# Patient Record
Sex: MALE | Race: WHITE | Employment: OTHER | URBAN - METROPOLITAN AREA
[De-identification: names, ages, dates, MRNs, and addresses within clinical notes are randomized per-mention and may not be internally consistent; named-entity substitution may affect disease eponyms.]

---

## 2017-01-05 ENCOUNTER — MEDICAL CORRESPONDENCE (OUTPATIENT)
Dept: HEALTH INFORMATION MANAGEMENT | Facility: CLINIC | Age: 82
End: 2017-01-05

## 2017-01-12 ENCOUNTER — OFFICE VISIT (OUTPATIENT)
Dept: INTERNAL MEDICINE | Facility: CLINIC | Age: 82
End: 2017-01-12

## 2017-01-12 VITALS
WEIGHT: 193 LBS | HEART RATE: 57 BPM | BODY MASS INDEX: 26.93 KG/M2 | SYSTOLIC BLOOD PRESSURE: 151 MMHG | DIASTOLIC BLOOD PRESSURE: 67 MMHG | RESPIRATION RATE: 16 BRPM

## 2017-01-12 DIAGNOSIS — F43.22 ADJUSTMENT DISORDER WITH ANXIOUS MOOD: Primary | ICD-10-CM

## 2017-01-12 DIAGNOSIS — I10 ESSENTIAL HYPERTENSION: ICD-10-CM

## 2017-01-12 ASSESSMENT — PAIN SCALES - GENERAL: PAINLEVEL: NO PAIN (1)

## 2017-01-12 NOTE — NURSING NOTE
Chief Complaint   Patient presents with     Recheck Medication     Patient is here to recheck medication     Ammy Araujo CMA 10:01 AM on 1/12/2017.

## 2017-01-12 NOTE — PATIENT INSTRUCTIONS
Primary Care Center Medication Refill Request Information:  * Please contact your pharmacy regarding ANY request for medication refills.  ** [facility] Prescription Fax = [phone]  * Please allow 3 business days for routine medication refills.  * Please allow 5 business days for controlled substance medication refills.     Primary Care Center Test Result notification information:  *You will be notified with in 7-10 days of your appointment day regarding the results of your test.  If you are on MyChart you will be notified as soon as the provider has reviewed the results and signed off on them.

## 2017-01-12 NOTE — PROGRESS NOTES
HPI  Daniel Mcnair is an 87M with PMH of anxiety, anal stenosis, CAD, and HTN who is presenting for routine health maintenance examination.      He was started on Zoloft at his last apppointment on 12/20, but is so far uncertain of whether it has been beneficial due to recent events.  He's experienced no side effects. 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  He feels he continues to have anxiety, and would like to continue the medication, but is not interested in seeing a therapist at this time.    Otherwise, he would like to clarify his medication list.  He continues to manage his ongoing medical conditions, most notably his anal stenosis, but had no further concerns for this visit.        ROS  No chest pain  No abdominal pain  No constipation or diarrhea    PMH  Past Medical History   Diagnosis Date     Anal stricture 7/29/2011     Hypertrophy of prostate without urinary obstruction and other lower urinary tract symptoms (LUTS) 7/21/2011     Idiopathic gynecomastia 7/29/2011     Unspecified essential hypertension 7/21/2011     Other and unspecified hyperlipidemia 7/21/2011     Spinal stenosis, lumbar region, without neurogenic claudication 7/21/2011     Impotence of organic origin 7/21/2011     Lymphoma (H) 1/25/2012     Coronary artery disease 9/5/2012     Esophageal reflux 3/20/2013     Baker's cyst      Chronic pain      left hip     Stented coronary artery 2003     Hearing loss      Snoring      Basal cell carcinoma      Macular degeneration, dry          PSH  Past Surgical History   Procedure Laterality Date     Arthroplasty hip  10/2008     left     Arthroplasty hip  1999     right     Hernia repair       Extracapsular cataract extration with intraocular lens implant  2005     Extracapsular cataract extration with intraocular lens implant  2010     Mastoid surgery  1939     Cardiac surgery  2001     stent x 5      Cardiac  surgery  2002     Back surgery  2006     Optical tracking system fusion spine posterior lumbar two levels  7/11/2013     Procedure: OPTICAL TRACKING SYSTEM FUSION SPINE POSTERIOR LUMBAR TWO LEVELS;  Stealth Assisted Lumbar 3-4 Transforaminal Lumbar Interbody Fusion, Lumbar 2-3 Lumbar 3-4 Laminectomy Decompression;  Surgeon: Edgar Lew MD;  Location: [facility]     Cystoscopy, transurethral resection (tur) prostate, combined  11/5/2013     Procedure: COMBINED CYSTOSCOPY, TRANSURETHRAL RESECTION (TUR) PROSTATE;  Transurethral Resection Of Prostate, Bipolar;  Surgeon: Lai Street MD;  Location: [facility]     Arthroplasty revision hip  1/21/2014     Procedure: ARTHROPLASTY REVISION HIP;  Revision Left Total Hip;  Surgeon: Edgar Grewal MD;  Location: [facility]     Esophagoscopy, gastroscopy, duodenoscopy (egd), combined  2/3/2014     Procedure: COMBINED ESOPHAGOSCOPY, GASTROSCOPY, DUODENOSCOPY (EGD);;  Surgeon: Ezra Fiore MD;  Location: [facility]     Esophagoscopy, gastroscopy, duodenoscopy (egd), combined  4/15/2014     Procedure: COMBINED ESOPHAGOSCOPY, GASTROSCOPY, DUODENOSCOPY (EGD), BIOPSY SINGLE OR MULTIPLE;  Surgeon: Ezra Fiore MD;  Location: [facility]     Cataract iol, rt/lt Bilateral      Mohs micrographic procedure           MEDICATIONS    Current outpatient prescriptions:      gentamicin (GARAMYCIN) 0.1 % ointment, , Disp: , Rfl: 3     atorvastatin (LIPITOR) 20 MG tablet, Take 1 tablet (20 mg) by mouth daily, Disp: 90 tablet, Rfl: 2     amLODIPine (NORVASC) 10 MG tablet, Take 1 tablet (10 mg) by mouth daily, Disp: 90 tablet, Rfl: 1     acetaminophen-codeine (TYLENOL W/CODEINE NO. 3) 300-30 MG per tablet, Take 1-2 tablets by mouth every 4 hours as needed for mild pain, Disp: 30 tablet, Rfl: 1     benazepril (LOTENSIN) 40 MG tablet, Take 1 tablet (40 mg) by mouth daily, Disp: 90 tablet, Rfl: 3     tamsulosin (FLOMAX) 0.4 MG 24 hr capsule, Take 1 capsule (0.4 mg) by mouth daily, Disp:  90 capsule, Rfl: 3     triamcinolone (KENALOG) 0.1 % ointment, Apply topically 2 times daily To itchy rashes, Disp: 80 g, Rfl: 3     metoprolol (TOPROL-XL) 25 MG 24 hr tablet, Take 0.5 tablets (12.5 mg) by mouth daily, Disp: 45 tablet, Rfl: 3     tolterodine (DETROL) 2 MG tablet, Take 1 tablet (2 mg )twice a day, Disp: 180 tablet, Rfl: 3     ranitidine (ZANTAC) 150 MG tablet, Take 1 tablet (150 mg) by mouth 2 times daily, Disp: 180 tablet, Rfl: 3     desonide (DESOWEN) 0.05 % ointment, Apply topically 2 times daily To affected areas of scale and redness on the face and ears as needed until clear., Disp: 60 g, Rfl: 11     sildenafil (VIAGRA) 100 MG tablet, Take 1 tablet (100 mg) by mouth daily as needed for erectile dysfunction, Disp: 10 tablet, Rfl: 2     Multiple Vitamins-Minerals (VITEYES AREDS FORMULA/LUTEIN) CAPS, Take by mouth daily, Disp: , Rfl:      psyllium 0.52 G capsule, Take by mouth daily, Disp: , Rfl:      nitroglycerin (NITROSTAT) 0.4 MG SL tablet, Place 1 tablet (0.4 mg) under the tongue every 5 minutes as needed, Disp: 30 tablet, Rfl: 2     triamcinolone (KENALOG) 0.025 % cream, Apply topically 2 times daily, Disp: 45 g, Rfl: 0     Cyanocobalamin (VITAMIN B 12 PO), Take by mouth daily, Disp: , Rfl:      aspirin 81 MG EC tablet, Take 1 tablet (81 mg) by mouth daily, Disp: 90 tablet, Rfl: 3     acetaminophen (TYLENOL) 325 MG tablet, Take 2 tablets (650 mg) by mouth every 6 hours as needed for pain, Disp: 100 tablet, Rfl: 0     Magnesium Hydroxide (MILK OF MAGNESIA PO), Take by mouth At Bedtime, Disp: , Rfl:      docusate sodium 100 MG tablet, Take 100 mg by mouth 2 times daily, Disp: 60 tablet, Rfl: 1     bisacodyl (DULCOLAX) 10 MG suppository, Place 1 suppository rectally daily as needed., Disp: 3 suppository, Rfl: 0     melatonin 3 MG tablet, Take 5 mg by mouth At Bedtime , Disp: , Rfl:      Guar Gum (BENEFIBER) packet, Take 1 packet by mouth every morning , Disp: , Rfl:         PHYSICAL  EXAMINATION  /67 mmHg  Pulse 57  Resp 16  Wt 87.544 kg (193 lb)  Constitutional: anxious, alert  HEENT:  No masses or lesions.  No LAD.  Cardiovascular: RRR, no murmurs   Respiratory: CTAB, no increased work of breathing  Gastrointestinal: Abdomen soft, non-tender.   Musculoskeletal: extremities normal- no LE edema  Skin: no suspicious lesions or rashes  Neurologic: alert and oriented x3  Psychiatric: affect normal/bright      ASSESSMENT/PLAN    1. Adjustment disorder with anxious mood.  Started on zoloft 25mg on 12/20/16.  Currently not interested in therapy.  Will increase zoloft to 50mg and re-evaluate in 1 month.      2. Benign Essential HTN.  Currently taking amlodipine 10mg, metoprolol 25mg, benazepril 40mg.  Given anxiety and recent stressors, no indication for addition of medication to current regimen.  Will reconsider at next visit.    3. Follow up in 2 months      This note was scribed by SUJATA Pena MS4 for Dr. Albertina Gross--Internal Medicine.    The medical student acted as scribe and the encounter documented above was completely performed by myself.  Supervising Doctor Albertina Gross

## 2017-01-12 NOTE — MR AVS SNAPSHOT
After Visit Summary   1/12/2017    Daniel Mcnair    MRN: [id number]           Patient Information     Date Of Birth          10/17/1929        Visit Information        Provider Department      1/12/2017 9:55 AM Albertina Gross MD [facility]        Today's Diagnoses     Adjustment disorder with anxious mood    -  1       Care Instructions    Primary Care Center Medication Refill Request Information:  * Please contact your pharmacy regarding ANY request for medication refills.  ** [facility] Prescription Fax = [phone]  * Please allow 3 business days for routine medication refills.  * Please allow 5 business days for controlled substance medication refills.     Primary Care Center Test Result notification information:  *You will be notified with in 7-10 days of your appointment day regarding the results of your test.  If you are on MyChart you will be notified as soon as the provider has reviewed the results and signed off on them.          Follow-ups after your visit        Follow-up notes from your care team     Return in about 2 months (around 3/12/2017).      Your next 10 appointments already scheduled     Feb 01, 2017 10:30 AM   (Arrive by 10:15 AM)   Return Visit with Gabo Valdez MD   [facility] Physical Medicine and Rehabilitation ([facility] [place])    [address]  3rd Floor  [facility] [id number] [phone]            Mar 15, 2017  9:25 AM   (Arrive by 9:10 AM)   Return Visit with Albertina Gross MD   [facility] ([facility] [place])    [address]  4th Floor  [facility] [id number] [phone]            May 18, 2017  9:00 AM   (Arrive by 8:45 AM)   CT CHEST/ABDOMEN/PELVIS W CONTRAST with UCCT2   [facility] Imaging Center CT ([facility] [place])    [address]  1st [facility] [id number] [phone]           Please bring any scans or  X-rays taken at other hospitals, if similar tests were done. Also bring a list of your medicines, including vitamins, minerals and over-the-counter drugs. It is safest to leave personal items at home.  Be sure to tell your doctor:   If you have any allergies.   If there s any chance you are pregnant.   If you are breastfeeding.   If you have any special needs.  You may have contrast for this exam. To prepare:   Do not eat or drink for 2 hours before your exam. If you need to take medicine, you may take it with small sips of water. (We may ask you to take liquid medicine as well.)   The day before your exam, drink extra fluids at least six 8-ounce glasses (unless your doctor tells you to restrict your fluids).  Patients over 70 or patients with diabetes or kidney problems:   If you haven t had a blood test (creatinine test) within the last 30 days, go to your clinic or Diagnostic Imaging Department for this test.  If you have diabetes:   If your kidney function is normal, continue taking your metformin (Avandamet, Glucophage, Glucovance, Metaglip) on the day of your exam.   If your kidney function is abnormal, wait 48 hours before restarting this medicine.  You will have oral contrast for this exam:   You will drink the contrast at home. Get this from your clinic or Diagnostic Imaging Department. Please follow the directions given.  Please wear loose clothing, such as a sweat suit or jogging clothes. Avoid snaps, zippers and other metal. We may ask you to undress and put on a hospital gown.  If you have any questions, please call the Imaging Department where you will have your exam.            May 18, 2017 10:00 AM   Live Lab Draw with  LIVE LAB DRAW   [facility] Live Lab Draw ([facility] [place])    [address] [address] [phone]            May 18, 2017 12:00 PM   (Arrive by 11:45 AM)   Return Visit with MD SOLANGE [facility] Live  Cancer Clinic ([facility] [place])    [address]  2nd Floor  [facility] [address] [phone]            Jun 05, 2017  8:45 AM   RETURN RETINA with Luna Parker MD   Eye Clinic ([facility])    [address]  [address]  9th [address]  [facility] [address] [phone]              Who to contact     Please call your clinic at [phone] to:    Ask questions about your health    Make or cancel appointments    Discuss your medicines    Learn about your test results    Speak to your doctor   If you have compliments or concerns about an experience at your clinic, or if you wish to file a complaint, please contact [facility] Patient Relations at [phone] or email us at [email or web address]         Additional Information About Your Visit        Loandesk[employer] Dragon Information     [employer]okt gives you secure access to your electronic health record. If you see a primary care provider, you can also send messages to your care team and make appointments. If you have questions, please call your primary care clinic.  If you do not have a primary care provider, please call [phone] and they will assist you.      Embrace is an electronic gateway that provides easy, online access to your medical records. With Embrace, you can request a clinic appointment, read your test results, renew a prescription or communicate with your care team.     To access your existing account, please contact your [facility] Clinic or call [phone] for assistance.        Care EveryWhere ID     This is your Care EveryWhere ID. This could be used by other organizations to access your [place] medical records  [id number]        Your Vitals Were     Pulse Respirations                57 16           Blood Pressure from Last 3 Encounters:   01/12/17 151/67   12/14/16 135/73   11/30/16 140/61    Weight from Last 3  Encounters:   01/12/17 87.544 kg (193 lb)   12/14/16 88.633 kg (195 lb 6.4 oz)   11/23/16 89.495 kg (197 lb 4.8 oz)              Today, you had the following     No orders found for display         Today's Medication Changes          These changes are accurate as of: 1/12/17 11:02 AM.  If you have any questions, ask your nurse or doctor.               Stop taking these medicines if you haven't already. Please contact your care team if you have questions.     loratadine 10 MG tablet   Commonly known as:  CLARITIN           sertraline 25 MG tablet   Commonly known as:  ZOLOFT                    Primary Care Provider Office Phone # Fax #    Albertina Gross [phone]        [facility] [address] [place] [address] [address]  [facility] [address]        Thank you!     Thank you for choosing [facility]  for your care. Our goal is always to provide you with excellent care. Hearing back from our patients is one way we can continue to improve our services. Please take a few minutes to complete the written survey that you may receive in the mail after your visit with us. Thank you!             Your Updated Medication List - Protect others around you: Learn how to safely use, store and throw away your medicines at www.disposemymeds.org.          This list is accurate as of: 1/12/17 11:02 AM.  Always use your most recent med list.                   Brand Name Dispense Instructions for use    acetaminophen 325 MG tablet    TYLENOL    100 tablet    Take 2 tablets (650 mg) by mouth every 6 hours as needed for pain       acetaminophen-codeine 300-30 MG per tablet    TYLENOL w/CODEINE No. 3    30 tablet    Take 1-2 tablets by mouth every 4 hours as needed for mild pain       amLODIPine 10 MG tablet    NORVASC    90 tablet    Take 1 tablet (10 mg) by mouth daily       aspirin 81 MG EC tablet     90 tablet    Take 1 tablet (81 mg) by mouth daily       atorvastatin 20 MG tablet    LIPITOR    90 tablet    Take  1 tablet (20 mg) by mouth daily       benazepril 40 MG tablet    LOTENSIN    90 tablet    Take 1 tablet (40 mg) by mouth daily       bisacodyl 10 MG Suppository    DULCOLAX    3 suppository    Place 1 suppository rectally daily as needed.       desonide 0.05 % ointment    DESOWEN    60 g    Apply topically 2 times daily To affected areas of scale and redness on the face and ears as needed until clear.       docusate sodium 100 MG tablet    COLACE    60 tablet    Take 100 mg by mouth 2 times daily       gentamicin 0.1 % ointment    GARAMYCIN         melatonin 3 MG tablet      Take 5 mg by mouth At Bedtime       metoprolol 25 MG 24 hr tablet    TOPROL-XL    45 tablet    Take 0.5 tablets (12.5 mg) by mouth daily       MILK OF MAGNESIA PO      Take by mouth At Bedtime       nitroglycerin 0.4 MG sublingual tablet    NITROSTAT    30 tablet    Place 1 tablet (0.4 mg) under the tongue every 5 minutes as needed       NutriSource Fiber packet      Take 1 packet by mouth every morning       psyllium 0.52 G capsule      Take by mouth daily       ranitidine 150 MG tablet    ZANTAC    180 tablet    Take 1 tablet (150 mg) by mouth 2 times daily       sildenafil 100 MG cap/tab    REVATIO/VIAGRA    10 tablet    Take 1 tablet (100 mg) by mouth daily as needed for erectile dysfunction       tamsulosin 0.4 MG capsule    FLOMAX    90 capsule    Take 1 capsule (0.4 mg) by mouth daily       tolterodine 2 MG tablet    DETROL    180 tablet    Take 1 tablet (2 mg )twice a day       * triamcinolone 0.025 % cream    KENALOG    45 g    Apply topically 2 times daily       * triamcinolone 0.1 % ointment    KENALOG    80 g    Apply topically 2 times daily To itchy rashes       VITAMIN B 12 PO      Take by mouth daily       VITEYES AREDS FORMULA/LUTEIN Caps      Take by mouth daily       * Notice:  This list has 2 medication(s) that are the same as other medications prescribed for you. Read the directions carefully, and ask your doctor or other  care provider to review them with you.

## 2017-02-01 ENCOUNTER — OFFICE VISIT (OUTPATIENT)
Dept: PHYSICAL MEDICINE AND REHAB | Facility: CLINIC | Age: 82
End: 2017-02-01

## 2017-02-01 VITALS
SYSTOLIC BLOOD PRESSURE: 141 MMHG | HEIGHT: 70 IN | BODY MASS INDEX: 27.66 KG/M2 | HEART RATE: 54 BPM | WEIGHT: 193.2 LBS | DIASTOLIC BLOOD PRESSURE: 56 MMHG | OXYGEN SATURATION: 97 % | RESPIRATION RATE: 20 BRPM

## 2017-02-01 DIAGNOSIS — M54.16 LUMBAR RADICULOPATHY: Primary | ICD-10-CM

## 2017-02-01 DIAGNOSIS — M70.62 GREATER TROCHANTERIC BURSITIS OF LEFT HIP: ICD-10-CM

## 2017-02-01 ASSESSMENT — PAIN SCALES - GENERAL: PAINLEVEL: MILD PAIN (2)

## 2017-02-01 NOTE — Clinical Note
2/1/2017       RE: Daniel Mcnair  [address] [id number]  [place] [phone]     Dear Colleague,    Thank you for referring your patient, Daniel Mcnair, to the [facility] PHYSICAL MEDICINE AND REHABILITATION at [facility]. Please see a copy of my visit note below.    PM&R Clinic F/u for Left Greater Trochanteric Steroid Injection    This is a 88 yo M with chronic low back pain, L > R hip pain likely from greater trochanteric pain syndrome, in the setting of chronic R L5 radiculopathy with myofascial pain.   He was last seen on 11/30/16 and here for the stated procedure.     Procedure: Left Greater Trochanteric Steroid Injection    Diagnosis: Greater Trochanteric pain syndrome     After explaining the procedure (risks: bleeding, infection, post-injection flare, infection, uncontrolled blood glucose levels, subcutaneous fat atrophy, skin depigmentation and benefits: symptom improvement) he gave informed consent.      The patient was placed in right lateral decubitus position and the anatomical area to be infiltrated was exposed.  The area was cleaned with chloro prep.    Using a 25 G 2 in needle, 4 mL of 0.25 % Bupivacaine (NDC 55150-167-10, Exp 03/2018)  40 mg per 1 mL of Kenalog / Triamcinolone (NDC 9436-2408-42, Exp May 2018) were infiltrated to Left Greater Trochanteric Area using an in-plane sono-guided technique with adequate needle visualization.    The area was cleaned and a band-aid was placed.  No post-procedure complication was observed and the patient tolerated the procedure appropriately.    Instructions:  1. Watch for medication side effects (bleeding, infection, post-injection flare, infection, uncontrolled blood glucose levels, subcutaneous fat atrophy, skin depigmentation)  2. Apply ice to area three times a day for 20-30 min during the first 48h.  3. Resume HEP in a few days.        4. Referral to [facility] for repeat L5-S1  Right transforaminal steroid injection.     F/u in 8 weeks           Again, thank you for allowing me to participate in the care of your patient.      Sincerely,    Gabo Valdez MD

## 2017-02-01 NOTE — PROGRESS NOTES
PM&R Clinic F/u for Left Greater Trochanteric Steroid Injection    This is a 88 yo M with chronic low back pain, L > R hip pain likely from greater trochanteric pain syndrome, in the setting of chronic R L5 radiculopathy with myofascial pain.   He was last seen on 11/30/16 and here for the stated procedure.     Procedure: Left Greater Trochanteric Steroid Injection    Diagnosis: Greater Trochanteric pain syndrome     After explaining the procedure (risks: bleeding, infection, post-injection flare, infection, uncontrolled blood glucose levels, subcutaneous fat atrophy, skin depigmentation and benefits: symptom improvement) he gave informed consent.      The patient was placed in right lateral decubitus position and the anatomical area to be infiltrated was exposed.  The area was cleaned with chloro prep.    Using a 25 G 2 in needle, 4 mL of 0.25 % Bupivacaine (NDC 55150-167-10, Exp 03/2018)  40 mg per 1 mL of Kenalog / Triamcinolone (NDC 5355-0082-56, Exp May 2018) were infiltrated to Left Greater Trochanteric Area using an in-plane sono-guided technique with adequate needle visualization.    The area was cleaned and a band-aid was placed.  No post-procedure complication was observed and the patient tolerated the procedure appropriately.    Instructions:  1. Watch for medication side effects (bleeding, infection, post-injection flare, infection, uncontrolled blood glucose levels, subcutaneous fat atrophy, skin depigmentation)  2. Apply ice to area three times a day for 20-30 min during the first 48h.  3. Resume HEP in a few days.        4. Referral to [facility] [place] for repeat L5-S1 Right transforaminal steroid injection.     F/u in 8 weeks

## 2017-02-02 ENCOUNTER — CARE COORDINATION (OUTPATIENT)
Dept: PHYSICAL MEDICINE AND REHAB | Facility: CLINIC | Age: 82
End: 2017-02-02

## 2017-02-02 NOTE — PROGRESS NOTES
Order for fluoroscopic guided lumbar (right L5-S1) transforaminal injection placed with [facility] via their website, requesting [place] location.

## 2017-02-15 ENCOUNTER — TRANSFERRED RECORDS (OUTPATIENT)
Dept: HEALTH INFORMATION MANAGEMENT | Facility: CLINIC | Age: 82
End: 2017-02-15

## 2017-03-11 DIAGNOSIS — I10 BENIGN ESSENTIAL HYPERTENSION: ICD-10-CM

## 2017-03-13 RX ORDER — AMLODIPINE BESYLATE 10 MG/1
10 TABLET ORAL DAILY
Qty: 90 TABLET | Refills: 0 | Status: SHIPPED | OUTPATIENT
Start: 2017-03-13 | End: 2017-06-21

## 2017-03-13 NOTE — TELEPHONE ENCOUNTER
amLODIPine (NORVASC) 10 MG  Last Written Prescription Date:  10/14/16  Last Fill Quantity: 90,   # refills: 1  Last Office Visit : 1/12/17  Future Office visit:  3/15/17  BP Readings from Last 3 Encounters:   02/01/17 141/56   01/12/17 151/67   12/14/16 135/73

## 2017-03-15 ENCOUNTER — OFFICE VISIT (OUTPATIENT)
Dept: INTERNAL MEDICINE | Facility: CLINIC | Age: 82
End: 2017-03-15

## 2017-03-15 VITALS
SYSTOLIC BLOOD PRESSURE: 126 MMHG | RESPIRATION RATE: 16 BRPM | DIASTOLIC BLOOD PRESSURE: 69 MMHG | BODY MASS INDEX: 26.72 KG/M2 | WEIGHT: 186.2 LBS | HEART RATE: 54 BPM

## 2017-03-15 DIAGNOSIS — I10 ESSENTIAL HYPERTENSION, BENIGN: Primary | ICD-10-CM

## 2017-03-15 DIAGNOSIS — K21.9 GASTROESOPHAGEAL REFLUX DISEASE WITHOUT ESOPHAGITIS: ICD-10-CM

## 2017-03-15 DIAGNOSIS — F43.22 ADJUSTMENT DISORDER WITH ANXIOUS MOOD: ICD-10-CM

## 2017-03-15 RX ORDER — METOPROLOL SUCCINATE 25 MG/1
12.5 TABLET, EXTENDED RELEASE ORAL DAILY
Qty: 45 TABLET | Refills: 3 | Status: SHIPPED | OUTPATIENT
Start: 2017-03-15 | End: 2018-03-08

## 2017-03-15 ASSESSMENT — PAIN SCALES - GENERAL: PAINLEVEL: NO PAIN (0)

## 2017-03-15 NOTE — PATIENT INSTRUCTIONS
Primary Care Center Medication Refill Request Information:  * Please contact your pharmacy regarding ANY request for medication refills.  ** [facility] Prescription Fax = [phone]  * Please allow 3 business days for routine medication refills.  * Please allow 5 business days for controlled substance medication refills.     Primary Care Center Test Result notification information:  *You will be notified with in 7-10 days of your appointment day regarding the results of your test.  If you are on MyChart you will be notified as soon as the provider has reviewed the results and signed off on them.

## 2017-03-15 NOTE — MR AVS SNAPSHOT
After Visit Summary   3/15/2017    Daniel Mcnair    MRN: [id number]           Patient Information     Date Of Birth          10/17/1929        Visit Information        Provider Department      3/15/2017 9:25 AM Albertina Gross MD [facility]        Today's Diagnoses     Essential hypertension, benign    -  1    Gastroesophageal reflux disease without esophagitis        Adjustment disorder with anxious mood          Care Instructions    Primary Care Center Medication Refill Request Information:  * Please contact your pharmacy regarding ANY request for medication refills.  ** [facility] Prescription Fax = [phone]  * Please allow 3 business days for routine medication refills.  * Please allow 5 business days for controlled substance medication refills.     Primary Care Center Test Result notification information:  *You will be notified with in 7-10 days of your appointment day regarding the results of your test.  If you are on MyChart you will be notified as soon as the provider has reviewed the results and signed off on them.          Follow-ups after your visit        Your next 10 appointments already scheduled     Apr 25, 2017  2:30 PM CDT   (Arrive by 2:15 PM)   Return Visit with Gabo Valdez MD   [facility] ([facility])    [address] [place] [address] [id number] [phone]            May 18, 2017  9:00 AM CDT   (Arrive by 8:45 AM)   CT CHEST/ABDOMEN/PELVIS W CONTRAST with UCCT2   [facility] [place] [facility] ([facility])    [address] [place] [address] [id number] [phone]           Please bring any scans or X-rays taken at other hospitals, if similar tests were done. Also bring a list of your medicines, including vitamins, minerals and over-the-counter drugs. It is safest to leave personal items at home.  Be sure to tell your doctor:    If you have any allergies.   If there s any chance you are pregnant.   If you are breastfeeding.   If you have any special needs.  You may have contrast for this exam. To prepare:   Do not eat or drink for 2 hours before your exam. If you need to take medicine, you may take it with small sips of water. (We may ask you to take liquid medicine as well.)   The day before your exam, drink extra fluids at least six 8-ounce glasses (unless your doctor tells you to restrict your fluids).  Patients over 70 or patients with diabetes or kidney problems:   If you haven t had a blood test (creatinine test) within the last 30 days, go to your clinic or Diagnostic Imaging Department for this test.  If you have diabetes:   If your kidney function is normal, continue taking your metformin (Avandamet, Glucophage, Glucovance, Metaglip) on the day of your exam.   If your kidney function is abnormal, wait 48 hours before restarting this medicine.  You will have oral contrast for this exam:   You will drink the contrast at home. Get this from your clinic or Diagnostic Imaging Department. Please follow the directions given.  Please wear loose clothing, such as a sweat suit or jogging clothes. Avoid snaps, zippers and other metal. We may ask you to undress and put on a hospital gown.  If you have any questions, please call the Imaging Department where you will have your exam.            May 18, 2017 10:00 AM CDT   [employer] [facility] [employer] Lab Draw with  [facility] LAB DRAW   [facility] Lab Draw ([facility])    [address] [place] [id number]   [phone]            May 18, 2017 12:00 PM CDT   (Arrive by 11:45 AM)   Return Visit with Tanner Rojas MD   [facility] ([facility])    [address] [place] [id number]   [phone]            Jun 05, 2017  8:45 AM CDT   RETURN RETINA with Luna Parker MD   [facility]  ([facility])    [address]  [address]  [address]  [facility] [address]   [phone]            Jun 21, 2017  9:25 AM CDT   (Arrive by 9:10 AM)   Return Visit with Albertina Gross MD   [facility] ([facility])    [address]  4th Floor  [facility] [id number] [phone]              Who to contact     Please call your clinic at [phone] to:    Ask questions about your health    Make or cancel appointments    Discuss your medicines    Learn about your test results    Speak to your doctor   If you have compliments or concerns about an experience at your clinic, or if you wish to file a complaint, please contact [facility] Patient Relations at [phone] or email us at [email or web address]         Additional Information About Your Visit        FUELUPharContact At Once! Information     [employer] gives you secure access to your electronic health record. If you see a primary care provider, you can also send messages to your care team and make appointments. If you have questions, please call your primary care clinic.  If you do not have a primary care provider, please call [phone] and they will assist you.      [employer] is an electronic gateway that provides easy, online access to your medical records. With [employer], you can request a clinic appointment, read your test results, renew a prescription or communicate with your care team.     To access your existing account, please contact your [facility] or call [phone] for assistance.        Care EveryWhere ID     This is your Care EveryWhere ID. This could be used by other organizations to access your [employer] medical records  [id number]        Your Vitals Were     Pulse Respirations BMI (Body Mass Index)             54 16 26.72 kg/m2          Blood Pressure from Last 3 Encounters:   03/15/17 126/69   02/01/17  141/56   01/12/17 151/67    Weight from Last 3 Encounters:   03/15/17 84.5 kg (186 lb 3.2 oz)   02/01/17 87.6 kg (193 lb 3.2 oz)   01/12/17 87.5 kg (193 lb)              Today, you had the following     No orders found for display         Where to get your medicines      These medications were sent to [facility] - [facility] [address]  [address] [address]     Phone:  [phone]     metoprolol 25 MG 24 hr tablet    ranitidine 150 MG tablet          Primary Care Provider Office Phone # Fax #    Albertina Gross [phone]        [facility] [address] [place] [address] [address] [place] [phone]        Thank you!     Thank you for choosing [facility]  for your care. Our goal is always to provide you with excellent care. Hearing back from our patients is one way we can continue to improve our services. Please take a few minutes to complete the written survey that you may receive in the mail after your visit with us. Thank you!             Your Updated Medication List - Protect others around you: Learn how to safely use, store and throw away your medicines at www.disposemymeds.org.          This list is accurate as of: 3/15/17 10:34 AM.  Always use your most recent med list.                   Brand Name Dispense Instructions for use    acetaminophen 325 MG tablet    TYLENOL    100 tablet    Take 2 tablets (650 mg) by mouth every 6 hours as needed for pain       acetaminophen-codeine 300-30 MG per tablet    TYLENOL w/CODEINE No. 3    30 tablet    Take 1-2 tablets by mouth every 4 hours as needed for mild pain       amLODIPine 10 MG tablet    NORVASC    90 tablet    Take 1 tablet (10 mg) by mouth daily       aspirin 81 MG EC tablet     90 tablet    Take 1 tablet (81 mg) by mouth daily       atorvastatin 20 MG tablet    LIPITOR    90 tablet    Take 1 tablet (20 mg) by mouth daily       benazepril 40 MG tablet    LOTENSIN    90 tablet     Take 1 tablet (40 mg) by mouth daily       bisacodyl 10 MG Suppository    DULCOLAX    3 suppository    Place 1 suppository rectally daily as needed.       desonide 0.05 % ointment    DESOWEN    60 g    Apply topically 2 times daily To affected areas of scale and redness on the face and ears as needed until clear.       docusate sodium 100 MG tablet    COLACE    60 tablet    Take 100 mg by mouth 2 times daily       gentamicin 0.1 % ointment    GARAMYCIN         melatonin 3 MG tablet      Take 5 mg by mouth At Bedtime       metoprolol 25 MG 24 hr tablet    TOPROL-XL    45 tablet    Take 0.5 tablets (12.5 mg) by mouth daily       MILK OF MAGNESIA PO      Take by mouth At Bedtime       nitroglycerin 0.4 MG sublingual tablet    NITROSTAT    30 tablet    Place 1 tablet (0.4 mg) under the tongue every 5 minutes as needed       NutriSource Fiber packet      Take 1 packet by mouth every morning       psyllium 0.52 G capsule      Take by mouth daily       ranitidine 150 MG tablet    ZANTAC    180 tablet    Take 1 tablet (150 mg) by mouth 2 times daily       sertraline 50 MG tablet    ZOLOFT    90 tablet    Take 1 tablet (50 mg) by mouth daily       sildenafil 100 MG cap/tab    REVATIO/VIAGRA    10 tablet    Take 1 tablet (100 mg) by mouth daily as needed for erectile dysfunction       tamsulosin 0.4 MG capsule    FLOMAX    90 capsule    Take 1 capsule (0.4 mg) by mouth daily       tolterodine 2 MG tablet    DETROL    180 tablet    Take 1 tablet (2 mg )twice a day       * triamcinolone 0.025 % cream    KENALOG    45 g    Apply topically 2 times daily       * triamcinolone 0.1 % ointment    KENALOG    80 g    Apply topically 2 times daily To itchy rashes       VITAMIN B 12 PO      Take by mouth daily       VITEYES AREDS FORMULA/LUTEIN Caps      Take by mouth daily       * Notice:  This list has 2 medication(s) that are the same as other medications prescribed for you. Read the directions carefully, and ask your doctor or  other care provider to review them with you.

## 2017-03-15 NOTE — PROGRESS NOTES
HPI:   Daniel Mcnair is a 87 year old  male presents today for followup. SUBJECTIVE:  He is here to follow up regarding his hypertension as well as his mood.  At our last visit, I increased his sertraline to 50 mg a day due to continued anxiousness and persistent worrying.  He reports he is overall doing better.  He says his anxiety is better, and his wife confirms this.  He says he is spending less time worrying and just feels more calm.  However, he has noticed difficulty having erections and maintaining erections on the higher dose.  He says that his sexual function was good prior to the sertraline dose, but now it has been more of a problem.  His PHQ-9 score today was 2.  He notes that he still has trouble with needing to pay attention to a bowel regimen in order to have regular bowel movements.  He is known to have anal stenosis and uses a dilator as well as good bowel regimen.  Two-three weeks ago he did have to use a suppository followed by an enema in order to have a bowel movement, but most of the time his regimen is working well.  He has no other complaints today and denies any chest pain or shortness of breath.           ASSESSMENT/PLAN: ASSESSMENT AND PLAN:   1.  Adjustment disorder with anxiety, improved on the sertraline.  He is willing to continue on this for now and will tolerate the sexual side effects for now.  He is hopeful that maybe after 6 months or so we will be able to try to taper him off the sertraline.  If we were not able to get him off of it, we would consider using another SSRI, but at this time, he would like not to make any changes in his medications.   2.  Benign essential hypertension.  Based on reading today, this is well controlled.   3.  Gastroesophageal reflux disease, well controlled on his ranitidine which I have refilled today.   4.  Other medical problems are stable.   5.  Anal fissure.  Constipation is a problem.  Continue bowel regimen and he needs to dilate on a  regular basis.           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 3) 300-30 MG per tablet Take 1-2 tablets by mouth every 4 hours as needed for mild pain (Patient not taking: Reported on 3/15/2017) 30 tablet 1     benazepril (LOTENSIN) 40 MG tablet Take 1 tablet (40 mg) by mouth daily 90 tablet 3     tamsulosin (FLOMAX) 0.4 MG 24 hr capsule Take 1 capsule (0.4 mg) by mouth daily 90 capsule 3     triamcinolone (KENALOG) 0.1 % ointment Apply topically 2 times daily To itchy rashes 80 g 3     metoprolol (TOPROL-XL) 25 MG 24 hr tablet Take 0.5 tablets (12.5 mg) by mouth daily 45 tablet 3     tolterodine (DETROL) 2 MG tablet Take 1 tablet (2 mg )twice a day 180 tablet 3     ranitidine (ZANTAC) 150 MG tablet Take 1 tablet (150 mg) by mouth 2 times daily 180 tablet 3     desonide (DESOWEN) 0.05 % ointment Apply topically 2 times daily To affected areas of scale and redness on the face and ears as needed until clear. 60 g 11     sildenafil (VIAGRA) 100 MG tablet Take 1 tablet (100 mg) by mouth daily as needed for erectile dysfunction 10 tablet 2     Multiple Vitamins-Minerals (VITEYES AREDS FORMULA/LUTEIN) CAPS Take by mouth daily       psyllium 0.52 G capsule Take by mouth daily       nitroglycerin (NITROSTAT) 0.4 MG SL tablet Place 1 tablet (0.4 mg) under the tongue every 5 minutes as needed 30 tablet 2     triamcinolone (KENALOG) 0.025 % cream Apply topically 2 times daily 45 g 0     Cyanocobalamin (VITAMIN B 12 PO) Take by mouth daily       aspirin 81 MG EC tablet Take 1 tablet (81 mg) by mouth daily 90 tablet 3     acetaminophen (TYLENOL) 325 MG tablet Take 2 tablets (650 mg) by mouth every 6 hours as needed for pain 100 tablet 0     Magnesium Hydroxide (MILK OF MAGNESIA PO) Take by mouth At Bedtime       docusate sodium 100 MG tablet Take 100 mg by mouth 2 times daily 60 tablet 1     bisacodyl (DULCOLAX) 10 MG suppository Place 1 suppository rectally daily as needed. 3 suppository 0     melatonin 3 MG tablet Take 5 mg by  mouth At Bedtime        Guar Gum (BENEFIBER) packet Take 1 packet by mouth every morning            ROS:    Constitutional: no fevers, chill   Cardiovascular: no chest pain, palpitations  Respiratory: no dyspnea, cough, shortness of breath or wheezing   GI: no nausea, vomiting, diarrhea, no abdominal pain   Musculoskeletal: no edema       PHYSICAL EXAM:   /69  Pulse 54  Resp 16  Wt 84.5 kg (186 lb 3.2 oz)  BMI 26.72 kg/m2   Wt Readings from Last 1 Encounters:   03/15/17 84.5 kg (186 lb 3.2 oz)       Constitutional: no distress, comfortable, pleasant    Cardiovascular: regular rate and rhythm, normal S1 and S2, no murmurs, rubs or gallops  Respiratory: clear to auscultation, no wheezes or crackles, normal breath sounds   Musculoskeletal:  no edema       Albertina Gross MD

## 2017-03-15 NOTE — NURSING NOTE
Chief Complaint   Patient presents with     Depression     patient states he is here for a check on jacquesoft     SPIKE [place] at 9:36 AM on 3/15/2017.

## 2017-03-28 ENCOUNTER — DOCUMENTATION ONLY (OUTPATIENT)
Dept: OTHER | Facility: CLINIC | Age: 82
End: 2017-03-28

## 2017-03-28 DIAGNOSIS — Z71.89 ADVANCED DIRECTIVES, COUNSELING/DISCUSSION: Chronic | ICD-10-CM

## 2017-04-05 ASSESSMENT — PATIENT HEALTH QUESTIONNAIRE - PHQ9: SUM OF ALL RESPONSES TO PHQ QUESTIONS 1-9: 2

## 2017-04-25 ENCOUNTER — OFFICE VISIT (OUTPATIENT)
Dept: PHYSICAL MEDICINE AND REHAB | Facility: CLINIC | Age: 82
End: 2017-04-25

## 2017-04-25 VITALS
SYSTOLIC BLOOD PRESSURE: 126 MMHG | BODY MASS INDEX: 27.41 KG/M2 | HEIGHT: 70 IN | WEIGHT: 191.5 LBS | HEART RATE: 54 BPM | DIASTOLIC BLOOD PRESSURE: 47 MMHG

## 2017-04-25 DIAGNOSIS — M25.552 HIP PAIN, BILATERAL: Primary | ICD-10-CM

## 2017-04-25 DIAGNOSIS — M54.50 CHRONIC BILATERAL LOW BACK PAIN WITHOUT SCIATICA: ICD-10-CM

## 2017-04-25 DIAGNOSIS — G89.29 CHRONIC BILATERAL LOW BACK PAIN WITHOUT SCIATICA: ICD-10-CM

## 2017-04-25 DIAGNOSIS — M25.551 HIP PAIN, BILATERAL: Primary | ICD-10-CM

## 2017-04-25 ASSESSMENT — PAIN SCALES - GENERAL: PAINLEVEL: NO PAIN (0)

## 2017-04-25 NOTE — LETTER
"4/25/2017     RE: Daniel Mcnair  [address] [id number]     Dear Colleague,    Thank you for referring your patient, Daniel Mcnair, to the [facility] REHAB[facility]ATION at [facility]. Please see a copy of my visit note below.    PHYSICAL MEDICINE AND REHABILITATION CLINIC:  Followup with me today after last seen on 02/01/2017 for back pain and hip pain.     Interval History:   Mr. Mcnair continues to have left sided \"hip\" pain and this unfortunately did not respond to the left greater trochanteric bursa injection that he received at this last visit. He did undergo a right L5 epidural for his chronic right sided radiculopathy and states that that issue is stable. He has done physical therapy for other issues before but nothing targeting this left sided pain. He states that this pain started over a year ago and his last artificial hip revision on that left side was about 2 years ago.    He is also concerned today about stiffness in the bilateral anterior and posterior thighs. He feels that this in general is exacerbated by activity. He reports limited walking tolerance and having to take breaks in order to \"recharge\". He denies pain associated with this. He also admits to generalized fatigue for the past couple years and states that he definitely sleeps longer at night than usual and takes a 2 hour nap during the day which is his best sleep. He feels this may be related to waking up several times at night to urinate but is unsure. The stiffness in his bilateral thighs has started to significantly impact his activities.       Original HPI:  This is an 87-year-old gentleman with acute and chronic low back pain and hip pain in the setting of chronic right-sided radiculopathy.  He had a history of right total hip arthroplasty in 1998, left total hip arthroplasty in 2006 and 2 lumbar spine surgeries in the 2000s.     "   PHYSICAL EXAMINATION:  Blood pressure 140/61, pulse 63 per minute.      Gen: Alert, pleasant, conversant individual, not in distress, cooperative.  Uses a cane for ambulation.    Ext: AROM intact throughout the upper and lower extremities. Tenderness to palpation just inferior to the left greater trochanter.  Spine: Tender to palpation at the right paraspinal and left PSIS, pain did not radiate.  Neuro: Speech is fluent and intelligible. Strength is 5/5 and symmetric. Sensation to light touch appears to be diminished in a stocking distribution. No tone detected on exam. No clonus at the ankles. SLR negative bilaterally. Pain with resisted left hip abduction.    ASSESSMENT AND PLAN:  This is an 87-year-old male with acute on chronic low back pain and hip pain in the setting of chronic right-sided radiculopathy with prior bilateral total hip arthroplasty in the past and 2 lumbar spine surgeries in the past with low back pain and hip pain.  His low back and right leg pain are likely due to a chronic L5 radiculopathy and the TFESI injections continue to provided some benefit.   He continues to have left>right lateral thigh pain that did not respond to greater trochanteric bursa injection. Tender to palpation today in a 1inch diamter area just inferior to the greater trochanter. Differential inculdes IT band syndrome as well as post surgical pain. Ordered physical therapy to develop a HEP for his bilateral hip tightness/pain.  Discussed stretching and heating regimen for his bilateral thigh stiffness.  Return to clinic in 3-4 months or PRN.    Rudi Pace,   PGY-4 PM&R Resident    The patient was seen and staffed with Dr. Valdez    Patient seen and examined with Dr Reynold Pace PM&R Resident, plan discussed. Agree with the findings and the plan of care as documented in this note. More than 15 minutes spent for this clinic visit.     Again, thank you for allowing me to participate in the care of your patient.       Sincerely,    Gabo Valdez MD

## 2017-04-25 NOTE — MR AVS SNAPSHOT
After Visit Summary   4/25/2017    Daniel Mcnair    MRN: [id number]           Patient Information     Date Of Birth          10/17/1929        Visit Information        Provider Department      4/25/2017 2:30 PM Gabo Valdez MD [facility]        Today's Diagnoses     Hip pain, bilateral    -  1    Chronic bilateral low back pain without sciatica           Follow-ups after your visit        Additional Services     PHYSICAL THERAPY REFERRAL       [facility] in [place]       Treatment: Evaluation & Treatment  Special Instructions/Modalities: modalities especially heat, stretching, core and pelvic stabilization, conditioning, HEP   Special Programs: none    Please be aware that coverage of these services is subject to the terms and limitations of your health insurance plan.  Call member services at your health plan with any benefit or coverage questions.      **Note to Provider:  If you are referring outside of [facility] for the therapy appointment, please list the name of the location in the  special instructions  above, print the referral and give to the patient to schedule the appointment.                  Your next 10 appointments already scheduled     May 18, 2017  9:00 AM CDT   (Arrive by 8:45 AM)   CT CHEST/ABDOMEN/PELVIS W CONTRAST with UCCT2   [facility] ([facility])    [address] [address] [phone]           Please bring any scans or X-rays taken at other hospitals, if similar tests were done. Also bring a list of your medicines, including vitamins, minerals and over-the-counter drugs. It is safest to leave personal items at home.  Be sure to tell your doctor:   If you have any allergies.   If there s any chance you are pregnant.   If you are breastfeeding.   If you have any special needs.  You may have contrast for this exam. To prepare:   Do not eat or drink for 2  hours before your exam. If you need to take medicine, you may take it with small sips of water. (We may ask you to take liquid medicine as well.)   The day before your exam, drink extra fluids at least six 8-ounce glasses (unless your doctor tells you to restrict your fluids).  Patients over 70 or patients with diabetes or kidney problems:   If you haven t had a blood test (creatinine test) within the last 30 days, go to your clinic or Diagnostic Imaging Department for this test.  If you have diabetes:   If your kidney function is normal, continue taking your metformin (Avandamet, Glucophage, Glucovance, Metaglip) on the day of your exam.   If your kidney function is abnormal, wait 48 hours before restarting this medicine.  You will have oral contrast for this exam:   You will drink the contrast at home. Get this from your clinic or Diagnostic Imaging Department. Please follow the directions given.  Please wear loose clothing, such as a sweat suit or jogging clothes. Avoid snaps, zippers and other metal. We may ask you to undress and put on a hospital gown.  If you have any questions, please call the Imaging Department where you will have your exam.            May 18, 2017 10:00 AM CDT   [facility] Draw with  [facility] LAB DRAW   [facility] Lab Draw ([facility])    [address] [place] [address]   [phone]            May 18, 2017 12:00 PM CDT   (Arrive by 11:45 AM)   Return Visit with Tanner Rojas MD   [facility] ([facility])    [address] [place] [address]   [phone]            Jun 21, 2017  9:25 AM CDT   (Arrive by 9:10 AM)   Return Visit with Albetrina Gross MD   [facility] ([facility])    [address] [place] [address]   [phone]            Jul 11, 2017  8:15 AM CDT   RETURN RETINA with  "Luna Parker MD   [facility] Clinic ([facility])    [address]  [address] [address]  9th [address]  [facility] [address]   [phone]            Jul 27, 2017 10:30 AM CDT   (Arrive by 10:15 AM)   Return Visit with Mary Sanon MD   [facility] ([facility])    [address]  3rd Floor  [facility] [id number] [phone]              Who to contact     Please call your clinic at [phone] to:    Ask questions about your health    Make or cancel appointments    Discuss your medicines    Learn about your test results    Speak to your doctor   If you have compliments or concerns about an experience at your clinic, or if you wish to file a complaint, please contact [facility] Patient Relations at [phone] or email us at [email or web address]         Additional Information About Your Visit        IdeaxisharHitchedPic Information     Invenra gives you secure access to your electronic health record. If you see a primary care provider, you can also send messages to your care team and make appointments. If you have questions, please call your primary care clinic.  If you do not have a primary care provider, please call [phone] and they will assist you.      Invenra is an electronic gateway that provides easy, online access to your medical records. With Invenra, you can request a clinic appointment, read your test results, renew a prescription or communicate with your care team.     To access your existing account, please contact your [facility] or call [phone] for assistance.        Care EveryWhere ID     This is your Care EveryWhere ID. This could be used by other organizations to access your [place] medical records  [id number]        Your Vitals Were     Pulse Height BMI (Body Mass Index)             54 1.778 m (5' 10\") 27.48 kg/m2          Blood " Pressure from Last 3 Encounters:   04/25/17 126/47   03/15/17 126/69   02/01/17 141/56    Weight from Last 3 Encounters:   04/25/17 86.9 kg (191 lb 8 oz)   03/15/17 84.5 kg (186 lb 3.2 oz)   02/01/17 87.6 kg (193 lb 3.2 oz)              We Performed the Following     PHYSICAL THERAPY REFERRAL        Primary Care Provider Office Phone # Fax #    Albertina Gross [phone]        PH[facility]IC[facility] [address] [facility] [address]  [facility] [address]        Thank you!     Thank you for choosing [facility]  for your care. Our goal is always to provide you with excellent care. Hearing back from our patients is one way we can continue to improve our services. Please take a few minutes to complete the written survey that you may receive in the mail after your visit with us. Thank you!             Your Updated Medication List - Protect others around you: Learn how to safely use, store and throw away your medicines at www.disposemymeds.org.          This list is accurate as of: 4/25/17  4:23 PM.  Always use your most recent med list.                   Brand Name Dispense Instructions for use    acetaminophen 325 MG tablet    TYLENOL    100 tablet    Take 2 tablets (650 mg) by mouth every 6 hours as needed for pain       acetaminophen-codeine 300-30 MG per tablet    TYLENOL w/CODEINE No. 3    30 tablet    Take 1-2 tablets by mouth every 4 hours as needed for mild pain       amLODIPine 10 MG tablet    NORVASC    90 tablet    Take 1 tablet (10 mg) by mouth daily       aspirin 81 MG EC tablet     90 tablet    Take 1 tablet (81 mg) by mouth daily       atorvastatin 20 MG tablet    LIPITOR    90 tablet    Take 1 tablet (20 mg) by mouth daily       benazepril 40 MG tablet    LOTENSIN    90 tablet    Take 1 tablet (40 mg) by mouth daily       bisacodyl 10 MG Suppository    DULCOLAX    3 suppository    Place 1 suppository rectally daily as needed.       desonide 0.05 % ointment    DESOWEN     60 g    Apply topically 2 times daily To affected areas of scale and redness on the face and ears as needed until clear.       docusate sodium 100 MG tablet    COLACE    60 tablet    Take 100 mg by mouth 2 times daily       gentamicin 0.1 % ointment    GARAMYCIN         melatonin 3 MG tablet      Take 5 mg by mouth At Bedtime       metoprolol 25 MG 24 hr tablet    TOPROL-XL    45 tablet    Take 0.5 tablets (12.5 mg) by mouth daily       MILK OF MAGNESIA PO      Take by mouth At Bedtime       nitroglycerin 0.4 MG sublingual tablet    NITROSTAT    30 tablet    Place 1 tablet (0.4 mg) under the tongue every 5 minutes as needed       NutriSource Fiber packet      Take 1 packet by mouth every morning       psyllium 0.52 G capsule      Take by mouth daily       ranitidine 150 MG tablet    ZANTAC    180 tablet    Take 1 tablet (150 mg) by mouth 2 times daily       sertraline 50 MG tablet    ZOLOFT    90 tablet    Take 1 tablet (50 mg) by mouth daily       sildenafil 100 MG cap/tab    REVATIO/VIAGRA    10 tablet    Take 1 tablet (100 mg) by mouth daily as needed for erectile dysfunction       tamsulosin 0.4 MG capsule    FLOMAX    90 capsule    Take 1 capsule (0.4 mg) by mouth daily       tolterodine 2 MG tablet    DETROL    180 tablet    Take 1 tablet (2 mg )twice a day       * triamcinolone 0.025 % cream    KENALOG    45 g    Apply topically 2 times daily       * triamcinolone 0.1 % ointment    KENALOG    80 g    Apply topically 2 times daily To itchy rashes       VITAMIN B 12 PO      Take by mouth daily       VITEYES AREDS FORMULA/LUTEIN Caps      Take by mouth daily       * Notice:  This list has 2 medication(s) that are the same as other medications prescribed for you. Read the directions carefully, and ask your doctor or other care provider to review them with you.

## 2017-04-25 NOTE — PROGRESS NOTES
"PHYSICAL MEDICINE AND REHABILITATION CLINIC:  Followup with me today after last seen on 02/01/2017 for back pain and hip pain.     Interval History:   Mr. Mcnair continues to have left sided \"hip\" pain and this unfortunately did not respond to the left greater trochanteric bursa injection that he received at this last visit. He did undergo a right L5 epidural for his chronic right sided radiculopathy and states that that issue is stable. He has done physical therapy for other issues before but nothing targeting this left sided pain. He states that this pain started over a year ago and his last artificial hip revision on that left side was about 2 years ago.    He is also concerned today about stiffness in the bilateral anterior and posterior thighs. He feels that this in general is exacerbated by activity. He reports limited walking tolerance and having to take breaks in order to \"recharge\". He denies pain associated with this. He also admits to generalized fatigue for the past couple years and states that he definitely sleeps longer at night than usual and takes a 2 hour nap during the day which is his best sleep. He feels this may be related to waking up several times at night to urinate but is unsure. The stiffness in his bilateral thighs has started to significantly impact his activities.       Original HPI:  This is an 87-year-old gentleman with acute and chronic low back pain and hip pain in the setting of chronic right-sided radiculopathy.  He had a history of right total hip arthroplasty in 1998, left total hip arthroplasty in 2006 and 2 lumbar spine surgeries in the 2000s.       PHYSICAL EXAMINATION:  Blood pressure 140/61, pulse 63 per minute.      Gen: Alert, pleasant, conversant individual, not in distress, cooperative.  Uses a cane for ambulation.    Ext: AROM intact throughout the upper and lower extremities. Tenderness to palpation just inferior to the left greater trochanter.  Spine: Tender to " palpation at the right paraspinal and left PSIS, pain did not radiate.  Neuro: Speech is fluent and intelligible. Strength is 5/5 and symmetric. Sensation to light touch appears to be diminished in a stocking distribution. No tone detected on exam. No clonus at the ankles. SLR negative bilaterally. Pain with resisted left hip abduction.    ASSESSMENT AND PLAN:  This is an 87-year-old male with acute on chronic low back pain and hip pain in the setting of chronic right-sided radiculopathy with prior bilateral total hip arthroplasty in the past and 2 lumbar spine surgeries in the past with low back pain and hip pain.  His low back and right leg pain are likely due to a chronic L5 radiculopathy and the TFESI injections continue to provided some benefit.   He continues to have left>right lateral thigh pain that did not respond to greater trochanteric bursa injection. Tender to palpation today in a 1inch diamter area just inferior to the greater trochanter. Differential inculdes IT band syndrome as well as post surgical pain. Ordered physical therapy to develop a HEP for his bilateral hip tightness/pain.  Discussed stretching and heating regimen for his bilateral thigh stiffness.  Return to clinic in 3-4 months or PRN.    Rudi Pace,   PGY-4 PM&R Resident    The patient was seen and staffed with Dr. Valdez    Patient seen and examined with Dr Reynold Pace PM&R Resident, plan discussed. Agree with the findings and the plan of care as documented in this note. More than 15 minutes spent for this clinic visit.

## 2017-04-26 ENCOUNTER — CARE COORDINATION (OUTPATIENT)
Dept: PHYSICAL MEDICINE AND REHAB | Facility: CLINIC | Age: 82
End: 2017-04-26

## 2017-04-26 NOTE — PROGRESS NOTES
Physical therapy orders signed by Dr. Valdez, and faxed to Clay Christine, requesting [facility], [phone].

## 2017-05-05 ENCOUNTER — TRANSFERRED RECORDS (OUTPATIENT)
Dept: HEALTH INFORMATION MANAGEMENT | Facility: CLINIC | Age: 82
End: 2017-05-05

## 2017-05-18 ENCOUNTER — ONCOLOGY VISIT (OUTPATIENT)
Dept: ONCOLOGY | Facility: CLINIC | Age: 82
End: 2017-05-18
Attending: INTERNAL MEDICINE
Payer: MEDICARE

## 2017-05-18 ENCOUNTER — APPOINTMENT (OUTPATIENT)
Dept: LAB | Facility: CLINIC | Age: 82
End: 2017-05-18
Attending: INTERNAL MEDICINE
Payer: MEDICARE

## 2017-05-18 VITALS
HEART RATE: 50 BPM | HEIGHT: 70 IN | TEMPERATURE: 98.1 F | SYSTOLIC BLOOD PRESSURE: 139 MMHG | OXYGEN SATURATION: 97 % | WEIGHT: 190.9 LBS | BODY MASS INDEX: 27.33 KG/M2 | DIASTOLIC BLOOD PRESSURE: 61 MMHG

## 2017-05-18 DIAGNOSIS — C85.13 B-CELL LYMPHOMA OF INTRA-ABDOMINAL LYMPH NODES, UNSPECIFIED B-CELL LYMPHOMA TYPE (H): ICD-10-CM

## 2017-05-18 LAB
ALBUMIN SERPL-MCNC: 3.6 G/DL (ref 3.4–5)
ALP SERPL-CCNC: 63 U/L (ref 40–150)
ALT SERPL W P-5'-P-CCNC: 24 U/L (ref 0–70)
ANION GAP SERPL CALCULATED.3IONS-SCNC: 10 MMOL/L (ref 3–14)
AST SERPL W P-5'-P-CCNC: 20 U/L (ref 0–45)
BASOPHILS # BLD AUTO: 0 10E9/L (ref 0–0.2)
BASOPHILS NFR BLD AUTO: 0.5 %
BILIRUB SERPL-MCNC: 0.9 MG/DL (ref 0.2–1.3)
BUN SERPL-MCNC: 12 MG/DL (ref 7–30)
CALCIUM SERPL-MCNC: 8.7 MG/DL (ref 8.5–10.1)
CHLORIDE SERPL-SCNC: 106 MMOL/L (ref 94–109)
CO2 SERPL-SCNC: 23 MMOL/L (ref 20–32)
CREAT SERPL-MCNC: 0.78 MG/DL (ref 0.66–1.25)
DIFFERENTIAL METHOD BLD: ABNORMAL
EOSINOPHIL # BLD AUTO: 0.2 10E9/L (ref 0–0.7)
EOSINOPHIL NFR BLD AUTO: 2.7 %
ERYTHROCYTE [DISTWIDTH] IN BLOOD BY AUTOMATED COUNT: 13.3 % (ref 10–15)
GFR SERPL CREATININE-BSD FRML MDRD: ABNORMAL ML/MIN/1.7M2
GLUCOSE SERPL-MCNC: 94 MG/DL (ref 70–99)
HCT VFR BLD AUTO: 37.6 % (ref 40–53)
HGB BLD-MCNC: 13.4 G/DL (ref 13.3–17.7)
IMM GRANULOCYTES # BLD: 0 10E9/L (ref 0–0.4)
IMM GRANULOCYTES NFR BLD: 0.5 %
LDH SERPL L TO P-CCNC: 160 U/L (ref 85–227)
LYMPHOCYTES # BLD AUTO: 1.6 10E9/L (ref 0.8–5.3)
LYMPHOCYTES NFR BLD AUTO: 21 %
MCH RBC QN AUTO: 34.1 PG (ref 26.5–33)
MCHC RBC AUTO-ENTMCNC: 35.6 G/DL (ref 31.5–36.5)
MCV RBC AUTO: 96 FL (ref 78–100)
MONOCYTES # BLD AUTO: 0.7 10E9/L (ref 0–1.3)
MONOCYTES NFR BLD AUTO: 9.8 %
NEUTROPHILS # BLD AUTO: 4.9 10E9/L (ref 1.6–8.3)
NEUTROPHILS NFR BLD AUTO: 65.5 %
NRBC # BLD AUTO: 0 10*3/UL
NRBC BLD AUTO-RTO: 0 /100
PLATELET # BLD AUTO: 145 10E9/L (ref 150–450)
POTASSIUM SERPL-SCNC: 4.4 MMOL/L (ref 3.4–5.3)
PROT SERPL-MCNC: 6.3 G/DL (ref 6.8–8.8)
RBC # BLD AUTO: 3.93 10E12/L (ref 4.4–5.9)
SODIUM SERPL-SCNC: 139 MMOL/L (ref 133–144)
URATE SERPL-MCNC: 4.3 MG/DL (ref 3.5–7.2)
WBC # BLD AUTO: 7.5 10E9/L (ref 4–11)

## 2017-05-18 PROCEDURE — 84550 ASSAY OF BLOOD/URIC ACID: CPT | Performed by: INTERNAL MEDICINE

## 2017-05-18 PROCEDURE — 84165 PROTEIN E-PHORESIS SERUM: CPT | Performed by: INTERNAL MEDICINE

## 2017-05-18 PROCEDURE — 83615 LACTATE (LD) (LDH) ENZYME: CPT | Performed by: INTERNAL MEDICINE

## 2017-05-18 PROCEDURE — 99212 OFFICE O/P EST SF 10 MIN: CPT | Mod: ZF

## 2017-05-18 PROCEDURE — 80053 COMPREHEN METABOLIC PANEL: CPT | Performed by: INTERNAL MEDICINE

## 2017-05-18 PROCEDURE — 36592 COLLECT BLOOD FROM PICC: CPT

## 2017-05-18 PROCEDURE — 00000402 ZZHCL STATISTIC TOTAL PROTEIN: Performed by: INTERNAL MEDICINE

## 2017-05-18 PROCEDURE — 85025 COMPLETE CBC W/AUTO DIFF WBC: CPT | Performed by: INTERNAL MEDICINE

## 2017-05-18 PROCEDURE — 99214 OFFICE O/P EST MOD 30 MIN: CPT | Mod: ZP | Performed by: INTERNAL MEDICINE

## 2017-05-18 ASSESSMENT — PAIN SCALES - GENERAL: PAINLEVEL: NO PAIN (0)

## 2017-05-18 NOTE — NURSING NOTE
Chief Complaint   Patient presents with     Blood Draw     vs/labs drawn off PIV   SEe flowsheets. PIV d/c.  Andreea Wick, [facility]

## 2017-05-18 NOTE — LETTER
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  The lymphoma was diagnosed when retroperitoneal lymphadenopathy was incidentally identified on a CT scan obtained for other purposes.  Diagnosis was established by needle biopsy.  The specimen was too small for histologic subtyping, but in view of the histology and modest adenopathy, it was felt that this was likely a low-grade lymphoma.  Reverend Mcnair has now been monitored without therapeutic intervention for approximately 5-1/2 years.  He was last in clinic on 11/16/2016.       INTERIM HISTORY: Reverend Mcnair's problems continue to be primarily those related to orthopedic and degenerative spinal issues.  The most recent steroid injection did not help very long.  He continues to have considerable fatigue and stress, but also maintains a quite busy schedule.        He has had no interval fevers, night sweats or weight loss.  The most significant pain that he has is in the right sacroiliac region.  His wife mentions that he only takes acetaminophen for this pain.  He is also undergoing physical therapy.       He has had no interval infections.  A 10-point review of systems is otherwise negative.  He has no upper abdominal or back pain attributable to pancreatic lesions.       MEDICATIONS:   1.  Metoprolol.   2.  Ranitidine.   3.  Amlodipine.   4.  Sertraline   5.  Atorvastatin.   6.  Acetaminophen/codeine.   7.  Lotensin.   8.  Tamsulosin.   9.  Tolterodine.    10.  Sildenafil.     11.  Multivitamins.    12.  Psyllium.     13.  Nitroglycerin.  The patient has not used this since approximately 2002.  He had a prescription, but it is an old prescription and has been misplaced.  Discussed reauthorizing via primary provider.    14.  Topical agents.       ALLERGIES:  None reported.       PHYSICAL EXAMINATION:   VITAL SIGNS:  Weight 86.6 kg (relatively stable), blood pressure 139/61, pulse 50 and regular, respirations 16-18, temperature 98.1, O2 saturation 97% on room air.    HEENT:  Pupils equal and reactive.  EOM - intact.  Anicteric.  Oropharynx - no visible masses.     Mucosa - moist.  No lesions.   NECK:  No cervical or supraclavicular adenopathy.   CHEST:  Clear to auscultation.   AXILLAE:  Negative.   HEART:  Regular rhythm.  No murmur. No gallop.   ABDOMEN:  Soft and nontender.  The liver is at the right costal margin and spans approximately    10-11 cm.  The spleen is not palpable or percussible.  No masses.  Bowel sounds present.  No inguinal/femoral nodes.    EXTREMITIES:  He has 1-2+ lower extremity edema.   SKIN:  No rashes.       LABORATORY:     Hemoglobin 13.4 grams percent with 7,500 WBCs (66% neutrophils, 21% lymphocytes) and 145,000 platelets.     Electrolytes, calcium, creatinine (0.78) - normal.    Liver enzymes, including serum LDH - normal.    Uric acid 4.3.   Glucose 94.      IMAGING:  Chest and abdominal/pelvic CT scan -     1. In this patient with a history of low-grade B-cell lymphoma, stable disease. Mildly enlarged retroperitoneal lymph nodes and prominent mediastinal lymph nodes are not significantly changed since 12/2014. No new or enlarging adenopathy in the chest, abdomen or pelvis.  2. Numerous bilateral solid pulmonary nodules are largely unchanged since 2014, with the exception of a new 3 mm nodule in the right lower lobe. Recommend attention on follow-up.  3. 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 Consider follow-up in 6 months to 1 year.     ASSESSMENT AND PLAN:  Low-grade B-cell lymphoma, clinical stage IIA, with intra-abdominal lymph nodes, but incompletely staged.  The patient initially had some regression and then subsequently had stable disease on serial CT scans.  His most recent CT scan  prior to today was obtained in 12/2014 and showed a spontaneous overall decrease in the size of nodes with no new findings.  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       Relative to the lymphoma, we will continue followup as we have been.  Although we were considering extending the interval to a year because of the stability of disease, the new findings on CT scan merit followup per patient's request.       CT findings regarding the pancreas - I had an extended discussion with Reverend Mcnair and his wife regarding these findings, which are apparently new since 2014 and may represent IPMNs.  If documented, it is unlikely that they would be actionable.  However, in our discussion he indicated that it would be reassuring to have the additional followup to know if these are, in fact, changing.  Thus, we will have him return in approximately 6 months with an abdominal CT scan to reassess.        The patient is out of nitroglycerin, but has not used this for many years.  He will be seeing his primary physician, Dr. Gross, in the next month or so.  The patient was comfortable waiting to see her prior to determining whether this prescription should be renewed.      Tanner Rojas MD  Professor of Medicine  Oncology  [facility]  Office: [phone]  Clinic Fax: [phone]        cc:      MD Abel Barcenas MD Bruce A. Peterson, MD

## 2017-05-18 NOTE — MR AVS SNAPSHOT
After Visit Summary   5/18/2017    Daniel Mcnair    MRN: [id number]           Patient Information     Date Of Birth          10/17/1929        Visit Information        Provider Department      5/18/2017 12:00 PM Tanner Rojas MD [facility]        Today's Diagnoses     B-cell lymphoma of intra-abdominal lymph nodes, unspecified B-cell lymphoma type (H)          Care Instructions    Return in about 6 months with labs and CT.         Follow-ups after your visit        Follow-up notes from your care team     Return in about 6 months (around 11/18/2017) for Physical Exam, Lab Work, CT.      Your next 10 appointments already scheduled     Jun 21, 2017  9:25 AM CDT   (Arrive by 9:10 AM)   Return Visit with Albertina Gross MD   [facility] ([facility] [place])    [address] [place] [address]  4th [facility] [address]   [phone]            Jul 11, 2017  8:15 AM CDT   RETURN RETINA with Luna Parker MD   [facility] Clinic ([facility])    [address]  [address] [place] [address]  [facility]  [facility] [address]   [phone]            Jul 27, 2017 10:30 AM CDT   (Arrive by 10:15 AM)   Return Visit with Mary Sanon MD   [facility] ([facility])    [address] [place] [address]  3rd [facility] [address] [phone]            Aug 11, 2017 10:00 AM CDT   (Arrive by 9:45 AM)   Return Visit with Esau Cotton MD   [facility] ([facility])    [address] [place] [address]  3rd [facility] [id number] [phone]            Nov 16, 2017  9:00 AM CST   (Arrive by 8:45 AM)   CT ABDOMEN W CONTRAST with UCCT1   [facility] [place] [facility] ([facility])    [address] [place] [id number] [phone]           Please bring any scans or X-rays taken at other hospitals,  if similar tests were done. Also bring a list of your medicines, including vitamins, minerals and over-the-counter drugs. It is safest to leave personal items at home.  Be sure to tell your doctor:   If you have any allergies.   If there s any chance you are pregnant.   If you are breastfeeding.   If you have any special needs.  You may have contrast for this exam. To prepare:   Do not eat or drink for 2 hours before your exam. If you need to take medicine, you may take it with small sips of water. (We may ask you to take liquid medicine as well.)   The day before your exam, drink extra fluids at least six 8-ounce glasses (unless your doctor tells you to restrict your fluids).  Patients over 70 or patients with diabetes or kidney problems:   If you haven t had a blood test (creatinine test) within the last 30 days, go to your clinic or Diagnostic Imaging Department for this test.  If you have diabetes:   If your kidney function is normal, continue taking your metformin (Avandamet, Glucophage, Glucovance, Metaglip) on the day of your exam.   If your kidney function is abnormal, wait 48 hours before restarting this medicine.  You will have oral contrast for this exam:   You will drink the contrast at home. Get this from your clinic or Diagnostic Imaging Department. Please follow the directions given.  Please wear loose clothing, such as a sweat suit or jogging clothes. Avoid snaps, zippers and other metal. We may ask you to undress and put on a hospital gown.  If you have any questions, please call the Imaging Department where you will have your exam.            Nov 16, 2017  9:45 AM [facility]   [facility] Lab Draw with  LIVE LAB DRAW   [facility] Lab Draw ([facility])    [address] [address] [phone]            Nov 16, 2017 10:30 AM CST   (Arrive by 10:15 AM)   Return Visit with Tanner Rojas MD   [facility] ([facility]  "Clinics and Surgery Center)    [address] [facility]  2nd Floor  [facility] [id number] [phone]              Future tests that were ordered for you today     Open Future Orders        Priority Expected Expires Ordered    Basic metabolic panel Routine 11/18/2017 5/18/2018 5/18/2017    *CBC with platelets differential Routine 11/18/2017 5/18/2018 5/18/2017    Comprehensive metabolic panel Routine 11/18/2017 5/18/2018 5/18/2017    CT Abdomen w contrast* Routine 11/18/2017 5/18/2018 5/18/2017            Who to contact     If you have questions or need follow up information about today's clinic visit or your schedule please contact [facility] directly at [phone].  Normal or non-critical lab and imaging results will be communicated to you by MyChart, letter or phone within 4 business days after the clinic has received the results. If you do not hear from us within 7 days, please contact the clinic through [employer] or phone. If you have a critical or abnormal lab result, we will notify you by phone as soon as possible.  Submit refill requests through [employer] or call your pharmacy and they will forward the refill request to us. Please allow 3 business days for your refill to be completed.          Additional Information About Your Visit        [employer] Information     [employer] gives you secure access to your electronic health record. If you see a primary care provider, you can also send messages to your care team and make appointments. If you have questions, please call your primary care clinic.  If you do not have a primary care provider, please call [phone] and they will assist you.        Care EveryWhere ID     This is your Care EveryWhere ID. This could be used by other organizations to access your [facility] medical records  [id number]        Your Vitals Were     Pulse Temperature Height Pulse Oximetry BMI (Body Mass Index)       50 98.1  F (36.7  C) (Oral) 1.778 m (5' 10\") 97% 27.39 " kg/m2        Blood Pressure from Last 3 Encounters:   05/18/17 139/61   04/25/17 126/47   03/15/17 126/69    Weight from Last 3 Encounters:   05/18/17 86.6 kg (190 lb 14.4 oz)   04/25/17 86.9 kg (191 lb 8 oz)   03/15/17 84.5 kg (186 lb 3.2 oz)              We Performed the Following     *CBC with platelets differential     Comprehensive metabolic panel     Lactate Dehydrogenase     Protein electrophoresis     Uric acid        Primary Care Provider Office Phone # Fax #    Albertina Gross [phone]        [facility] [address] [facility] [address]  [facility] [address]        Thank you!     Thank you for choosing [facility]  for your care. Our goal is always to provide you with excellent care. Hearing back from our patients is one way we can continue to improve our services. Please take a few minutes to complete the written survey that you may receive in the mail after your visit with us. Thank you!             Your Updated Medication List - Protect others around you: Learn how to safely use, store and throw away your medicines at www.disposemymeds.org.          This list is accurate as of: 5/18/17 12:24 PM.  Always use your most recent med list.                   Brand Name Dispense Instructions for use    acetaminophen 325 MG tablet    TYLENOL    100 tablet    Take 2 tablets (650 mg) by mouth every 6 hours as needed for pain       acetaminophen-codeine 300-30 MG per tablet    TYLENOL w/CODEINE No. 3    30 tablet    Take 1-2 tablets by mouth every 4 hours as needed for mild pain       amLODIPine 10 MG tablet    NORVASC    90 tablet    Take 1 tablet (10 mg) by mouth daily       aspirin 81 MG EC tablet     90 tablet    Take 1 tablet (81 mg) by mouth daily       atorvastatin 20 MG tablet    LIPITOR    90 tablet    Take 1 tablet (20 mg) by mouth daily       benazepril 40 MG tablet    LOTENSIN    90 tablet    Take 1 tablet (40 mg) by mouth daily       bisacodyl 10 MG Suppository     DULCOLAX    3 suppository    Place 1 suppository rectally daily as needed.       desonide 0.05 % ointment    DESOWEN    60 g    Apply topically 2 times daily To affected areas of scale and redness on the face and ears as needed until clear.       docusate sodium 100 MG tablet    COLACE    60 tablet    Take 100 mg by mouth 2 times daily       gentamicin 0.1 % ointment    GARAMYCIN         melatonin 3 MG tablet      Take 5 mg by mouth At Bedtime       metoprolol 25 MG 24 hr tablet    TOPROL-XL    45 tablet    Take 0.5 tablets (12.5 mg) by mouth daily       MILK OF MAGNESIA PO      Take by mouth At Bedtime       nitroglycerin 0.4 MG sublingual tablet    NITROSTAT    30 tablet    Place 1 tablet (0.4 mg) under the tongue every 5 minutes as needed       [employer] Fiber packet      Take 1 packet by mouth every morning       psyllium 0.52 G capsule      Take by mouth daily       ranitidine 150 MG tablet    ZANTAC    180 tablet    Take 1 tablet (150 mg) by mouth 2 times daily       sertraline 50 MG tablet    ZOLOFT    90 tablet    Take 1 tablet (50 mg) by mouth daily       sildenafil 100 MG cap/tab    REVATIO/VIAGRA    10 tablet    Take 1 tablet (100 mg) by mouth daily as needed for erectile dysfunction       tamsulosin 0.4 MG capsule    FLOMAX    90 capsule    Take 1 capsule (0.4 mg) by mouth daily       tolterodine 2 MG tablet    DETROL    180 tablet    Take 1 tablet (2 mg )twice a day       * triamcinolone 0.025 % cream    KENALOG    45 g    Apply topically 2 times daily       * triamcinolone 0.1 % ointment    KENALOG    80 g    Apply topically 2 times daily To itchy rashes       VITAMIN B 12 PO      Take by mouth daily       VITEYES AREDS FORMULA/LUTEIN Caps      Take by mouth daily       * Notice:  This list has 2 medication(s) that are the same as other medications prescribed for you. Read the directions carefully, and ask your doctor or other care provider to review them with you.

## 2017-05-18 NOTE — PROGRESS NOTES
HISTORY OF PRESENT ILLNESS:  Reverend Daniel Mcnair is an 87-year-old man with a diagnosis of low-grade B-cell lymphoma.  The lymphoma was diagnosed when retroperitoneal lymphadenopathy was incidentally identified on a CT scan obtained for other purposes.  Diagnosis was established by needle biopsy.  The specimen was too small for histologic subtyping, but in view of the histology and modest adenopathy, it was felt that this was likely a low-grade lymphoma.  Reverend Mcnair has now been monitored without therapeutic intervention for approximately 5-1/2 years.  He was last in clinic on 11/16/2016.       INTERIM HISTORY: Reverend Mcnair's problems continue to be primarily those related to orthopedic and degenerative spinal issues.  The most recent steroid injection did not help very long.  He continues to have considerable fatigue and stress, but also maintains a quite busy schedule.        He has had no interval fevers, night sweats or weight loss.  The most significant pain that he has is in the right sacroiliac region.  His wife mentions that he only takes acetaminophen for this pain.  He is also undergoing physical therapy.       He has had no interval infections.  A 10-point review of systems is otherwise negative.  He has no upper abdominal or back pain attributable to pancreatic lesions.       MEDICATIONS:   1.  Metoprolol.   2.  Ranitidine.   3.  Amlodipine.   4.  Sertraline   5.  Atorvastatin.   6.  Acetaminophen/codeine.   7.  Lotensin.   8.  Tamsulosin.   9.  Tolterodine.    10.  Sildenafil.     11.  Multivitamins.    12.  Psyllium.     13.  Nitroglycerin.  The patient has not used this since approximately 2002.  He had a prescription, but it is an old prescription and has been misplaced.  Discussed reauthorizing via primary provider.    14.  Topical agents.       ALLERGIES:  None reported.      PHYSICAL EXAMINATION:   VITAL SIGNS:  Weight 86.6 kg (relatively stable), blood pressure 139/61, pulse 50 and regular,  respirations 16-18, temperature 98.1, O2 saturation 97% on room air.    HEENT:  Pupils equal and reactive.  EOM - intact.  Anicteric.  Oropharynx - no visible masses.     Mucosa - moist.  No lesions.   NECK:  No cervical or supraclavicular adenopathy.   CHEST:  Clear to auscultation.   AXILLAE:  Negative.   HEART:  Regular rhythm.  No murmur. No gallop.   ABDOMEN:  Soft and nontender.  The liver is at the right costal margin and spans approximately    10-11 cm.  The spleen is not palpable or percussible.  No masses.  Bowel sounds present.  No inguinal/femoral nodes.    EXTREMITIES:  He has 1-2+ lower extremity edema.   SKIN:  No rashes.       LABORATORY:     Hemoglobin 13.4 grams percent with 7,500 WBCs (66% neutrophils, 21% lymphocytes) and 145,000 platelets.     Electrolytes, calcium, creatinine (0.78) - normal.    Liver enzymes, including serum LDH - normal.    Uric acid 4.3.   Glucose 94.      IMAGING:  Chest and abdominal/pelvic CT scan -     1. In this patient with a history of low-grade B-cell lymphoma, stable disease. Mildly enlarged retroperitoneal lymph nodes and prominent mediastinal lymph nodes are not significantly changed since 12/2014. No new or enlarging adenopathy in the chest, abdomen or pelvis.  2. Numerous bilateral solid pulmonary nodules are largely unchanged since 2014, with the exception of a new 3 mm nodule in the right lower lobe. Recommend attention on follow-up.  3. 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 Consider follow-up in 6 months to 1 year.     ASSESSMENT AND PLAN:  Low-grade B-cell lymphoma, clinical stage IIA, with intra-abdominal lymph nodes, but incompletely staged.  The patient initially had some regression and then subsequently had stable disease on serial CT scans.  His most recent CT scan prior to today was obtained in 12/2014 and showed a spontaneous overall decrease in the size of nodes with no new findings.   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       Relative to the lymphoma, we will continue followup as we have been.  Although we were considering extending the interval to a year because of the stability of disease, the new findings on CT scan merit followup per patient's request.       CT findings regarding the pancreas - I had an extended discussion with Reverend Mcnair and his wife regarding these findings, which are apparently new since 2014 and may represent IPMNs.  If documented, it is unlikely that they would be actionable.  However, in our discussion he indicated that it would be reassuring to have the additional followup to know if these are, in fact, changing.  Thus, we will have him return in approximately 6 months with an abdominal CT scan to reassess.        The patient is out of nitroglycerin, but has not used this for many years.  He will be seeing his primary physician, Dr. Gross, in the next month or so.  The patient was comfortable waiting to see her prior to determining whether this prescription should be renewed.      Tanner Rojas MD  Professor of Medicine  Oncology  [facility]  Office: [phone]  Clinic Fax: [phone]        cc:      MD Abel Barcenas MD

## 2017-05-18 NOTE — NURSING NOTE
"Oncology Rooming Note    May 18, 2017 11:39 AM   Daniel Mcnair is a 87 year old male who presents for:    Chief Complaint   Patient presents with     Blood Draw     vs/labs drawn off PIV     Oncology Clinic Visit     Lymphoma of lymph nodes in abdomen      Initial Vitals: /61  Pulse 50  Temp 98.1  F (36.7  C) (Oral)  Ht 1.778 m (5' 10\")  Wt 86.6 kg (190 lb 14.4 oz)  SpO2 97%  BMI 27.39 kg/m2 Estimated body mass index is 27.39 kg/(m^2) as calculated from the following:    Height as of this encounter: 1.778 m (5' 10\").    Weight as of this encounter: 86.6 kg (190 lb 14.4 oz). Body surface area is 2.07 meters squared.  No Pain (0) Comment: Data Unavailable   No LMP for male patient.  Allergies reviewed: Yes  Medications reviewed: Yes    Medications: Medication refills not needed today.  Pharmacy name entered into EPIC:    [facility] - NICOLE GOYAL  [facility] - [address] PHARMACY [address] [place] [address] [id number]  [facility] [address] [place] [address] - [id number]    Clinical concerns: Pt would like to talk to Dr. Rojas about getting a new bottle of Nitroglycerin.  Provider was notified.    7 minutes for nursing intake (face to face time)     Mally Foote MA              "

## 2017-05-19 LAB
ALBUMIN SERPL ELPH-MCNC: 3.6 G/DL (ref 3.7–5.1)
ALPHA1 GLOB SERPL ELPH-MCNC: 0.2 G/DL (ref 0.2–0.4)
ALPHA2 GLOB SERPL ELPH-MCNC: 0.9 G/DL (ref 0.5–0.9)
B-GLOBULIN SERPL ELPH-MCNC: 0.6 G/DL (ref 0.6–1)
GAMMA GLOB SERPL ELPH-MCNC: 0.8 G/DL (ref 0.7–1.6)
M PROTEIN SERPL ELPH-MCNC: 0 G/DL
PROT PATTERN SERPL ELPH-IMP: ABNORMAL

## 2017-06-16 ENCOUNTER — TRANSFERRED RECORDS (OUTPATIENT)
Dept: HEALTH INFORMATION MANAGEMENT | Facility: CLINIC | Age: 82
End: 2017-06-16

## 2017-06-21 ENCOUNTER — OFFICE VISIT (OUTPATIENT)
Dept: INTERNAL MEDICINE | Facility: CLINIC | Age: 82
End: 2017-06-21

## 2017-06-21 VITALS
SYSTOLIC BLOOD PRESSURE: 151 MMHG | WEIGHT: 188 LBS | BODY MASS INDEX: 26.98 KG/M2 | HEART RATE: 73 BPM | RESPIRATION RATE: 16 BRPM | DIASTOLIC BLOOD PRESSURE: 65 MMHG

## 2017-06-21 DIAGNOSIS — I25.10 CORONARY ARTERY DISEASE INVOLVING NATIVE HEART WITHOUT ANGINA PECTORIS, UNSPECIFIED VESSEL OR LESION TYPE: ICD-10-CM

## 2017-06-21 DIAGNOSIS — I10 BENIGN ESSENTIAL HYPERTENSION: Primary | ICD-10-CM

## 2017-06-21 DIAGNOSIS — E78.5 HYPERLIPIDEMIA LDL GOAL <70: ICD-10-CM

## 2017-06-21 DIAGNOSIS — N40.0 HYPERTROPHY OF PROSTATE WITHOUT URINARY OBSTRUCTION: ICD-10-CM

## 2017-06-21 DIAGNOSIS — N52.2 DRUG-INDUCED ERECTILE DYSFUNCTION: ICD-10-CM

## 2017-06-21 DIAGNOSIS — I49.9 IRREGULAR HEART RATE: ICD-10-CM

## 2017-06-21 DIAGNOSIS — N31.8 HYPERTONICITY OF BLADDER: ICD-10-CM

## 2017-06-21 LAB — INTERPRETATION ECG - MUSE: NORMAL

## 2017-06-21 RX ORDER — ATORVASTATIN CALCIUM 20 MG/1
20 TABLET, FILM COATED ORAL DAILY
Qty: 90 TABLET | Refills: 2 | Status: SHIPPED | OUTPATIENT
Start: 2017-06-21 | End: 2018-03-08

## 2017-06-21 RX ORDER — NITROGLYCERIN 0.4 MG/1
0.4 TABLET SUBLINGUAL EVERY 5 MIN PRN
Qty: 30 TABLET | Refills: 2 | Status: SHIPPED | OUTPATIENT
Start: 2017-06-21

## 2017-06-21 RX ORDER — AMLODIPINE BESYLATE 10 MG/1
10 TABLET ORAL DAILY
Qty: 90 TABLET | Refills: 0 | Status: SHIPPED | OUTPATIENT
Start: 2017-06-21 | End: 2017-08-19

## 2017-06-21 RX ORDER — TOLTERODINE TARTRATE 2 MG/1
TABLET, EXTENDED RELEASE ORAL
Qty: 180 TABLET | Refills: 3 | Status: SHIPPED | OUTPATIENT
Start: 2017-06-21 | End: 2018-07-09

## 2017-06-21 RX ORDER — TAMSULOSIN HYDROCHLORIDE 0.4 MG/1
0.4 CAPSULE ORAL DAILY
Qty: 90 CAPSULE | Refills: 3 | Status: SHIPPED | OUTPATIENT
Start: 2017-06-21 | End: 2018-07-20

## 2017-06-21 NOTE — PATIENT INSTRUCTIONS
[facility] Medication Refill Request Information:  * Please contact your pharmacy regarding ANY request for medication refills.  ** [facility] Prescription Fax = [phone]  * Please allow 3 business days for routine medication refills.  * Please allow 5 business days for controlled substance medication refills.     [facility] Test Result notification information:  *You will be notified with in 7-10 days of your appointment day regarding the results of your test.  If you are on MyChart you will be notified as soon as the provider has reviewed the results and signed off on them.    [facility] [phone]

## 2017-06-21 NOTE — MR AVS SNAPSHOT
After Visit Summary   6/21/2017    Daniel cMnair    MRN: [id number]           Patient Information     Date Of Birth          10/17/1929        Visit Information        Provider Department      6/21/2017 9:25 AM Albertina Gross MD [facility]        Today's Diagnoses     Benign essential hypertension    -  1    Hyperlipidemia LDL goal <70        Hypertonicity of bladder        Hypertrophy of prostate without urinary obstruction        Coronary artery disease involving native heart without angina pectoris, unspecified vessel or lesion type        Drug-induced erectile dysfunction        Irregular heart rate          Care Instructions    Primary Care Center Medication Refill Request Information:  * Please contact your pharmacy regarding ANY request for medication refills.  ** [facility] Prescription Fax = [phone]  * Please allow 3 business days for routine medication refills.  * Please allow 5 business days for controlled substance medication refills.     Primary Care Center Test Result notification information:  *You will be notified with in 7-10 days of your appointment day regarding the results of your test.  If you are on MyChart you will be notified as soon as the provider has reviewed the results and signed off on them.    Primary Care Center [phone]             Follow-ups after your visit        Follow-up notes from your care team     Return in about 4 weeks (around 7/19/2017) for BP Recheck.      Your next 10 appointments already scheduled     Jul 11, 2017  8:15 AM CDT   RETURN RETINA with Luna Parker MD   Eye Clinic ([facility])    [address]  [address]  [address] [address]  [facility] [address] [phone]            Jul 21, 2017 10:30 AM CDT   Nurse Visit with  P[facility] Nurse   [facility] ([facility])    [address]  [address] [facility] [id number] [phone]             Jul 27, 2017 10:30 AM CDT   (Arrive by 10:15 AM)   Return Visit with Mary Sanon MD   [facility] Physical Medicine and Rehabilitation ([facility])    [address]  [address] [facility] [address]   [phone]            Aug 11, 2017 10:00 AM CDT   (Arrive by 9:45 AM)   Return Visit with Esau Cotton MD   [facility] Dermatology ([facility])    [address]  [address] [facility] [address] [phone]            Nov 16, 2017  9:00 AM CST   (Arrive by 8:45 AM)   CT ABDOMEN W CONTRAST with UCCT1   [facility] ([facility])    [address] [address] [phone]           Please bring any scans or X-rays taken at other hospitals, if similar tests were done. Also bring a list of your medicines, including vitamins, minerals and over-the-counter drugs. It is safest to leave personal items at home.  Be sure to tell your doctor:   If you have any allergies.   If there s any chance you are pregnant.   If you are breastfeeding.   If you have any special needs.  You may have contrast for this exam. To prepare:   Do not eat or drink for 2 hours before your exam. If you need to take medicine, you may take it with small sips of water. (We may ask you to take liquid medicine as well.)   The day before your exam, drink extra fluids at least six 8-ounce glasses (unless your doctor tells you to restrict your fluids).  Patients over 70 or patients with diabetes or kidney problems:   If you haven t had a blood test (creatinine test) within the last 30 days, go to your clinic or Diagnostic Imaging Department for this test.  If you have diabetes:   If your kidney function is normal, continue taking your metformin (Avandamet, Glucophage, Glucovance, Metaglip) on the day of your exam.   If your kidney function is abnormal, wait 48 hours before restarting this medicine.  You will  have oral contrast for this exam:   You will drink the contrast at home. Get this from your clinic or Diagnostic Imaging Department. Please follow the directions given.  Please wear loose clothing, such as a sweat suit or jogging clothes. Avoid snaps, zippers and other metal. We may ask you to undress and put on a hospital gown.  If you have any questions, please call the Imaging Department where you will have your exam.            Nov 16, 2017  9:45 AM CST   [facility] Draw with  [facility] LAB DRAW   [facility] Lab Draw ([facility])    [address] [place] [address] [phone]            Nov 16, 2017 10:30 AM CST   (Arrive by 10:15 AM)   Return Visit with Tanner Rojas MD   [facility] ([facility])    [address] [place] [address] [phone]              Who to contact     Please call your clinic at [phone] to:    Ask questions about your health    Make or cancel appointments    Discuss your medicines    Learn about your test results    Speak to your doctor   If you have compliments or concerns about an experience at your clinic, or if you wish to file a complaint, please contact [facility] Patient Relations at [phone] or email us at [email or web address]         Additional Information About Your Visit        MyChart Information     [employer] gives you secure access to your electronic health record. If you see a primary care provider, you can also send messages to your care team and make appointments. If you have questions, please call your primary care clinic.  If you do not have a primary care provider, please call [phone] and they will assist you.      [employer] is an electronic gateway that provides easy, online access to your medical records. With [employer], you can request a clinic appointment, read your test results,  renew a prescription or communicate with your care team.     To access your existing account, please contact your [facility] or call [phone] for assistance.        Care EveryWhere ID     This is your Care EveryWhere ID. This could be used by other organizations to access your [facility] medical records  [id number]        Your Vitals Were     BMI (Body Mass Index)                   26.98 kg/m2            Blood Pressure from Last 3 Encounters:   05/18/17 139/61   04/25/17 126/47   03/15/17 126/69    Weight from Last 3 Encounters:   06/21/17 85.3 kg (188 lb)   05/18/17 86.6 kg (190 lb 14.4 oz)   04/25/17 86.9 kg (191 lb 8 oz)              We Performed the Following     EKG Performed in Clinic w/ Provider Reading Fee          Where to get your medicines      These medications were sent to [facility] - [address] [address]     Phone:  [phone]     amLODIPine 10 MG tablet    atorvastatin 20 MG tablet    nitroglycerin 0.4 MG sublingual tablet    tamsulosin 0.4 MG capsule    tolterodine 2 MG tablet          Primary Care Provider Office Phone # Fax #    Albertina Gross [phone]        [facility] [address] [place] [address] [address] [place] [address]        Equal Access to Services     PARRISH LOPEZ : Hadii aad ku hadasho Soomaali, waaxda luqadaha, qaybta kaalmada adeegyada, jose miguel lopez. So [facility] [phone].    ATENCIÓN: Si habla español, tiene a vaughan disposición servicios gratuitos de asistencia lingüística. Glenna al [phone].    We comply with applicable federal civil rights laws and [place] laws. We do not discriminate on the basis of race, color, national origin, age, disability sex, sexual orientation or gender identity.            Thank you!     Thank you for choosing [facility]  for your care. Our goal is always to provide you with  excellent care. Hearing back from our patients is one way we can continue to improve our services. Please take a few minutes to complete the written survey that you may receive in the mail after your visit with us. Thank you!             Your Updated Medication List - Protect others around you: Learn how to safely use, store and throw away your medicines at www.disposemymeds.org.          This list is accurate as of: 6/21/17 10:36 AM.  Always use your most recent med list.                   Brand Name Dispense Instructions for use Diagnosis    acetaminophen 325 MG tablet    TYLENOL    100 tablet    Take 2 tablets (650 mg) by mouth every 6 hours as needed for pain    S/P revision of total hip       acetaminophen-codeine 300-30 MG per tablet    TYLENOL w/CODEINE No. 3    30 tablet    Take 1-2 tablets by mouth every 4 hours as needed for mild pain    Pain in joint, pelvic region and thigh, unspecified laterality       amLODIPine 10 MG tablet    NORVASC    90 tablet    Take 1 tablet (10 mg) by mouth daily    Benign essential hypertension       aspirin 81 MG EC tablet     90 tablet    Take 1 tablet (81 mg) by mouth daily    Hyperlipidemia LDL goal <70       atorvastatin 20 MG tablet    LIPITOR    90 tablet    Take 1 tablet (20 mg) by mouth daily    Hyperlipidemia LDL goal <70       benazepril 40 MG tablet    LOTENSIN    90 tablet    Take 1 tablet (40 mg) by mouth daily    Essential hypertension, benign       bisacodyl 10 MG Suppository    DULCOLAX    3 suppository    Place 1 suppository rectally daily as needed.    Chronic constipation       desonide 0.05 % ointment    DESOWEN    60 g    Apply topically 2 times daily To affected areas of scale and redness on the face and ears as needed until clear.    Dermatitis, seborrheic       docusate sodium 100 MG tablet    COLACE    60 tablet    Take 100 mg by mouth 2 times daily    BPH (benign prostatic hyperplasia)       gentamicin 0.1 % ointment    GARAMYCIN      B-cell lymphoma  of intra-abdominal lymph nodes, unspecified B-cell lymphoma type (H)       melatonin 3 MG tablet      Take 5 mg by mouth At Bedtime        metoprolol 25 MG 24 hr tablet    TOPROL-XL    45 tablet    Take 0.5 tablets (12.5 mg) by mouth daily    Essential hypertension, benign       MILK OF MAGNESIA PO      Take by mouth At Bedtime        nitroglycerin 0.4 MG sublingual tablet    NITROSTAT    30 tablet    Place 1 tablet (0.4 mg) under the tongue every 5 minutes as needed    Coronary artery disease involving native heart without angina pectoris, unspecified vessel or lesion type       [employer] Fiber packet      Take 1 packet by mouth every morning        psyllium 0.52 G capsule      Take by mouth daily        ranitidine 150 MG tablet    ZANTAC    180 tablet    Take 1 tablet (150 mg) by mouth 2 times daily    Gastroesophageal reflux disease without esophagitis       sertraline 50 MG tablet    ZOLOFT    90 tablet    Take 1 tablet (50 mg) by mouth daily    Adjustment disorder with anxious mood       sildenafil 100 MG cap/tab    REVATIO/VIAGRA    10 tablet    Take 1 tablet (100 mg) by mouth daily as needed for erectile dysfunction    ED (erectile dysfunction)       tamsulosin 0.4 MG capsule    FLOMAX    90 capsule    Take 1 capsule (0.4 mg) by mouth daily    Hypertrophy of prostate without urinary obstruction       tolterodine 2 MG tablet    DETROL    180 tablet    Take 1 tablet (2 mg )twice a day    Hypertonicity of bladder       * triamcinolone 0.025 % cream    KENALOG    45 g    Apply topically 2 times daily    Dermatitis       * triamcinolone 0.1 % ointment    KENALOG    80 g    Apply topically 2 times daily To itchy rashes    Intrinsic atopic dermatitis       VITAMIN B 12 PO      Take by mouth daily        VITEYES AREDS FORMULA/LUTEIN Caps      Take by mouth daily        * Notice:  This list has 2 medication(s) that are the same as other medications prescribed for you. Read the directions carefully, and ask your  doctor or other care provider to review them with you.

## 2017-06-21 NOTE — NURSING NOTE
Chief Complaint   Patient presents with     Health Maintenance     Here for follow up; recheck     Wilfrido Oakley CMA at 9:39 AM on 6/21/2017

## 2017-06-21 NOTE — PROGRESS NOTES
HPI:   Daniel Mcnair is a 87 year old year old male presents today for followup.  He is generally doing well.  He is bothered by erectile dysfunction that is worsened on sertraline and he would like to wean off of that.  He felt the sertraline did help with some of the anxiety but feels that that is better overall and is very much bothered by the erectile dysfunction.  He tells me he is using melatonin at night to help with sleep and that is helpful.  He continues to get up several times a night to go to the bathroom due to BPH but is able to fall back asleep quickly.  He has apparently been taking vitamin B12 for a long time but for uncertain reasons and wonders if he can discontinue that.  He has no angina, no chest pain or shortness of breath with activity.  Energy level is good.  He has chronic low back pain that is stable and adequately managed at this point in terms of pain control.         ASSESSMENT/PLAN:     ASSESSMENT AND PLAN:   1. Coronary artery disease, status post recent angioplasty and stent placement.  He is doing well with no symptoms.  Continue aggressive management of risk factors including his hyperlipidemia.  Blood pressure today was above target at 151 systolic.  Generally it is good at home.  I am going to have him come back to see a nurse in about 3-4 weeks for repeat blood pressure check prior to making any changes in medications.  I note that his pulse rate today was about 50.  I obtained EKG and the ventricular rate was reported as 50 with sinus bradycardia with first-degree block.  He did have premature atrial complexes.  This is essentially unchanged from previous EKGs although the first degree AV block is more pronounced.  He is asymptomatic from the bradycardia and so I am not going to make any changes in those medicines.  We are maximum dose of his ACE inhibitor and calcium channel blocker.  We would like to avoid a diuretic because of his urinary symptoms, so I have limited  options in terms of blood pressure control.     2. Benign prostatic hypertrophy with lower urinary tract symptoms and hypertonicity of bladder.  Refilled Detrol and tamsulosin.     3. Erectile dysfunction.  He uses Viagra as needed.  He knows not to use it in conjunction with using any nitroglycerin.  He does feel as if this has worsened on sertraline and so we are going to taper him off and see how he does.     4. Pulse felt somewhat irregular on exam but EKG reveals this is due to PACs.     5. Follow up with nurse in 3-4 weeks and me in 3 months or sooner as needed.         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)     Benign essential hypertension     Coronary artery disease involving native coronary artery of native heart without angina pectoris     Advance Care Planning       Current Outpatient Prescriptions   Medication Sig Dispense Refill     metoprolol (TOPROL-XL) 25 MG 24 hr tablet Take 0.5 tablets (12.5 mg) by mouth daily 45 tablet 3     ranitidine (ZANTAC) 150 MG tablet Take 1 tablet (150 mg) by mouth 2 times daily 180 tablet 3     amLODIPine (NORVASC) 10 MG tablet Take 1 tablet (10 mg) by mouth daily 90 tablet 0     sertraline (ZOLOFT) 50 MG tablet Take 1 tablet (50 mg) by mouth daily 90 tablet 1     gentamicin (GARAMYCIN) 0.1 % ointment   3     atorvastatin (LIPITOR) 20 MG tablet Take 1 tablet (20 mg) by mouth daily 90 tablet 2     acetaminophen-codeine (TYLENOL W/CODEINE NO. 3) 300-30 MG per tablet Take 1-2 tablets by mouth every 4 hours as needed for mild pain 30 tablet 1     benazepril (LOTENSIN) 40 MG tablet Take 1 tablet (40 mg) by mouth daily 90 tablet 3     tamsulosin (FLOMAX) 0.4 MG 24 hr capsule Take 1 capsule (0.4 mg) by mouth daily 90 capsule 3     triamcinolone (KENALOG) 0.1 % ointment Apply topically 2 times daily To itchy rashes 80 g 3     tolterodine (DETROL) 2 MG tablet Take 1 tablet (2 mg )twice a day 180 tablet 3     desonide (DESOWEN) 0.05 % ointment Apply topically 2 times daily To affected areas of scale and redness on the face and ears as needed until clear. 60 g 11     sildenafil (VIAGRA) 100 MG tablet Take 1 tablet (100 mg) by mouth daily as needed for erectile dysfunction 10 tablet 2     Multiple Vitamins-Minerals (VITEYES AREDS FORMULA/LUTEIN) CAPS Take by mouth daily       psyllium 0.52 G capsule Take by mouth daily       nitroglycerin (NITROSTAT) 0.4 MG SL tablet Place 1 tablet (0.4 mg) under the tongue every 5 minutes as needed 30 tablet 2     triamcinolone (KENALOG) 0.025 % cream Apply topically 2 times daily 45 g 0      Cyanocobalamin (VITAMIN B 12 PO) Take by mouth daily       aspirin 81 MG EC tablet Take 1 tablet (81 mg) by mouth daily 90 tablet 3     acetaminophen (TYLENOL) 325 MG tablet Take 2 tablets (650 mg) by mouth every 6 hours as needed for pain 100 tablet 0     Magnesium Hydroxide (MILK OF MAGNESIA PO) Take by mouth At Bedtime       docusate sodium 100 MG tablet Take 100 mg by mouth 2 times daily 60 tablet 1     bisacodyl (DULCOLAX) 10 MG suppository Place 1 suppository rectally daily as needed. 3 suppository 0     melatonin 3 MG tablet Take 5 mg by mouth At Bedtime        Guar Gum (BENEFIBER) packet Take 1 packet by mouth every morning            ROS:    Constitutional: no fevers, chill   Cardiovascular: no chest pain, palpitations  Respiratory: no dyspnea, cough, shortness of breath or wheezing   GI: no nausea, vomiting, diarrhea, no abdominal pain   Musculoskeletal: no edema    PHYSICAL EXAM:   Wt 85.3 kg (188 lb)  BMI 26.98 kg/m2   Wt Readings from Last 1 Encounters:   06/21/17 85.3 kg (188 lb)       Constitutional: no distress, comfortable, pleasant    Cardiovascular: regular rate and rhythm, normal S1 and S2, no murmurs, rubs or gallops  Respiratory: clear to auscultation, no wheezes or crackles, normal breath sounds   Musculoskeletal:  no edema       Albertina Gross MD

## 2017-07-11 ENCOUNTER — OFFICE VISIT (OUTPATIENT)
Dept: OPHTHALMOLOGY | Facility: CLINIC | Age: 82
End: 2017-07-11
Attending: OPHTHALMOLOGY
Payer: MEDICARE

## 2017-07-11 DIAGNOSIS — H35.3112 NONEXUDATIVE AGE-RELATED MACULAR DEGENERATION, RIGHT EYE, INTERMEDIATE DRY STAGE: ICD-10-CM

## 2017-07-11 DIAGNOSIS — H53.2 DIPLOPIA: ICD-10-CM

## 2017-07-11 DIAGNOSIS — H43.813 POSTERIOR VITREOUS DETACHMENT, BILATERAL: ICD-10-CM

## 2017-07-11 DIAGNOSIS — H35.3122 NONEXUDATIVE AGE-RELATED MACULAR DEGENERATION, LEFT EYE, INTERMEDIATE DRY STAGE: ICD-10-CM

## 2017-07-11 DIAGNOSIS — H35.3190 NONEXUDATIVE SENILE MACULAR DEGENERATION OF RETINA: ICD-10-CM

## 2017-07-11 DIAGNOSIS — H53.2 MONOCULAR DIPLOPIA, RIGHT EYE: Primary | ICD-10-CM

## 2017-07-11 PROCEDURE — 99213 OFFICE O/P EST LOW 20 MIN: CPT | Mod: ZF

## 2017-07-11 PROCEDURE — 92060 SENSORIMOTOR EXAMINATION: CPT | Mod: ZF | Performed by: OPHTHALMOLOGY

## 2017-07-11 PROCEDURE — 92134 CPTRZ OPH DX IMG PST SGM RTA: CPT | Mod: ZF | Performed by: OPHTHALMOLOGY

## 2017-07-11 ASSESSMENT — VISUAL ACUITY
OD_CC: 20/30
METHOD: SNELLEN - LINEAR
OD_CC+: -2
OS_CC: 20/20

## 2017-07-11 ASSESSMENT — REFRACTION_MANIFEST
OS_AXIS: 165
OD_CYLINDER: SPHERE
OD_SPHERE: -1.75
OS_CYLINDER: +1.50
OS_SPHERE: +1.50

## 2017-07-11 ASSESSMENT — REFRACTION_WEARINGRX
OS_SPHERE: -1.50
OS_ADD: +2.75
OS_AXIS: 165
OD_CYLINDER: SPHERE
OD_ADD: +2.75
OD_SPHERE: -1.25
OS_CYLINDER: +1.50

## 2017-07-11 ASSESSMENT — CONF VISUAL FIELD
OD_NORMAL: 1
METHOD: COUNTING FINGERS
OS_NORMAL: 1

## 2017-07-11 ASSESSMENT — TONOMETRY
OS_IOP_MMHG: 13
OD_IOP_MMHG: 11
IOP_METHOD: TONOPEN

## 2017-07-11 ASSESSMENT — SLIT LAMP EXAM - LIDS
COMMENTS: NORMAL
COMMENTS: NORMAL

## 2017-07-11 ASSESSMENT — CUP TO DISC RATIO
OD_RATIO: 0.3
OS_RATIO: 0.3

## 2017-07-11 ASSESSMENT — EXTERNAL EXAM - RIGHT EYE: OD_EXAM: NORMAL

## 2017-07-11 ASSESSMENT — EXTERNAL EXAM - LEFT EYE: OS_EXAM: NORMAL

## 2017-07-11 NOTE — PROGRESS NOTES
CC -   Dry AMD    INTERVAL HISTORY -    continues to have monocular diplopia, mostly while reading the paper in the morning. He removes his glasses while reading because of neck strain while reading. Feels vision has been stable.    HPI - 88 yo male with hx of dry ARMD, dbh right eye noted in 3/2015, CE/IOL OU.   Now taking AREDS II, never smoked in the past and using Amsler not noticing diplopia      PAST OCULAR SURGERY  CE/IOL both eyes ~ 2005    RETINAL IMAGING  OCT  7-11-17  right eye- 2+ drusen , drusenoid pigment epithelial detachments, no fluid  left eye - 2+ drusen, drusenoid pigment epithelial detachments, no fluid      ASSESSMENT & PLAN  1.  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  Diplopia   - monocular on testing in past, although reports binocular symptoms since visit 1 year ago   - ortho at near and distance on strabismus exam   - will prescribe reading only glasses     3. Posterior vitreous detachment (PVD) both eyes    - advised S/Sx RD    4.  H/O dot blot hemorrhage right eye   - seen previously 3/2015 visit, resolved today, no new heme    - ?etiolgy   - h/o HTN, doubt Choroidal neovascular membrane, but in far temporal macula   - continue observation    5.  Pseudophakia both eyes   - Observe   - doing well with current Rx, reading only Rx dispensed today    6. Dry Eyes, bilateral   -continue AT QID   -start warm compress      return to clinic: 6 months, OCT both eyes       Kael Candelaria MD  PGY3, Dept of Ophthalmology  Pager [phone]      ATTESTATION     Attending Physician Attestation:      Complete documentation of historical and exam elements from today's encounter can be found in the full encounter summary report (not reduplicated in this progress note).  I personally obtained the chief complaint(s) and history of present illness.  I  confirmed and edited as necessary the review of systems, past medical/surgical history, family history, social history, and examination findings as documented by others; and I examined the patient myself.  I personally reviewed the relevant tests, images, and reports as documented above.  I personally reviewed the ophthalmic test(s) associated with this encounter, agree with the interpretation(s) as documented by the resident/fellow, and have edited the corresponding report(s) as necessary.   I formulated and edited as necessary the assessment and plan and discussed the findings and management plan with the patient and family        Luna Parker MD, PhD  , Vitreoretinal Surgery  Department of Ophthalmology  [facility]

## 2017-07-11 NOTE — MR AVS SNAPSHOT
After Visit Summary   7/11/2017    Daniel Mcnair    MRN: [id number]           Patient Information     Date Of Birth          10/17/1929        Visit Information        Provider Department      7/11/2017 8:15 AM Luna Parker MD [facility]        Today's Diagnoses     Monocular diplopia, right eye    -  1    Nonexudative senile macular degeneration of retina        Posterior vitreous detachment, bilateral        Diplopia        Nonexudative age-related macular degeneration, left eye, intermediate dry stage        Nonexudative age-related macular degeneration, right eye, intermediate dry stage           Follow-ups after your visit        Follow-up notes from your care team     Return in about 6 months (around 1/11/2018) for Retina Clinic, Follow Up, OCT OU, AMD.      Your next 10 appointments already scheduled     Jul 27, 2017 10:30 AM CDT   (Arrive by 10:15 AM)   Return Visit with Mary Sanon MD   [facility] Physical Medicine and Rehabilitation ([facility])    [address] [place] [address]   [phone]            Jul 28, 2017 10:30 AM CDT   Nurse Visit with  Pcc Nurse   [facility] Primary Care Clinic ([facility])    [address] [place] [address]  4th [facility] [address]   [phone]            Aug 11, 2017 10:00 AM CDT   (Arrive by 9:45 AM)   Return Visit with Esau Cotton MD   [facility] Dermatology ([facility])    [address] [place] [address]   [phone]            Nov 16, 2017  9:00 AM CST   (Arrive by 8:45 AM)   CT ABDOMEN W CONTRAST with UCCT1   [facility] ([facility])    [address] [place] [id number] [phone]           Please bring any scans or X-rays taken at other hospitals, if similar tests were done. Also bring a list of your medicines, including vitamins,  minerals and over-the-counter drugs. It is safest to leave personal items at home.  Be sure to tell your doctor:   If you have any allergies.   If there s any chance you are pregnant.   If you are breastfeeding.   If you have any special needs.  You may have contrast for this exam. To prepare:   Do not eat or drink for 2 hours before your exam. If you need to take medicine, you may take it with small sips of water. (We may ask you to take liquid medicine as well.)   The day before your exam, drink extra fluids at least six 8-ounce glasses (unless your doctor tells you to restrict your fluids).  Patients over 70 or patients with diabetes or kidney problems:   If you haven t had a blood test (creatinine test) within the last 30 days, go to your clinic or Diagnostic Imaging Department for this test.  If you have diabetes:   If your kidney function is normal, continue taking your metformin (Avandamet, Glucophage, Glucovance, Metaglip) on the day of your exam.   If your kidney function is abnormal, wait 48 hours before restarting this medicine.  You will have oral contrast for this exam:   You will drink the contrast at home. Get this from your clinic or Diagnostic Imaging Department. Please follow the directions given.  Please wear loose clothing, such as a sweat suit or jogging clothes. Avoid snaps, zippers and other metal. We may ask you to undress and put on a hospital gown.  If you have any questions, please call the Imaging Department where you will have your exam.            Nov 16, 2017  9:45 AM CST   [facility] Lab Draw with  [facility] LAB DRAW   [facility] Lab Draw ([facility])    [address] [place] [address] [phone]            Nov 16, 2017 10:30 AM CST   (Arrive by 10:15 AM)   Return Visit with Tanner Rojas MD   [facility] Cancer Clinic ([facility])    [address] [place] [address]    [phone]            Basil 15, 2018  9:45 AM CST   RETURN RETINA with Luna Parker MD   Eye Clinic ([facility])    [address]  9th [address] Clin 9a  [facility] [address]   [phone]              Future tests that were ordered for you today     Open Future Orders        Priority Expected Expires Ordered    OCT Retina Spectralis OU (both eyes) Routine  1/12/2019 7/11/2017            Who to contact     Please call your clinic at [phone] to:    Ask questions about your health    Make or cancel appointments    Discuss your medicines    Learn about your test results    Speak to your doctor   If you have compliments or concerns about an experience at your clinic, or if you wish to file a complaint, please contact [facility] Patient Relations at [phone] or email us at [email or web address]         Additional Information About Your Visit        Mobile Authentication[employer]Hop Information     [employer] gives you secure access to your electronic health record. If you see a primary care provider, you can also send messages to your care team and make appointments. If you have questions, please call your primary care clinic.  If you do not have a primary care provider, please call [phone] and they will assist you.      [employer] is an electronic gateway that provides easy, online access to your medical records. With [employer], you can request a clinic appointment, read your test results, renew a prescription or communicate with your care team.     To access your existing account, please contact your [facility] or call [phone] for assistance.        Care EveryWhere ID     This is your Care EveryWhere ID. This could be used by other organizations to access your [place] medical records  [id number]         Blood Pressure from Last 3 Encounters:   06/21/17 151/65   05/18/17 139/61   04/25/17 126/47    Weight from Last 3  Encounters:   06/21/17 85.3 kg (188 lb)   05/18/17 86.6 kg (190 lb 14.4 oz)   04/25/17 86.9 kg (191 lb 8 oz)              We Performed the Following     OCT Retina Spectralis OU (both eyes)     Sensorimotor        Primary Care Provider Office Phone # Fax #    Albertina Gross [phone]        PHYSICIANS [address] [place] [address] [address]  [facility] [address]        Equal Access to Services     PARRISH LOPEZ : Hadii aad ku hadasho Soomaali, waaxda luqadaha, qaybta kaalmada adeegyada, waxay idiin hayaan adeeg kharash la'sandhya lopez. So [facility] [phone].    ATENCIÓN: Si habla español, tiene a vaughan disposición servicios gratuitos de asistencia lingüística. [facility] [phone].    We comply with applicable federal civil rights laws and [place] laws. We do not discriminate on the basis of race, color, national origin, age, disability sex, sexual orientation or gender identity.            Thank you!     Thank you for choosing EYE CLINIC  for your care. Our goal is always to provide you with excellent care. Hearing back from our patients is one way we can continue to improve our services. Please take a few minutes to complete the written survey that you may receive in the mail after your visit with us. Thank you!             Your Updated Medication List - Protect others around you: Learn how to safely use, store and throw away your medicines at www.disposemymeds.org.          This list is accurate as of: 7/11/17 10:29 AM.  Always use your most recent med list.                   Brand Name Dispense Instructions for use Diagnosis    acetaminophen 325 MG tablet    TYLENOL    100 tablet    Take 2 tablets (650 mg) by mouth every 6 hours as needed for pain    S/P revision of total hip       acetaminophen-codeine 300-30 MG per tablet    TYLENOL w/CODEINE No. 3    30 tablet    Take 1-2 tablets by mouth every 4 hours as needed for mild pain    Pain in joint, pelvic region and thigh, unspecified laterality       amLODIPine  10 MG tablet    NORVASC    90 tablet    Take 1 tablet (10 mg) by mouth daily    Benign essential hypertension       aspirin 81 MG EC tablet     90 tablet    Take 1 tablet (81 mg) by mouth daily    Hyperlipidemia LDL goal <70       atorvastatin 20 MG tablet    LIPITOR    90 tablet    Take 1 tablet (20 mg) by mouth daily    Hyperlipidemia LDL goal <70       benazepril 40 MG tablet    LOTENSIN    90 tablet    Take 1 tablet (40 mg) by mouth daily    Essential hypertension, benign       bisacodyl 10 MG Suppository    DULCOLAX    3 suppository    Place 1 suppository rectally daily as needed.    Chronic constipation       desonide 0.05 % ointment    DESOWEN    60 g    Apply topically 2 times daily To affected areas of scale and redness on the face and ears as needed until clear.    Dermatitis, seborrheic       docusate sodium 100 MG tablet    COLACE    60 tablet    Take 100 mg by mouth 2 times daily    BPH (benign prostatic hyperplasia)       gentamicin 0.1 % ointment    GARAMYCIN      B-cell lymphoma of intra-abdominal lymph nodes, unspecified B-cell lymphoma type (H)       melatonin 3 MG tablet      Take 5 mg by mouth At Bedtime        metoprolol 25 MG 24 hr tablet    TOPROL-XL    45 tablet    Take 0.5 tablets (12.5 mg) by mouth daily    Essential hypertension, benign       MILK OF MAGNESIA PO      Take by mouth At Bedtime        nitroGLYcerin 0.4 MG sublingual tablet    NITROSTAT    30 tablet    Place 1 tablet (0.4 mg) under the tongue every 5 minutes as needed    Coronary artery disease involving native heart without angina pectoris, unspecified vessel or lesion type       N[employer]riSource Fiber packet      Take 1 packet by mouth every morning        psyllium 0.52 G capsule      Take by mouth daily        ranitidine 150 MG tablet    ZANTAC    180 tablet    Take 1 tablet (150 mg) by mouth 2 times daily    Gastroesophageal reflux disease without esophagitis       sertraline 50 MG tablet    ZOLOFT    90 tablet    Take 1 tablet  (50 mg) by mouth daily    Adjustment disorder with anxious mood       sildenafil 100 MG cap/tab    REVATIO/VIAGRA    10 tablet    Take 1 tablet (100 mg) by mouth daily as needed for erectile dysfunction    ED (erectile dysfunction)       tamsulosin 0.4 MG capsule    FLOMAX    90 capsule    Take 1 capsule (0.4 mg) by mouth daily    Hypertrophy of prostate without urinary obstruction       tolterodine 2 MG tablet    DETROL    180 tablet    Take 1 tablet (2 mg )twice a day    Hypertonicity of bladder       * triamcinolone 0.025 % cream    KENALOG    45 g    Apply topically 2 times daily    Dermatitis       * triamcinolone 0.1 % ointment    KENALOG    80 g    Apply topically 2 times daily To itchy rashes    Intrinsic atopic dermatitis       VITAMIN B 12 PO      Take by mouth daily        VITEYES AREDS FORMULA/LUTEIN Caps      Take by mouth daily        * Notice:  This list has 2 medication(s) that are the same as other medications prescribed for you. Read the directions carefully, and ask your doctor or other care provider to review them with you.

## 2017-07-27 ENCOUNTER — OFFICE VISIT (OUTPATIENT)
Dept: PHYSICAL MEDICINE AND REHAB | Facility: CLINIC | Age: 82
End: 2017-07-27

## 2017-07-27 VITALS — HEIGHT: 70 IN | DIASTOLIC BLOOD PRESSURE: 72 MMHG | HEART RATE: 52 BPM | SYSTOLIC BLOOD PRESSURE: 152 MMHG

## 2017-07-27 DIAGNOSIS — M25.551 HIP PAIN, BILATERAL: Primary | ICD-10-CM

## 2017-07-27 DIAGNOSIS — M25.552 HIP PAIN, BILATERAL: Primary | ICD-10-CM

## 2017-07-27 DIAGNOSIS — G89.29 CHRONIC BILATERAL LOW BACK PAIN WITHOUT SCIATICA: ICD-10-CM

## 2017-07-27 DIAGNOSIS — M54.50 CHRONIC BILATERAL LOW BACK PAIN WITHOUT SCIATICA: ICD-10-CM

## 2017-07-27 DIAGNOSIS — M70.61 GREATER TROCHANTERIC BURSITIS OF BOTH HIPS: ICD-10-CM

## 2017-07-27 DIAGNOSIS — M70.62 GREATER TROCHANTERIC BURSITIS OF BOTH HIPS: ICD-10-CM

## 2017-07-27 ASSESSMENT — PAIN SCALES - GENERAL: PAINLEVEL: NO PAIN (0)

## 2017-07-27 NOTE — MR AVS SNAPSHOT
After Visit Summary   7/27/2017    Daniel Mcnair    MRN: [id number]           Patient Information     Date Of Birth          10/17/1929        Visit Information        Provider Department      7/27/2017 10:30 AM Mary Sanon MD [facility] Physical Medicine and Rehabilitation        Today's Diagnoses     Hip pain, bilateral    -  1    Chronic bilateral low back pain without sciatica        Greater trochanteric bursitis of both hips           Follow-ups after your visit        Follow-up notes from your care team     Return if symptoms worsen or fail to improve.      Your next 10 appointments already scheduled     Aug 11, 2017 10:00 AM CDT   (Arrive by 9:45 AM)   Return Visit with Esau Cotton MD   [facility] Dermatology ([facility])    [address]  3rd Floor  [facility] [address] [phone]            Nov 16, 2017  9:00 AM CST   (Arrive by 8:45 AM)   CT ABDOMEN W CONTRAST with UCCT1   [facility] Imaging [place] [address] ([facility])    [address]  1st Floor  [facility] [address] [phone]           Please bring any scans or X-rays taken at other hospitals, if similar tests were done. Also bring a list of your medicines, including vitamins, minerals and over-the-counter drugs. It is safest to leave personal items at home.  Be sure to tell your doctor:   If you have any allergies.   If there s any chance you are pregnant.   If you are breastfeeding.   If you have any special needs.  You may have contrast for this exam. To prepare:   Do not eat or drink for 2 hours before your exam. If you need to take medicine, you may take it with small sips of water. (We may ask you to take liquid medicine as well.)   The day before your exam, drink extra fluids at least six 8-ounce glasses (unless your doctor tells you to restrict your fluids).  Patients over 70 or patients with diabetes or kidney problems:   If you haven t had a  blood test (creatinine test) within the last 30 days, go to your clinic or Diagnostic Imaging Department for this test.  If you have diabetes:   If your kidney function is normal, continue taking your metformin (Avandamet, Glucophage, Glucovance, Metaglip) on the day of your exam.   If your kidney function is abnormal, wait 48 hours before restarting this medicine.  You will have oral contrast for this exam:   You will drink the contrast at home. Get this from your clinic or Diagnostic Imaging Department. Please follow the directions given.  Please wear loose clothing, such as a sweat suit or jogging clothes. Avoid snaps, zippers and other metal. We may ask you to undress and put on a hospital gown.  If you have any questions, please call the Imaging Department where you will have your exam.            Nov 16, 2017  9:45 AM CST   [facility] Lab Draw with  [facility] LAB DRAW   [facility] Lab Draw ([facility])    [address] [place] [id number] [phone]            Nov 16, 2017 10:30 AM CST   (Arrive by 10:15 AM)   Return Visit with Tanner Rojas MD   [facility] Cancer Clinic ([facility])    [address] [address] [phone]            Basil 15, 2018  9:45 AM CST   RETURN RETINA with Luna Parker MD   [facility] ([facility]    [address] [facility]  [address]  [address] Clin 9a  [facility] [address] [phone]              Who to contact     Please call your clinic at [phone] to:    Ask questions about your health    Make or cancel appointments    Discuss your medicines    Learn about your test results    Speak to your doctor   If you have compliments or concerns about an experience at your clinic, or if you wish to file a complaint, please contact [facility] Patient Relations at [phone] or email us at  [email or web address]         Additional Information About Your Visit        MyChart Information     [email or web address] gives you secure access to your electronic health record. If you see a primary care provider, you can also send messages to your care team and make appointments. If you have questions, please call your primary care clinic.  If you do not have a primary care provider, please call [phone] and they will assist you.      [employer] is an electronic gateway that provides easy, online access to your medical records. With [employer], you can request a clinic appointment, read your test results, renew a prescription or communicate with your care team.     To access your existing account, please contact your [facility] or call [phone] for assistance.        Care EveryWhere ID     This is your Care EveryWhere ID. This could be used by other organizations to access your [place] medical records  [id number]        Your Vitals Were     Pulse Height                52 1.778 m (5' 10\")           Blood Pressure from Last 3 Encounters:   07/28/17 105/63   07/27/17 152/72   06/21/17 151/65    Weight from Last 3 Encounters:   06/21/17 85.3 kg (188 lb)   05/18/17 86.6 kg (190 lb 14.4 oz)   04/25/17 86.9 kg (191 lb 8 oz)              Today, you had the following     No orders found for display       Primary Care Provider Office Phone # Fax #    Albertina Gross [phone]        PHYSICIANS [address] [facility] [address]  [facility] [address]        Equal Access to Services     PARRISH LOPEZ : Hadii aad ku hadasho Soomaali, waaxda luqadaha, qaybta kaalmada adeegyada, jose miguel lopez. So [facility] [phone].    ATENCIÓN: Si habla español, tiene a vaughan disposición servicios gratuitos de asistencia lingüística. Llame al [phone].    We comply with applicable federal civil rights laws and [place] laws. We do not discriminate on the basis of " race, color, national origin, age, disability sex, sexual orientation or gender identity.            Thank you!     Thank you for choosing [facility] PHYSICAL MEDICINE AND REHABILITATION  for your care. Our goal is always to provide you with excellent care. Hearing back from our patients is one way we can continue to improve our services. Please take a few minutes to complete the written survey that you may receive in the mail after your visit with us. Thank you!             Your Updated Medication List - Protect others around you: Learn how to safely use, store and throw away your medicines at www.disposemymeds.org.          This list is accurate as of: 7/27/17 11:59 PM.  Always use your most recent med list.                   Brand Name Dispense Instructions for use Diagnosis    acetaminophen 325 MG tablet    TYLENOL    100 tablet    Take 2 tablets (650 mg) by mouth every 6 hours as needed for pain    S/P revision of total hip       acetaminophen-codeine 300-30 MG per tablet    TYLENOL w/CODEINE No. 3    30 tablet    Take 1-2 tablets by mouth every 4 hours as needed for mild pain    Pain in joint, pelvic region and thigh, unspecified laterality       amLODIPine 10 MG tablet    NORVASC    90 tablet    Take 1 tablet (10 mg) by mouth daily    Benign essential hypertension       aspirin 81 MG EC tablet     90 tablet    Take 1 tablet (81 mg) by mouth daily    Hyperlipidemia LDL goal <70       atorvastatin 20 MG tablet    LIPITOR    90 tablet    Take 1 tablet (20 mg) by mouth daily    Hyperlipidemia LDL goal <70       benazepril 40 MG tablet    LOTENSIN    90 tablet    Take 1 tablet (40 mg) by mouth daily    Essential hypertension, benign       bisacodyl 10 MG Suppository    DULCOLAX    3 suppository    Place 1 suppository rectally daily as needed.    Chronic constipation       desonide 0.05 % ointment    DESOWEN    60 g    Apply topically 2 times daily To affected areas of scale and redness on the face and ears as needed  until clear.    Dermatitis, seborrheic       docusate sodium 100 MG tablet    COLACE    60 tablet    Take 100 mg by mouth 2 times daily    BPH (benign prostatic hyperplasia)       gentamicin 0.1 % ointment    GARAMYCIN      B-cell lymphoma of intra-abdominal lymph nodes, unspecified B-cell lymphoma type (H)       melatonin 3 MG tablet      Take 5 mg by mouth At Bedtime        metoprolol 25 MG 24 hr tablet    TOPROL-XL    45 tablet    Take 0.5 tablets (12.5 mg) by mouth daily    Essential hypertension, benign       MILK OF MAGNESIA PO      Take by mouth At Bedtime        nitroGLYcerin 0.4 MG sublingual tablet    NITROSTAT    30 tablet    Place 1 tablet (0.4 mg) under the tongue every 5 minutes as needed    Coronary artery disease involving native heart without angina pectoris, unspecified vessel or lesion type       [employer] Fiber packet      Take 1 packet by mouth every morning        psyllium 0.52 G capsule      Take by mouth daily        ranitidine 150 MG tablet    ZANTAC    180 tablet    Take 1 tablet (150 mg) by mouth 2 times daily    Gastroesophageal reflux disease without esophagitis       sertraline 50 MG tablet    ZOLOFT    90 tablet    Take 1 tablet (50 mg) by mouth daily    Adjustment disorder with anxious mood       sildenafil 100 MG cap/tab    REVATIO/VIAGRA    10 tablet    Take 1 tablet (100 mg) by mouth daily as needed for erectile dysfunction    ED (erectile dysfunction)       tamsulosin 0.4 MG capsule    FLOMAX    90 capsule    Take 1 capsule (0.4 mg) by mouth daily    Hypertrophy of prostate without urinary obstruction       tolterodine 2 MG tablet    DETROL    180 tablet    Take 1 tablet (2 mg )twice a day    Hypertonicity of bladder       * triamcinolone 0.025 % cream    KENALOG    45 g    Apply topically 2 times daily    Dermatitis       * triamcinolone 0.1 % ointment    KENALOG    80 g    Apply topically 2 times daily To itchy rashes    Intrinsic atopic dermatitis       VITAMIN B 12 PO       Take by mouth daily        VITEYES AREDS FORMULA/LUTEIN Caps      Take by mouth daily        * Notice:  This list has 2 medication(s) that are the same as other medications prescribed for you. Read the directions carefully, and ask your doctor or other care provider to review them with you.

## 2017-07-27 NOTE — LETTER
"7/27/2017       RE: Daniel Mcnair  [id number] [id number]     Dear Colleague,    Thank you for referring your patient, Daniel Mcnair, to the [facility] PHYSICAL MEDICINE AND REHABILITATION at [facility]. Please see a copy of my visit note below.         Physical Medicine & Rehabilitation Clinic Note     Patient Name: Daniel Mcnair     YOB: 1929  Age / Sex: 87 year old male  MRN: [id number]              Background:      Daniel Mcnair is an 87 year old male last seen in PM&R Clinic by Dr. Valdez on April 25, 2017 with acute and chronic low back pain and hip pain in the setting of chronic right-sided radiculopathy. He has a complicated past surgical history as listed below.   At his last PM&R appointment, his low back and right leg pain were attributed to chronic L5 radiculopathy and his left>right lateral thigh pain was attributed to IT band syndrome vs post-surgical pain.  The plan was to refer for PT to develop a home exercise program for bilateral hip tightness/pain.     1. 07/2013 L3-L4 TLIF with L2-L3 and L3-L4 laminectomy and removal of hardware L4-L5 with Dr. Lew.   2. 02/2006 L4-L5 decompression and posterior instrumented spinal fusion.     3. 1999 right total hip arthroplasty by Dr. Cam, [facility].   4. 10/2008 left total hip arthroplasty by Dr. Grewal.   5. Revision left total hip arthroplasty 01/2014 for aseptic loosening of femoral stem by Dr. Grewal.        Subjective:      Since the last appointment, he has felt improved overall.  He has had two sessions of physical therapy total and he does his home exercise program daily.  He celebrated his 65th wedding anniversary yesterday with his family and he reports that he \"was getting exercise in another way\" but otherwise he has been doing his exercises faithfully daily.  He feels the PT has been very beneficial. He is now able to tashia his " "socks without a sock aid and he feels stronger after the exercises, though he remains tired at the end of the day.  He has also had some PT work on his neck with some massage which has been helpful.     He reports that he still \"can't stand for very long\"  and he notices that standing for conversations can be taxing, same with walking to the elevator at his senior living residence; he feels this is most related to thigh weakness. He takes a rollator with him to that he can sit when he needs to.  He estimates that he can walk a quarter of a mile before needing a break.  Otherwise, he will use a single end point cane when ambulating, though he often goes without the cane if he misplaces it.     His pain has also greatly improved.  He has had some pain-free mornings, and he describes his pain as 1-2/10 at worst.  This has improved since last visit.  Pain varies from low back to either hip in location.  His pain will at times radiate down his anterior thigh but not beyond the knee.     Overall, he feels that tiredness is the most limiting to him, although it has improved since starting PT. He doesn't have any other specific concerns at this time and is happy with the plan to continue home exercises.  He reports that the PT told him some of his issues may not improve, and he is understanding of that overall.          Objective:      /72  Pulse 52  Ht 1.778 m (5' 10\")    GENERAL: sitting in clinic room, no acute distress, conversational and pleasant     CARDIOPULMONARY:  Breathing comfortably on room air, speaking in full sentences, no cyanosis, extremities warm and well perfused    NEUROLOGICAL/MUSCULOSKELATAL:   General:  alert and oriented, moves all four extremities independently, extremities without gross deformities or edema.  Strength: 4+/5 strength bilaterally in tested flexion/extension of knee/hip and in plantar/dorsiflexion;   Gait: independent, moderate pace, reciprocal pattern, relatively narrow base " of support, overall well balanced with slightly stooped forward posture.   Other: hip scour positive for groin pain on the right, and positive for lateral hip pain on the left; straight leg raise demonstrates left worse than right hamstring tightness but is otherwise negative.          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 07/2013 L3-L4 TLIF with L2-L3 and L3-L4 laminectomy and removal of hardware L4-L5 with Dr. Lew.   2. 02/2006 L4-L5 decompression and posterior instrumented spinal fusion.     3. 1999 right total hip arthroplasty by Dr. Cam, [facility].   4. 10/2008 left total hip arthroplasty by Dr. Grewal.   5. Revision left total hip arthroplasty 01/2014 for aseptic loosening of femoral stem by Dr. Grewal.      Since his last visit, his low back and bilateral hip pain have improved after physical therapy and HEP. At this time, his most limiting factor is fatigue/thigh weakness which is likely due to deconditioning s/p multiple surgeries and possible residual spinal stenosis.     1. Work-up: nothing further indicated at this time.  2. Therapy: asked him to schedule a follow-up PT visit in order to assess his technique and potentially add exercises given his improvement; in the interim, he will continue with his current home exercise program  3. Activity: encouraged him to be as active as tolerated and to focus his energies on his most important activities;  4. Medications: no changes  5. Interventions: none indicated at this time.  He has previously had TFESI for chronic L5 radiculopathy at [facility] in [place] (last injection a few months ago per patient), no objections from PM&R standpoint for further injections as needed.  6. Follow-up in PM&R clinic PRN for persisting/progressing pain or decline in  function.     CC:  -  Primary Care Provider: Albertina Gross    --  Patient interviewed and discussed with attending Dr. Fab Dobbins-Sadia Weiner MD  PM&R Resident, PGY-4    Physician Attestation   I, Mary Sanon, saw this patient with the resident and agree with the resident s findings and plan of care as documented in the resident s note.      I personally reviewed vital signs, medications and imaging.    Fisher findings: Mr. Mcnair has a complicated past surgical history. His symptoms have overall improve with PT and regular HEP. Pain is most likely multifactorial as listed above. No clinical s/s of lumbar radiculopathy or myelopathy at this point. Will continue conservative management; discussed the importance of regular exercises to maintain his current function, ROM and strength. Can f/u with ortho team and PM&R as needed in the future.   I spent a total of 30 minutes face-to-face with Daniel Mcnair during today's office visit. Over 50% of this time was spent counseling the patient and/or coordinating care. See note for details.    Again, thank you for allowing me to participate in the care of your patient.      Sincerely,    Mary Sanon MD

## 2017-07-27 NOTE — PROGRESS NOTES
"     Physical Medicine & Rehabilitation Clinic Note     Patient Name: Daniel Mcnair     YOB: 1929  Age / Sex: 87 year old male  MRN: [id number]              Background:      Daniel Mcnair is an 87 year old male last seen in PM&R Clinic by Dr. Valdez on April 25, 2017 with acute and chronic low back pain and hip pain in the setting of chronic right-sided radiculopathy. He has a complicated past surgical history as listed below.  At his last PM&R appointment, his low back and right leg pain were attributed to chronic L5 radiculopathy and his left>right lateral thigh pain was attributed to IT band syndrome vs post-surgical pain.  The plan was to refer for PT to develop a home exercise program for bilateral hip tightness/pain.     1. 07/2013 L3-L4 TLIF with L2-L3 and L3-L4 laminectomy and removal of hardware L4-L5 with Dr. Lew.   2. 02/2006 L4-L5 decompression and posterior instrumented spinal fusion.     3. 1999 right total hip arthroplasty by Dr. Cam, [facility].   4. 10/2008 left total hip arthroplasty by Dr. Grewal.   5. Revision left total hip arthroplasty 01/2014 for aseptic loosening of femoral stem by Dr. Grewal.          Subjective:        Since the last appointment, he has felt improved overall.  He has had two sessions of physical therapy total and he does his home exercise program daily.  He celebrated his 65th wedding anniversary yesterday with his family and he reports that he \"was getting exercise in another way\" but otherwise he has been doing his exercises faithfully daily.  He feels the PT has been very beneficial. He is now able to tashia his socks without a sock aid and he feels stronger after the exercises, though he remains tired at the end of the day.  He has also had some PT work on his neck with some massage which has been helpful.     He reports that he still \"can't stand for very long\"  and he notices that standing for conversations can be taxing, same with walking to " "the elevator at his senior living residence; he feels this is most related to thigh weakness. He takes a rollator with him to that he can sit when he needs to.  He estimates that he can walk a quarter of a mile before needing a break.  Otherwise, he will use a single end point cane when ambulating, though he often goes without the cane if he misplaces it.     His pain has also greatly improved.  He has had some pain-free mornings, and he describes his pain as 1-2/10 at worst.  This has improved since last visit.  Pain varies from low back to either hip in location.  His pain will at times radiate down his anterior thigh but not beyond the knee.     Overall, he feels that tiredness is the most limiting to him, although it has improved since starting PT. He doesn't have any other specific concerns at this time and is happy with the plan to continue home exercises.  He reports that the PT told him some of his issues may not improve, and he is understanding of that overall.              Objective:      /72  Pulse 52  Ht 1.778 m (5' 10\")    GENERAL: sitting in clinic room, no acute distress, conversational and pleasant     CARDIOPULMONARY:  Breathing comfortably on room air, speaking in full sentences, no cyanosis, extremities warm and well perfused    NEUROLOGICAL/MUSCULOSKELATAL:   General:  alert and oriented, moves all four extremities independently, extremities without gross deformities or edema.  Strength: 4+/5 strength bilaterally in tested flexion/extension of knee/hip and in plantar/dorsiflexion;   Gait: independent, moderate pace, reciprocal pattern, relatively narrow base of support, overall well balanced with slightly stooped forward posture.   Other: hip scour positive for groin pain on the right, and positive for lateral hip pain on the left; straight leg raise demonstrates left worse than right hamstring tightness but is otherwise negative.              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 07/2013 L3-L4 TLIF with L2-L3 and L3-L4 laminectomy and removal of hardware L4-L5 with Dr. Lew.   2. 02/2006 L4-L5 decompression and posterior instrumented spinal fusion.     3. 1999 right total hip arthroplasty by Dr. Cam, [facility].   4. 10/2008 left total hip arthroplasty by Dr. Grewal.   5. Revision left total hip arthroplasty 01/2014 for aseptic loosening of femoral stem by Dr. Grewal.      Since his last visit, his low back and bilateral hip pain have improved after physical therapy and HEP. At this time, his most limiting factor is fatigue/thigh weakness which is likely due to deconditioning s/p multiple surgeries and possible residual spinal stenosis.     1. Work-up: nothing further indicated at this time.  2. Therapy: asked him to schedule a follow-up PT visit in order to assess his technique and potentially add exercises given his improvement; in the interim, he will continue with his current home exercise program  3. Activity: encouraged him to be as active as tolerated and to focus his energies on his most important activities;  4. Medications: no changes  5. Interventions: none indicated at this time.  He has previously had TFESI for chronic L5 radiculopathy at [facility] in [place] (last injection a few months ago per patient), no objections from PM&R standpoint for further injections as needed.  6. Follow-up in PM&R clinic PRN for persisting/progressing pain or decline in function.       CC:  -  Primary Care Provider: Albertina Gross      --  Patient interviewed and discussed with attending Dr. Fab Dobbins-Sadia Weiner MD  PM&R Resident, PGY-4    Physician Attestation   I, Mary Sanon, saw this patient with the resident and agree with the resident s findings and plan of care as  documented in the resident s note.      I personally reviewed vital signs, medications and imaging.    Fisher findings: Mr. Mcnair has a complicated past surgical history. His symptoms have overall improve with PT and regular HEP. Pain is most likely multifactorial as listed above. No clinical s/s of lumbar radiculopathy or myelopathy at this point. Will continue conservative management; discussed the importance of regular exercises to maintain his current function, ROM and strength. Can f/u with ortho team and PM&R as needed in the future.       Mary Sanon  Date of Service (when I saw the patient): Jul 27, 2017    I spent a total of 30 minutes face-to-face with Daniel Gamino Tabby during today's office visit. Over 50% of this time was spent counseling the patient and/or coordinating care. See note for details.

## 2017-07-28 ENCOUNTER — ALLIED HEALTH/NURSE VISIT (OUTPATIENT)
Dept: INTERNAL MEDICINE | Facility: CLINIC | Age: 82
End: 2017-07-28

## 2017-07-28 VITALS — HEART RATE: 59 BPM | SYSTOLIC BLOOD PRESSURE: 105 MMHG | DIASTOLIC BLOOD PRESSURE: 63 MMHG

## 2017-07-28 DIAGNOSIS — I10 BENIGN ESSENTIAL HYPERTENSION: Primary | ICD-10-CM

## 2017-07-28 NOTE — NURSING NOTE
Daniel Stevenson Mcnair comes into clinic today at the request of Dr. Gross for a BP check.    Patient' AHA average BP readin/63   Pulse: 59    This service provided today was under the supervising provider of the day Dr. Hernández, who was available if needed.    Reason for visit: BP Check: BP Protocol used: AHA    Sabrina Lopez LPN 2017 10:51 AM

## 2017-07-28 NOTE — MR AVS SNAPSHOT
After Visit Summary   7/28/2017    Daniel Mcnair    MRN: [id number]           Patient Information     Date Of Birth          10/17/1929        Visit Information        Provider Department      7/28/2017 10:30 AM Nurse, [facility]        Today's Diagnoses     Benign essential hypertension    -  1       Follow-ups after your visit        Your next 10 appointments already scheduled     Aug 11, 2017 10:00 AM CDT   (Arrive by 9:45 AM)   Return Visit with Esau Cotton MD   [facility] ([facility] [place])    [address] [place] [address]  3rd Floor  [facility] [address] [phone]            Nov 16, 2017  9:00 AM CST   (Arrive by 8:45 AM)   CT ABDOMEN W CONTRAST with UCCT1   [facility] ([facility])    [address] [place] [id number] [phone]           Please bring any scans or X-rays taken at other hospitals, if similar tests were done. Also bring a list of your medicines, including vitamins, minerals and over-the-counter drugs. It is safest to leave personal items at home.  Be sure to tell your doctor:   If you have any allergies.   If there s any chance you are pregnant.   If you are breastfeeding.   If you have any special needs.  You may have contrast for this exam. To prepare:   Do not eat or drink for 2 hours before your exam. If you need to take medicine, you may take it with small sips of water. (We may ask you to take liquid medicine as well.)   The day before your exam, drink extra fluids at least six 8-ounce glasses (unless your doctor tells you to restrict your fluids).  Patients over 70 or patients with diabetes or kidney problems:   If you haven t had a blood test (creatinine test) within the last 30 days, go to your clinic or Diagnostic Imaging Department for this test.  If you have diabetes:   If your kidney function is normal, continue taking your metformin  (Avandamet, Glucophage, Glucovance, Metaglip) on the day of your exam.   If your kidney function is abnormal, wait 48 hours before restarting this medicine.  You will have oral contrast for this exam:   You will drink the contrast at home. Get this from your clinic or Diagnostic Imaging Department. Please follow the directions given.  Please wear loose clothing, such as a sweat suit or jogging clothes. Avoid snaps, zippers and other metal. We may ask you to undress and put on a hospital gown.  If you have any questions, please call the Imaging Department where you will have your exam.            Nov 16, 2017  9:45 AM CST   [facility] Draw with  [email or web address] LAB DRAW   [facility] ([facility])    [address] [place] [id number] [phone]            Nov 16, 2017 10:30 AM CST   (Arrive by 10:15 AM)   Return Visit with Tanner Rojas MD   [facility] ([facility])    [address] [place] [id number] [phone]            Basil 15, 2018  9:45 AM CST   RETURN RETINA with Luna Parker MD   [facility] ([facility])    Jamey Marks [address]  [facility] [address] [phone]              Who to contact     Please call your clinic at [phone] to:    Ask questions about your health    Make or cancel appointments    Discuss your medicines    Learn about your test results    Speak to your doctor   If you have compliments or concerns about an experience at your clinic, or if you wish to file a complaint, please contact [facility] Patient Relations at [phone] or email us at [email or web address]         Additional Information About Your Visit        MyChart Information     [facility] gives you secure access to your electronic health record. If you see a primary care provider, you can also  send messages to your care team and make appointments. If you have questions, please call your primary care clinic.  If you do not have a primary care provider, please call [phone] and they will assist you.      [employer] is an electronic gateway that provides easy, online access to your medical records. With [employer], you can request a clinic appointment, read your test results, renew a prescription or communicate with your care team.     To access your existing account, please contact your [facility] or call [phone] for assistance.        Care EveryWhere ID     This is your Care EveryWhere ID. This could be used by other organizations to access your [facility] medical records  [id number]        Your Vitals Were     Pulse                   59            Blood Pressure from Last 3 Encounters:   07/28/17 105/63   07/27/17 152/72   06/21/17 151/65    Weight from Last 3 Encounters:   06/21/17 85.3 kg (188 lb)   05/18/17 86.6 kg (190 lb 14.4 oz)   04/25/17 86.9 kg (191 lb 8 oz)              Today, you had the following     No orders found for display       Primary Care Provider Office Phone # Fax #    Albertina Gross [phone]        [facility] [address] [place] [address] [phone]  [facility] [address]        Equal Access to Services     PARRISH LOPEZ AH: Hadii aad ku hadasho Soomaali, waaxda luqadaha, qaybta kaalmada adeegyada, jose miguel lucasin haylioneln jacinta lopez. So [facility] [phone].    ATENCIÓN: Si habla español, tiene a vaughan disposición servicios gratuitos de asistencia lingüística. Llame al [phone].    We comply with applicable federal civil rights laws and [place] laws. We do not discriminate on the basis of race, color, national origin, age, disability sex, sexual orientation or gender identity.            Thank you!     Thank you for choosing [facility]  for your care. Our goal is always to provide you with excellent care. Hearing back  from our patients is one way we can continue to improve our services. Please take a few minutes to complete the written survey that you may receive in the mail after your visit with us. Thank you!             Your Updated Medication List - Protect others around you: Learn how to safely use, store and throw away your medicines at www.disposemymeds.org.          This list is accurate as of: 7/28/17 10:53 AM.  Always use your most recent med list.                   Brand Name Dispense Instructions for use Diagnosis    acetaminophen 325 MG tablet    TYLENOL    100 tablet    Take 2 tablets (650 mg) by mouth every 6 hours as needed for pain    S/P revision of total hip       acetaminophen-codeine 300-30 MG per tablet    TYLENOL w/CODEINE No. 3    30 tablet    Take 1-2 tablets by mouth every 4 hours as needed for mild pain    Pain in joint, pelvic region and thigh, unspecified laterality       amLODIPine 10 MG tablet    NORVASC    90 tablet    Take 1 tablet (10 mg) by mouth daily    Benign essential hypertension       aspirin 81 MG EC tablet     90 tablet    Take 1 tablet (81 mg) by mouth daily    Hyperlipidemia LDL goal <70       atorvastatin 20 MG tablet    LIPITOR    90 tablet    Take 1 tablet (20 mg) by mouth daily    Hyperlipidemia LDL goal <70       benazepril 40 MG tablet    LOTENSIN    90 tablet    Take 1 tablet (40 mg) by mouth daily    Essential hypertension, benign       bisacodyl 10 MG Suppository    DULCOLAX    3 suppository    Place 1 suppository rectally daily as needed.    Chronic constipation       desonide 0.05 % ointment    DESOWEN    60 g    Apply topically 2 times daily To affected areas of scale and redness on the face and ears as needed until clear.    Dermatitis, seborrheic       docusate sodium 100 MG tablet    COLACE    60 tablet    Take 100 mg by mouth 2 times daily    BPH (benign prostatic hyperplasia)       gentamicin 0.1 % ointment    GARAMYCIN      B-cell lymphoma of intra-abdominal lymph  nodes, unspecified B-cell lymphoma type (H)       melatonin 3 MG tablet      Take 5 mg by mouth At Bedtime        metoprolol 25 MG 24 hr tablet    TOPROL-XL    45 tablet    Take 0.5 tablets (12.5 mg) by mouth daily    Essential hypertension, benign       MILK OF MAGNESIA PO      Take by mouth At Bedtime        nitroGLYcerin 0.4 MG sublingual tablet    NITROSTAT    30 tablet    Place 1 tablet (0.4 mg) under the tongue every 5 minutes as needed    Coronary artery disease involving native heart without angina pectoris, unspecified vessel or lesion type       N[employer]ri[employer] Fiber packet      Take 1 packet by mouth every morning        psyllium 0.52 G capsule      Take by mouth daily        ranitidine 150 MG tablet    ZANTAC    180 tablet    Take 1 tablet (150 mg) by mouth 2 times daily    Gastroesophageal reflux disease without esophagitis       sertraline 50 MG tablet    ZOLOFT    90 tablet    Take 1 tablet (50 mg) by mouth daily    Adjustment disorder with anxious mood       sildenafil 100 MG cap/tab    REVATIO/VIAGRA    10 tablet    Take 1 tablet (100 mg) by mouth daily as needed for erectile dysfunction    ED (erectile dysfunction)       tamsulosin 0.4 MG capsule    FLOMAX    90 capsule    Take 1 capsule (0.4 mg) by mouth daily    Hypertrophy of prostate without urinary obstruction       tolterodine 2 MG tablet    DETROL    180 tablet    Take 1 tablet (2 mg )twice a day    Hypertonicity of bladder       * triamcinolone 0.025 % cream    KENALOG    45 g    Apply topically 2 times daily    Dermatitis       * triamcinolone 0.1 % ointment    KENALOG    80 g    Apply topically 2 times daily To itchy rashes    Intrinsic atopic dermatitis       VITAMIN B 12 PO      Take by mouth daily        VITEYES AREDS FORMULA/LUTEIN Caps      Take by mouth daily        * Notice:  This list has 2 medication(s) that are the same as other medications prescribed for you. Read the directions carefully, and ask your doctor or other care provider  to review them with you.

## 2017-08-11 ENCOUNTER — OFFICE VISIT (OUTPATIENT)
Dept: DERMATOLOGY | Facility: CLINIC | Age: 82
End: 2017-08-11

## 2017-08-11 DIAGNOSIS — L72.11 PILAR CYST: Primary | ICD-10-CM

## 2017-08-11 DIAGNOSIS — L82.1 SEBORRHEIC KERATOSIS: ICD-10-CM

## 2017-08-11 DIAGNOSIS — L81.4 SOLAR LENTIGO: ICD-10-CM

## 2017-08-11 DIAGNOSIS — L85.8 RETENTION HYPERKERATOSIS: ICD-10-CM

## 2017-08-11 DIAGNOSIS — D18.01 CHERRY ANGIOMA: ICD-10-CM

## 2017-08-11 ASSESSMENT — PAIN SCALES - GENERAL: PAINLEVEL: NO PAIN (0)

## 2017-08-11 NOTE — PROGRESS NOTES
"[facility] Dermatology Note      Dermatology Problem List:  1.  NMSC  -BCC, right lateral neck, s/p Mohs 03/11/2016  -BCC, left antihelix, s/p Mohs 11/13/2015  2. Hx of AKs  3. Non-Hodgkin lymphoma, 2012, followed by oncology  4. ISK biopsied from left upper back 10/19/2015  5. Pilar vs epidermal inclusion cyst    Encounter Date: Aug 11, 2017    CC:   Chief Complaint   Patient presents with     Skin Check     \"I have itchy spots.\" Personal hx of BCC.         History of Present Illness:  Mr. Daniel Mcnair is a 87 year old male who presents as a follow-up for his annual skin check. The patient was last seen 8/5/2016 when a shave biopsy was taken from his right paraspinal base of neck that showed lichen planus-like keratosis. Patient reports that he has a cyst on his left occipital hair line that he had previously excised, but has since returned. He also is concerned with an itch spot over his right medial scapula. Otherwise he denies new or changing moles, itchy/irritated/bleeding/non-healing spots.    On ROS he reports back pain and fatigue, both of which are chronic for him. He denies fever, chills, unexplained weight loss or tender/swollen lymph nodes.     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)     Benign essential hypertension     Coronary artery disease involving native coronary artery of native heart without angina pectoris     Advance Care Planning     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     Macular degeneration, dry      Other and unspecified hyperlipidemia 7/21/2011     Snoring      Spinal stenosis, lumbar region, without neurogenic claudication 7/21/2011     Stented coronary artery 2003     Unspecified essential hypertension 7/21/2011     Past Surgical History:   Procedure Laterality Date     ARTHROPLASTY HIP  10/2008    left     ARTHROPLASTY HIP  1999    right     ARTHROPLASTY REVISION HIP  1/21/2014    Procedure: ARTHROPLASTY REVISION HIP;  Revision Left Total Hip;  Surgeon: Edgar Grewal MD;  Location: [facility] OR     BACK SURGERY  2006     CARDIAC SURGERY  2001    stent x 5      CARDIAC SURGERY  2002     CATARACT IOL,  RT/LT Bilateral      CYSTOSCOPY, TRANSURETHRAL RESECTION (TUR) PROSTATE, COMBINED  11/5/2013    Procedure: COMBINED CYSTOSCOPY, TRANSURETHRAL RESECTION (TUR) PROSTATE;  Transurethral Resection Of Prostate, Bipolar;  Surgeon: Lai Street MD;  Location: [facility] OR     ESOPHAGOSCOPY, GASTROSCOPY, DUODENOSCOPY (EGD), COMBINED  2/3/2014    Procedure: COMBINED ESOPHAGOSCOPY, GASTROSCOPY, DUODENOSCOPY (EGD);;  Surgeon: Ezra Fiore MD;  Location: [facility] GI     ESOPHAGOSCOPY, GASTROSCOPY, DUODENOSCOPY (EGD), COMBINED  4/15/2014    Procedure: COMBINED ESOPHAGOSCOPY, GASTROSCOPY, DUODENOSCOPY (EGD), BIOPSY SINGLE OR MULTIPLE;  Surgeon: Ezra Fiore MD;  Location: [facility] GI     EXTRACAPSULAR CATARACT EXTRATION WITH INTRAOCULAR LENS IMPLANT  2005     EXTRACAPSULAR CATARACT EXTRATION WITH INTRAOCULAR LENS IMPLANT  2010     HERNIA REPAIR       MASTOID SURGERY  1939     MOHS MICROGRAPHIC PROCEDURE       OPTICAL TRACKING SYSTEM FUSION SPINE POSTERIOR LUMBAR TWO LEVELS  7/11/2013    Procedure: OPTICAL TRACKING SYSTEM FUSION SPINE POSTERIOR LUMBAR TWO LEVELS;  Stealth Assisted Lumbar 3-4 Transforaminal Lumbar Interbody Fusion, Lumbar 2-3 Lumbar 3-4 Laminectomy Decompression;  Surgeon: Edgar Lew MD;  Location:  OR       Social History:  The patient is retired from full time work, but continues to work on and off as a [employer] .    Family History:  Family History   Problem Relation Age of Onset     DIABETES Maternal Grandfather      Alcohol/Drug Maternal Grandfather      Arthritis Maternal Grandfather      Obesity Maternal Grandfather      CEREBROVASCULAR DISEASE Maternal Grandmother      Hypertension Mother      Glaucoma No family hx of      Macular Degeneration No family hx of      CANCER No family hx of      No known family hx of skin cancer     Skin Cancer No family hx of          Medications:  Current Outpatient Prescriptions   Medication Sig Dispense Refill     atorvastatin (LIPITOR) 20 MG  tablet Take 1 tablet (20 mg) by mouth daily 90 tablet 2     tolterodine (DETROL) 2 MG tablet Take 1 tablet (2 mg )twice a day 180 tablet 3     amLODIPine (NORVASC) 10 MG tablet Take 1 tablet (10 mg) by mouth daily 90 tablet 0     tamsulosin (FLOMAX) 0.4 MG capsule Take 1 capsule (0.4 mg) by mouth daily 90 capsule 3     nitroglycerin (NITROSTAT) 0.4 MG sublingual tablet Place 1 tablet (0.4 mg) under the tongue every 5 minutes as needed 30 tablet 2     metoprolol (TOPROL-XL) 25 MG 24 hr tablet Take 0.5 tablets (12.5 mg) by mouth daily 45 tablet 3     ranitidine (ZANTAC) 150 MG tablet Take 1 tablet (150 mg) by mouth 2 times daily 180 tablet 3     sertraline (ZOLOFT) 50 MG tablet Take 1 tablet (50 mg) by mouth daily 90 tablet 1     gentamicin (GARAMYCIN) 0.1 % ointment   3     acetaminophen-codeine (TYLENOL W/CODEINE NO. 3) 300-30 MG per tablet Take 1-2 tablets by mouth every 4 hours as needed for mild pain 30 tablet 1     benazepril (LOTENSIN) 40 MG tablet Take 1 tablet (40 mg) by mouth daily 90 tablet 3     triamcinolone (KENALOG) 0.1 % ointment Apply topically 2 times daily To itchy rashes 80 g 3     desonide (DESOWEN) 0.05 % ointment Apply topically 2 times daily To affected areas of scale and redness on the face and ears as needed until clear. 60 g 11     sildenafil (VIAGRA) 100 MG tablet Take 1 tablet (100 mg) by mouth daily as needed for erectile dysfunction 10 tablet 2     Multiple Vitamins-Minerals (VITEYES AREDS FORMULA/LUTEIN) CAPS Take by mouth daily       psyllium 0.52 G capsule Take by mouth daily       triamcinolone (KENALOG) 0.025 % cream Apply topically 2 times daily 45 g 0     Cyanocobalamin (VITAMIN B 12 PO) Take by mouth daily       aspirin 81 MG EC tablet Take 1 tablet (81 mg) by mouth daily 90 tablet 3     acetaminophen (TYLENOL) 325 MG tablet Take 2 tablets (650 mg) by mouth every 6 hours as needed for pain 100 tablet 0     Magnesium Hydroxide (MILK OF MAGNESIA PO) Take by mouth At Bedtime        docusate sodium 100 MG tablet Take 100 mg by mouth 2 times daily 60 tablet 1     bisacodyl (DULCOLAX) 10 MG suppository Place 1 suppository rectally daily as needed. 3 suppository 0     melatonin 3 MG tablet Take 5 mg by mouth At Bedtime        Guar Gum (BENEFIBER) packet Take 1 packet by mouth every morning           Allergies   Allergen Reactions     Nka [No Known Allergies]          Review of Systems:  -As per HPI  -Constitutional: The patient denies fatigue, fevers, chills, unintended weight loss, and night sweats.  -HEENT: Patient denies nonhealing oral sores.  -Skin: As above in HPI. No additional skin concerns.    Physical exam:  Vitals: There were no vitals taken for this visit.  GEN: This is a well developed, well-nourished male in no acute distress, in a pleasant mood.    SKIN: Total skin excluding the undergarment areas was performed. The exam included the head/face, neck, both arms, chest, back, abdomen, both legs, digits and/or nails.   -There are bright red some shaped papules scattered on the trunk, upper and lower extremities.   -Multiple regular brown pigmented macules and papules are identified on the trunk, upper and lower extremities.   -There are waxy stuck on tan to brown papules on the face, neck, trunk, upper and lower extremities.  -There are purple to dark red patches on bilateral arms.  -No other lesions of concern on areas examined.     Impression/Plan:  1. Cherry angioma(s)    Benign, patient reassured. No treatment needed.    2. Seborrheic keratosis, non irritated    Benign, patient reassured. No treatment needed.    3. Multiple clinically benign nevi on the upper and lower extremities and trunk.    Benign, patient reassured. No treatment needed.    4. Senile plaques on bilateral upper extremities    Benign, patient reassured. No treatment needed.    5. History of NMSCs and AKs: no concerning lesions on today's exam.    CC Dr. Gross on close of this encounter.         Dr. Esau Cotton  staffed the patient.    Staff Involved:  Resident(Rick Blackmon)/Staff(as above)    Rick Blackmon MD, PhD  Medicine-Dermatology PGY-2    I agree with the PFSH and ROS as completed by the medical resident. The remainder of the encounter was performed by me and scribed by the medical resident. The scribed note accurately reflects my personal services and the decisions made by me.  Esau Cotton MD, MS, FAAD

## 2017-08-11 NOTE — NURSING NOTE
"Dermatology Rooming Note    Daniel Mcnair's goals for this visit include:   Chief Complaint   Patient presents with     Skin Check     \"I have itchy spots.\" Personal hx of BCC.     Hannah Goldsmith, [facility]  "

## 2017-08-11 NOTE — MR AVS SNAPSHOT
After Visit Summary   8/11/2017    Daniel Mcnair    MRN: [id number]           Patient Information     Date Of Birth          10/17/1929        Visit Information        Provider Department      8/11/2017 10:00 AM Esau Cotton MD [facility] Dermatology        Today's Diagnoses     Pilar cyst    -  1    Seborrheic keratosis        Cherry angioma        Solar lentigo        Retention hyperkeratosis          Care Instructions    - For your chest with the flaking skin, you can use Cerave-SA or Amlactin lotion.          Follow-ups after your visit        Follow-up notes from your care team     Return in about 1 year (around 8/11/2018).      Your next 10 appointments already scheduled     Nov 16, 2017  9:00 AM CST   (Arrive by 8:45 AM)   CT ABDOMEN W CONTRAST with UCCT1   [facility] ([facility])    [address] [address] [phone]           Please bring any scans or X-rays taken at other hospitals, if similar tests were done. Also bring a list of your medicines, including vitamins, minerals and over-the-counter drugs. It is safest to leave personal items at home.  Be sure to tell your doctor:   If you have any allergies.   If there s any chance you are pregnant.   If you are breastfeeding.   If you have any special needs.  You may have contrast for this exam. To prepare:   Do not eat or drink for 2 hours before your exam. If you need to take medicine, you may take it with small sips of water. (We may ask you to take liquid medicine as well.)   The day before your exam, drink extra fluids at least six 8-ounce glasses (unless your doctor tells you to restrict your fluids).  Patients over 70 or patients with diabetes or kidney problems:   If you haven t had a blood test (creatinine test) within the last 30 days, go to your clinic or Diagnostic Imaging Department for this test.  If you have diabetes:   If your kidney  function is normal, continue taking your metformin (Avandamet, Glucophage, Glucovance, Metaglip) on the day of your exam.   If your kidney function is abnormal, wait 48 hours before restarting this medicine.  You will have oral contrast for this exam:   You will drink the contrast at home. Get this from your clinic or Diagnostic Imaging Department. Please follow the directions given.  Please wear loose clothing, such as a sweat suit or jogging clothes. Avoid snaps, zippers and other metal. We may ask you to undress and put on a hospital gown.  If you have any questions, please call the Imaging Department where you will have your exam.            Nov 16, 2017  9:45 AM CST   [facility] Draw with  [email or web address] LAB DRAW   [facility] Draw ([facility])    [address] [place] [id number] [phone]            Nov 16, 2017 10:30 AM CST   (Arrive by 10:15 AM)   Return Visit with Tanner Rojas MD   [facility] ([facility])    [address] [place] [id number] [phone]            Basil 15, 2018  9:45 AM CST   RETURN RETINA with Luna Parker MD   [facility] ([facility])    Jamey Marks [address]  [address] [address]  [facility] [address] [phone]              Who to contact     Please call your clinic at [phone] to:    Ask questions about your health    Make or cancel appointments    Discuss your medicines    Learn about your test results    Speak to your doctor   If you have compliments or concerns about an experience at your clinic, or if you wish to file a complaint, please contact [facility] Patient Relations at [phone] or email us at [email or web address]         Additional Information About Your Visit        MyChart Information     [employer] gives you secure access to your electronic health  record. If you see a primary care provider, you can also send messages to your care team and make appointments. If you have questions, please call your primary care clinic.  If you do not have a primary care provider, please call [phone] and they will assist you.      [facility] is an electronic gateway that provides easy, online access to your medical records. With [facility], you can request a clinic appointment, read your test results, renew a prescription or communicate with your care team.     To access your existing account, please contact your [facility] or call [phone] for assistance.        Care EveryWhere ID     This is your Care EveryWhere ID. This could be used by other organizations to access your [facility] medical records  [id number]         Blood Pressure from Last 3 Encounters:   07/28/17 105/63   07/27/17 152/72   06/21/17 151/65    Weight from Last 3 Encounters:   06/21/17 85.3 kg (188 lb)   05/18/17 86.6 kg (190 lb 14.4 oz)   04/25/17 86.9 kg (191 lb 8 oz)              Today, you had the following     No orders found for display       Primary Care Provider Office Phone # Fax #    Albertina Gross [phone]       [address] [place] [address] [address] [place] [id number]        Equal Access to Services     PARRISH LOPEZ : Hadii brandy ku hadasho Soomaali, waaxda luqadaha, qaybta kaalmada adeegyada, jose miguel cummins haysandhya lawrence . So [facility] [phone].    ATENCIÓN: Si habla español, tiene a vaughan disposición servicios gratuitos de asistencia lingüística. Llame al [phone].    We comply with applicable federal civil rights laws and [place] laws. We do not discriminate on the basis of race, color, national origin, age, disability sex, sexual orientation or gender identity.            Thank you!     Thank you for choosing [facility]  for your care. Our goal is always to provide you with excellent care. Hearing back from our patients is one  way we can continue to improve our services. Please take a few minutes to complete the written survey that you may receive in the mail after your visit with us. Thank you!             Your Updated Medication List - Protect others around you: Learn how to safely use, store and throw away your medicines at www.disposemymeds.org.          This list is accurate as of: 8/11/17 10:48 AM.  Always use your most recent med list.                   Brand Name Dispense Instructions for use Diagnosis    acetaminophen 325 MG tablet    TYLENOL    100 tablet    Take 2 tablets (650 mg) by mouth every 6 hours as needed for pain    S/P revision of total hip       acetaminophen-codeine 300-30 MG per tablet    TYLENOL w/CODEINE No. 3    30 tablet    Take 1-2 tablets by mouth every 4 hours as needed for mild pain    Pain in joint, pelvic region and thigh, unspecified laterality       amLODIPine 10 MG tablet    NORVASC    90 tablet    Take 1 tablet (10 mg) by mouth daily    Benign essential hypertension       aspirin 81 MG EC tablet     90 tablet    Take 1 tablet (81 mg) by mouth daily    Hyperlipidemia LDL goal <70       atorvastatin 20 MG tablet    LIPITOR    90 tablet    Take 1 tablet (20 mg) by mouth daily    Hyperlipidemia LDL goal <70       benazepril 40 MG tablet    LOTENSIN    90 tablet    Take 1 tablet (40 mg) by mouth daily    Essential hypertension, benign       bisacodyl 10 MG Suppository    DULCOLAX    3 suppository    Place 1 suppository rectally daily as needed.    Chronic constipation       desonide 0.05 % ointment    DESOWEN    60 g    Apply topically 2 times daily To affected areas of scale and redness on the face and ears as needed until clear.    Dermatitis, seborrheic       docusate sodium 100 MG tablet    COLACE    60 tablet    Take 100 mg by mouth 2 times daily    BPH (benign prostatic hyperplasia)       gentamicin 0.1 % ointment    GARAMYCIN      B-cell lymphoma of intra-abdominal lymph nodes, unspecified B-cell  lymphoma type (H)       melatonin 3 MG tablet      Take 5 mg by mouth At Bedtime        metoprolol 25 MG 24 hr tablet    TOPROL-XL    45 tablet    Take 0.5 tablets (12.5 mg) by mouth daily    Essential hypertension, benign       MILK OF MAGNESIA PO      Take by mouth At Bedtime        nitroGLYcerin 0.4 MG sublingual tablet    NITROSTAT    30 tablet    Place 1 tablet (0.4 mg) under the tongue every 5 minutes as needed    Coronary artery disease involving native heart without angina pectoris, unspecified vessel or lesion type       N[employer]riSource Fiber packet      Take 1 packet by mouth every morning        psyllium 0.52 G capsule      Take by mouth daily        ranitidine 150 MG tablet    ZANTAC    180 tablet    Take 1 tablet (150 mg) by mouth 2 times daily    Gastroesophageal reflux disease without esophagitis       sertraline 50 MG tablet    ZOLOFT    90 tablet    Take 1 tablet (50 mg) by mouth daily    Adjustment disorder with anxious mood       sildenafil 100 MG cap/tab    REVATIO/VIAGRA    10 tablet    Take 1 tablet (100 mg) by mouth daily as needed for erectile dysfunction    ED (erectile dysfunction)       tamsulosin 0.4 MG capsule    FLOMAX    90 capsule    Take 1 capsule (0.4 mg) by mouth daily    Hypertrophy of prostate without urinary obstruction       tolterodine 2 MG tablet    DETROL    180 tablet    Take 1 tablet (2 mg )twice a day    Hypertonicity of bladder       * triamcinolone 0.025 % cream    KENALOG    45 g    Apply topically 2 times daily    Dermatitis       * triamcinolone 0.1 % ointment    KENALOG    80 g    Apply topically 2 times daily To itchy rashes    Intrinsic atopic dermatitis       VITAMIN B 12 PO      Take by mouth daily        VITEYES AREDS FORMULA/LUTEIN Caps      Take by mouth daily        * Notice:  This list has 2 medication(s) that are the same as other medications prescribed for you. Read the directions carefully, and ask your doctor or other care provider to review them with you.

## 2017-08-11 NOTE — LETTER
"8/11/2017       RE: Daniel Mcnair  [address] [facility] [address]  [place] [address]     Dear Colleague,    Thank you for referring your patient, Daniel Mcnair, to the [facility] DERMATOLOGY at [facility]. Please see a copy of my visit note below.    [facility] Dermatology Note      Dermatology Problem List:  1.  NMSC  -BCC, right lateral neck, s/p Mohs 03/11/2016  -BCC, left antihelix, s/p Mohs 11/13/2015  2. Hx of AKs  3. Non-Hodgkin lymphoma, 2012, followed by oncology  4. ISK biopsied from left upper back 10/19/2015  5. Pilar vs epidermal inclusion cyst    Encounter Date: Aug 11, 2017    CC:   Chief Complaint   Patient presents with     Skin Check     \"I have itchy spots.\" Personal hx of BCC.         History of Present Illness:  Mr. Daniel Mcnair is a 87 year old male who presents as a follow-up for his annual skin check. The patient was last seen 8/5/2016 when a shave biopsy was taken from his right paraspinal base of neck that showed lichen planus-like keratosis. Patient reports that he has a cyst on his left occipital hair line that he had previously excised, but has since returned. He also is concerned with an itch spot over his right medial scapula. Otherwise he denies new or changing moles, itchy/irritated/bleeding/non-healing spots.    On ROS he reports back pain and fatigue, both of which are chronic for him. He denies fever, chills, unexplained weight loss or tender/swollen lymph nodes.     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)     Benign essential hypertension     Coronary artery disease involving native coronary artery of native heart without angina pectoris     Advance Care Planning     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     Macular degeneration, dry      Other and unspecified hyperlipidemia 7/21/2011     Snoring      Spinal stenosis, lumbar region, without neurogenic claudication 7/21/2011     Stented coronary artery 2003     Unspecified essential hypertension 7/21/2011     Past Surgical History:   Procedure Laterality Date     ARTHROPLASTY HIP   10/2008    left     ARTHROPLASTY HIP  1999    right     ARTHROPLASTY REVISION HIP  1/21/2014    Procedure: ARTHROPLASTY REVISION HIP;  Revision Left Total Hip;  Surgeon: Edgar Grewal MD;  Location: [facility] OR     BACK SURGERY  2006     CARDIAC SURGERY  2001    stent x 5      CARDIAC SURGERY  2002     CATARACT IOL, RT/LT Bilateral      CYSTOSCOPY, TRANSURETHRAL RESECTION (TUR) PROSTATE, COMBINED  11/5/2013    Procedure: COMBINED CYSTOSCOPY, TRANSURETHRAL RESECTION (TUR) PROSTATE;  Transurethral Resection Of Prostate, Bipolar;  Surgeon: Lai Street MD;  Location: [facility] OR     ESOPHAGOSCOPY, GASTROSCOPY, DUODENOSCOPY (EGD), COMBINED  2/3/2014    Procedure: COMBINED ESOPHAGOSCOPY, GASTROSCOPY, DUODENOSCOPY (EGD);;  Surgeon: Ezra Fiore MD;  Location: [facility] GI     ESOPHAGOSCOPY, GASTROSCOPY, DUODENOSCOPY (EGD), COMBINED  4/15/2014    Procedure: COMBINED ESOPHAGOSCOPY, GASTROSCOPY, DUODENOSCOPY (EGD), BIOPSY SINGLE OR MULTIPLE;  Surgeon: Ezra Fiore MD;  Location: [facility] GI     EXTRACAPSULAR CATARACT EXTRATION WITH INTRAOCULAR LENS IMPLANT  2005     EXTRACAPSULAR CATARACT EXTRATION WITH INTRAOCULAR LENS IMPLANT  2010     HERNIA REPAIR       MASTOID SURGERY  1939     MOHS MICROGRAPHIC PROCEDURE       OPTICAL TRACKING SYSTEM FUSION SPINE POSTERIOR LUMBAR TWO LEVELS  7/11/2013    Procedure: OPTICAL TRACKING SYSTEM FUSION SPINE POSTERIOR LUMBAR TWO LEVELS;  Stealth Assisted Lumbar 3-4 Transforaminal Lumbar Interbody Fusion, Lumbar 2-3 Lumbar 3-4 Laminectomy Decompression;  Surgeon: Edgar Lew MD;  Location: [facility] OR       Social History:  The patient is retired from full time work, but continues to work on and off as a [employer] .    Family History:  Family History   Problem Relation Age of Onset     DIABETES Maternal Grandfather      Alcohol/Drug Maternal Grandfather      Arthritis Maternal Grandfather      Obesity Maternal Grandfather      CEREBROVASCULAR DISEASE Maternal Grandmother       Hypertension Mother      Glaucoma No family hx of      Macular Degeneration No family hx of      CANCER No family hx of      No known family hx of skin cancer     Skin Cancer No family hx of          Medications:  Current Outpatient Prescriptions   Medication Sig Dispense Refill     atorvastatin (LIPITOR) 20 MG tablet Take 1 tablet (20 mg) by mouth daily 90 tablet 2     tolterodine (DETROL) 2 MG tablet Take 1 tablet (2 mg )twice a day 180 tablet 3     amLODIPine (NORVASC) 10 MG tablet Take 1 tablet (10 mg) by mouth daily 90 tablet 0     tamsulosin (FLOMAX) 0.4 MG capsule Take 1 capsule (0.4 mg) by mouth daily 90 capsule 3     nitroglycerin (NITROSTAT) 0.4 MG sublingual tablet Place 1 tablet (0.4 mg) under the tongue every 5 minutes as needed 30 tablet 2     metoprolol (TOPROL-XL) 25 MG 24 hr tablet Take 0.5 tablets (12.5 mg) by mouth daily 45 tablet 3     ranitidine (ZANTAC) 150 MG tablet Take 1 tablet (150 mg) by mouth 2 times daily 180 tablet 3     sertraline (ZOLOFT) 50 MG tablet Take 1 tablet (50 mg) by mouth daily 90 tablet 1     gentamicin (GARAMYCIN) 0.1 % ointment   3     acetaminophen-codeine (TYLENOL W/CODEINE NO. 3) 300-30 MG per tablet Take 1-2 tablets by mouth every 4 hours as needed for mild pain 30 tablet 1     benazepril (LOTENSIN) 40 MG tablet Take 1 tablet (40 mg) by mouth daily 90 tablet 3     triamcinolone (KENALOG) 0.1 % ointment Apply topically 2 times daily To itchy rashes 80 g 3     desonide (DESOWEN) 0.05 % ointment Apply topically 2 times daily To affected areas of scale and redness on the face and ears as needed until clear. 60 g 11     sildenafil (VIAGRA) 100 MG tablet Take 1 tablet (100 mg) by mouth daily as needed for erectile dysfunction 10 tablet 2     Multiple Vitamins-Minerals (VITEYES AREDS FORMULA/LUTEIN) CAPS Take by mouth daily       psyllium 0.52 G capsule Take by mouth daily       triamcinolone (KENALOG) 0.025 % cream Apply topically 2 times daily 45 g 0     Cyanocobalamin  (VITAMIN B 12 PO) Take by mouth daily       aspirin 81 MG EC tablet Take 1 tablet (81 mg) by mouth daily 90 tablet 3     acetaminophen (TYLENOL) 325 MG tablet Take 2 tablets (650 mg) by mouth every 6 hours as needed for pain 100 tablet 0     Magnesium Hydroxide (MILK OF MAGNESIA PO) Take by mouth At Bedtime       docusate sodium 100 MG tablet Take 100 mg by mouth 2 times daily 60 tablet 1     bisacodyl (DULCOLAX) 10 MG suppository Place 1 suppository rectally daily as needed. 3 suppository 0     melatonin 3 MG tablet Take 5 mg by mouth At Bedtime        Guar Gum (BENEFIBER) packet Take 1 packet by mouth every morning           Allergies   Allergen Reactions     Nka [No Known Allergies]          Review of Systems:  -As per HPI  -Constitutional: The patient denies fatigue, fevers, chills, unintended weight loss, and night sweats.  -HEENT: Patient denies nonhealing oral sores.  -Skin: As above in HPI. No additional skin concerns.    Physical exam:  Vitals: There were no vitals taken for this visit.  GEN: This is a well developed, well-nourished male in no acute distress, in a pleasant mood.    SKIN: Total skin excluding the undergarment areas was performed. The exam included the head/face, neck, both arms, chest, back, abdomen, both legs, digits and/or nails.   -There are bright red some shaped papules scattered on the trunk, upper and lower extremities.   -Multiple regular brown pigmented macules and papules are identified on the trunk, upper and lower extremities.   -There are waxy stuck on tan to brown papules on the face, neck, trunk, upper and lower extremities.  -There are purple to dark red patches on bilateral arms.  -No other lesions of concern on areas examined.     Impression/Plan:  1. Cherry angioma(s)    Benign, patient reassured. No treatment needed.    2. Seborrheic keratosis, non irritated    Benign, patient reassured. No treatment needed.    3. Multiple clinically benign nevi on the upper and lower  extremities and trunk.    Benign, patient reassured. No treatment needed.    4. Senile plaques on bilateral upper extremities    Benign, patient reassured. No treatment needed.    5. History of NMSCs and AKs: no concerning lesions on today's exam.    CC Dr. Gross on close of this encounter.         Dr. Esau Cotton staffed the patient.    Staff Involved:  Resident(Rick Blackmon)/Staff(as above)    Rick Blackmon MD, PhD  Medicine-Dermatology PGY-2    I agree with the PFSH and ROS as completed by the medical resident. The remainder of the encounter was performed by me and scribed by the medical resident. The scribed note accurately reflects my personal services and the decisions made by me.  Esau Cotton MD, MS, FAAD      Again, thank you for allowing me to participate in the care of your patient.      Sincerely,    Esau Cotton MD

## 2017-08-19 DIAGNOSIS — I10 BENIGN ESSENTIAL HYPERTENSION: ICD-10-CM

## 2017-08-21 RX ORDER — AMLODIPINE BESYLATE 10 MG/1
TABLET ORAL
Qty: 90 TABLET | Refills: 2 | Status: SHIPPED | OUTPATIENT
Start: 2017-08-21 | End: 2018-06-21

## 2017-08-21 NOTE — TELEPHONE ENCOUNTER
norvasc  Last Written Prescription Date: 6/21/17  Last Fill Quantity: 90, # refills: 0  Last Office Visit with [facility], [facility] or [facility] prescribing provider: 6/21/17       Potassium   Date Value Ref Range Status   05/18/2017 4.4 3.4 - 5.3 mmol/L Final     Creatinine   Date Value Ref Range Status   05/18/2017 0.78 0.66 - 1.25 mg/dL Final     BP Readings from Last 3 Encounters:   07/28/17 105/63   07/27/17 152/72   06/21/17 151/65

## 2017-09-12 ENCOUNTER — OFFICE VISIT (OUTPATIENT)
Dept: AUDIOLOGY | Facility: CLINIC | Age: 82
End: 2017-09-12

## 2017-09-12 DIAGNOSIS — H90.3 SENSORY HEARING LOSS, BILATERAL: Primary | ICD-10-CM

## 2017-09-18 ENCOUNTER — OFFICE VISIT (OUTPATIENT)
Dept: INTERNAL MEDICINE | Facility: CLINIC | Age: 82
End: 2017-09-18

## 2017-09-18 VITALS
BODY MASS INDEX: 27.71 KG/M2 | SYSTOLIC BLOOD PRESSURE: 148 MMHG | HEART RATE: 48 BPM | WEIGHT: 193.1 LBS | RESPIRATION RATE: 16 BRPM | TEMPERATURE: 97.8 F | DIASTOLIC BLOOD PRESSURE: 73 MMHG | OXYGEN SATURATION: 98 %

## 2017-09-18 DIAGNOSIS — Z23 NEED FOR PROPHYLACTIC VACCINATION AND INOCULATION AGAINST INFLUENZA: ICD-10-CM

## 2017-09-18 DIAGNOSIS — I25.10 CORONARY ARTERY DISEASE INVOLVING NATIVE HEART WITHOUT ANGINA PECTORIS, UNSPECIFIED VESSEL OR LESION TYPE: ICD-10-CM

## 2017-09-18 DIAGNOSIS — R53.83 FATIGUE, UNSPECIFIED TYPE: Primary | ICD-10-CM

## 2017-09-18 DIAGNOSIS — I10 ESSENTIAL HYPERTENSION, BENIGN: ICD-10-CM

## 2017-09-18 DIAGNOSIS — M79.89 SWELLING OF RIGHT UPPER EXTREMITY: ICD-10-CM

## 2017-09-18 DIAGNOSIS — N52.9 ERECTILE DYSFUNCTION, UNSPECIFIED ERECTILE DYSFUNCTION TYPE: ICD-10-CM

## 2017-09-18 DIAGNOSIS — K62.4 ANAL STRICTURE: ICD-10-CM

## 2017-09-18 DIAGNOSIS — I25.810 CORONARY ARTERY DISEASE INVOLVING AUTOLOGOUS ARTERY CORONARY BYPASS GRAFT WITHOUT ANGINA PECTORIS: ICD-10-CM

## 2017-09-18 DIAGNOSIS — R00.1 SINUS BRADYCARDIA: ICD-10-CM

## 2017-09-18 RX ORDER — BENAZEPRIL HYDROCHLORIDE 40 MG/1
40 TABLET ORAL DAILY
Qty: 90 TABLET | Refills: 3 | Status: SHIPPED | OUTPATIENT
Start: 2017-09-18 | End: 2018-12-04

## 2017-09-18 RX ORDER — SILDENAFIL 100 MG/1
100 TABLET, FILM COATED ORAL DAILY PRN
Qty: 10 TABLET | Refills: 2 | Status: SHIPPED | OUTPATIENT
Start: 2017-09-18

## 2017-09-18 ASSESSMENT — PAIN SCALES - GENERAL: PAINLEVEL: NO PAIN (1)

## 2017-09-18 NOTE — PATIENT INSTRUCTIONS
[facility]: [phone]     [facility] Medication Refill Request Information:  * Please contact your pharmacy regarding ANY request for medication refills.  ** [facility] Prescription Fax = [phone]  * Please allow 3 business days for routine medication refills.  * Please allow 5 business days for controlled substance medication refills.     Primary Care Center Test Result notification information:  *You will be notified with in 7-10 days of your appointment day regarding the results of your test.  If you are on MyChart you will be notified as soon as the provider has reviewed the results and signed off on them.    Followup with new primary care provider:  Lia Dumont MD (she is taking new patients)  MD Anne Marie Eubanks MD Jamie Santilli, MD

## 2017-09-18 NOTE — PROGRESS NOTES
HPI:   Daniel Mcnair is a 87 year old male presents today for followup. His main complaint today is about fatigue. He says he gets about 10 hours of sleep at night and then still naps 1-2 hours during the day. he just feels as if his more tired  is unsure whether this is related to his age or not. This is not a new issue for him but he does feel as if it's gotten somewhat worse. He continues to significant low back pain. He was seen by physical medicine and rehabilitation earlier this summer and they did tell him that he could be referred for physical therapy or another steroid injection at any time. He does have back pain as well as sensation that his thighs are getting tired with walking and he needs to rest. However he's not sure that he's ready to any new action at this time for the symptoms. He denies any chest pain or shortness of breath. He continues to manage his anal stricture with dilation and careful management of his stools. His wife mentioned that she's noticed the last 2 or 3 months that his right arm seems to be a little larger in diameter than his left. He moved his watch because of this. It's unclear when this began and how long it's been the case. He's had no pain in the arm and he doesn't feel either arm is weak. He notes a little bit of neck pain but nothing that's been that bothersome to him. No numbness or tingling in his hands or arms.      ASSESSMENT/PLAN: #1 fatigue- he's had no evidence of anemia, liver disease, or renal disease on laboratory testing the spring or in the past. We'll check a TSH. I do wonder whether his sinus bradycardia may be contributing at all. He is on a low-dose of a beta blocker for his coronary artery disease. His heart rate has been running in the mid to upper 40s recently and 70 possible that this is contributing to his symptoms. I'm going to refer him to cardiology for their opinion possible consideration of stopping his beta blocker and consider whether  anything else instead be done about his slow heart rate.   #2 coronary artery disease status post stenting -previously he's been a symptomatic and stable for quite a while now. continue present medications and I am going to refer him to cardiology for their reassessment. We'll check a lipid profile on his current dose of statin today.   #3 erectile dysfunction for which uses Viagra on a p.r.n. basis. He knows not to use nitrates with that   #4 Benign essential hypertension continue present medications   #5 anal stricture continue present management. Has previously seen in colorectal surgery   #6  B-cell lymphoma that was being that is being conservatively managed and followed by Dr. Tanner Rojas of oncology. On recent imaging earlier this summer I note that he was noted to have possible new pancreatic  findings consistent with possible side branch IPMN and recommendation was for follow-up in 6 months. I believe he'll be having repeat imaging under the direction Dr. Rojas at that time at and at that time this can be reevaluated.   #7 possible differential size in the right arm compared to the left. This is a subtle finding but he may have subtle increase in the diameter of the right arm compared to the left. I'm going obtain a right approximately ultrasound to rule out DVT. I do not appreciate any axillary masses or lymph nodes. His strength is symmetric in both arms and he has normal sensation and normal deep tendon reflexes.    #8 I've asked to make follow-up appointment with new Primary care provider within the next 2 months to establish care. 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4 MG sublingual tablet Place 1 tablet (0.4 mg) under the tongue every 5 minutes as needed 30 tablet 2     metoprolol (TOPROL-XL) 25 MG 24 hr tablet Take 0.5 tablets (12.5 mg) by mouth daily 45 tablet 3     ranitidine (ZANTAC) 150 MG tablet Take 1 tablet (150 mg) by mouth 2 times daily 180  tablet 3     sertraline (ZOLOFT) 50 MG tablet Take 1 tablet (50 mg) by mouth daily 90 tablet 1     gentamicin (GARAMYCIN) 0.1 % ointment   3     acetaminophen-codeine (TYLENOL W/CODEINE NO. 3) 300-30 MG per tablet Take 1-2 tablets by mouth every 4 hours as needed for mild pain 30 tablet 1     benazepril (LOTENSIN) 40 MG tablet Take 1 tablet (40 mg) by mouth daily 90 tablet 3     triamcinolone (KENALOG) 0.1 % ointment Apply topically 2 times daily To itchy rashes 80 g 3     desonide (DESOWEN) 0.05 % ointment Apply topically 2 times daily To affected areas of scale and redness on the face and ears as needed until clear. 60 g 11     sildenafil (VIAGRA) 100 MG tablet Take 1 tablet (100 mg) by mouth daily as needed for erectile dysfunction 10 tablet 2     Multiple Vitamins-Minerals (VITEYES AREDS FORMULA/LUTEIN) CAPS Take by mouth daily       psyllium 0.52 G capsule Take by mouth daily       triamcinolone (KENALOG) 0.025 % cream Apply topically 2 times daily 45 g 0     Cyanocobalamin (VITAMIN B 12 PO) Take by mouth daily       aspirin 81 MG EC tablet Take 1 tablet (81 mg) by mouth daily 90 tablet 3     acetaminophen (TYLENOL) 325 MG tablet Take 2 tablets (650 mg) by mouth every 6 hours as needed for pain 100 tablet 0     Magnesium Hydroxide (MILK OF MAGNESIA PO) Take by mouth At Bedtime       docusate sodium 100 MG tablet Take 100 mg by mouth 2 times daily 60 tablet 1     bisacodyl (DULCOLAX) 10 MG suppository Place 1 suppository rectally daily as needed. 3 suppository 0     melatonin 3 MG tablet Take 5 mg by mouth At Bedtime        Guar Gum (BENEFIBER) packet Take 1 packet by mouth every morning            ROS:    Constitutional: no fevers, chill   Cardiovascular: no chest pain, palpitations  Respiratory: no dyspnea, cough, shortness of breath or wheezing   GI: no nausea, vomiting, diarrhea, no abdominal pain   Musculoskeletal: no edema       PHYSICAL EXAM:   /73  Pulse (!) 48  Temp 97.8  F (36.6  C) (Oral)   Resp 16  Wt 87.6 kg (193 lb 1.6 oz)  SpO2 98%  BMI 27.71 kg/m2   Wt Readings from Last 1 Encounters:   09/18/17 87.6 kg (193 lb 1.6 oz)       Constitutional: no distress, comfortable, pleasant    Cardiovascular: regular rate and rhythm, normal S1 and S2, no murmurs, rubs or gallops  Respiratory: clear to auscultation, no wheezes or crackles, normal breath sounds   Musculoskeletal:  Right arm ? Subtly larger in diameter compared to left; No axillary masses or lymphadenopathy. No supraclavicular masses.  Strength 5/5 UE throughout; DTRS 2+ throughout    Albertina Gross MD

## 2017-09-18 NOTE — MR AVS SNAPSHOT
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  ** [facility] Prescription Fax = [phone]  * Please allow 3 business days for routine medication refills.  * Please allow 5 business days for controlled substance medication refills.     Primary Care Center Test Result notification information:  *You will be notified with in 7-10 days of your appointment day regarding the results of your test.  If you are on MyChart you will be notified as soon as the provider has reviewed the results and signed off on them.    Followup with new primary care provider:  Lia Dumont MD (she is taking new patients)  MD Anne Marie Eubanks MD Jamie Santilli, MD          Follow-ups after your visit        Additional Services     CARDIOLOGY EVAL ADULT REFERRAL       Your provider has referred you to:  [facility]: [facility] [phone]    [email or web address]    Please be aware that coverage of these services is subject to the terms and limitations of your health insurance plan.  Call member services at your health plan with any benefit or coverage questions.      Type of Referral:  New Cardiology Consult    Timeframe requested:  Within 1 month    Please bring the following to your appointment:  >>   Any x-rays, CTs or MRIs which have been performed.  Contact the facility where they were done to arrange for  prior to your scheduled appointment.    >>   List of current medications  >>   This referral request   >>   Any documents/labs given to you for this referral                  Follow-up notes from your care team     Return in about 2 months (around 11/18/2017).      Your next 10 appointments already scheduled     Sep 19, 2017  9:30 AM CDT   US VENOUS with MGUS1 MG [facility] ([facility])    [address] [place] [address] [phone]           Please bring a list of your medicines (including vitamins, minerals and over-the-counter drugs). Also, tell your doctor about any allergies you may have. Wear comfortable clothes and leave your valuables at home.  You do not need to do anything special to prepare for your exam.  Please call the Imaging Department at your exam site with any questions.            Oct 03, 2017  2:30 PM CDT   (Arrive by 2:15 PM)   New Patient Visit with Santana Martinez MD   [facility] ([facility])    [address] [place] [address]  3rd Floor  [facility] [address] [phone]            Nov 16, 2017  9:00 AM CST   (Arrive by 8:45 AM)   CT ABDOMEN W CONTRAST with UCCT1   [facility] CT ([facility])    [address]  1st [facility] [address] [phone]           Please bring any scans or X-rays taken at other  \[facility] [place] [facility]\"", if similar tests were done. Also bring a list of your medicines, including vitamins, minerals and over-the-counter drugs. It is safest to leave personal items at home.  Be sure to tell your doctor:   If you have any allergies.   If there s any chance you are pregnant.   If you are breastfeeding.   If you have any special needs.  You may have contrast for this exam. To prepare:   Do not eat or drink for 2 hours before your exam. If you need to take medicine, you may take it with small sips of water. (We may ask you to take liquid medicine as well.)   The day before your exam, drink extra fluids at least six 8-ounce glasses (unless your doctor tells you to restrict your fluids).  Patients over 70 or patients with diabetes or kidney problems:   If you haven t had a blood test (creatinine test) within the last 30 days, go to your clinic or Diagnostic Imaging Department for this test.  If you have diabetes:   If your kidney function is normal, continue taking your metformin (Avandamet, Glucophage, Glucovance, Metaglip) on the day of your exam.   If your kidney function is abnormal, wait 48 hours before restarting this medicine.  You will have oral contrast for this exam:   You will drink the contrast at home. Get this from your clinic or Diagnostic Imaging Department. Please follow the directions given.  Please wear loose clothing, such as a sweat suit or jogging clothes. Avoid snaps, zippers and other metal. We may ask you to undress and put on a hospital gown.  If you have any questions, please call the Imaging Department where you will have your exam.            Nov 16, 2017  9:45 AM CST   [facility] Lab Draw with  [facility] LAB DRAW   [facility] Lab Draw ([facility])    [address]  2nd Floor  [facility] [address] [phone]            Nov 16, 2017 10:30 AM CST   (Arrive by 10:15 AM)   Return Visit with Tanner Rojas MD   [facility] (  [facility])    [address]  2nd Floor  [facility] [address]   [phone]            Nov 18, 2017  9:40 AM CST   (Arrive by 9:25 AM)   Return Visit with Lia Dumont MD   [facility] ([facility])    [address]  4th Floor  [facility] [address] [phone]            Basil 15, 2018  9:45 AM CST   RETURN RETINA with Luna Parker MD   [facility] ([facility])    [address]  [address]  9th [address]  [facility] [address] [phone]              Future tests that were ordered for you today     Open Future Orders        Priority Expected Expires Ordered    US Upper Extremity Venous Duplex Right Routine  9/18/2018 9/18/2017    Lipid panel reflex to direct LDL - -(Today) Routine 9/18/2017 9/18/2018 9/18/2017            Who to contact     Please call your clinic at [phone] to:    Ask questions about your health    Make or cancel appointments    Discuss your medicines    Learn about your test results    Speak to your doctor   If you have compliments or concerns about an experience at your clinic, or if you wish to file a complaint, please contact [facility] Patient Relations at [phone] or email us at [email or web address]         Additional Information About Your Visit        MyChart Information     [employer] gives you secure access to your electronic health record. If you see a primary care provider, you can also send messages to your care team and make appointments. If you have questions, please call your primary care clinic.  If you do not have a primary care provider, please call [phone] and they will assist you.      cartmi is an electronic gateway that provides easy, online access to your medical records. With cartmi, you can request a clinic appointment, read your test results, renew a prescription or communicate with your  care team.     To access your existing account, please contact your [facility] or call [phone] for assistance.        Care EveryWhere ID     This is your Care EveryWhere ID. This could be used by other organizations to access your [facility] medical records  [id number]        Your Vitals Were     Pulse Temperature Respirations Pulse Oximetry BMI (Body Mass Index)       48 97.8  F (36.6  C) (Oral) 16 98% 27.71 kg/m2        Blood Pressure from Last 3 Encounters:   09/18/17 148/73   07/28/17 105/63   07/27/17 152/72    Weight from Last 3 Encounters:   09/18/17 87.6 kg (193 lb 1.6 oz)   06/21/17 85.3 kg (188 lb)   05/18/17 86.6 kg (190 lb 14.4 oz)              We Performed the Following     CARDIOLOGY EVAL ADULT REFERRAL     FLU VACCINE, INCREASED ANTIGEN, PRESV FREE, AGE 65+ [99689]          Today's Medication Changes          These changes are accurate as of: 9/18/17  6:37 PM.  If you have any questions, ask your nurse or doctor.               These medicines have changed or have updated prescriptions.        Dose/Directions    sildenafil 100 MG tablet   Commonly known as:  VIAGRA   This may have changed:  reasons to take this   Used for:  Erectile dysfunction, unspecified erectile dysfunction type   Changed by:  Albertina Gross MD        Dose:  100 mg   Take 1 tablet (100 mg) by mouth daily as needed   Quantity:  10 tablet   Refills:  2            Where to get your medicines      These medications were sent to [facility] [address] [address]     Phone:  [phone]     benazepril 40 MG tablet    sildenafil 100 MG tablet                Primary Care Provider Office Phone # Fax #    Albertina Gross [phone]       [address] [place] [address]        Equal Access to Services     PARRISH LOPEZ AH: Shruthi Camargo, ad portillo, rossy arreola  jose miguel binghamboaz quirozaan ah. So [facility] [phone].    ATENCIÓN: Si cristoballa palomo, tiene a vaughan disposición servicios gratuitos de asistencia lingüística. Glenna al [phone].    We comply with applicable federal civil rights laws and [place] laws. We do not discriminate on the basis of race, color, national origin, age, disability sex, sexual orientation or gender identity.            Thank you!     Thank you for choosing [facility]  for your care. Our goal is always to provide you with excellent care. Hearing back from our patients is one way we can continue to improve our services. Please take a few minutes to complete the written survey that you may receive in the mail after your visit with us. Thank you!             Your Updated Medication List - Protect others around you: Learn how to safely use, store and throw away your medicines at www.disposemymeds.org.          This list is accurate as of: 9/18/17  6:37 PM.  Always use your most recent med list.                   Brand Name Dispense Instructions for use Diagnosis    acetaminophen 325 MG tablet    TYLENOL    100 tablet    Take 2 tablets (650 mg) by mouth every 6 hours as needed for pain    S/P revision of total hip       acetaminophen-codeine 300-30 MG per tablet    TYLENOL w/CODEINE No. 3    30 tablet    Take 1-2 tablets by mouth every 4 hours as needed for mild pain    Pain in joint, pelvic region and thigh, unspecified laterality       amLODIPine 10 MG tablet    NORVASC    90 tablet    TAKE 1 TABLET DAILY    Benign essential hypertension       aspirin 81 MG EC tablet     90 tablet    Take 1 tablet (81 mg) by mouth daily    Hyperlipidemia LDL goal <70       atorvastatin 20 MG tablet    LIPITOR    90 tablet    Take 1 tablet (20 mg) by mouth daily    Hyperlipidemia LDL goal <70       benazepril 40 MG tablet    LOTENSIN    90 tablet    Take 1 tablet (40 mg) by mouth daily    Essential hypertension, benign        bisacodyl 10 MG Suppository    DULCOLAX    3 suppository    Place 1 suppository rectally daily as needed.    Chronic constipation       desonide 0.05 % ointment    DESOWEN    60 g    Apply topically 2 times daily To affected areas of scale and redness on the face and ears as needed until clear.    Dermatitis, seborrheic       docusate sodium 100 MG tablet    COLACE    60 tablet    Take 100 mg by mouth 2 times daily    BPH (benign prostatic hyperplasia)       gentamicin 0.1 % ointment    GARAMYCIN      B-cell lymphoma of intra-abdominal lymph nodes, unspecified B-cell lymphoma type (H)       melatonin 3 MG tablet      Take 5 mg by mouth At Bedtime        metoprolol 25 MG 24 hr tablet    TOPROL-XL    45 tablet    Take 0.5 tablets (12.5 mg) by mouth daily    Essential hypertension, benign       MILK OF MAGNESIA PO      Take by mouth At Bedtime        nitroGLYcerin 0.4 MG sublingual tablet    NITROSTAT    30 tablet    Place 1 tablet (0.4 mg) under the tongue every 5 minutes as needed    Coronary artery disease involving native heart without angina pectoris, unspecified vessel or lesion type       NutriSource Fiber packet      Take 1 packet by mouth every morning        psyllium 0.52 G capsule      Take by mouth daily        ranitidine 150 MG tablet    ZANTAC    180 tablet    Take 1 tablet (150 mg) by mouth 2 times daily    Gastroesophageal reflux disease without esophagitis       sertraline 50 MG tablet    ZOLOFT    90 tablet    Take 1 tablet (50 mg) by mouth daily    Adjustment disorder with anxious mood       sildenafil 100 MG tablet    VIAGRA    10 tablet    Take 1 tablet (100 mg) by mouth daily as needed    Erectile dysfunction, unspecified erectile dysfunction type       tamsulosin 0.4 MG capsule    FLOMAX    90 capsule    Take 1 capsule (0.4 mg) by mouth daily    Hypertrophy of prostate without urinary obstruction       tolterodine 2 MG tablet    DETROL    180 tablet    Take 1 tablet (2 mg )twice a day     Hypertonicity of bladder       * triamcinolone 0.025 % cream    KENALOG    45 g    Apply topically 2 times daily    Dermatitis       * triamcinolone 0.1 % ointment    KENALOG    80 g    Apply topically 2 times daily To itchy rashes    Intrinsic atopic dermatitis       VITAMIN B 12 PO      Take by mouth daily        VITEYES AREDS FORMULA/LUTEIN Caps      Take by mouth daily        * Notice:  This list has 2 medication(s) that are the same as other medications prescribed for you. Read the directions carefully, and ask your doctor or other care provider to review them with you.

## 2017-09-18 NOTE — Clinical Note
Can you tell him I would also like to check his TSH? He can have drawn when he is in for the ultrsound, or other testing. Thanks

## 2017-09-18 NOTE — NURSING NOTE
Injectable Influenza Immunization Documentation      1.  Has the patient received the information for the injectable influenza vaccine? YES    2. Is the patient 6 months of age or older? YES    3. Does the patient have any of the following contraindications?          Severe allergy to eggs? No     Severe allergic reaction to previous influenza vaccines? No     Allergy to contact lens solution/thimerosol? No     History of Guillain-Dubberly syndrome? No     Undergoing chemotherapy or radiation therapy?       (vaccine should be given at least 2 weeks prior or 3 weeks after)  No     Currently have moderate or severe illness? No         4.  The vaccine has been administered and the patient was instructed to wait 15 minutes before leaving the building in the event of an allergic reaction: YES    Leonila Diez LPN at 4:52 PM on 9/18/2017.

## 2017-09-18 NOTE — NURSING NOTE
Chief Complaint   Patient presents with     Medication Refill     Patient is here for medication refills.      Fatigue     Patient is here to discuss feeling fatigued.      Pain     Patient is here to discuss low back pain, and patient states upper legs are aching and tired.      Urinary Frequency     Patient is also here to discuss urinary frequency.      Swelling     Patient noticed the right forearm is swollen.      Leonila Diez LPN at 4:52 PM on 9/18/2017.

## 2017-09-19 ENCOUNTER — RADIANT APPOINTMENT (OUTPATIENT)
Dept: ULTRASOUND IMAGING | Facility: CLINIC | Age: 82
End: 2017-09-19
Attending: INTERNAL MEDICINE
Payer: MEDICARE

## 2017-09-19 DIAGNOSIS — M79.89 SWELLING OF RIGHT UPPER EXTREMITY: ICD-10-CM

## 2017-09-19 PROCEDURE — 93971 EXTREMITY STUDY: CPT | Mod: RT | Performed by: RADIOLOGY

## 2017-09-20 DIAGNOSIS — N52.9 ERECTILE DYSFUNCTION, UNSPECIFIED ERECTILE DYSFUNCTION TYPE: ICD-10-CM

## 2017-09-20 RX ORDER — SILDENAFIL 100 MG/1
100 TABLET, FILM COATED ORAL DAILY PRN
Refills: 0 | Status: CANCELLED | OUTPATIENT
Start: 2017-09-20

## 2017-09-20 NOTE — TELEPHONE ENCOUNTER
sildenafil (VIAGRA) 100 MG tablet: received fax from [facility]. Pt also on nitroglycerin, tamsulosin. D/c any?  --------------------------  No need to stop any of the meds-per .  Poornima Centeno RN      Note      sildenafil (VIAGRA) 100 MG tablet: received fax from [facility]. Pt also on nitroglycerin, tamsulosin. D/c any?  --------------------------  No need to stop any of the meds-per .  Poornima Centeno RN

## 2017-10-02 ENCOUNTER — PRE VISIT (OUTPATIENT)
Dept: CARDIOLOGY | Facility: CLINIC | Age: 82
End: 2017-10-02

## 2017-10-02 DIAGNOSIS — I25.10 CORONARY ARTERY DISEASE INVOLVING NATIVE CORONARY ARTERY OF NATIVE HEART WITHOUT ANGINA PECTORIS: Primary | ICD-10-CM

## 2017-10-03 ENCOUNTER — OFFICE VISIT (OUTPATIENT)
Dept: CARDIOLOGY | Facility: CLINIC | Age: 82
End: 2017-10-03
Attending: INTERNAL MEDICINE
Payer: MEDICARE

## 2017-10-03 VITALS
HEART RATE: 50 BPM | BODY MASS INDEX: 28.02 KG/M2 | WEIGHT: 195.7 LBS | SYSTOLIC BLOOD PRESSURE: 139 MMHG | OXYGEN SATURATION: 98 % | HEIGHT: 70 IN | DIASTOLIC BLOOD PRESSURE: 70 MMHG

## 2017-10-03 DIAGNOSIS — M79.661 PAIN OF RIGHT LOWER LEG: ICD-10-CM

## 2017-10-03 DIAGNOSIS — I25.10 CORONARY ARTERY DISEASE INVOLVING NATIVE HEART WITHOUT ANGINA PECTORIS, UNSPECIFIED VESSEL OR LESION TYPE: ICD-10-CM

## 2017-10-03 DIAGNOSIS — R53.83 FATIGUE, UNSPECIFIED TYPE: ICD-10-CM

## 2017-10-03 DIAGNOSIS — I10 BENIGN ESSENTIAL HYPERTENSION: ICD-10-CM

## 2017-10-03 DIAGNOSIS — I25.810 CORONARY ARTERY DISEASE INVOLVING AUTOLOGOUS ARTERY CORONARY BYPASS GRAFT WITHOUT ANGINA PECTORIS: ICD-10-CM

## 2017-10-03 DIAGNOSIS — R00.1 SINUS BRADYCARDIA: ICD-10-CM

## 2017-10-03 DIAGNOSIS — I73.9 CLAUDICATION OF BOTH LOWER EXTREMITIES (H): Primary | ICD-10-CM

## 2017-10-03 DIAGNOSIS — M79.662 PAIN OF LEFT LOWER LEG: ICD-10-CM

## 2017-10-03 DIAGNOSIS — E78.5 HYPERLIPIDEMIA LDL GOAL <70: ICD-10-CM

## 2017-10-03 LAB
CHOLEST SERPL-MCNC: 88 MG/DL
HDLC SERPL-MCNC: 45 MG/DL
LDLC SERPL CALC-MCNC: 27 MG/DL
NONHDLC SERPL-MCNC: 43 MG/DL
TRIGL SERPL-MCNC: 78 MG/DL
TSH SERPL DL<=0.005 MIU/L-ACNC: 1.39 MU/L (ref 0.4–4)

## 2017-10-03 PROCEDURE — 99204 OFFICE O/P NEW MOD 45 MIN: CPT | Mod: ZP | Performed by: INTERNAL MEDICINE

## 2017-10-03 PROCEDURE — 93225 XTRNL ECG REC<48 HRS REC: CPT | Mod: ZF | Performed by: INTERNAL MEDICINE

## 2017-10-03 PROCEDURE — 99214 OFFICE O/P EST MOD 30 MIN: CPT | Mod: ZF

## 2017-10-03 PROCEDURE — 93226 XTRNL ECG REC<48 HR SCAN A/R: CPT | Performed by: INTERNAL MEDICINE

## 2017-10-03 PROCEDURE — 93010 ELECTROCARDIOGRAM REPORT: CPT | Mod: ZP | Performed by: INTERNAL MEDICINE

## 2017-10-03 PROCEDURE — 93005 ELECTROCARDIOGRAM TRACING: CPT | Mod: ZF

## 2017-10-03 PROCEDURE — 99215 OFFICE O/P EST HI 40 MIN: CPT | Mod: ZF

## 2017-10-03 ASSESSMENT — ENCOUNTER SYMPTOMS
DECREASED APPETITE: 0
FEVER: 0
NECK PAIN: 1
JOINT SWELLING: 1
WEIGHT GAIN: 1
POLYDIPSIA: 0
MUSCLE WEAKNESS: 0
HALLUCINATIONS: 0
STIFFNESS: 1
ALTERED TEMPERATURE REGULATION: 0
CHILLS: 0
FATIGUE: 1
MYALGIAS: 1
WEIGHT LOSS: 0
NIGHT SWEATS: 1
ARTHRALGIAS: 0
POLYPHAGIA: 0
MUSCLE CRAMPS: 0
BACK PAIN: 1
INCREASED ENERGY: 0

## 2017-10-03 ASSESSMENT — PAIN SCALES - GENERAL: PAINLEVEL: NO PAIN (0)

## 2017-10-03 NOTE — MR AVS SNAPSHOT
After Visit Summary   10/3/2017    Daniel Mcnair    MRN: [id number]           Patient Information     Date Of Birth          10/17/1929        Visit Information        Provider Department      10/3/2017 2:30 PM Santana Martinez MD [facility]        Today's Diagnoses     Pain of left lower leg    -  1    Coronary artery disease involving native heart without angina pectoris, unspecified vessel or lesion type        Sinus bradycardia        Pain of right lower leg        Claudication of both lower extremities (H)          Care Instructions    You were seen at the [facility] today.  You saw Dr. Martinez  Here are your Instructions:    1.  48h holter monitor.  2.  DOYLE test.  3.  See us back in 1 year.      Deedee Henderson RN  Nurse Care Coordinator  Office:  [phone] option #1, then #3 & ask for Deedee (nurse line)  Fax:  [phone]  After Hours:  [phone]  Appointments:  [phone] option #1, then option #1                        Follow-ups after your visit        Follow-up notes from your care team     Return in about 1 year (around 10/3/2018), or Dr. Martinez.      Your next 10 appointments already scheduled     Nov 16, 2017  8:30 AM CST   US DOYLE DOPPLER NO EXERCISE 1-2 LVLS BILAT with UCUSV1   [facility] US ([email or web address])    [address] [place] [address] [phone]           Please bring a list of your medicines (including vitamins, minerals and over-the-counter drugs). Also, tell your doctor about any allergies you may have. Wear comfortable clothes and leave your valuables at home.  No caffeine or tobacco for 1 hour prior to exam.  Please call the Imaging Department at your exam site with any questions.            Nov 16, 2017  9:00 AM CST   (Arrive by 8:45 AM)   CT ABDOMEN W CONTRAST with UCCT1   [facility] CT ([email or web address])    [address]  [address] [facility] [address] [phone]           Please bring any scans or X-rays taken at other hospitals, if similar tests were done. Also bring a list of your medicines, including vitamins, minerals and over-the-counter drugs. It is safest to leave personal items at home.  Be sure to tell your doctor:   If you have any allergies.   If there s any chance you are pregnant.   If you are breastfeeding.   If you have any special needs.  You may have contrast for this exam. To prepare:   Do not eat or drink for 2 hours before your exam. If you need to take medicine, you may take it with small sips of water. (We may ask you to take liquid medicine as well.)   The day before your exam, drink extra fluids at least six 8-ounce glasses (unless your doctor tells you to restrict your fluids).  Patients over 70 or patients with diabetes or kidney problems:   If you haven t had a blood test (creatinine test) within the last 30 days, go to your clinic or Diagnostic Imaging Department for this test.  If you have diabetes:   If your kidney function is normal, continue taking your metformin (Avandamet, Glucophage, Glucovance, Metaglip) on the day of your exam.   If your kidney function is abnormal, wait 48 hours before restarting this medicine.  You will have oral contrast for this exam:   You will drink the contrast at home. Get this from your clinic or Diagnostic Imaging Department. Please follow the directions given.  Please wear loose clothing, such as a sweat suit or jogging clothes. Avoid snaps, zippers and other metal. We may ask you to undress and put on a hospital gown.  If you have any questions, please call the Imaging Department where you will have your exam.            Nov 16, 2017  9:45 AM [facility]   [facility] Draw with  [facility] LAB DRAW   [facility] ([facility])    [address] [address]    [phone]            Nov 16, 2017 10:30 AM CST   (Arrive by 10:15 AM)   Return Visit with Tanner Rojas MD   [facility] ([facility])    [address]  2nd Floor  [facility] [address]   [phone]            Nov 18, 2017  9:40 AM CST   (Arrive by 9:25 AM)   Return Visit with Lia Dumont MD   [facility] ([facility])    [address]  4th Floor  [facility] [address] [phone]            Basil 15, 2018  9:45 AM CST   RETURN RETINA with Luna Parker MD   [facility] ([facility])    [address]  [address] [address]  [facility] [address] [phone]              Future tests that were ordered for you today     Open Future Orders        Priority Expected Expires Ordered    US DOYLE Doppler No Exercise Routine  10/3/2018 10/3/2017            Who to contact     If you have questions or need follow up information about today's clinic visit or your schedule please contact [facility] directly at [phone].  Normal or non-critical lab and imaging results will be communicated to you by MyChart, letter or phone within 4 business days after the clinic has received the results. If you do not hear from us within 7 days, please contact the clinic through [facility] [facility] or phone. If you have a critical or abnormal lab result, we will notify you by phone as soon as possible.  Submit refill requests through [facility] or call your pharmacy and they will forward the refill request to us. Please allow 3 business days for your refill to be completed.          Additional Information About Your Visit        [facility] Information     [facility] gives you secure access to your electronic health record. If you see a primary care provider, you can also send messages to your care team and make appointments. If you have questions, please call your primary care clinic.  If you  "do not have a primary care provider, please call [phone] and they will assist you.        Care EveryWhere ID     This is your Care EveryWhere ID. This could be used by other organizations to access your [facility] medical records  [id number]        Your Vitals Were     Pulse Height Pulse Oximetry BMI (Body Mass Index)          50 1.778 m (5' 10\") 98% 28.08 kg/m2         Blood Pressure from Last 3 Encounters:   10/03/17 139/70   09/18/17 148/73   07/28/17 105/63    Weight from Last 3 Encounters:   10/03/17 88.8 kg (195 lb 11.2 oz)   09/18/17 87.6 kg (193 lb 1.6 oz)   06/21/17 85.3 kg (188 lb)               Primary Care Provider Office Phone # Fax #    Albertina Gross [phone]       [address] [place] [address]  [facility] [address]        Equal Access to Services     [facility]IE [facility] AH: Hadii aad ku hadasho Soomaali, waaxda luqadaha, qaybta kaalmada adeegyada, waxay idiin haysandhya lawrence . So [facility] [phone].    ATENCIÓN: Si habla español, tiene a vaughan disposición servicios gratuitos de asistencia lingüística. Ll[facility] [phone].    We comply with applicable federal civil rights laws and [place] laws. We do not discriminate on the basis of race, color, national origin, age, disability, sex, sexual orientation, or gender identity.            Thank you!     Thank you for choosing [facility]  for your care. Our goal is always to provide you with excellent care. Hearing back from our patients is one way we can continue to improve our services. Please take a few minutes to complete the written survey that you may receive in the mail after your visit with us. Thank you!             Your Updated Medication List - Protect others around you: Learn how to safely use, store and throw away your medicines at www.disposemymeds.org.          This list is accurate as of: 10/3/17  3:10 PM.  Always use your most recent med list.                   Brand Name Dispense Instructions for use " Diagnosis    acetaminophen 325 MG tablet    TYLENOL    100 tablet    Take 2 tablets (650 mg) by mouth every 6 hours as needed for pain    S/P revision of total hip       acetaminophen-codeine 300-30 MG per tablet    TYLENOL w/CODEINE No. 3    30 tablet    Take 1-2 tablets by mouth every 4 hours as needed for mild pain    Pain in joint, pelvic region and thigh, unspecified laterality       amLODIPine 10 MG tablet    NORVASC    90 tablet    TAKE 1 TABLET DAILY    Benign essential hypertension       aspirin 81 MG EC tablet     90 tablet    Take 1 tablet (81 mg) by mouth daily    Hyperlipidemia LDL goal <70       atorvastatin 20 MG tablet    LIPITOR    90 tablet    Take 1 tablet (20 mg) by mouth daily    Hyperlipidemia LDL goal <70       benazepril 40 MG tablet    LOTENSIN    90 tablet    Take 1 tablet (40 mg) by mouth daily    Essential hypertension, benign       bisacodyl 10 MG Suppository    DULCOLAX    3 suppository    Place 1 suppository rectally daily as needed.    Chronic constipation       desonide 0.05 % ointment    DESOWEN    60 g    Apply topically 2 times daily To affected areas of scale and redness on the face and ears as needed until clear.    Dermatitis, seborrheic       docusate sodium 100 MG tablet    COLACE    60 tablet    Take 100 mg by mouth 2 times daily    BPH (benign prostatic hyperplasia)       gentamicin 0.1 % ointment    GARAMYCIN      B-cell lymphoma of intra-abdominal lymph nodes, unspecified B-cell lymphoma type (H)       melatonin 3 MG tablet      Take 5 mg by mouth At Bedtime        metoprolol 25 MG 24 hr tablet    TOPROL-XL    45 tablet    Take 0.5 tablets (12.5 mg) by mouth daily    Essential hypertension, benign       MILK OF MAGNESIA PO      Take by mouth At Bedtime        nitroGLYcerin 0.4 MG sublingual tablet    NITROSTAT    30 tablet    Place 1 tablet (0.4 mg) under the tongue every 5 minutes as needed    Coronary artery disease involving native heart without angina pectoris,  unspecified vessel or lesion type       NutriSource Fiber packet      Take 1 packet by mouth every morning        psyllium 0.52 G capsule      Take by mouth daily        ranitidine 150 MG tablet    ZANTAC    180 tablet    Take 1 tablet (150 mg) by mouth 2 times daily    Gastroesophageal reflux disease without esophagitis       sertraline 50 MG tablet    ZOLOFT    90 tablet    Take 1 tablet (50 mg) by mouth daily    Adjustment disorder with anxious mood       sildenafil 100 MG tablet    VIAGRA    10 tablet    Take 1 tablet (100 mg) by mouth daily as needed    Erectile dysfunction, unspecified erectile dysfunction type       tamsulosin 0.4 MG capsule    FLOMAX    90 capsule    Take 1 capsule (0.4 mg) by mouth daily    Hypertrophy of prostate without urinary obstruction       tolterodine 2 MG tablet    DETROL    180 tablet    Take 1 tablet (2 mg )twice a day    Hypertonicity of bladder       * triamcinolone 0.025 % cream    KENALOG    45 g    Apply topically 2 times daily    Dermatitis       * triamcinolone 0.1 % ointment    KENALOG    80 g    Apply topically 2 times daily To itchy rashes    Intrinsic atopic dermatitis       VITAMIN B 12 PO      Take by mouth daily        VITEYES AREDS FORMULA/LUTEIN Caps      Take by mouth daily        * Notice:  This list has 2 medication(s) that are the same as other medications prescribed for you. Read the directions carefully, and ask your doctor or other care provider to review them with you.

## 2017-10-03 NOTE — NURSING NOTE
Chief Complaint   Patient presents with     New Patient     consult discuss fatigue & bradycardia, hx cad, needs ekg     Vitals were taken and medications were reconciled.  GERARDO Flores  1:55 PM    Per Dr. Santana Martinez, patient to have 48hr holter monitor placed.  Diagnosis: CAD, bradycardia  Monitor placed: Yes  Patient Instructed: Yes  Patient verbalized understanding: Yes  Holter # 20  Card ID: [id number]  Placed by GERARDO Flores

## 2017-10-03 NOTE — LETTER
10/3/2017      RE: Daniel Mcnair  [address] [facility], [place] [address] [id number]       Dear Colleague,    Thank you for the opportunity to participate in the care of your patient, Daniel Mcnair, at the [facility] HE[facility]. Please see a copy of my visit note below.    Chief complaint: bradycardia, h/o CAD    HPI:   Daniel Mcnair is a 87 year old male with history of HTN, HL, and CAD s/p PCI to LAD/D2 (5 stents total) 5/2002-6/2003 who presents for evaluation of bradycardia/fatigue and to re-establish cardiology follow up.    He reports generalized fatigue, as well as limited exertional capacity with insidious onset over the past 2-3 years. He does light calisthenics (PT exercises) 3-4x/wk for 15-20 minutes. He walks sporadically with his wife, walking up to 1/4 mile with a cane. He states that he has to sit down after ~10 minutes due to aching in both of his thighs, which has been occurring for he past 2-3 years. He denies any exertional chest pain or dyspnea. He has rare orthostatic lightheadedness which he manages with behavioral measures (rising slowly and pausing after changing positions), but has never experienced this during activity and has never had syncope.     Workup for fatigue by his PCP included ECG which showed sinus bradycardia with 1st degree AV block with PACs and left axis deviation. Question regarding whether fatigue could be related to symptomatic bradycardia prompted referral for cardiology evaluation.     He had several PCIs (5 stents total) between 5/2002 and 3/2003; his last PCI was 3/2003 at which time he had PCI to mLAD (in-stent restenosis) and to D2; he recalls having new-onset exertional dyspnea while playing tennis at that time. He was last seen by Dr. Guajardo in 2013 for a preoperative visit and was asymptomatic at that time. He underwent regadenoson SPECT which showed a small area of apical  reversible photopenia-- ischemia versus artifact. He was asymptomatic and no further workup was pursued.     ECG today shows: sinus bradycardia with 1st degree AV block, VR 58,  QRS 86 QTc 435, left axis deviation.    He denies any chest pain, dyspnea at rest or with exertion, PND, orthopnea, palpitations, lightheadedness or syncope.       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     Macular degeneration, dry      Other and unspecified hyperlipidemia 7/21/2011     Snoring      Spinal stenosis, lumbar region, without neurogenic claudication 7/21/2011     Stented coronary artery 2003     Unspecified essential hypertension 7/21/2011       CURRENT MEDICATIONS:  Current Outpatient Prescriptions   Medication Sig Dispense Refill     sildenafil (VIAGRA) 100 MG tablet Take 1 tablet (100 mg) by mouth daily as needed 10 tablet 2     benazepril (LOTENSIN) 40 MG tablet Take 1 tablet (40 mg) by mouth daily 90 tablet 3     amLODIPine (NORVASC) 10 MG tablet TAKE 1 TABLET DAILY 90 tablet 2     atorvastatin (LIPITOR) 20 MG tablet Take 1 tablet (20 mg) by mouth daily 90 tablet 2     tolterodine (DETROL) 2 MG tablet Take 1 tablet (2 mg )twice a day 180 tablet 3     tamsulosin (FLOMAX) 0.4 MG capsule Take 1 capsule (0.4 mg) by mouth daily 90 capsule 3     nitroglycerin (NITROSTAT) 0.4 MG sublingual tablet Place 1 tablet (0.4 mg) under the tongue every 5 minutes as needed 30 tablet 2     metoprolol (TOPROL-XL) 25 MG 24 hr tablet Take 0.5 tablets (12.5 mg) by mouth daily 45 tablet 3     ranitidine (ZANTAC) 150  MG tablet Take 1 tablet (150 mg) by mouth 2 times daily 180 tablet 3     sertraline (ZOLOFT) 50 MG tablet Take 1 tablet (50 mg) by mouth daily 90 tablet 1     gentamicin (GARAMYCIN) 0.1 % ointment   3     acetaminophen-codeine (TYLENOL W/CODEINE NO. 3) 300-30 MG per tablet Take 1-2 tablets by mouth every 4 hours as needed for mild pain 30 tablet 1     triamcinolone (KENALOG) 0.1 % ointment Apply topically 2 times daily To itchy rashes 80 g 3     desonide (DESOWEN) 0.05 % ointment Apply topically 2 times daily To affected areas of scale and redness on the face and ears as needed until clear. 60 g 11     Multiple Vitamins-Minerals (VITEYES AREDS FORMULA/LUTEIN) CAPS Take by mouth daily       psyllium 0.52 G capsule Take by mouth daily       triamcinolone (KENALOG) 0.025 % cream Apply topically 2 times daily 45 g 0     Cyanocobalamin (VITAMIN B 12 PO) Take by mouth daily       aspirin 81 MG EC tablet Take 1 tablet (81 mg) by mouth daily 90 tablet 3     acetaminophen (TYLENOL) 325 MG tablet Take 2 tablets (650 mg) by mouth every 6 hours as needed for pain 100 tablet 0     Magnesium Hydroxide (MILK OF MAGNESIA PO) Take by mouth At Bedtime       docusate sodium 100 MG tablet Take 100 mg by mouth 2 times daily 60 tablet 1     bisacodyl (DULCOLAX) 10 MG suppository Place 1 suppository rectally daily as needed. 3 suppository 0     melatonin 3 MG tablet Take 5 mg by mouth At Bedtime        Guar Gum (BENEFIBER) packet Take 1 packet by mouth every morning          PAST SURGICAL HISTORY:  Past Surgical History:   Procedure Laterality Date     ARTHROPLASTY HIP  10/2008    left     ARTHROPLASTY HIP  1999    right     ARTHROPLASTY REVISION HIP  1/21/2014    Procedure: ARTHROPLASTY REVISION HIP;  Revision Left Total Hip;  Surgeon: Edgar Grewal MD;  [facility]: [facility]     BACK SURGERY  2006     CARDIAC SURGERY  2001    stent x 5      CARDIAC SURGERY  2002     CATARACT IOL, RT/LT Bilateral      CYSTOSCOPY, TRANSURETHRAL RESECTION  (TUR) PROSTATE, COMBINED  11/5/2013    Procedure: COMBINED CYSTOSCOPY, TRANSURETHRAL RESECTION (TUR) PROSTATE;  Transurethral Resection Of Prostate, Bipolar;  Surgeon: Lai Street MD;  [facility]     ESOPHAGOSCOPY, GASTROSCOPY, DUODENOSCOPY (EGD), COMBINED  2/3/2014    Procedure: COMBINED ESOPHAGOSCOPY, GASTROSCOPY, DUODENOSCOPY (EGD);;  Surgeon: Ezra Fiore MD;  [facility]     ESOPHAGOSCOPY, GASTROSCOPY, DUODENOSCOPY (EGD), COMBINED  4/15/2014    Procedure: COMBINED ESOPHAGOSCOPY, GASTROSCOPY, DUODENOSCOPY (EGD), BIOPSY SINGLE OR MULTIPLE;  Surgeon: Ezra Fiore MD;  [facility]     EXTRACAPSULAR CATARACT EXTRATION WITH INTRAOCULAR LENS IMPLANT  2005     EXTRACAPSULAR CATARACT EXTRATION WITH INTRAOCULAR LENS IMPLANT  2010     HERNIA REPAIR       MASTOID SURGERY  1939     MOHS MICROGRAPHIC PROCEDURE       OPTICAL TRACKING SYSTEM FUSION SPINE POSTERIOR LUMBAR TWO LEVELS  7/11/2013    Procedure: OPTICAL TRACKING SYSTEM FUSION SPINE POSTERIOR LUMBAR TWO LEVELS;  Stealth Assisted Lumbar 3-4 Transforaminal Lumbar Interbody Fusion, Lumbar 2-3 Lumbar 3-4 Laminectomy Decompression;  Surgeon: Edgar Lew MD;  Location[facility]       ALLERGIES:     Allergies   Allergen Reactions     Nka [No Known Allergies]        FAMILY HISTORY:  Family History   Problem Relation Age of Onset     DIABETES Maternal Grandfather      Alcohol/Drug Maternal Grandfather      Arthritis Maternal Grandfather      Obesity Maternal Grandfather      CEREBROVASCULAR DISEASE Maternal Grandmother      Hypertension Mother      Glaucoma No family hx of      Macular Degeneration No family hx of      CANCER No family hx of      No known family hx of skin cancer     Skin Cancer No family hx of      SOCIAL HISTORY:  Social History   Substance Use Topics     Smoking status: Never Smoker     Smokeless tobacco: Never Used     Alcohol use 1.0 oz/week     2 Standard drinks or equivalent per week      Comment: occ  "      ROS:   A comprehensive 14 point review of systems is negative other than as mentioned in HPI.    Exam:  /70 (BP Location: Left arm, Patient Position: Chair, Cuff Size: Adult Regular)  Pulse 50  Ht 1.778 m (5' 10\")  Wt 88.8 kg (195 lb 11.2 oz)  SpO2 98%  BMI 28.08 kg/m2  GENERAL APPEARANCE: healthy, alert and no distress  EYES: no icterus, no xanthelasmas  ENT: normal palate, mucosa moist, no central cyanosis  NECK: no adenopathy, no asymmetry, masses, or scars, thyroid normal to palpation and no bruits, JVP not elevated  RESPIRATORY: lungs clear to auscultation - no rales, rhonchi or wheezes, no use of accessory muscles, no retractions, respirations are unlabored, normal respiratory rate  CARDIOVASCULAR: regular bradycardic rhythm, normal S1 with physiologic split S2, no S3 or S4 and no murmur, click or rub, precordium quiet with normal PMI.  GI: soft, non tender, without hepatosplenomegaly, no masses palpable, bowel sounds normal, aorta not enlarged by palpation, no abdominal bruits  EXTREMITIES: bilateral PT 1+/DPs non palpable. 1+ pitting pretibial edema BLE. no bruits  NEURO: alert and oriented to person/place/time, normal speech, gait and affect  VASC: Radial, femoral, dorsalis pedis and posterior tibialis pulses 2+ bilaterally.  SKIN: no ecchymoses, no rashes.  PSYCH: cooperative, affect appropriate.       DATA:  Labs reviewed, of note: Lipids 6/2016: TChol 100 LDL 47 HDL 43 TG 44    Reviewed:  Regadenoson SPECT 6/24/13: LVEF=58, small apical reversible defect (SSS=4)-- artifact vs. ischemia.     Visualized and personally interpreted:  ECG (10/3/17, today): sinus bradycardia with 1st degree AV block, VR 58,  QRS 86 QTc 435, left axis deviation.    ECG 6/21/17: sinus meir w/ 1st degree AV block and PACs, VR 47,  QRS 85 QTc 424, left axis deviation.    ECG 6/2013: sinus bradycardia w/ 1st degree AV block, VR 54,  QRS 80 QTc 409      Assessment and Plan:   1. Sinus bradycardia, " fatigability-- possibly newly symptomatic:   Chronotropic incompetence is a possibility, though it seems less likely given prior ECGs dating back at least to 2013 showing sinus bradycardia with prolonged NH and HR in the 50s.   Nonetheless, seems prudent to pursue 48h Holter monitor to assess for adequate HR variability/chronotropic response. If insufficient, would d/c beta blocker.    2. Bilateral thigh claudication:  Bilateral exertional thigh symptoms have multiple potential etiologies (neurologic, musculoskeletal, deconditioning), but typical claudication symptoms in combination with diminished lower extremity pulses in a patient with known CAD raise concern for PAD.  Additionally, this may be a cause of debility that is intervenable with relatively simple, noninvasive measures (e.g., supervised exercise and cilostazol.)   -ABIs, further workup if abnormal    3. CAD s/p PCI to LAD/D2 5789-9029 w/o angina: Stable and free of anginal symptoms. Continue ASA, atorvastatin, beta blocker. If Holter suggests chronotropic incompetence, may need to reduce beta blocker as above.     4. HTN: well-controlled on ACEI/amlodipine/beta blocker, continue at present doses.    5. Hyperlipidemia: stable and well-controlled, continue atorvastatin.     Follow up in 1 year.    >50% of this 60-minute visit were spent with the patient and his wife on in-person counseling and discussion regarding evaluation of bradycardia and possible peripheral arterial disease.     The patient states understanding and is agreeable with plan.     Santana Martinez MD  Cardiology    CC  [facility]

## 2017-10-03 NOTE — PATIENT INSTRUCTIONS
You were seen at the [facility] clinic today.  You saw Dr. Martinez  Here are your Instructions:    1.  48h holter monitor.  2.  DOYLE test.  3.  See us back in 1 year.      Deedee Henderson RN  Nurse Care Coordinator  Office:  [phone] option #1, then #3 & ask for Deedee (nurse line)  Fax:  [phone]  After Hours:  [phone]  Appointments:  [phone] option #1, then option #1

## 2017-10-03 NOTE — PROGRESS NOTES
Chief complaint: bradycardia, h/o CAD    HPI:   Daniel Mcnair is a 87 year old male with history of HTN, HL, and CAD s/p PCI to LAD/D2 (5 stents total) 5/2002-6/2003 who presents for evaluation of bradycardia/fatigue and to re-establish cardiology follow up.    He reports generalized fatigue, as well as limited exertional capacity with insidious onset over the past 2-3 years. He does light calisthenics (PT exercises) 3-4x/wk for 15-20 minutes. He walks sporadically with his wife, walking up to 1/4 mile with a cane. He states that he has to sit down after ~10 minutes due to aching in both of his thighs, which has been occurring for he past 2-3 years. He denies any exertional chest pain or dyspnea. He has rare orthostatic lightheadedness which he manages with behavioral measures (rising slowly and pausing after changing positions), but has never experienced this during activity and has never had syncope.     Workup for fatigue by his PCP included ECG which showed sinus bradycardia with 1st degree AV block with PACs and left axis deviation. Question regarding whether fatigue could be related to symptomatic bradycardia prompted referral for cardiology evaluation.     He had several PCIs (5 stents total) between 5/2002 and 3/2003; his last PCI was 3/2003 at which time he had PCI to mLAD (in-stent restenosis) and to D2; he recalls having new-onset exertional dyspnea while playing tennis at that time. He was last seen by Dr. Guajardo in 2013 for a preoperative visit and was asymptomatic at that time. He underwent regadenoson SPECT which showed a small area of apical reversible photopenia-- ischemia versus artifact. He was asymptomatic and no further workup was pursued.     ECG today shows: sinus bradycardia with 1st degree AV block, VR 58,  QRS 86 QTc 435, left axis deviation.    He denies any chest pain, dyspnea at rest or with exertion, PND, orthopnea, palpitations, lightheadedness or syncope.       PAST  MEDICAL HISTORY:  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     Macular degeneration, dry      Other and unspecified hyperlipidemia 7/21/2011     Snoring      Spinal stenosis, lumbar region, without neurogenic claudication 7/21/2011     Stented coronary artery 2003     Unspecified essential hypertension 7/21/2011       CURRENT MEDICATIONS:  Current Outpatient Prescriptions   Medication Sig Dispense Refill     sildenafil (VIAGRA) 100 MG tablet Take 1 tablet (100 mg) by mouth daily as needed 10 tablet 2     benazepril (LOTENSIN) 40 MG tablet Take 1 tablet (40 mg) by mouth daily 90 tablet 3     amLODIPine (NORVASC) 10 MG tablet TAKE 1 TABLET DAILY 90 tablet 2     atorvastatin (LIPITOR) 20 MG tablet Take 1 tablet (20 mg) by mouth daily 90 tablet 2     tolterodine (DETROL) 2 MG tablet Take 1 tablet (2 mg )twice a day 180 tablet 3     tamsulosin (FLOMAX) 0.4 MG capsule Take 1 capsule (0.4 mg) by mouth daily 90 capsule 3     nitroglycerin (NITROSTAT) 0.4 MG sublingual tablet Place 1 tablet (0.4 mg) under the tongue every 5 minutes as needed 30 tablet 2     metoprolol (TOPROL-XL) 25 MG 24 hr tablet Take 0.5 tablets (12.5 mg) by mouth daily 45 tablet 3     ranitidine (ZANTAC) 150 MG tablet Take 1 tablet (150 mg) by mouth 2 times daily 180 tablet 3     sertraline (ZOLOFT) 50 MG tablet Take 1 tablet (50 mg) by mouth daily 90 tablet 1     gentamicin (GARAMYCIN) 0.1 % ointment   3     acetaminophen-codeine (TYLENOL W/CODEINE NO. 3) 300-30 MG per tablet Take 1-2 tablets by mouth every 4 hours as needed for mild pain 30 tablet 1      triamcinolone (KENALOG) 0.1 % ointment Apply topically 2 times daily To itchy rashes 80 g 3     desonide (DESOWEN) 0.05 % ointment Apply topically 2 times daily To affected areas of scale and redness on the face and ears as needed until clear. 60 g 11     Multiple Vitamins-Minerals (VITEYES AREDS FORMULA/LUTEIN) CAPS Take by mouth daily       psyllium 0.52 G capsule Take by mouth daily       triamcinolone (KENALOG) 0.025 % cream Apply topically 2 times daily 45 g 0     Cyanocobalamin (VITAMIN B 12 PO) Take by mouth daily       aspirin 81 MG EC tablet Take 1 tablet (81 mg) by mouth daily 90 tablet 3     acetaminophen (TYLENOL) 325 MG tablet Take 2 tablets (650 mg) by mouth every 6 hours as needed for pain 100 tablet 0     Magnesium Hydroxide (MILK OF MAGNESIA PO) Take by mouth At Bedtime       docusate sodium 100 MG tablet Take 100 mg by mouth 2 times daily 60 tablet 1     bisacodyl (DULCOLAX) 10 MG suppository Place 1 suppository rectally daily as needed. 3 suppository 0     melatonin 3 MG tablet Take 5 mg by mouth At Bedtime        Guar Gum (BENEFIBER) packet Take 1 packet by mouth every morning          PAST SURGICAL HISTORY:  Past Surgical History:   Procedure Laterality Date     ARTHROPLASTY HIP  10/2008    left     ARTHROPLASTY HIP  1999    right     ARTHROPLASTY REVISION HIP  1/21/2014    Procedure: ARTHROPLASTY REVISION HIP;  Revision Left Total Hip;  Surgeon: Edgar Grewal MD;  [facility]: [facility]     BACK SURGERY  2006     CARDIAC SURGERY  2001    stent x 5      CARDIAC SURGERY  2002     CATARACT IOL, RT/LT Bilateral      CYSTOSCOPY, TRANSURETHRAL RESECTION (TUR) PROSTATE, COMBINED  11/5/2013    Procedure: COMBINED CYSTOSCOPY, TRANSURETHRAL RESECTION (TUR) PROSTATE;  Transurethral Resection Of Prostate, Bipolar;  Surgeon: Lai Street MD;  [facility]     ESOPHAGOSCOPY, GASTROSCOPY, DUODENOSCOPY (EGD), COMBINED  2/3/2014    Procedure: COMBINED ESOPHAGOSCOPY, GASTROSCOPY, DUODENOSCOPY (EGD);;  " Surgeon: Ezra Fiore MD;  [facility]     ESOPHAGOSCOPY, GASTROSCOPY, DUODENOSCOPY (EGD), COMBINED  4/15/2014    Procedure: COMBINED ESOPHAGOSCOPY, GASTROSCOPY, DUODENOSCOPY (EGD), BIOPSY SINGLE OR MULTIPLE;  Surgeon: Ezra Fiore MD;  [facility]     EXTRACAPSULAR CATARACT EXTRATION WITH INTRAOCULAR LENS IMPLANT  2005     EXTRACAPSULAR CATARACT EXTRATION WITH INTRAOCULAR LENS IMPLANT  2010     HERNIA REPAIR       MASTOID SURGERY  1939     MOHS MICROGRAPHIC PROCEDURE       OPTICAL TRACKING SYSTEM FUSION SPINE POSTERIOR LUMBAR TWO LEVELS  7/11/2013    Procedure: OPTICAL TRACKING SYSTEM FUSION SPINE POSTERIOR LUMBAR TWO LEVELS;  Stealth Assisted Lumbar 3-4 Transforaminal Lumbar Interbody Fusion, Lumbar 2-3 Lumbar 3-4 Laminectomy Decompression;  Surgeon: Edgar Lew MD;  [facility]       ALLERGIES:     Allergies   Allergen Reactions     Nka [No Known Allergies]        FAMILY HISTORY:  Family History   Problem Relation Age of Onset     DIABETES Maternal Grandfather      Alcohol/Drug Maternal Grandfather      Arthritis Maternal Grandfather      Obesity Maternal Grandfather      CEREBROVASCULAR DISEASE Maternal Grandmother      Hypertension Mother      Glaucoma No family hx of      Macular Degeneration No family hx of      CANCER No family hx of      No known family hx of skin cancer     Skin Cancer No family hx of      SOCIAL HISTORY:  Social History   Substance Use Topics     Smoking status: Never Smoker     Smokeless tobacco: Never Used     Alcohol use 1.0 oz/week     2 Standard drinks or equivalent per week      Comment: occ       ROS:   A comprehensive 14 point review of systems is negative other than as mentioned in HPI.    Exam:  /70 (BP Location: Left arm, Patient Position: Chair, Cuff Size: Adult Regular)  Pulse 50  Ht 1.778 m (5' 10\")  Wt 88.8 kg (195 lb 11.2 oz)  SpO2 98%  BMI 28.08 kg/m2  GENERAL APPEARANCE: healthy, alert and no distress  EYES: no icterus, no " xanthelasmas  ENT: normal palate, mucosa moist, no central cyanosis  NECK: no adenopathy, no asymmetry, masses, or scars, thyroid normal to palpation and no bruits, JVP not elevated  RESPIRATORY: lungs clear to auscultation - no rales, rhonchi or wheezes, no use of accessory muscles, no retractions, respirations are unlabored, normal respiratory rate  CARDIOVASCULAR: regular bradycardic rhythm, normal S1 with physiologic split S2, no S3 or S4 and no murmur, click or rub, precordium quiet with normal PMI.  GI: soft, non tender, without hepatosplenomegaly, no masses palpable, bowel sounds normal, aorta not enlarged by palpation, no abdominal bruits  EXTREMITIES: bilateral PT 1+/DPs non palpable. 1+ pitting pretibial edema BLE. no bruits  NEURO: alert and oriented to person/place/time, normal speech, gait and affect  VASC: Radial, femoral, dorsalis pedis and posterior tibialis pulses 2+ bilaterally.  SKIN: no ecchymoses, no rashes.  PSYCH: cooperative, affect appropriate.       DATA:  Labs reviewed, of note: Lipids 6/2016: TChol 100 LDL 47 HDL 43 TG 44    Reviewed:  Regadenoson SPECT 6/24/13: LVEF=58, small apical reversible defect (SSS=4)-- artifact vs. ischemia.     Visualized and personally interpreted:  ECG (10/3/17, today): sinus bradycardia with 1st degree AV block, VR 58,  QRS 86 QTc 435, left axis deviation.    ECG 6/21/17: sinus meri w/ 1st degree AV block and PACs, VR 47,  QRS 85 QTc 424, left axis deviation.    ECG 6/2013: sinus bradycardia w/ 1st degree AV block, VR 54,  QRS 80 QTc 409      Assessment and Plan:   1. Sinus bradycardia, fatigability-- possibly newly symptomatic:   Chronotropic incompetence is a possibility, though it seems less likely given prior ECGs dating back at least to 2013 showing sinus bradycardia with prolonged [place] and HR in the 50s.   Nonetheless, seems prudent to pursue 48h Holter monitor to assess for adequate HR variability/chronotropic response. If  insufficient, would d/c beta blocker.    2. Bilateral thigh claudication:  Bilateral exertional thigh symptoms have multiple potential etiologies (neurologic, musculoskeletal, deconditioning), but typical claudication symptoms in combination with diminished lower extremity pulses in a patient with known CAD raise concern for PAD.  Additionally, this may be a cause of debility that is intervenable with relatively simple, noninvasive measures (e.g., supervised exercise and cilostazol.)   -ABIs, further workup if abnormal    3. CAD s/p PCI to LAD/D2 9234-5081 w/o angina: Stable and free of anginal symptoms. Continue ASA, atorvastatin, beta blocker. If Holter suggests chronotropic incompetence, may need to reduce beta blocker as above.     4. HTN: well-controlled on ACEI/amlodipine/beta blocker, continue at present doses.    5. Hyperlipidemia: stable and well-controlled, continue atorvastatin.     Follow up in 1 year.    >50% of this 60-minute visit were spent with the patient and his wife on in-person counseling and discussion regarding evaluation of bradycardia and possible peripheral arterial disease.     The patient states understanding and is agreeable with plan.     Santana Martinez MD  Cardiology    CC  [facility]        Answers for HPI/ROS submitted by the patient on 10/3/2017   General Symptoms: Yes  Skin Symptoms: No  HENT Symptoms: No  EYE SYMPTOMS: No  HEART SYMPTOMS: No  LUNG SYMPTOMS: No  INTESTINAL SYMPTOMS: No  URINARY SYMPTOMS: No  REPRODUCTIVE SYMPTOMS: No  SKELETAL SYMPTOMS: Yes  BLOOD SYMPTOMS: No  NERVOUS SYSTEM SYMPTOMS: No  MENTAL HEALTH SYMPTOMS: No  Fever: No  Loss of appetite: No  Weight loss: No  Weight gain: Yes  Fatigue: Yes  Night sweats: Yes  Chills: No  Increased stress: No  Excessive hunger: No  Excessive thirst: No  Feeling hot or cold when others believe the temperature is normal: No  Loss of height: No  Post-operative complications: No  Surgical site pain:  No  Hallucinations: No  Change in or Loss of Energy: No  Hyperactivity: No  Confusion: No  Back pain: Yes  Muscle aches: Yes  Neck pain: Yes  Swollen joints: Yes  Joint pain: No  Bone pain: No  Muscle cramps: No  Muscle weakness: No  Joint stiffness: Yes  Bone fracture: No

## 2017-10-04 LAB — INTERPRETATION ECG - MUSE: NORMAL

## 2017-10-13 DIAGNOSIS — M54.50 CHRONIC BILATERAL LOW BACK PAIN WITHOUT SCIATICA: ICD-10-CM

## 2017-10-13 DIAGNOSIS — G89.29 CHRONIC BILATERAL LOW BACK PAIN WITHOUT SCIATICA: ICD-10-CM

## 2017-10-13 DIAGNOSIS — M54.16 LUMBAR RADICULOPATHY: Primary | ICD-10-CM

## 2017-10-17 ENCOUNTER — CARE COORDINATION (OUTPATIENT)
Dept: CARDIOLOGY | Facility: CLINIC | Age: 82
End: 2017-10-17

## 2017-10-17 NOTE — PROGRESS NOTES
"Per Dr. Martinez via staff msg:  \"His Holter monitor showed that he could increase he heart rate well with activity and that his slow heart rate isn't dangerous.\"  [employer]orsche msg sent to pt.  "

## 2017-10-23 ENCOUNTER — TRANSFERRED RECORDS (OUTPATIENT)
Dept: HEALTH INFORMATION MANAGEMENT | Facility: CLINIC | Age: 82
End: 2017-10-23

## 2017-11-16 ENCOUNTER — APPOINTMENT (OUTPATIENT)
Dept: LAB | Facility: CLINIC | Age: 82
End: 2017-11-16
Attending: INTERNAL MEDICINE
Payer: MEDICARE

## 2017-11-16 ENCOUNTER — ONCOLOGY VISIT (OUTPATIENT)
Dept: ONCOLOGY | Facility: CLINIC | Age: 82
End: 2017-11-16
Attending: INTERNAL MEDICINE
Payer: MEDICARE

## 2017-11-16 VITALS
TEMPERATURE: 99.1 F | WEIGHT: 197.53 LBS | HEIGHT: 70 IN | SYSTOLIC BLOOD PRESSURE: 154 MMHG | OXYGEN SATURATION: 97 % | BODY MASS INDEX: 28.28 KG/M2 | RESPIRATION RATE: 18 BRPM | HEART RATE: 54 BPM | DIASTOLIC BLOOD PRESSURE: 65 MMHG

## 2017-11-16 DIAGNOSIS — C85.13 B-CELL LYMPHOMA OF INTRA-ABDOMINAL LYMPH NODES, UNSPECIFIED B-CELL LYMPHOMA TYPE (H): ICD-10-CM

## 2017-11-16 DIAGNOSIS — D49.0 IPMN (INTRADUCTAL PAPILLARY MUCINOUS NEOPLASM): Primary | ICD-10-CM

## 2017-11-16 LAB
ALBUMIN SERPL-MCNC: 3.5 G/DL (ref 3.4–5)
ALP SERPL-CCNC: 65 U/L (ref 40–150)
ALT SERPL W P-5'-P-CCNC: 23 U/L (ref 0–70)
ANION GAP SERPL CALCULATED.3IONS-SCNC: 8 MMOL/L (ref 3–14)
AST SERPL W P-5'-P-CCNC: 15 U/L (ref 0–45)
BASOPHILS # BLD AUTO: 0 10E9/L (ref 0–0.2)
BASOPHILS NFR BLD AUTO: 0.5 %
BILIRUB SERPL-MCNC: 0.9 MG/DL (ref 0.2–1.3)
BUN SERPL-MCNC: 11 MG/DL (ref 7–30)
CALCIUM SERPL-MCNC: 8.8 MG/DL (ref 8.5–10.1)
CHLORIDE SERPL-SCNC: 106 MMOL/L (ref 94–109)
CO2 SERPL-SCNC: 22 MMOL/L (ref 20–32)
CREAT SERPL-MCNC: 0.86 MG/DL (ref 0.66–1.25)
DIFFERENTIAL METHOD BLD: ABNORMAL
EOSINOPHIL # BLD AUTO: 0.1 10E9/L (ref 0–0.7)
EOSINOPHIL NFR BLD AUTO: 1.7 %
ERYTHROCYTE [DISTWIDTH] IN BLOOD BY AUTOMATED COUNT: 13.2 % (ref 10–15)
GFR SERPL CREATININE-BSD FRML MDRD: 84 ML/MIN/1.7M2
GLUCOSE SERPL-MCNC: 105 MG/DL (ref 70–99)
HCT VFR BLD AUTO: 38.3 % (ref 40–53)
HGB BLD-MCNC: 13.9 G/DL (ref 13.3–17.7)
IMM GRANULOCYTES # BLD: 0 10E9/L (ref 0–0.4)
IMM GRANULOCYTES NFR BLD: 0.3 %
LYMPHOCYTES # BLD AUTO: 1.7 10E9/L (ref 0.8–5.3)
LYMPHOCYTES NFR BLD AUTO: 22.3 %
MCH RBC QN AUTO: 34.2 PG (ref 26.5–33)
MCHC RBC AUTO-ENTMCNC: 36.3 G/DL (ref 31.5–36.5)
MCV RBC AUTO: 94 FL (ref 78–100)
MONOCYTES # BLD AUTO: 0.9 10E9/L (ref 0–1.3)
MONOCYTES NFR BLD AUTO: 11.5 %
NEUTROPHILS # BLD AUTO: 4.8 10E9/L (ref 1.6–8.3)
NEUTROPHILS NFR BLD AUTO: 63.7 %
NRBC # BLD AUTO: 0 10*3/UL
NRBC BLD AUTO-RTO: 0 /100
PLATELET # BLD AUTO: 136 10E9/L (ref 150–450)
POTASSIUM SERPL-SCNC: 4.4 MMOL/L (ref 3.4–5.3)
PROT SERPL-MCNC: 6.3 G/DL (ref 6.8–8.8)
RBC # BLD AUTO: 4.06 10E12/L (ref 4.4–5.9)
SODIUM SERPL-SCNC: 136 MMOL/L (ref 133–144)
WBC # BLD AUTO: 7.5 10E9/L (ref 4–11)

## 2017-11-16 PROCEDURE — 36592 COLLECT BLOOD FROM PICC: CPT

## 2017-11-16 PROCEDURE — 99213 OFFICE O/P EST LOW 20 MIN: CPT | Mod: ZF

## 2017-11-16 PROCEDURE — 85025 COMPLETE CBC W/AUTO DIFF WBC: CPT | Performed by: INTERNAL MEDICINE

## 2017-11-16 PROCEDURE — 99214 OFFICE O/P EST MOD 30 MIN: CPT | Mod: GC | Performed by: INTERNAL MEDICINE

## 2017-11-16 PROCEDURE — 80053 COMPREHEN METABOLIC PANEL: CPT | Performed by: INTERNAL MEDICINE

## 2017-11-16 ASSESSMENT — PAIN SCALES - GENERAL: PAINLEVEL: NO PAIN (0)

## 2017-11-16 NOTE — PROGRESS NOTES
[facility]  Outpatient Progress Note  Last clinic visit: 5/18/17    Hem/onc History:     Reverend Daniel Mcnair is an 88-year-old man with a diagnosis of low-grade B-cell lymphoma.  The lymphoma was diagnosed when retroperitoneal lymphadenopathy was incidentally identified on a CT scan obtained for other purposes in 01/2012. Diagnosis was established by needle biopsy.  The specimen was too small for histologic subtyping, but in view of the histology and modest adenopathy, it was felt that this was likely a low-grade lymphoma. Subsequent CT scans have documented stable adenopathy.    Of note, in 5/2017, multiple new pancreatic hypodensities c/w IPMNs were noted on CT scan.    Reverend Mcnair now been monitored without therapeutic intervention.      Interval history:     Reverend Mcnair presents today with his wife, Anastasiya). He has been doing generally well without any specific oncologic questions nor concerns.    He continues to remain active, exercising regularly. He and his wife attend a balance and breathing class, and he denies any falls. He does report getting rather tired after walking for a while. He also endorses aching of his legs. His PCP has ordered ABIs which Rev Mcnair says is due to chronic LE edema (R>L). He reports napping ~1.5 hrs per day, and gets 9-10 hrs of sleep per night.    He has been helping [facility]. 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    On ROS in addition to the above  Endorses:  +stable findings with anal stricture  +recent congestion with clear mucous (upper airway)  +narrow anal area due to hemorrhoids as a teen; takes laxatives and dilates daily; intermittent constipation  +occasional hematochezia when needing to push hard  +urinary frequency and nocturia, some leakage  +worsening visual acuity particularly of R eye, will see eye doctor January  +ongoing back and hip pains/aches    Denies fevers, chills, NS, HA, change in weight,  change in appetite, cough, SOB, CP, n/v, abd pain, diarrhea, dysuria, hematuria, swelling, rashes, lymphadenopathy    Medications:   Reviewed in EPIC    Stopped sertraline approx 2 months ago in coordination with PCP    Current Outpatient Prescriptions   Medication Sig     sildenafil (VIAGRA) 100 MG tablet Take 1 tablet (100 mg) by mouth daily as needed     benazepril (LOTENSIN) 40 MG tablet Take 1 tablet (40 mg) by mouth daily     amLODIPine (NORVASC) 10 MG tablet TAKE 1 TABLET DAILY     atorvastatin (LIPITOR) 20 MG tablet Take 1 tablet (20 mg) by mouth daily     tolterodine (DETROL) 2 MG tablet Take 1 tablet (2 mg )twice a day     tamsulosin (FLOMAX) 0.4 MG capsule Take 1 capsule (0.4 mg) by mouth daily     nitroglycerin (NITROSTAT) 0.4 MG sublingual tablet Place 1 tablet (0.4 mg) under the tongue every 5 minutes as needed     metoprolol (TOPROL-XL) 25 MG 24 hr tablet Take 0.5 tablets (12.5 mg) by mouth daily     ranitidine (ZANTAC) 150 MG tablet Take 1 tablet (150 mg) by mouth 2 times daily     sertraline (ZOLOFT) 50 MG tablet Take 1 tablet (50 mg) by mouth daily     gentamicin (GARAMYCIN) 0.1 % ointment      acetaminophen-codeine (TYLENOL W/CODEINE NO. 3) 300-30 MG per tablet Take 1-2 tablets by mouth every 4 hours as needed for mild pain     triamcinolone (KENALOG) 0.1 % ointment Apply topically 2 times daily To itchy rashes     desonide (DESOWEN) 0.05 % ointment Apply topically 2 times daily To affected areas of scale and redness on the face and ears as needed until clear.     Multiple Vitamins-Minerals (VITEYES AREDS FORMULA/LUTEIN) CAPS Take by mouth daily     psyllium 0.52 G capsule Take by mouth daily     triamcinolone (KENALOG) 0.025 % cream Apply topically 2 times daily     Cyanocobalamin (VITAMIN B 12 PO) Take by mouth daily     aspirin 81 MG EC tablet Take 1 tablet (81 mg) by mouth daily     acetaminophen (TYLENOL) 325 MG tablet Take 2 tablets (650 mg) by mouth every 6 hours as needed for pain      "Magnesium Hydroxide (MILK OF MAGNESIA PO) Take by mouth At Bedtime     docusate sodium 100 MG tablet Take 100 mg by mouth 2 times daily     bisacodyl (DULCOLAX) 10 MG suppository Place 1 suppository rectally daily as needed.     melatonin 3 MG tablet Take 5 mg by mouth At Bedtime      Guar Gum (BENEFIBER) packet Take 1 packet by mouth every morning      No current facility-administered medications for this visit.       Physical Exam (Resident / Clinician):   Blood pressure 154/65, pulse 54, temperature 99.1  F (37.3  C), temperature source Oral, resp. rate 18, height 1.778 m (5' 10\"), weight 89.6 kg (197 lb 8.5 oz), SpO2 97 %.    ECOG PS: 1  Constitutional: WDWN male in NAD, pleasant and appropriate  HEENT:  NC/AT, no icterus, OP clear, MMM  Skin: No jaundice nor ecchymoses  Lungs: CTAB, no w/r/r, nonlabored breathing  Cardiovascular: RRR, S1, S2, no m/r/g  Abdomen: +BS, soft, nontender, nondistended, no organomegaly nor masses  MSK/Extremities: Warm, well perfused. No edema  LN: no cervical, supraclavicular, axillary, nor inguinal lymphadenopathy  Neurologic: alert, answering questions appropriately, moving all extremities spontaneously  Psych: appropriate affect  Data:   Reviewed in EPIC and notable for:    7.5 > 13.9 < 136 ANC 4.8  Cr 0.86, electrolytes, LFTs wnl    CT a/p 11/16/17  IMPRESSION:   1.  Multiple hypodense lesions in the pancreas with largest being 1.0 cm, which connects to the pancreatic duct. No arterial enhancement or washout. 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 No change from CT of 05/18/2017. Recommend follow-up yearly for 2 years.  2. Unchanged enlarged retroperitoneal lymph nodes. No new or enlarging abdominal lymphadenopathy. Largest node measure 1.6 cm.  3. 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    Assessment and Plan:     # Low-grade B-cell lymphoma, clinical stage IIA, with intra-abdominal lymph nodes, but incompletely staged.    # IPMNs    Rev Rand lymphoma initially " had spontaneous regression of the nodes and subsequently has had stable disease on serial CT scans.  He has generally been doing well and is now approaching 6 years from diagnosis.    IPMNs were initially detected 5/2017. No change on CT in 6 month interval.    Labs today notable for chronic, stable minimal thrombocytopenia (intermittent since 2013, stable since 2015).    Plan  -continue observation for lymphoma and IPMNs  -RTC with scans and labs in 1 year. If no change, consider discontinuing surveillance      Labs, imaging and treatment plan reviewed with patient. All questions answered.    In this 30 minutes visit, > 50% spent in counseling and coordinating care.    Patient has received influenza vaccine.    Patient discussed with Dr. Rojas.    Joshua (Elfrancisco Erazo MD, PhD   Hem/Onc Fellow    Oncology Attending    Patient seen and examined with the Oncology Fellow, Dr. Erazo. Agree with his findings, assessment and plan.    Tanner Rojas MD  Professor of Medicine  Oncology  [facility]  Office: [phone]  Clinic Fax: [phone]      cc:       MD Abel Neri MD

## 2017-11-16 NOTE — LETTER
11/16/2017      RE: Daniel Mcnair  [address]  [place] [address]       [facility]  Outpatient Progress Note  Last clinic visit: 5/18/17    Hem/onc History:     Reverend Daniel Mcnair is an 88-year-old man with a diagnosis of low-grade B-cell lymphoma.  The lymphoma was diagnosed when retroperitoneal lymphadenopathy was incidentally identified on a CT scan obtained for other purposes in 01/2012. Diagnosis was established by needle biopsy.  The specimen was too small for histologic subtyping, but in view of the histology and modest adenopathy, it was felt that this was likely a low-grade lymphoma. Subsequent CT scans have documented stable adenopathy.    Of note, in 5/2017, multiple new pancreatic hypodensities c/w IPMNs were noted on CT scan.    Reverend Mcnair now been monitored without therapeutic intervention.      Interval history:     Reverend Mcnair presents today with his wife, Anastasiya). He has been doing generally well without any specific oncologic questions nor concerns.    He continues to remain active, exercising regularly. He and his wife attend a balance and breathing class, and he denies any falls. He does report getting rather tired after walking for a while. He also endorses aching of his legs. His PCP has ordered ABIs which Rev Mcnair says is due to chronic LE edema (R>L). He reports napping ~1.5 hrs per day, and gets 9-10 hrs of sleep per night.    He has been helping with counseling at their senior living center. 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    On ROS in addition to the above  Endorses:  +stable findings with anal stricture  +recent congestion with clear mucous (upper airway)  +narrow anal area due to hemorrhoids as a teen; takes laxatives and dilates daily; intermittent constipation  +occasional hematochezia when needing to push hard  +urinary frequency and nocturia, some leakage  +worsening visual acuity particularly of R eye, will  see eye doctor January  +ongoing back and hip pains/aches    Denies fevers, chills, NS, HA, change in weight, change in appetite, cough, SOB, CP, n/v, abd pain, diarrhea, dysuria, hematuria, swelling, rashes, lymphadenopathy    Medications:   Reviewed in EPIC    Stopped sertraline approx 2 months ago in coordination with PCP    Current Outpatient Prescriptions   Medication Sig     sildenafil (VIAGRA) 100 MG tablet Take 1 tablet (100 mg) by mouth daily as needed     benazepril (LOTENSIN) 40 MG tablet Take 1 tablet (40 mg) by mouth daily     amLODIPine (NORVASC) 10 MG tablet TAKE 1 TABLET DAILY     atorvastatin (LIPITOR) 20 MG tablet Take 1 tablet (20 mg) by mouth daily     tolterodine (DETROL) 2 MG tablet Take 1 tablet (2 mg )twice a day     tamsulosin (FLOMAX) 0.4 MG capsule Take 1 capsule (0.4 mg) by mouth daily     nitroglycerin (NITROSTAT) 0.4 MG sublingual tablet Place 1 tablet (0.4 mg) under the tongue every 5 minutes as needed     metoprolol (TOPROL-XL) 25 MG 24 hr tablet Take 0.5 tablets (12.5 mg) by mouth daily     ranitidine (ZANTAC) 150 MG tablet Take 1 tablet (150 mg) by mouth 2 times daily     sertraline (ZOLOFT) 50 MG tablet Take 1 tablet (50 mg) by mouth daily     gentamicin (GARAMYCIN) 0.1 % ointment      acetaminophen-codeine (TYLENOL W/CODEINE NO. 3) 300-30 MG per tablet Take 1-2 tablets by mouth every 4 hours as needed for mild pain     triamcinolone (KENALOG) 0.1 % ointment Apply topically 2 times daily To itchy rashes     desonide (DESOWEN) 0.05 % ointment Apply topically 2 times daily To affected areas of scale and redness on the face and ears as needed until clear.     Multiple Vitamins-Minerals (VITEYES AREDS FORMULA/LUTEIN) CAPS Take by mouth daily     psyllium 0.52 G capsule Take by mouth daily     triamcinolone (KENALOG) 0.025 % cream Apply topically 2 times daily     Cyanocobalamin (VITAMIN B 12 PO) Take by mouth daily     aspirin 81 MG EC tablet Take 1 tablet (81 mg) by mouth daily      "acetaminophen (TYLENOL) 325 MG tablet Take 2 tablets (650 mg) by mouth every 6 hours as needed for pain     Magnesium Hydroxide (MILK OF MAGNESIA PO) Take by mouth At Bedtime     docusate sodium 100 MG tablet Take 100 mg by mouth 2 times daily     bisacodyl (DULCOLAX) 10 MG suppository Place 1 suppository rectally daily as needed.     melatonin 3 MG tablet Take 5 mg by mouth At Bedtime      Guar Gum (BENEFIBER) packet Take 1 packet by mouth every morning      No current facility-administered medications for this visit.       Physical Exam (Resident / Clinician):   Blood pressure 154/65, pulse 54, temperature 99.1  F (37.3  C), temperature source Oral, resp. rate 18, height 1.778 m (5' 10\"), weight 89.6 kg (197 lb 8.5 oz), SpO2 97 %.    ECOG PS: 1  Constitutional: WDWN male in NAD, pleasant and appropriate  HEENT:  NC/AT, no icterus, OP clear, MMM  Skin: No jaundice nor ecchymoses  Lungs: CTAB, no w/r/r, nonlabored breathing  Cardiovascular: RRR, S1, S2, no m/r/g  Abdomen: +BS, soft, nontender, nondistended, no organomegaly nor masses  MSK/Extremities: Warm, well perfused. No edema  LN: no cervical, supraclavicular, axillary, nor inguinal lymphadenopathy  Neurologic: alert, answering questions appropriately, moving all extremities spontaneously  Psych: appropriate affect  Data:   Reviewed in EPIC and notable for:    7.5 > 13.9 < 136 ANC 4.8  Cr 0.86, electrolytes, LFTs wnl    CT a/p 11/16/17  IMPRESSION:   1.  Multiple hypodense lesions in the pancreas with largest being 1.0 cm, which connects to the pancreatic duct. No arterial enhancement or washout. 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 No change from CT of 05/18/2017. Recommend follow-up yearly for 2 years.  2. Unchanged enlarged retroperitoneal lymph nodes. No new or enlarging abdominal lymphadenopathy. Largest node measure 1.6 cm.  3. 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    Assessment and Plan:     # Low-grade B-cell lymphoma, clinical stage " IIA, with intra-abdominal lymph nodes, but incompletely staged.    # IPMNs    Rev Rand lymphoma initially had spontaneous regression of the nodes and subsequently has had stable disease on serial CT scans.   He has generally been doing well and is now approaching 6 years from diagnosis.    IPMNs were initially detected 5/2017. No change on CT in 6 month interval.    Labs today notable for chronic, stable minimal thrombocytopenia (intermittent since 2013, stable since 2015).    Plan  -continue observation for lymphoma and IPMNs  -RTC with scans and labs in 1 year. If no change, consider discontinuing surveillance      Labs, imaging and treatment plan reviewed with patient. All questions answered.    In this 30 minutes visit, > 50% spent in counseling and coordinating care.    Patient has received influenza vaccine.    Patient discussed with Dr. Rojas.    Joshua (Elfrancisco Erazo MD, PhD   Hem/Onc Fellow    Oncology Attending    Patient seen and examined with the Oncology Fellow, Dr. Erazo. Agree with his findings, assessment and plan.    Tanner Rojas MD  Professor of Medicine  Oncology  [facility]  Office: [phone]  Clinic Fax: [phone]      cc:       MD Abel Neri MD Bruce A. Peterson, MD

## 2017-11-16 NOTE — MR AVS SNAPSHOT
After Visit Summary   11/16/2017    Daniel Mcnair    MRN: [id number]           Patient Information     Date Of Birth          10/17/1929        Visit Information        Provider Department      11/16/2017 10:30 AM Tanner Rojas MD [facility]        Today's Diagnoses     IPMN (intraductal papillary mucinous neoplasm)    -  1    B-cell lymphoma of intra-abdominal lymph nodes, unspecified B-cell lymphoma type (H)          Care Instructions    -Will contact you to follow up with official results of CT scan  -Return to clinic in 1 year with labs and CT scan          Follow-ups after your visit        Follow-up notes from your care team     Return in about 1 year (around 11/16/2018) for Physical Exam, Lab Work, imaging.      Your next 10 appointments already scheduled     Dec 05, 2017 12:00 PM CST   (Arrive by 11:45 AM)   Return Visit with Santana Martinez MD   [facility] ([facility] [place])    [address]  3[address]  [place] [id number] [phone]            Basil 15, 2018  9:45 AM CST   RETURN RETINA with Luna Parker MD   [facility] ([facility])    [address]  [address]  [address]  [place] [address] [phone]            Mar 08, 2018 10:20 AM CST   (Arrive by 10:05 AM)   Return Visit with Lia Dumont MD   [facility] ([facility] [place])    [address]  [address]  [place] [address] [phone]              Who to contact     If you have questions or need follow up information about today's clinic visit or your schedule please contact [facility] directly at [phone].  Normal or non-critical lab and imaging results will be communicated to you by MyChart, letter or phone within 4 business days after the clinic has received the results. If you do not hear from us within 7 days, please contact  "the clinic through [email or web address] or phone. If you have a critical or abnormal lab result, we will notify you by phone as soon as possible.  Submit refill requests through [employer] or call your pharmacy and they will forward the refill request to us. Please allow 3 business days for your refill to be completed.          Additional Information About Your Visit        etoucheshart Information     [employer] gives you secure access to your electronic health record. If you see a primary care provider, you can also send messages to your care team and make appointments. If you have questions, please call your primary care clinic.  If you do not have a primary care provider, please call [phone] and they will assist you.        Care EveryWhere ID     This is your Care EveryWhere ID. This could be used by other organizations to access your [place] medical records  [id number]        Your Vitals Were     Pulse Temperature Respirations Height Pulse Oximetry BMI (Body Mass Index)    54 99.1  F (37.3  C) (Oral) 18 1.778 m (5' 10\") 97% 28.34 kg/m2       Blood Pressure from Last 3 Encounters:   11/18/17 124/65   11/16/17 154/65   10/03/17 139/70    Weight from Last 3 Encounters:   11/18/17 89.6 kg (197 lb 9.6 oz)   11/16/17 89.6 kg (197 lb 8.5 oz)   10/03/17 88.8 kg (195 lb 11.2 oz)              We Performed the Following     *CBC with platelets differential     Comprehensive metabolic panel        Primary Care Provider    Albertina Gross MD       No address on file        Equal Access to Services     [facility] : Hadii aad ku hadasho Soomaali, waaxda luqadaha, qaybta kaalmada adeegyada, waxay placido lawrence . So [facility] [phone].    ATENCIÓN: Si habla español, tiene a vaughan disposición servicios gratuitos de asistencia lingüística. Llame al [phone].    We comply with applicable federal civil rights laws and [place] laws. We do not discriminate on the basis of race, color, national origin, age, disability, " sex, sexual orientation, or gender identity.            Thank you!     Thank you for choosing [facility]  for your care. Our goal is always to provide you with excellent care. Hearing back from our patients is one way we can continue to improve our services. Please take a few minutes to complete the written survey that you may receive in the mail after your visit with us. Thank you!             Your Updated Medication List - Protect others around you: Learn how to safely use, store and throw away your medicines at www.disposemymeds.org.          This list is accurate as of: 11/16/17 11:59 PM.  Always use your most recent med list.                   Brand Name Dispense Instructions for use Diagnosis    acetaminophen 325 MG tablet    TYLENOL    100 tablet    Take 2 tablets (650 mg) by mouth every 6 hours as needed for pain    S/P revision of total hip       acetaminophen-codeine 300-30 MG per tablet    TYLENOL w/CODEINE No. 3    30 tablet    Take 1-2 tablets by mouth every 4 hours as needed for mild pain    Pain in joint, pelvic region and thigh, unspecified laterality       amLODIPine 10 MG tablet    NORVASC    90 tablet    TAKE 1 TABLET DAILY    Benign essential hypertension       aspirin 81 MG EC tablet     90 tablet    Take 1 tablet (81 mg) by mouth daily    Hyperlipidemia LDL goal <70       atorvastatin 20 MG tablet    LIPITOR    90 tablet    Take 1 tablet (20 mg) by mouth daily    Hyperlipidemia LDL goal <70       benazepril 40 MG tablet    LOTENSIN    90 tablet    Take 1 tablet (40 mg) by mouth daily    Essential hypertension, benign       bisacodyl 10 MG Suppository    DULCOLAX    3 suppository    Place 1 suppository rectally daily as needed.    Chronic constipation       desonide 0.05 % ointment    DESOWEN    60 g    Apply topically 2 times daily To affected areas of scale and redness on the face and ears as needed until clear.    Dermatitis, seborrheic       docusate sodium 100 MG tablet     COLACE    60 tablet    Take 100 mg by mouth 2 times daily    BPH (benign prostatic hyperplasia)       gentamicin 0.1 % ointment    GARAMYCIN      B-cell lymphoma of intra-abdominal lymph nodes, unspecified B-cell lymphoma type (H)       melatonin 3 MG tablet      Take 5 mg by mouth At Bedtime        metoprolol 25 MG 24 hr tablet    TOPROL-XL    45 tablet    Take 0.5 tablets (12.5 mg) by mouth daily    Essential hypertension, benign       MILK OF MAGNESIA PO      Take by mouth At Bedtime        nitroGLYcerin 0.4 MG sublingual tablet    NITROSTAT    30 tablet    Place 1 tablet (0.4 mg) under the tongue every 5 minutes as needed    Coronary artery disease involving native heart without angina pectoris, unspecified vessel or lesion type       N[employer] Fiber packet      Take 1 packet by mouth every morning        psyllium 0.52 G capsule      Take by mouth daily Powder        ranitidine 150 MG tablet    ZANTAC    180 tablet    Take 1 tablet (150 mg) by mouth 2 times daily    Gastroesophageal reflux disease without esophagitis       sertraline 50 MG tablet    ZOLOFT    90 tablet    Take 1 tablet (50 mg) by mouth daily    Adjustment disorder with anxious mood       sildenafil 100 MG tablet    VIAGRA    10 tablet    Take 1 tablet (100 mg) by mouth daily as needed    Erectile dysfunction, unspecified erectile dysfunction type       tamsulosin 0.4 MG capsule    FLOMAX    90 capsule    Take 1 capsule (0.4 mg) by mouth daily    Hypertrophy of prostate without urinary obstruction       tolterodine 2 MG tablet    DETROL    180 tablet    Take 1 tablet (2 mg )twice a day    Hypertonicity of bladder       * triamcinolone 0.025 % cream    KENALOG    45 g    Apply topically 2 times daily    Dermatitis       * triamcinolone 0.1 % ointment    KENALOG    80 g    Apply topically 2 times daily To itchy rashes    Intrinsic atopic dermatitis       VITAMIN B 12 PO      Take by mouth daily        VITEYES AREDS FORMULA/LUTEIN Caps       Take by mouth daily        * Notice:  This list has 2 medication(s) that are the same as other medications prescribed for you. Read the directions carefully, and ask your doctor or other care provider to review them with you.

## 2017-11-16 NOTE — PATIENT INSTRUCTIONS
-Will contact you to follow up with official results of CT scan  -Return to clinic in 1 year with labs and CT scan

## 2017-11-16 NOTE — NURSING NOTE
"Oncology Rooming Note    November 16, 2017 10:18 AM   Daniel Mcnair is a 88 year old male who presents for:    Chief Complaint   Patient presents with     Blood Draw     Labs drawn by RN from Left AC PIV.  PIV removed in tact.      Oncology Clinic Visit      Return visit related to Lymphoma     Initial Vitals: /65  Pulse 54  Temp 99.1  F (37.3  C) (Oral)  Resp 18  Ht 1.778 m (5' 10\")  Wt 89.6 kg (197 lb 8.5 oz)  SpO2 97%  BMI 28.34 kg/m2 Estimated body mass index is 28.34 kg/(m^2) as calculated from the following:    Height as of this encounter: 1.778 m (5' 10\").    Weight as of this encounter: 89.6 kg (197 lb 8.5 oz). Body surface area is 2.1 meters squared.  No Pain (0) Comment: Data Unavailable   No LMP for male patient.  Allergies reviewed: Yes  Medications reviewed: Yes    Medications: Medication refills not needed today.  Pharmacy name entered into EPIC:    [facility] - [facility]ICOLE [facility]YAL  [facility] - [place], [place] - [address] [place] [address] PHARMACY [address] [place] [address] [address]  [facility] PHARM[facility]Y [address] [place] [address] [id number]    Clinical concerns: No new concerns. Provider was notified.    10 minutes for nursing intake (face to face time)     Nickie Espinoza LPN            "

## 2017-11-16 NOTE — NURSING NOTE
Chief Complaint   Patient presents with     Blood Draw     Labs drawn by RN from Left AC PIV.  PIV removed in tact.      Stephanie Rayo RN

## 2017-11-18 ENCOUNTER — OFFICE VISIT (OUTPATIENT)
Dept: INTERNAL MEDICINE | Facility: CLINIC | Age: 82
End: 2017-11-18

## 2017-11-18 VITALS
HEIGHT: 70 IN | HEART RATE: 51 BPM | DIASTOLIC BLOOD PRESSURE: 65 MMHG | BODY MASS INDEX: 28.29 KG/M2 | OXYGEN SATURATION: 97 % | SYSTOLIC BLOOD PRESSURE: 124 MMHG | WEIGHT: 197.6 LBS

## 2017-11-18 DIAGNOSIS — E78.5 HYPERLIPIDEMIA, UNSPECIFIED HYPERLIPIDEMIA TYPE: ICD-10-CM

## 2017-11-18 DIAGNOSIS — M79.89 LEG SWELLING: ICD-10-CM

## 2017-11-18 DIAGNOSIS — I25.10 CORONARY ARTERY DISEASE INVOLVING NATIVE CORONARY ARTERY OF NATIVE HEART WITHOUT ANGINA PECTORIS: ICD-10-CM

## 2017-11-18 DIAGNOSIS — R53.83 OTHER FATIGUE: ICD-10-CM

## 2017-11-18 DIAGNOSIS — C85.10 B-CELL LYMPHOMA, UNSPECIFIED B-CELL LYMPHOMA TYPE, UNSPECIFIED BODY REGION (H): ICD-10-CM

## 2017-11-18 DIAGNOSIS — M15.9 OSTEOARTHRITIS OF MULTIPLE JOINTS, UNSPECIFIED OSTEOARTHRITIS TYPE: ICD-10-CM

## 2017-11-18 DIAGNOSIS — I49.3 VENTRICULAR ECTOPY: ICD-10-CM

## 2017-11-18 DIAGNOSIS — I10 ESSENTIAL HYPERTENSION: ICD-10-CM

## 2017-11-18 DIAGNOSIS — R00.1 BRADYCARDIA: ICD-10-CM

## 2017-11-18 DIAGNOSIS — C85.98 MALIGNANT LYMPHOMAS OF LYMPH NODES OF MULTIPLE SITES (H): ICD-10-CM

## 2017-11-18 DIAGNOSIS — Z76.89 ENCOUNTER TO ESTABLISH CARE: Primary | ICD-10-CM

## 2017-11-18 ASSESSMENT — PAIN SCALES - GENERAL: PAINLEVEL: NO PAIN (0)

## 2017-11-18 NOTE — PROGRESS NOTES
CC: establish care    S:  Reviewed past medical history in detail, also most recent notes from Dr. Gross, oncology, cardiology, PMNR    Notes chronic back and hip pain, this morning and left hip pain on awakening today 1-2/10, short lived, resolved with moving around.  Will be sticking with his home exercise program for now.  Has seen PT.  Has not had x ray.  Hip pain is chronic, intermittent, has seen Dr. Grewal, s/p bilateral CIRO with revision left hip in past. Saw Dr. Lew in past, then Dr. Valdez, history of back surgeries, hx spinal stenosis, had epidural injection recently (helpful).  Last saw PMNR July of this year.  Uses acetaminophen one 500 mg tab up to 3 times daily.      Fatigue, ongoing, takes naps and sleeps longer at night.  Not too bothered by it overall.  Discussed potential etiologies (likely multifactorial) and w/u to date.    Chronic constipation under control with diet, stool softeners and daily anal dilation (hx stenosis)    Chronic leg swelling, managed with compression stockings.    Copy/forward Cardiology Assessment and Plan from October, with results:   1. Sinus bradycardia, fatigability-- possibly newly symptomatic:   Chronotropic incompetence is a possibility, though it seems less likely given prior ECGs dating back at least to 2013 showing sinus bradycardia with prolonged MA and HR in the 50s.   Nonetheless, seems prudent to pursue 48h Holter monitor to assess for adequate HR variability/chronotropic response. If insufficient, would d/c beta blocker.   Holter 10/13/17   Bradycardia  average 57   Frequent ventricular and occas supraventricular ectopy     2. Bilateral thigh claudication:  Bilateral exertional thigh symptoms have multiple potential etiologies (neurologic, musculoskeletal, deconditioning), but typical claudication symptoms in combination with diminished lower extremity pulses in a patient with known CAD raise concern for PAD.  Additionally, this may be a cause of  debility that is intervenable with relatively simple, noninvasive measures (e.g., supervised exercise and cilostazol.)   -ABIs, further workup if abnormal   11/16/17 DOYLE's Impression:       Right leg: Resting DOYLE is 1.36, Normal (No observed evidence of   increased cardiovascular risk or peripheral arterial disease.)      Left leg: Resting DOYLE is 1.13, Normal (No observed evidence of   increased cardiovascular risk or peripheral arterial disease.)      3. CAD s/p PCI to LAD/D2 2536-5863 w/o angina: Stable and free of anginal symptoms. Continue ASA, atorvastatin, beta blocker. If Holter suggests chronotropic incompetence, may need to reduce beta blocker as above.      4. HTN: well-controlled on ACEI/amlodipine/beta blocker, continue at present doses.     5. Hyperlipidemia: stable and well-controlled, continue atorvastatin.          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)     Benign essential hypertension     Coronary artery disease involving native coronary artery of native heart without angina pectoris     Advance Care Planning     Osteoarthritis of multiple joints, unspecified osteoarthritis type     Hyperlipidemia, unspecified hyperlipidemia type     Other fatigue     B-cell lymphoma, unspecified B-cell lymphoma type, unspecified body region (H)       Past Surgical History:   Procedure Laterality Date     ARTHROPLASTY HIP  10/2008    left     ARTHROPLASTY HIP  1999    right     ARTHROPLASTY REVISION HIP  1/21/2014    Procedure: ARTHROPLASTY REVISION HIP;  Revision Left Total Hip;  Surgeon: Edgar Grewal MD;  Location: [facility]     BACK SURGERY  2006     CARDIAC SURGERY  2001    stent x 5      CARDIAC SURGERY  2002     CATARACT IOL, RT/LT Bilateral      CYSTOSCOPY, TRANSURETHRAL RESECTION (TUR) PROSTATE, COMBINED  11/5/2013    Procedure: COMBINED CYSTOSCOPY, TRANSURETHRAL RESECTION (TUR) PROSTATE;  Transurethral Resection Of Prostate, Bipolar;  Surgeon: Lai Street MD;  Location: [facility]     ESOPHAGOSCOPY, GASTROSCOPY, DUODENOSCOPY (EGD), COMBINED  2/3/2014    Procedure: COMBINED ESOPHAGOSCOPY, GASTROSCOPY, DUODENOSCOPY (EGD);;  Surgeon: Ezra Fiore MD;  Location: [facility]     ESOPHAGOSCOPY, GASTROSCOPY, DUODENOSCOPY (EGD), COMBINED  4/15/2014    Procedure: COMBINED ESOPHAGOSCOPY, GASTROSCOPY, DUODENOSCOPY (EGD), BIOPSY SINGLE OR MULTIPLE;  Surgeon: Ezra Fiore MD;  Location: [facility]     EXTRACAPSULAR CATARACT EXTRATION WITH INTRAOCULAR LENS IMPLANT  2005     EXTRACAPSULAR CATARACT EXTRATION WITH INTRAOCULAR LENS IMPLANT  2010     HERNIA REPAIR       MASTOID  SURGERY  1939     MOHS MICROGRAPHIC PROCEDURE       OPTICAL TRACKING SYSTEM FUSION SPINE POSTERIOR LUMBAR TWO LEVELS  7/11/2013    Procedure: OPTICAL TRACKING SYSTEM FUSION SPINE POSTERIOR LUMBAR TWO LEVELS;  Stealth Assisted Lumbar 3-4 Transforaminal Lumbar Interbody Fusion, Lumbar 2-3 Lumbar 3-4 Laminectomy Decompression;  Surgeon: Edgar Lew MD;  Location: [facility]     Family History   Problem Relation Age of Onset     DIABETES Maternal Grandfather      Alcohol/Drug Maternal Grandfather      Arthritis Maternal Grandfather      Obesity Maternal Grandfather      CEREBROVASCULAR DISEASE Maternal Grandmother      Hypertension Mother      Glaucoma No family hx of      Macular Degeneration No family hx of      CANCER No family hx of      No known family hx of skin cancer     Skin Cancer No family hx of      Social History     Social History     Marital status:      Spouse name: N/A     Number of children: N/A     Years of education: N/A     Occupational History     Not on file.     Social History Main Topics     Smoking status: Never Smoker     Smokeless tobacco: Never Used     Alcohol use 1.0 oz/week     2 Standard drinks or equivalent per week      Comment: occ     Drug use: No     Sexual activity: Not on file     Other Topics Concern     Not on file     Social History Narrative       Current Outpatient Prescriptions   Medication Sig Dispense Refill     sildenafil (VIAGRA) 100 MG tablet Take 1 tablet (100 mg) by mouth daily as needed 10 tablet 2     benazepril (LOTENSIN) 40 MG tablet Take 1 tablet (40 mg) by mouth daily 90 tablet 3     amLODIPine (NORVASC) 10 MG tablet TAKE 1 TABLET DAILY 90 tablet 2     atorvastatin (LIPITOR) 20 MG tablet Take 1 tablet (20 mg) by mouth daily 90 tablet 2     tolterodine (DETROL) 2 MG tablet Take 1 tablet (2 mg )twice a day 180 tablet 3     tamsulosin (FLOMAX) 0.4 MG capsule Take 1 capsule (0.4 mg) by mouth daily 90 capsule 3     nitroglycerin (NITROSTAT) 0.4  "MG sublingual tablet Place 1 tablet (0.4 mg) under the tongue every 5 minutes as needed 30 tablet 2     metoprolol (TOPROL-XL) 25 MG 24 hr tablet Take 0.5 tablets (12.5 mg) by mouth daily 45 tablet 3     ranitidine (ZANTAC) 150 MG tablet Take 1 tablet (150 mg) by mouth 2 times daily 180 tablet 3     gentamicin (GARAMYCIN) 0.1 % ointment   3     triamcinolone (KENALOG) 0.1 % ointment Apply topically 2 times daily To itchy rashes 80 g 3     desonide (DESOWEN) 0.05 % ointment Apply topically 2 times daily To affected areas of scale and redness on the face and ears as needed until clear. 60 g 11     Multiple Vitamins-Minerals (VITEYES AREDS FORMULA/LUTEIN) CAPS Take by mouth daily       psyllium 0.52 G capsule Take by mouth daily Powder       triamcinolone (KENALOG) 0.025 % cream Apply topically 2 times daily 45 g 0     aspirin 81 MG EC tablet Take 1 tablet (81 mg) by mouth daily 90 tablet 3     acetaminophen (TYLENOL) 325 MG tablet Take 2 tablets (650 mg) by mouth every 6 hours as needed for pain 100 tablet 0     Magnesium Hydroxide (MILK OF MAGNESIA PO) Take by mouth At Bedtime       docusate sodium 100 MG tablet Take 100 mg by mouth 2 times daily 60 tablet 1     bisacodyl (DULCOLAX) 10 MG suppository Place 1 suppository rectally daily as needed. 3 suppository 0     melatonin 3 MG tablet Take 5 mg by mouth At Bedtime        Guar Gum (BENEFIBER) packet Take 1 packet by mouth every morning        acetaminophen-codeine (TYLENOL W/CODEINE NO. 3) 300-30 MG per tablet Take 1-2 tablets by mouth every 4 hours as needed for mild pain (Patient not taking: Reported on 11/18/2017) 30 tablet 1     Cyanocobalamin (VITAMIN B 12 PO) Take by mouth daily       Allergies   Allergen Reactions     Nka [No Known Allergies]      /65  Pulse 51  Ht 1.778 m (5' 10\")  Wt 89.6 kg (197 lb 9.6 oz)  SpO2 97%  BMI 28.35 kg/m2  Gen:  Pleasant, NAD  Lungs:  CTA  C/V:  RRR, No MRG noted today.  No carotid bruits.  1+ bilateral pitting edema " to shins bilaterally.  DP pulses 2+.  Abd:  Soft, NT, no bruits, no HSM or masses.    Daniel was seen today for establish care.    Diagnoses and all orders for this visit:    Encounter to establish care    Bradycardia, ventricular ectopy, leg swelling, fatigue  -     CARDIOLOGY EVAL ADULT REFERRAL; follow up within one month to review test results, leg swelling, fatigue (?bradycardia related to decreased exercise tolerance, ?calcium channel blocker assoc with swelling)    Routine f/u in 3-4 months.    Total time spent 40 minutes.  More than 50% of the time spent with Mr. Mcnair on counseling / coordinating his care      Lia Dumont M.D.  Internal Medicine  [facility] Center   pager [phone]

## 2017-11-18 NOTE — MR AVS SNAPSHOT
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           Follow-ups after your visit        Additional Services     CARDIOLOGY EVAL ADULT REFERRAL       This is a follow up request.  Schedule appointment within one month to review test results that cardiology ordered. Also to evaluate leg swelling.                  Follow-up notes from your care team     Return in about 3 months (around 2/18/2018).      Your next 10 appointments already scheduled     Dec 05, 2017 12:00 PM CST   (Arrive by 11:45 AM)   Return Visit with Santana Martinez MD   [facility] ([facility])    [address]  [address] [facility] [address] [phone]            Basil 15, 2018  9:45 AM CST   RETURN RETINA with Luna Parker MD   [facility] ([facility])    [address]  [address] [address]  [facility] [address]   [phone]            Mar 08, 2018 10:20 AM CST   (Arrive by 10:05 AM)   Return Visit with Lia Dumont MD   [facility]  "([facility])    [address]  [address] [facility] [id number] [phone]              Future tests that were ordered for you today     Open Future Orders        Priority Expected Expires Ordered    CARDIOLOGY EVAL ADULT REFERRAL Routine  11/18/2018 11/18/2017            Who to contact     Please call your clinic at [phone] to:    Ask questions about your health    Make or cancel appointments    Discuss your medicines    Learn about your test results    Speak to your doctor   If you have compliments or concerns about an experience at your clinic, or if you wish to file a complaint, please contact [facility] Patient Relations at [phone] or email us at [email or web address]         Additional Information About Your Visit        [employer] Information     [employer] gives you secure access to your electronic health record. If you see a primary care provider, you can also send messages to your care team and make appointments. If you have questions, please call your primary care clinic.  If you do not have a primary care provider, please call [phone] and they will assist you.      [employer] is an electronic gateway that provides easy, online access to your medical records. With [employer], you can request a clinic appointment, read your test results, renew a prescription or communicate with your care team.     To access your existing account, please contact your [facility] or call [phone] for assistance.        Care EveryWhere ID     This is your Care EveryWhere ID. This could be used by other organizations to access your [place] medical records  [id number]        Your Vitals Were     Pulse Height Pulse Oximetry BMI (Body Mass Index)          51 1.778 m (5' 10\") 97% 28.35 kg/m2         Blood Pressure from Last 3 Encounters:   11/18/17 124/65   11/16/17 154/65   10/03/17 139/70    Weight from Last 3 " Encounters:   11/18/17 89.6 kg (197 lb 9.6 oz)   11/16/17 89.6 kg (197 lb 8.5 oz)   10/03/17 88.8 kg (195 lb 11.2 oz)                 Today's Medication Changes          These changes are accurate as of: 11/18/17 10:58 AM.  If you have any questions, ask your nurse or doctor.               Stop taking these medicines if you haven't already. Please contact your care team if you have questions.     sertraline 50 MG tablet   Commonly known as:  ZOLOFT                    Primary Care Provider    Albertina Gross MD       No address on file        Equal Access to Services     [facility] : Hadcinthia Camargo, ad portillo, rossy bingham, jose miguel lawrence . So [facility] [phone].    ATENCIÓN: Si habla español, tiene a vaughan disposición servicios gratuitos de asistencia lingüística. Ll[facility] [phone].    We comply with applicable federal civil rights laws and [place] laws. We do not discriminate on the basis of race, color, national origin, age, disability, sex, sexual orientation, or gender identity.            Thank you!     Thank you for choosing [facility]  for your care. Our goal is always to provide you with excellent care. Hearing back from our patients is one way we can continue to improve our services. Please take a few minutes to complete the written survey that you may receive in the mail after your visit with us. Thank you!             Your Updated Medication List - Protect others around you: Learn how to safely use, store and throw away your medicines at www.disposemymeds.org.          This list is accurate as of: 11/18/17 10:58 AM.  Always use your most recent med list.                   Brand Name Dispense Instructions for use Diagnosis    acetaminophen 325 MG tablet    TYLENOL    100 tablet    Take 2 tablets (650 mg) by mouth every 6 hours as needed for pain    S/P revision of total hip       acetaminophen-codeine 300-30 MG per tablet     TYLENOL w/CODEINE No. 3    30 tablet    Take 1-2 tablets by mouth every 4 hours as needed for mild pain    Pain in joint, pelvic region and thigh, unspecified laterality       amLODIPine 10 MG tablet    NORVASC    90 tablet    TAKE 1 TABLET DAILY    Benign essential hypertension       aspirin 81 MG EC tablet     90 tablet    Take 1 tablet (81 mg) by mouth daily    Hyperlipidemia LDL goal <70       atorvastatin 20 MG tablet    LIPITOR    90 tablet    Take 1 tablet (20 mg) by mouth daily    Hyperlipidemia LDL goal <70       benazepril 40 MG tablet    LOTENSIN    90 tablet    Take 1 tablet (40 mg) by mouth daily    Essential hypertension, benign       bisacodyl 10 MG Suppository    DULCOLAX    3 suppository    Place 1 suppository rectally daily as needed.    Chronic constipation       desonide 0.05 % ointment    DESOWEN    60 g    Apply topically 2 times daily To affected areas of scale and redness on the face and ears as needed until clear.    Dermatitis, seborrheic       docusate sodium 100 MG tablet    COLACE    60 tablet    Take 100 mg by mouth 2 times daily    BPH (benign prostatic hyperplasia)       gentamicin 0.1 % ointment    GARAMYCIN      B-cell lymphoma of intra-abdominal lymph nodes, unspecified B-cell lymphoma type (H)       melatonin 3 MG tablet      Take 5 mg by mouth At Bedtime        metoprolol 25 MG 24 hr tablet    TOPROL-XL    45 tablet    Take 0.5 tablets (12.5 mg) by mouth daily    Essential hypertension, benign       MILK OF MAGNESIA PO      Take by mouth At Bedtime        nitroGLYcerin 0.4 MG sublingual tablet    NITROSTAT    30 tablet    Place 1 tablet (0.4 mg) under the tongue every 5 minutes as needed    Coronary artery disease involving native heart without angina pectoris, unspecified vessel or lesion type       [employer] Fiber packet      Take 1 packet by mouth every morning        psyllium 0.52 G capsule      Take by mouth daily Powder        ranitidine 150 MG tablet    ZANTAC    180  tablet    Take 1 tablet (150 mg) by mouth 2 times daily    Gastroesophageal reflux disease without esophagitis       sildenafil 100 MG tablet    VIAGRA    10 tablet    Take 1 tablet (100 mg) by mouth daily as needed    Erectile dysfunction, unspecified erectile dysfunction type       tamsulosin 0.4 MG capsule    FLOMAX    90 capsule    Take 1 capsule (0.4 mg) by mouth daily    Hypertrophy of prostate without urinary obstruction       tolterodine 2 MG tablet    DETROL    180 tablet    Take 1 tablet (2 mg )twice a day    Hypertonicity of bladder       * triamcinolone 0.025 % cream    KENALOG    45 g    Apply topically 2 times daily    Dermatitis       * triamcinolone 0.1 % ointment    KENALOG    80 g    Apply topically 2 times daily To itchy rashes    Intrinsic atopic dermatitis       VITAMIN B 12 PO      Take by mouth daily        VITEYES AREDS FORMULA/LUTEIN Caps      Take by mouth daily        * Notice:  This list has 2 medication(s) that are the same as other medications prescribed for you. Read the directions carefully, and ask your doctor or other care provider to review them with you.

## 2017-11-19 PROBLEM — D49.0 IPMN (INTRADUCTAL PAPILLARY MUCINOUS NEOPLASM): Status: ACTIVE | Noted: 2017-11-19

## 2017-11-22 ENCOUNTER — CARE COORDINATION (OUTPATIENT)
Dept: ONCOLOGY | Facility: CLINIC | Age: 82
End: 2017-11-22

## 2017-11-22 NOTE — PROGRESS NOTES
Reason for Outgoing call:    Called patient this afternoon per the request of Dr. Rojas to inform them the results of his CT scan.  Per Dr. Rojas, there is no change in his CT scan and he would like to repeat the scan one more time in a year.     Patients Questions/Concerns:    Patient had none.    Nursing Action/Patient Instruction:    RNCC reminded patient to call the nurse triage line if he ever had any questions, comments, or concerns.     Patient Response/Evaluation:    Patient was very pleased to hear the report of the CT scan and was very grateful for the follow up.   Patient verbalized understanding of plan.

## 2017-12-05 ENCOUNTER — PRE VISIT (OUTPATIENT)
Dept: CARDIOLOGY | Facility: CLINIC | Age: 82
End: 2017-12-05

## 2017-12-05 ENCOUNTER — OFFICE VISIT (OUTPATIENT)
Dept: CARDIOLOGY | Facility: CLINIC | Age: 82
End: 2017-12-05
Attending: INTERNAL MEDICINE
Payer: MEDICARE

## 2017-12-05 VITALS
HEART RATE: 57 BPM | DIASTOLIC BLOOD PRESSURE: 49 MMHG | SYSTOLIC BLOOD PRESSURE: 131 MMHG | OXYGEN SATURATION: 97 % | WEIGHT: 193.7 LBS | HEIGHT: 70 IN | BODY MASS INDEX: 27.73 KG/M2

## 2017-12-05 DIAGNOSIS — R00.1 BRADYCARDIA: Primary | ICD-10-CM

## 2017-12-05 DIAGNOSIS — I25.10 CORONARY ARTERY DISEASE INVOLVING NATIVE CORONARY ARTERY OF NATIVE HEART WITHOUT ANGINA PECTORIS: ICD-10-CM

## 2017-12-05 DIAGNOSIS — R53.83 OTHER FATIGUE: ICD-10-CM

## 2017-12-05 DIAGNOSIS — M79.89 LEG SWELLING: ICD-10-CM

## 2017-12-05 DIAGNOSIS — I49.3 VENTRICULAR ECTOPY: ICD-10-CM

## 2017-12-05 PROCEDURE — 99213 OFFICE O/P EST LOW 20 MIN: CPT | Mod: ZF

## 2017-12-05 PROCEDURE — 99214 OFFICE O/P EST MOD 30 MIN: CPT | Mod: ZP | Performed by: INTERNAL MEDICINE

## 2017-12-05 ASSESSMENT — PAIN SCALES - GENERAL: PAINLEVEL: NO PAIN (0)

## 2017-12-05 NOTE — PROGRESS NOTES
Chief complaint: bradycardia, h/o CAD    HPI:   Daniel Mcnair is a 87 year old male with history of HTN, HL, and CAD s/p PCI to LAD/D2 (5 stents total) 5/2002-6/2003 last seen by me 10/2017 who presents for follow up of bradycardia and leg pain/weakness.     His prior cardiac history includes several PCIs (5 stents total) between 5/2002 and 3/2003; his last PCI was 3/2003 at which time he had PCI to mLAD (in-stent restenosis) and to D2; he recalls having new-onset exertional dyspnea while playing tennis at that time. He was last seen by Dr. Guajardo in 2013 for a preoperative visit and was asymptomatic at that time. He underwent regadenoson SPECT which showed a small area of apical reversible photopenia-- ischemia versus artifact. He was asymptomatic and no further workup was pursued.       Since last visit, he reports stable fatigue. He had 48 Holter (see below) to investigate bradycardia which documented excellent chronotropic competence, no symptomatic bradycardia, and no prolonged pauses or high-grade AV block. ABIs (see below) showed no evidence of significant lower extremity PAD.)     He does have chronic bilateral leg edema, worse at the end of the day and after prolonged standing, for which he was recently prescribed compression hose. He said that he has found these helpful.     He denies any chest pain, dyspnea at rest or with exertion, PND, orthopnea, palpitations, lightheadedness or syncope.     ECG today shows: sinus bradycardia with 1st degree AV block, VR 58,  QRS 86 QTc 435, left axis deviation.        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     Macular degeneration, dry      Other and unspecified hyperlipidemia 7/21/2011     Snoring      Spinal stenosis, lumbar region, without neurogenic claudication 7/21/2011     Stented coronary artery 2003     Unspecified essential hypertension 7/21/2011       CURRENT MEDICATIONS:  Current Outpatient Prescriptions   Medication Sig Dispense Refill     sildenafil (VIAGRA) 100 MG tablet Take 1 tablet (100 mg) by mouth daily as needed 10 tablet 2     benazepril (LOTENSIN) 40 MG tablet Take 1 tablet (40 mg) by mouth daily 90 tablet 3     amLODIPine (NORVASC) 10 MG tablet TAKE 1 TABLET DAILY 90 tablet 2     atorvastatin (LIPITOR) 20 MG tablet Take 1 tablet (20 mg) by mouth daily 90 tablet 2     tolterodine (DETROL) 2 MG tablet Take 1 tablet (2 mg )twice a day 180 tablet 3     tamsulosin (FLOMAX) 0.4 MG capsule Take 1 capsule (0.4 mg) by mouth daily 90 capsule 3     nitroglycerin (NITROSTAT) 0.4 MG sublingual tablet Place 1 tablet (0.4 mg) under the tongue every 5 minutes as needed 30 tablet 2     metoprolol (TOPROL-XL) 25 MG 24 hr tablet Take 0.5 tablets (12.5 mg) by mouth daily 45 tablet 3     ranitidine (ZANTAC) 150 MG tablet Take 1 tablet (150 mg) by mouth 2 times daily 180 tablet 3     gentamicin (GARAMYCIN) 0.1 % ointment Apply topically 4 times daily   3     triamcinolone (KENALOG) 0.1 % ointment Apply topically 2 times daily To itchy rashes 80 g 3     desonide (DESOWEN) 0.05 % ointment Apply topically 2 times daily To affected areas of scale and redness on the face and ears as needed until clear. 60 g 11     Multiple Vitamins-Minerals (VITEYES AREDS FORMULA/LUTEIN) CAPS Take by mouth daily       psyllium 0.52 G capsule Take by mouth daily Powder       triamcinolone (KENALOG) 0.025 % cream Apply topically 2 times daily 45 g 0     aspirin 81 MG EC tablet Take 1 tablet (81 mg) by mouth daily 90 tablet 3     acetaminophen (TYLENOL) 325 MG  tablet Take 2 tablets (650 mg) by mouth every 6 hours as needed for pain 100 tablet 0     Magnesium Hydroxide (MILK OF MAGNESIA PO) Take by mouth At Bedtime       docusate sodium 100 MG tablet Take 100 mg by mouth 2 times daily 60 tablet 1     bisacodyl (DULCOLAX) 10 MG suppository Place 1 suppository rectally daily as needed. 3 suppository 0     melatonin 3 MG tablet Take 5 mg by mouth At Bedtime        Guar Gum (BENEFIBER) packet Take 1 packet by mouth every morning        acetaminophen-codeine (TYLENOL W/CODEINE NO. 3) 300-30 MG per tablet Take 1-2 tablets by mouth every 4 hours as needed for mild pain (Patient not taking: Reported on 11/18/2017) 30 tablet 1     Cyanocobalamin (VITAMIN B 12 PO) Take by mouth daily         PAST SURGICAL HISTORY:  Past Surgical History:   Procedure Laterality Date     ARTHROPLASTY HIP  10/2008    left     ARTHROPLASTY HIP  1999    right     ARTHROPLASTY REVISION HIP  1/21/2014    Procedure: ARTHROPLASTY REVISION HIP;  Revision Left Total Hip;  Surgeon: Edgar Grewal MD;  Location: [facility]     BACK SURGERY  2006     CARDIAC SURGERY  2001    stent x 5      CARDIAC SURGERY  2002     CATARACT IOL, RT/LT Bilateral      CYSTOSCOPY, TRANSURETHRAL RESECTION (TUR) PROSTATE, COMBINED  11/5/2013    Procedure: COMBINED CYSTOSCOPY, TRANSURETHRAL RESECTION (TUR) PROSTATE;  Transurethral Resection Of Prostate, Bipolar;  Surgeon: Lai Street MD;  Location: [facility]     ESOPHAGOSCOPY, GASTROSCOPY, DUODENOSCOPY (EGD), COMBINED  2/3/2014    Procedure: COMBINED ESOPHAGOSCOPY, GASTROSCOPY, DUODENOSCOPY (EGD);;  Surgeon: Ezra Fiore MD;  Location: [facility]     ESOPHAGOSCOPY, GASTROSCOPY, DUODENOSCOPY (EGD), COMBINED  4/15/2014    Procedure: COMBINED ESOPHAGOSCOPY, GASTROSCOPY, DUODENOSCOPY (EGD), BIOPSY SINGLE OR MULTIPLE;  Surgeon: Ezra Fiore MD;  Location: [facility]     EXTRACAPSULAR CATARACT EXTRATION WITH INTRAOCULAR LENS IMPLANT  2005     EXTRACAPSULAR CATARACT EXTRATION WITH  "INTRAOCULAR LENS IMPLANT  2010     HERNIA REPAIR       MASTOID SURGERY  1939     MOHS MICROGRAPHIC PROCEDURE       OPTICAL TRACKING SYSTEM FUSION SPINE POSTERIOR LUMBAR TWO LEVELS  7/11/2013    Procedure: OPTICAL TRACKING SYSTEM FUSION SPINE POSTERIOR LUMBAR TWO LEVELS;  Stealth Assisted Lumbar 3-4 Transforaminal Lumbar Interbody Fusion, Lumbar 2-3 Lumbar 3-4 Laminectomy Decompression;  Surgeon: Edgar Lew MD;  Location: [facility]       ALLERGIES:     Allergies   Allergen Reactions     Nka [No Known Allergies]        FAMILY HISTORY:  Family History   Problem Relation Age of Onset     DIABETES Maternal Grandfather      Alcohol/Drug Maternal Grandfather      Arthritis Maternal Grandfather      Obesity Maternal Grandfather      CEREBROVASCULAR DISEASE Maternal Grandmother      Hypertension Mother      Glaucoma No family hx of      Macular Degeneration No family hx of      CANCER No family hx of      No known family hx of skin cancer     Skin Cancer No family hx of      SOCIAL HISTORY:  Social History   Substance Use Topics     Smoking status: Never Smoker     Smokeless tobacco: Never Used     Alcohol use 1.0 oz/week     2 Standard drinks or equivalent per week      Comment: occ       ROS:   A comprehensive 14 point review of systems is negative other than as mentioned in HPI.    Exam:  /49 (BP Location: Left arm, Patient Position: Chair, Cuff Size: Adult Regular)  Pulse 57  Ht 1.778 m (5' 10\")  Wt 87.9 kg (193 lb 11.2 oz)  SpO2 97%  BMI 27.79 kg/m2  GENERAL APPEARANCE: healthy, alert and no distress  EYES: no icterus, no xanthelasmas  ENT: normal palate, mucosa moist, no central cyanosis  NECK: no adenopathy, no asymmetry, masses, or scars, thyroid normal to palpation and no bruits, JVP~7 cm H2O  RESPIRATORY: lungs clear to auscultation - no rales, rhonchi or wheezes, no use of accessory muscles, no retractions, respirations are unlabored, normal respiratory rate  CARDIOVASCULAR: regular " bradycardic rhythm, normal S1 with physiologic split S2, no S3 or S4 and no murmur, click or rub, precordium quiet with normal PMI.  GI: soft, non tender, without hepatosplenomegaly, no masses palpable, bowel sounds normal, aorta not enlarged by palpation, no abdominal bruits  EXTREMITIES: 1+ pitting pretibial edema BLE. no bruits  NEURO: alert and oriented to person/place/time, normal speech, gait and affect  VASC: PTs 1+ bilaterally  SKIN: no ecchymoses, no rashes.  PSYCH: cooperative, affect appropriate.       DATA:  Labs reviewed.    Reviewed:  48h Holter (10/3/17-10/5/17): Sinus with occasional (2%) PVCs, average VR 57 (rage  bpm.) Rare non-sustained SVT (longest 5 beats), no sustained or non-sustained VT. Appropriate heart rate variability with activity. Rare reports of symptoms ( stress,   tired,   shaky ) did not correspond to arrhythmias.     ABIs 11/16/17: Normal, no evidence of PAD.      Assessment and Plan:   1. Sinus bradycardia, stable: With Holter monitor showing clear evidence of chronotropic competence.   -continue beta blocker at present dose   -in the absence of symptomatic bradycardia (presyncope/syncope) or ECG evidence of advanced SA or AV jesse disease (prolonged pauses, Mobitz II or 3rd degree AV block), no further workup is indicated at this time    2. Bilateral leg pain, stable:  ABIs without evidence of PAD, discussed with patient   -evaluate for nonvascular etiologies (likely his longstanding spinal stenosis status post multiple surgeries)    3. CAD s/p PCI to LAD/D2 2173-5028 w/o angina: Stable and free of anginal symptoms. Continue ASA, atorvastatin, beta blocker.    4. HTN: well-controlled on ACEI/amlodipine/beta blocker, continue at present doses.    5. Hyperlipidemia: stable and well-controlled, continue atorvastatin.     6. Bilateral leg edema: exam and history strongly suggest venous insufficiency. Continue use of compression stockings, counseled regarding elevation of legs  when sitting.    Follow up in 1 year.    The patient states understanding and is agreeable with plan.     Santana Martinez MD  Cardiology

## 2017-12-05 NOTE — LETTER
12/5/2017      RE: Daniel Mcnair  [address] [facility] [address]  [place] [id number]       Dear Colleague,    Thank you for the opportunity to participate in the care of your patient, Daniel Mcnair, at the [facility] HEART [facility]. Please see a copy of my visit note below.    Chief complaint: bradycardia, h/o CAD    HPI:   Daniel Mcnair is a 87 year old male with history of HTN, HL, and CAD s/p PCI to LAD/D2 (5 stents total) 5/2002-6/2003 last seen by me 10/2017 who presents for follow up of bradycardia and leg pain/weakness.     His prior cardiac history includes several PCIs (5 stents total) between 5/2002 and 3/2003; his last PCI was 3/2003 at which time he had PCI to mLAD (in-stent restenosis) and to D2; he recalls having new-onset exertional dyspnea while playing tennis at that time. He was last seen by Dr. Guajardo in 2013 for a preoperative visit and was asymptomatic at that time. He underwent regadenoson SPECT which showed a small area of apical reversible photopenia-- ischemia versus artifact. He was asymptomatic and no further workup was pursued.       Since last visit, he reports stable fatigue. He had 48 Holter (see below) to investigate bradycardia which documented excellent chronotropic competence, no symptomatic bradycardia, and no prolonged pauses or high-grade AV block. ABIs (see below) showed no evidence of significant lower extremity PAD.)     He does have chronic bilateral leg edema, worse at the end of the day and after prolonged standing, for which he was recently prescribed compression hose. He said that he has found these helpful.     He denies any chest pain, dyspnea at rest or with exertion, PND, orthopnea, palpitations, lightheadedness or syncope.     ECG today shows: sinus bradycardia with 1st degree AV block, VR 58,  QRS 86 QTc 435, left axis deviation.        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     Macular degeneration, dry      Other and unspecified hyperlipidemia 7/21/2011     Snoring      Spinal stenosis, lumbar region, without neurogenic claudication 7/21/2011     Stented coronary artery 2003     Unspecified essential hypertension 7/21/2011       CURRENT MEDICATIONS:  Current Outpatient Prescriptions   Medication Sig Dispense Refill     sildenafil (VIAGRA) 100 MG tablet Take 1 tablet (100 mg) by mouth daily as needed 10 tablet 2     benazepril (LOTENSIN) 40 MG tablet Take 1 tablet (40 mg) by mouth daily 90 tablet 3     amLODIPine (NORVASC) 10 MG tablet TAKE 1 TABLET DAILY 90 tablet 2     atorvastatin (LIPITOR) 20 MG tablet Take 1 tablet (20 mg) by mouth daily 90 tablet 2     tolterodine (DETROL) 2 MG tablet Take 1 tablet (2 mg )twice a day 180 tablet 3     tamsulosin (FLOMAX) 0.4 MG capsule Take 1 capsule (0.4 mg) by mouth daily 90 capsule 3     nitroglycerin (NITROSTAT) 0.4 MG sublingual tablet Place 1 tablet (0.4 mg) under the tongue every 5 minutes as needed 30 tablet 2     metoprolol (TOPROL-XL) 25 MG 24 hr tablet Take 0.5 tablets (12.5 mg) by mouth daily 45 tablet 3     ranitidine (ZANTAC) 150 MG tablet Take 1 tablet (150 mg) by mouth 2 times daily 180 tablet 3     gentamicin (GARAMYCIN) 0.1 % ointment Apply topically 4 times daily   3     triamcinolone (KENALOG) 0.1 % ointment Apply topically 2 times daily To itchy rashes 80 g 3     desonide (DESOWEN) 0.05 % ointment Apply topically 2 times daily To affected areas of scale and redness on the face and ears as needed  until clear. 60 g 11     Multiple Vitamins-Minerals (VITEYES AREDS FORMULA/LUTEIN) CAPS Take by mouth daily       psyllium 0.52 G capsule Take by mouth daily Powder       triamcinolone (KENALOG) 0.025 % cream Apply topically 2 times daily 45 g 0     aspirin 81 MG EC tablet Take 1 tablet (81 mg) by mouth daily 90 tablet 3     acetaminophen (TYLENOL) 325 MG tablet Take 2 tablets (650 mg) by mouth every 6 hours as needed for pain 100 tablet 0     Magnesium Hydroxide (MILK OF MAGNESIA PO) Take by mouth At Bedtime       docusate sodium 100 MG tablet Take 100 mg by mouth 2 times daily 60 tablet 1     bisacodyl (DULCOLAX) 10 MG suppository Place 1 suppository rectally daily as needed. 3 suppository 0     melatonin 3 MG tablet Take 5 mg by mouth At Bedtime        Guar Gum (BENEFIBER) packet Take 1 packet by mouth every morning        acetaminophen-codeine (TYLENOL W/CODEINE NO. 3) 300-30 MG per tablet Take 1-2 tablets by mouth every 4 hours as needed for mild pain (Patient not taking: Reported on 11/18/2017) 30 tablet 1     Cyanocobalamin (VITAMIN B 12 PO) Take by mouth daily         PAST SURGICAL HISTORY:  Past Surgical History:   Procedure Laterality Date     ARTHROPLASTY HIP  10/2008    left     ARTHROPLASTY HIP  1999    right     ARTHROPLASTY REVISION HIP  1/21/2014    Procedure: ARTHROPLASTY REVISION HIP;  Revision Left Total Hip;  Surgeon: Edgar Grewal MD;  Location: [facility]     BACK SURGERY  2006     CARDIAC SURGERY  2001    stent x 5      CARDIAC SURGERY  2002     CATARACT IOL, RT/LT Bilateral      CYSTOSCOPY, TRANSURETHRAL RESECTION (TUR) PROSTATE, COMBINED  11/5/2013    Procedure: COMBINED CYSTOSCOPY, TRANSURETHRAL RESECTION (TUR) PROSTATE;  Transurethral Resection Of Prostate, Bipolar;  Surgeon: Lai Street MD;  Location: [facility]     ESOPHAGOSCOPY, GASTROSCOPY, DUODENOSCOPY (EGD), COMBINED  2/3/2014    Procedure: COMBINED ESOPHAGOSCOPY, GASTROSCOPY, DUODENOSCOPY (EGD);;  Surgeon: Ezra Fiore MD;  " Location: [facility]     ESOPHAGOSCOPY, GASTROSCOPY, DUODENOSCOPY (EGD), COMBINED  4/15/2014    Procedure: COMBINED ESOPHAGOSCOPY, GASTROSCOPY, DUODENOSCOPY (EGD), BIOPSY SINGLE OR MULTIPLE;  Surgeon: Ezra Fiore MD;  Location: [facility]     EXTRACAPSULAR CATARACT EXTRATION WITH INTRAOCULAR LENS IMPLANT  2005     EXTRACAPSULAR CATARACT EXTRATION WITH INTRAOCULAR LENS IMPLANT  2010     HERNIA REPAIR       MASTOID SURGERY  1939     MOHS MICROGRAPHIC PROCEDURE       OPTICAL TRACKING SYSTEM FUSION SPINE POSTERIOR LUMBAR TWO LEVELS  7/11/2013    Procedure: OPTICAL TRACKING SYSTEM FUSION SPINE POSTERIOR LUMBAR TWO LEVELS;  Stealth Assisted Lumbar 3-4 Transforaminal Lumbar Interbody Fusion, Lumbar 2-3 Lumbar 3-4 Laminectomy Decompression;  Surgeon: Edgar Lew MD;  Location: [facility]       ALLERGIES:  Allergies   Allergen Reactions     Nka [No Known Allergies]        FAMILY HISTORY:  Family History   Problem Relation Age of Onset     DIABETES Maternal Grandfather      Alcohol/Drug Maternal Grandfather      Arthritis Maternal Grandfather      Obesity Maternal Grandfather      CEREBROVASCULAR DISEASE Maternal Grandmother      Hypertension Mother      Glaucoma No family hx of      Macular Degeneration No family hx of      CANCER No family hx of      No known family hx of skin cancer     Skin Cancer No family hx of      SOCIAL HISTORY:  Social History   Substance Use Topics     Smoking status: Never Smoker     Smokeless tobacco: Never Used     Alcohol use 1.0 oz/week     2 Standard drinks or equivalent per week      Comment: occ       ROS:   A comprehensive 14 point review of systems is negative other than as mentioned in HPI.    Exam:  /49 (BP Location: Left arm, Patient Position: Chair, Cuff Size: Adult Regular)  Pulse 57  Ht 1.778 m (5' 10\")  Wt 87.9 kg (193 lb 11.2 oz)  SpO2 97%  BMI 27.79 kg/m2  GENERAL APPEARANCE: healthy, alert and no distress  EYES: no icterus, no xanthelasmas  ENT: normal " palate, mucosa moist, no central cyanosis  NECK: no adenopathy, no asymmetry, masses, or scars, thyroid normal to palpation and no bruits, JVP~7 cm H2O  RESPIRATORY: lungs clear to auscultation - no rales, rhonchi or wheezes, no use of accessory muscles, no retractions, respirations are unlabored, normal respiratory rate  CARDIOVASCULAR: regular bradycardic rhythm, normal S1 with physiologic split S2, no S3 or S4 and no murmur, click or rub, precordium quiet with normal PMI.  GI: soft, non tender, without hepatosplenomegaly, no masses palpable, bowel sounds normal, aorta not enlarged by palpation, no abdominal bruits  EXTREMITIES: 1+ pitting pretibial edema BLE. no bruits  NEURO: alert and oriented to person/place/time, normal speech, gait and affect  VASC: PTs 1+ bilaterally  SKIN: no ecchymoses, no rashes.  PSYCH: cooperative, affect appropriate.     DATA:  Labs reviewed.    Reviewed:  48h Holter (10/3/17-10/5/17): Sinus with occasional (2%) PVCs, average VR 57 (rage  bpm.) Rare non-sustained SVT (longest 5 beats), no sustained or non-sustained VT. Appropriate heart rate variability with activity. Rare reports of symptoms ( stress,   tired,   shaky ) did not correspond to arrhythmias.     ABIs 11/16/17: Normal, no evidence of PAD.    Assessment and Plan:   1. Sinus bradycardia, stable: With Holter monitor showing clear evidence of chronotropic competence.   -continue beta blocker at present dose   -in the absence of symptomatic bradycardia (presyncope/syncope) or ECG evidence of advanced SA or AV jesse disease (prolonged pauses, Mobitz II or 3rd degree AV block), no further workup is indicated at this time    2. Bilateral leg pain, stable:  ABIs without evidence of PAD, discussed with patient   -evaluate for nonvascular etiologies (likely his longstanding spinal stenosis status post multiple surgeries)    3. CAD s/p PCI to LAD/D2 6178-5657 w/o angina: Stable and free of anginal symptoms. Continue ASA,  atorvastatin, beta blocker.    4. HTN: well-controlled on ACEI/amlodipine/beta blocker, continue at present doses.    5. Hyperlipidemia: stable and well-controlled, continue atorvastatin.     6. Bilateral leg edema: exam and history strongly suggest venous insufficiency. Continue use of compression stockings, counseled regarding elevation of legs when sitting.    Follow up in 1 year.    The patient states understanding and is agreeable with plan.     Santana Martinez MD  Cardiology

## 2017-12-05 NOTE — NURSING NOTE
Chief Complaint   Patient presents with     Follow Up For     f/u per primary for fatigue/bradycardia     Vitals were taken and medications were reconciled.  GERARDO Flores  12:07 PM

## 2017-12-05 NOTE — MR AVS SNAPSHOT
After Visit Summary   12/5/2017    Daniel Mcnair    MRN: [id number]           Patient Information     Date Of Birth          10/17/1929        Visit Information        Provider Department      12/5/2017 12:00 PM Santana Martinez MD [facility]        Today's Diagnoses     Bradycardia    -  1    Ventricular ectopy        Other fatigue        Leg swelling        Coronary artery disease involving native coronary artery of native heart without angina pectoris          Care Instructions    You were seen at the [facility] today.  You saw Dr. Santana Martinez  Here are your Instructions:    1. Follow up in 1 year      Deedee Henderson RN  Nurse Care Coordinator  Office:  [phone] option #1, then #3 & ask for Deedee (nurse line)  Fax:  [phone]  After Hours:  [phone]  Appointments:  [phone] option #1, then option #1                        Follow-ups after your visit        Follow-up notes from your care team     Return in about 1 year (around 12/5/2018) for Dr. Martinez.      Your next 10 appointments already scheduled     Basil 15, 2018  9:45 AM CST   RETURN RETINA with Luna Parker MD   Eye Clinic ([facility])    [address]  [address]  9th [address] Clin 9a  [facility] [address] [phone]            Mar 08, 2018 10:20 AM CST   (Arrive by 10:05 AM)   Return Visit with Lia Dumont MD   [facility] ([facility] [place])    [address]  4th Floor  [facility] [address] [phone]            Nov 15, 2018  8:30 AM CST   Masonic Lab Draw with  MASONIC LAB DRAW   [facility] Masonic Lab Draw ([facility])    [address]  2nd Floor  [facility] [address] [phone]            Nov 15, 2018  9:00 AM CST   (Arrive by 8:45 AM)   CT ABDOMEN PELVIS W CONTRAST with UCCT1   [facility] ([facility]  [facility])    [address] [place] [address]  1st [facility] [address] [phone]           Please bring any scans or X-rays taken at other hospitals, if similar tests were done. Also bring a list of your medicines, including vitamins, minerals and over-the-counter drugs. It is safest to leave personal items at home.  Be sure to tell your doctor:   If you have any allergies.   If there s any chance you are pregnant.   If you are breastfeeding.   If you have any special needs.  You may have contrast for this exam. To prepare:   Do not eat or drink for 2 hours before your exam. If you need to take medicine, you may take it with small sips of water. (We may ask you to take liquid medicine as well.)   The day before your exam, drink extra fluids at least six 8-ounce glasses (unless your doctor tells you to restrict your fluids).  Patients over 70 or patients with diabetes or kidney problems:   If you haven t had a blood test (creatinine test) within the last 30 days, go to your clinic or Diagnostic Imaging Department for this test.  If you have diabetes:   If your kidney function is normal, continue taking your metformin (Avandamet, Glucophage, Glucovance, Metaglip) on the day of your exam.   If your kidney function is abnormal, wait 48 hours before restarting this medicine.  You will have oral contrast for this exam:   You will drink the contrast at home. Get this from your clinic or Diagnostic Imaging Department. Please follow the directions given.  Please wear loose clothing, such as a sweat suit or jogging clothes. Avoid snaps, zippers and other metal. We may ask you to undress and put on a hospital gown.  If you have any questions, please call the Imaging Department where you will have your exam.            Nov 15, 2018 12:00 PM CST   (Arrive by 11:45 AM)   Return Visit with Tanner Rojas MD   [facility] ([facility] [place])    [address] [place] [address]  "Se  2nd Floor  [facility] [address] [phone]              Who to contact     If you have questions or need follow up information about today's clinic visit or your schedule please contact [facility] directly at [phone].  Normal or non-critical lab and imaging results will be communicated to you by MyChart, letter or phone within 4 business days after the clinic has received the results. If you do not hear from us within 7 days, please contact the clinic through [employer] or phone. If you have a critical or abnormal lab result, we will notify you by phone as soon as possible.  Submit refill requests through [employer] or call your pharmacy and they will forward the refill request to us. Please allow 3 business days for your refill to be completed.          Additional Information About Your Visit        [employer] Information     [employer] gives you secure access to your electronic health record. If you see a primary care provider, you can also send messages to your care team and make appointments. If you have questions, please call your primary care clinic.  If you do not have a primary care provider, please call [phone] and they will assist you.        Care EveryWhere ID     This is your Care EveryWhere ID. This could be used by other organizations to access your [place] medical records  [id number]        Your Vitals Were     Pulse Height Pulse Oximetry BMI (Body Mass Index)          57 1.778 m (5' 10\") 97% 27.79 kg/m2         Blood Pressure from Last 3 Encounters:   12/05/17 131/49   11/18/17 124/65   11/16/17 154/65    Weight from Last 3 Encounters:   12/05/17 87.9 kg (193 lb 11.2 oz)   11/18/17 89.6 kg (197 lb 9.6 oz)   11/16/17 89.6 kg (197 lb 8.5 oz)              We Performed the Following     CARDIOLOGY EVAL ADULT REFERRAL        Primary Care Provider    Albertina Gross MD       No address on file        Equal Access to Services     PARRISH LOEPZ AH: Hadii ad Vallejo " bandar santymoniquejohanne brennenjose miguel can. So [facility] [phone].    ATENCIÓN: Si drew culver, tiene a vaughan disposición servicios gratuitos de asistencia lingüística. Glenna al [phone].    We comply with applicable federal civil rights laws and [place] laws. We do not discriminate on the basis of race, color, national origin, age, disability, sex, sexual orientation, or gender identity.            Thank you!     Thank you for choosing [facility]  for your care. Our goal is always to provide you with excellent care. Hearing back from our patients is one way we can continue to improve our services. Please take a few minutes to complete the written survey that you may receive in the mail after your visit with us. Thank you!             Your Updated Medication List - Protect others around you: Learn how to safely use, store and throw away your medicines at www.disposemymeds.org.          This list is accurate as of: 12/5/17 12:35 PM.  Always use your most recent med list.                   Brand Name Dispense Instructions for use Diagnosis    acetaminophen 325 MG tablet    TYLENOL    100 tablet    Take 2 tablets (650 mg) by mouth every 6 hours as needed for pain    S/P revision of total hip       acetaminophen-codeine 300-30 MG per tablet    TYLENOL w/CODEINE No. 3    30 tablet    Take 1-2 tablets by mouth every 4 hours as needed for mild pain    Pain in joint, pelvic region and thigh, unspecified laterality       amLODIPine 10 MG tablet    NORVASC    90 tablet    TAKE 1 TABLET DAILY    Benign essential hypertension       aspirin 81 MG EC tablet     90 tablet    Take 1 tablet (81 mg) by mouth daily    Hyperlipidemia LDL goal <70       atorvastatin 20 MG tablet    LIPITOR    90 tablet    Take 1 tablet (20 mg) by mouth daily    Hyperlipidemia LDL goal <70       benazepril 40 MG tablet    LOTENSIN    90 tablet    Take 1 tablet (40 mg) by mouth daily    Essential  hypertension, benign       bisacodyl 10 MG Suppository    DULCOLAX    3 suppository    Place 1 suppository rectally daily as needed.    Chronic constipation       desonide 0.05 % ointment    DESOWEN    60 g    Apply topically 2 times daily To affected areas of scale and redness on the face and ears as needed until clear.    Dermatitis, seborrheic       docusate sodium 100 MG tablet    COLACE    60 tablet    Take 100 mg by mouth 2 times daily    BPH (benign prostatic hyperplasia)       gentamicin 0.1 % ointment    GARAMYCIN     Apply topically 4 times daily    B-cell lymphoma of intra-abdominal lymph nodes, unspecified B-cell lymphoma type (H)       melatonin 3 MG tablet      Take 5 mg by mouth At Bedtime        metoprolol 25 MG 24 hr tablet    TOPROL-XL    45 tablet    Take 0.5 tablets (12.5 mg) by mouth daily    Essential hypertension, benign       MILK OF MAGNESIA PO      Take by mouth At Bedtime        nitroGLYcerin 0.4 MG sublingual tablet    NITROSTAT    30 tablet    Place 1 tablet (0.4 mg) under the tongue every 5 minutes as needed    Coronary artery disease involving native heart without angina pectoris, unspecified vessel or lesion type       NutriSource Fiber packet      Take 1 packet by mouth every morning        psyllium 0.52 G capsule      Take by mouth daily Powder        ranitidine 150 MG tablet    ZANTAC    180 tablet    Take 1 tablet (150 mg) by mouth 2 times daily    Gastroesophageal reflux disease without esophagitis       sildenafil 100 MG tablet    VIAGRA    10 tablet    Take 1 tablet (100 mg) by mouth daily as needed    Erectile dysfunction, unspecified erectile dysfunction type       tamsulosin 0.4 MG capsule    FLOMAX    90 capsule    Take 1 capsule (0.4 mg) by mouth daily    Hypertrophy of prostate without urinary obstruction       tolterodine 2 MG tablet    DETROL    180 tablet    Take 1 tablet (2 mg )twice a day    Hypertonicity of bladder       * triamcinolone 0.025 % cream    KENALOG     45 g    Apply topically 2 times daily    Dermatitis       * triamcinolone 0.1 % ointment    KENALOG    80 g    Apply topically 2 times daily To itchy rashes    Intrinsic atopic dermatitis       VITAMIN B 12 PO      Take by mouth daily        VITEYES AREDS FORMULA/LUTEIN Caps      Take by mouth daily        * Notice:  This list has 2 medication(s) that are the same as other medications prescribed for you. Read the directions carefully, and ask your doctor or other care provider to review them with you.

## 2017-12-05 NOTE — PATIENT INSTRUCTIONS
You were seen at the [facility] clinic today.  You saw Dr. Santana Martinez  Here are your Instructions:    1. Follow up in 1 year      Deedee Henderson RN  Nurse Care Coordinator  Office:  [phone] option #1, then #3 & ask for Deedee (nurse line)  Fax:  [phone]  After Hours:  [phone]  Appointments:  [phone] option #1, then option #1

## 2017-12-22 ENCOUNTER — OFFICE VISIT (OUTPATIENT)
Dept: OPHTHALMOLOGY | Facility: CLINIC | Age: 82
End: 2017-12-22
Attending: OPHTHALMOLOGY
Payer: MEDICARE

## 2017-12-22 DIAGNOSIS — H35.3122 NONEXUDATIVE AGE-RELATED MACULAR DEGENERATION, LEFT EYE, INTERMEDIATE DRY STAGE: ICD-10-CM

## 2017-12-22 DIAGNOSIS — H43.813 POSTERIOR VITREOUS DETACHMENT, BILATERAL: ICD-10-CM

## 2017-12-22 DIAGNOSIS — H53.2 MONOCULAR DIPLOPIA, RIGHT EYE: ICD-10-CM

## 2017-12-22 DIAGNOSIS — H35.3190 NONEXUDATIVE SENILE MACULAR DEGENERATION OF RETINA: ICD-10-CM

## 2017-12-22 DIAGNOSIS — H35.3112 NONEXUDATIVE AGE-RELATED MACULAR DEGENERATION, RIGHT EYE, INTERMEDIATE DRY STAGE: ICD-10-CM

## 2017-12-22 DIAGNOSIS — H53.2 DIPLOPIA: ICD-10-CM

## 2017-12-22 PROCEDURE — 99213 OFFICE O/P EST LOW 20 MIN: CPT | Mod: ZF

## 2017-12-22 PROCEDURE — 92134 CPTRZ OPH DX IMG PST SGM RTA: CPT | Mod: ZF | Performed by: OPHTHALMOLOGY

## 2017-12-22 ASSESSMENT — CONF VISUAL FIELD
OD_NORMAL: 1
METHOD: COUNTING FINGERS
OS_INFERIOR_TEMPORAL_RESTRICTION: 3

## 2017-12-22 ASSESSMENT — REFRACTION_MANIFEST
OS_SPHERE: -1.50
OS_CYLINDER: +1.50
OS_AXIS: 165
OD_SPHERE: -1.75
OD_CYLINDER: SPEHRE

## 2017-12-22 ASSESSMENT — REFRACTION_WEARINGRX
OS_AXIS: 165
OS_ADD: +2.75
OS_CYLINDER: +1.50
OS_SPHERE: -1.50
OD_ADD: +2.75
OD_CYLINDER: SPHERE
OD_SPHERE: -1.25

## 2017-12-22 ASSESSMENT — VISUAL ACUITY
OD_CC: 20/40
METHOD: SNELLEN - LINEAR
OD_CC+: -2
OS_CC: 20/40
OD_PH_CC: 20/25-2
OS_CC+: -2
OS_PH_CC: 20/20-3

## 2017-12-22 ASSESSMENT — TONOMETRY
IOP_METHOD: TONOPEN
OS_IOP_MMHG: 15
OD_IOP_MMHG: 15

## 2017-12-22 ASSESSMENT — CUP TO DISC RATIO
OS_RATIO: 0.3
OD_RATIO: 0.3

## 2017-12-22 ASSESSMENT — EXTERNAL EXAM - RIGHT EYE: OD_EXAM: NORMAL

## 2017-12-22 ASSESSMENT — EXTERNAL EXAM - LEFT EYE: OS_EXAM: NORMAL

## 2017-12-22 ASSESSMENT — SLIT LAMP EXAM - LIDS
COMMENTS: UPPER LID DERMATOCHALASIS
COMMENTS: UPPER LID DERMATOCHALASIS

## 2017-12-22 NOTE — NURSING NOTE
Chief Complaints and History of Present Illnesses   Patient presents with     Follow Up For     Nonexudative senile macular degeneration of retina     HPI    Last Eye Exam:  7/11/17   Affected eye(s):  Both   Symptoms:        Unknown duration    Frequency:  Constant       Do you have eye pain now?:  No      Comments:  Daniel is here today for a follow up of Nonexudative senile macular degeneration of retina  He originally had an appointment scheduled for January of next year, but has noticed his vision has diminished some.   He states his eyes are often itchy and has been off and on for the past year.   He denies flashes or floaters.     Christo Liriano COT 9:40 AM December 22, 2017

## 2017-12-22 NOTE — MR AVS SNAPSHOT
After Visit Summary   12/22/2017    Daniel Mcnair    MRN: [id number]           Patient Information     Date Of Birth          10/17/1929        Visit Information        Provider Department      12/22/2017 9:45 AM Luna Parker MD Eye Clinic        Today's Diagnoses     Monocular diplopia, right eye        Nonexudative senile macular degeneration of retina        Posterior vitreous detachment, bilateral        Diplopia        Nonexudative age-related macular degeneration, left eye, intermediate dry stage        Nonexudative age-related macular degeneration, right eye, intermediate dry stage           Follow-ups after your visit        Follow-up notes from your care team     Return in about 6 months (around 6/22/2018) for Mrx, ptosis fields, no dilation.      Your next 10 appointments already scheduled     Jan 03, 2018 10:00 AM CST   TECH with [facility] EYE TECH   Eye Clinic ([facility])    [address]  [address] [place] [address] Clin [address] [place] [address]   [phone]            Mar 08, 2018 10:20 AM CST   (Arrive by 10:05 AM)   Return Visit with Lia Dumont MD   [facility] ([facility] [place])    [address] [place] [address]  4th [facility] [place] [facility] [address]   [phone]            Jun 25, 2018  9:45 AM CDT   RETURN RETINA with Luna Parker MD   Eye Clinic ([facility])    [address]  [address] [place] [address] Clin [address] [place] [address]   [phone]            Nov 15, 2018  8:30 AM CST   Masonic Lab Draw with  MASONIC LAB DRAW   [facility] Masonic Lab Draw ([facility])    [address] [place] [address]   [phone]            Nov 15, 2018  9:00 AM CST   (Arrive by 8:45 AM)   CT ABDOMEN PELVIS W CONTRAST with UCCT1   [facility] CT ([facility])    [address] [place] [address]  [address] [phone]           Please bring any scans or X-rays taken at other hospitals, if similar tests were done. Also bring a list of your medicines, including vitamins, minerals and over-the-counter drugs. It is safest to leave personal items at home.  Be sure to tell your doctor:   If you have any allergies.   If there s any chance you are pregnant.   If you are breastfeeding.   If you have any special needs.  You may have contrast for this exam. To prepare:   Do not eat or drink for 2 hours before your exam. If you need to take medicine, you may take it with small sips of water. (We may ask you to take liquid medicine as well.)   The day before your exam, drink extra fluids at least six 8-ounce glasses (unless your doctor tells you to restrict your fluids).  Patients over 70 or patients with diabetes or kidney problems:   If you haven t had a blood test (creatinine test) within the last 30 days, go to your clinic or Diagnostic Imaging Department for this test.  If you have diabetes:   If your kidney function is normal, continue taking your metformin (Avandamet, Glucophage, Glucovance, Metaglip) on the day of your exam.   If your kidney function is abnormal, wait 48 hours before restarting this medicine.  You will have oral contrast for this exam:   You will drink the contrast at home. Get this from your clinic or Diagnostic Imaging Department. Please follow the directions given.  Please wear loose clothing, such as a sweat suit or jogging clothes. Avoid snaps, zippers and other metal. We may ask you to undress and put on a hospital gown.  If you have any questions, please call the Imaging Department where you will have your exam.            Nov 15, 2018 12:00 PM CST   (Arrive by 11:45 AM)   Return Visit with Tanner Rojas MD   [facility] ([facility] [place])    [address]  [address]nd [facility] [address] [phone]              Future tests  that were ordered for you today     Open Future Orders        Priority Expected Expires Ordered    OCT Retina Spectralis OU (both eyes) Routine  6/25/2019 12/22/2017            Who to contact     Please call your clinic at [phone] to:    Ask questions about your health    Make or cancel appointments    Discuss your medicines    Learn about your test results    Speak to your doctor   If you have compliments or concerns about an experience at your clinic, or if you wish to file a complaint, please contact [facility] Patient Relations at [phone] or email us at [email or web address]         Additional Information About Your Visit        [employer] Information     [employer] gives you secure access to your electronic health record. If you see a primary care provider, you can also send messages to your care team and make appointments. If you have questions, please call your primary care clinic.  If you do not have a primary care provider, please call [phone] and they will assist you.      [employer] is an electronic gateway that provides easy, online access to your medical records. With [employer], you can request a clinic appointment, read your test results, renew a prescription or communicate with your care team.     To access your existing account, please contact your [facility] or call [phone] for assistance.        Care EveryWhere ID     This is your Care EveryWhere ID. This could be used by other organizations to access your [place] medical records  [id number]         Blood Pressure from Last 3 Encounters:   12/05/17 131/49   11/18/17 124/65   11/16/17 154/65    Weight from Last 3 Encounters:   12/05/17 87.9 kg (193 lb 11.2 oz)   11/18/17 89.6 kg (197 lb 9.6 oz)   11/16/17 89.6 kg (197 lb 8.5 oz)              We Performed the Following     OCT Retina Spectralis OU (both eyes)          Today's Medication Changes          These changes are  accurate as of: 12/22/17 11:39 AM.  If you have any questions, ask your nurse or doctor.               Stop taking these medicines if you haven't already. Please contact your care team if you have questions.     VITAMIN B 12 PO                    Primary Care Provider    Albertina Gross MD       No address on file        Equal Access to Services     PARRISH LOPEZ : Hadii aad ku hadasho Soomaali, waaxda luqadaha, qaybta kaalmada adeegyada, waxay lancein haylioneln adeboaz maryanjose francisco lawrence . So [facility] [phone].    ATENCIÓN: Si habla español, tiene a vaughan disposición servicios gratuitos de asistencia lingüística. [facility] [phone].    We comply with applicable federal civil rights laws and [place] laws. We do not discriminate on the basis of race, color, national origin, age, disability, sex, sexual orientation, or gender identity.            Thank you!     Thank you for choosing EYE CLINIC  for your care. Our goal is always to provide you with excellent care. Hearing back from our patients is one way we can continue to improve our services. Please take a few minutes to complete the written survey that you may receive in the mail after your visit with us. Thank you!             Your Updated Medication List - Protect others around you: Learn how to safely use, store and throw away your medicines at www.disposemymeds.org.          This list is accurate as of: 12/22/17 11:39 AM.  Always use your most recent med list.                   Brand Name Dispense Instructions for use Diagnosis    acetaminophen 325 MG tablet    TYLENOL    100 tablet    Take 2 tablets (650 mg) by mouth every 6 hours as needed for pain    S/P revision of total hip       acetaminophen-codeine 300-30 MG per tablet    TYLENOL w/CODEINE No. 3    30 tablet    Take 1-2 tablets by mouth every 4 hours as needed for mild pain    Pain in joint, pelvic region and thigh, unspecified laterality       amLODIPine 10 MG tablet    NORVASC    90 tablet    TAKE 1 TABLET  DAILY    Benign essential hypertension       aspirin 81 MG EC tablet     90 tablet    Take 1 tablet (81 mg) by mouth daily    Hyperlipidemia LDL goal <70       atorvastatin 20 MG tablet    LIPITOR    90 tablet    Take 1 tablet (20 mg) by mouth daily    Hyperlipidemia LDL goal <70       benazepril 40 MG tablet    LOTENSIN    90 tablet    Take 1 tablet (40 mg) by mouth daily    Essential hypertension, benign       bisacodyl 10 MG Suppository    DULCOLAX    3 suppository    Place 1 suppository rectally daily as needed.    Chronic constipation       desonide 0.05 % ointment    DESOWEN    60 g    Apply topically 2 times daily To affected areas of scale and redness on the face and ears as needed until clear.    Dermatitis, seborrheic       docusate sodium 100 MG tablet    COLACE    60 tablet    Take 100 mg by mouth 2 times daily    BPH (benign prostatic hyperplasia)       gentamicin 0.1 % ointment    GARAMYCIN     Apply topically 4 times daily    B-cell lymphoma of intra-abdominal lymph nodes, unspecified B-cell lymphoma type (H)       melatonin 3 MG tablet      Take 5 mg by mouth At Bedtime        metoprolol 25 MG 24 hr tablet    TOPROL-XL    45 tablet    Take 0.5 tablets (12.5 mg) by mouth daily    Essential hypertension, benign       MILK OF MAGNESIA PO      Take by mouth At Bedtime        nitroGLYcerin 0.4 MG sublingual tablet    NITROSTAT    30 tablet    Place 1 tablet (0.4 mg) under the tongue every 5 minutes as needed    Coronary artery disease involving native heart without angina pectoris, unspecified vessel or lesion type       N[employer]riSource Fiber packet      Take 1 packet by mouth every morning        psyllium 0.52 G capsule      Take by mouth daily Powder        ranitidine 150 MG tablet    ZANTAC    180 tablet    Take 1 tablet (150 mg) by mouth 2 times daily    Gastroesophageal reflux disease without esophagitis       sildenafil 100 MG tablet    VIAGRA    10 tablet    Take 1 tablet (100 mg) by mouth daily as  needed    Erectile dysfunction, unspecified erectile dysfunction type       tamsulosin 0.4 MG capsule    FLOMAX    90 capsule    Take 1 capsule (0.4 mg) by mouth daily    Hypertrophy of prostate without urinary obstruction       tolterodine 2 MG tablet    DETROL    180 tablet    Take 1 tablet (2 mg )twice a day    Hypertonicity of bladder       * triamcinolone 0.025 % cream    KENALOG    45 g    Apply topically 2 times daily    Dermatitis       * triamcinolone 0.1 % ointment    KENALOG    80 g    Apply topically 2 times daily To itchy rashes    Intrinsic atopic dermatitis       VITEYES AREDS FORMULA/LUTEIN Caps      Take by mouth daily        * Notice:  This list has 2 medication(s) that are the same as other medications prescribed for you. Read the directions carefully, and ask your doctor or other care provider to review them with you.

## 2017-12-22 NOTE — PROGRESS NOTES
CC -   Dry AMD    INTERVAL HISTORY -    continues to have monocular diplopia, mostly while reading the paper in the morning. He removes his glasses while reading because of neck strain while reading. Feels vision is decreased today  Using AREDS, occasional Amsler    HPI - 89yo male with hx of dry ARMD, dbh right eye noted in 3/2015, CE/IOL OU.   Now taking AREDS II, never smoked in the past and using Amsler not noticing diplopia      PAST OCULAR SURGERY  CE/IOL both eyes ~ 2005    RETINAL IMAGING  OCT  12-22-17  right eye- 2+ drusen , drusenoid pigment epithelial detachments, no fluid  left eye - 2+ drusen, drusenoid pigment epithelial detachments, no fluid      ASSESSMENT & PLAN  1.  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  Diplopia   - monocular on testing in past, although reports binocular symptoms since visit 1 year ago   - ortho at near and distance on strabismus exam in past      3. Posterior vitreous detachment (PVD) both eyes    - advised S/Sx RD    4.  H/O dot blot hemorrhage right eye   - seen previously 3/2015 visit, resolved today, no new heme    - ?etiolgy   - h/o HTN, doubt Choroidal neovascular membrane, but in far temporal macula   - continue observation    5.  Pseudophakia both eyes   - Observe      6. Dry Eyes, bilateral   -increase At to 4-6x daily, currently using twice a day    -start warm compress    7. Dermatochalasis    return to clinic:6 monhts, OCT OU, also RTC for MRx tech only visit    Kael Candelaria MD  PGY3, Dept of Ophthalmology  Pager [phone]      ATTESTATION     Attending Physician Attestation:      Complete documentation of historical and exam elements from today's encounter can be found in the full encounter summary report (not reduplicated in this progress note).  I personally obtained the chief complaint(s) and history of  present illness.  I confirmed and edited as necessary the review of systems, past medical/surgical history, family history, social history, and examination findings as documented by others; and I examined the patient myself.  I personally reviewed the relevant tests, images, and reports as documented above.  I personally reviewed the ophthalmic test(s) associated with this encounter, agree with the interpretation(s) as documented by the resident/fellow, and have edited the corresponding report(s) as necessary.   I formulated and edited as necessary the assessment and plan and discussed the findings and management plan with the patient and family    Luna Parker MD, PhD  , Vitreoretinal Surgery  Department of Ophthalmology  [facility]

## 2018-01-03 ENCOUNTER — ALLIED HEALTH/NURSE VISIT (OUTPATIENT)
Dept: OPHTHALMOLOGY | Facility: CLINIC | Age: 83
End: 2018-01-03
Attending: OPHTHALMOLOGY
Payer: MEDICARE

## 2018-01-03 DIAGNOSIS — H35.3190 NONEXUDATIVE SENILE MACULAR DEGENERATION OF RETINA: Primary | ICD-10-CM

## 2018-01-03 ASSESSMENT — REFRACTION_MANIFEST
OD_AXIS: 040
OS_CYLINDER: +1.75
OD_CYLINDER: +0.50
OS_ADD: +2.75
OS_SPHERE: -2.25
OD_ADD: +2.75
OD_SPHERE: -0.75
OS_AXIS: 175

## 2018-01-03 ASSESSMENT — VISUAL ACUITY
OS_CC: 20/50
OS_PH_CC: 20/25
OS_CC+: -2
CORRECTION_TYPE: GLASSES
METHOD: SNELLEN - LINEAR
OD_CC+: -2
OD_CC: 20/40

## 2018-01-03 ASSESSMENT — REFRACTION_WEARINGRX
OS_SPHERE: -1.50
OD_CYLINDER: SPHERE
OS_CYLINDER: +1.50
OD_ADD: +2.75
OD_SPHERE: -1.25
OS_AXIS: 165
OS_ADD: +2.75

## 2018-01-03 NOTE — NURSING NOTE
Chief Complaints and History of Present Illnesses   Patient presents with     Follow Up For     MRx check     HPI    Affected eye(s):  Both   Symptoms:        Frequency:  Constant       Do you have eye pain now?:  No      Comments:  New MRx.  When he was here last he was dil and is unsure if that MR is correct  Tayler BARRETT 10:53 AM January 3, 2018

## 2018-01-03 NOTE — MR AVS SNAPSHOT
After Visit Summary   1/3/2018    Daniel Mcnair    MRN: [id number]           Patient Information     Date Of Birth          10/17/1929        Visit Information        Provider Department      1/3/2018 10:00 AM [facility]        Today's Diagnoses     Nonexudative senile macular degeneration of retina    -  1       Follow-ups after your visit        Your next 10 appointments already scheduled     Feb 02, 2018  2:30 PM CST   Walk In From ENT with Stephie Bell   [facility] ([facility])    [address] [place] [address]  [address] [facility] [address]   [phone]            Feb 02, 2018  4:00 PM CST   (Arrive by 3:45 PM)   New Patient Visit with Tony Kahn MD   [facility] Nose [facility] Throat ([facility])    [address] [place] [address]   [phone]            Mar 08, 2018 10:20 AM CST   (Arrive by 10:05 AM)   Return Visit with Lia Dumont MD   [facility] ([facility])    [address] [place] [address]  [address] [facility] [address]   [phone]            Jun 25, 2018  9:45 AM CDT   RETURN RETINA with Luna Parker MD   Eye Clinic ([facility])    [address]  [address] [facility]  [address] [address]  [facility] [address]   [phone]            Nov 15, 2018  8:30 AM CST   [facility] Draw with  [facility] DRAW   [facility] Draw ([facility])    [address] [place] [address]  [address]  [facility] [id number] [phone]            Nov 15, 2018  9:00 AM CST   (Arrive by 8:45 AM)   CT ABDOMEN PELVIS W CONTRAST with UCCT1   [facility] [place] [facility] ([facility])    [address] [place] [id number] [phone]           Please bring any scans or X-rays taken at other hospitals, if similar tests were done. Also bring a list of  your medicines, including vitamins, minerals and over-the-counter drugs. It is safest to leave personal items at home.  Be sure to tell your doctor:   If you have any allergies.   If there s any chance you are pregnant.   If you are breastfeeding.   If you have any special needs.  You may have contrast for this exam. To prepare:   Do not eat or drink for 2 hours before your exam. If you need to take medicine, you may take it with small sips of water. (We may ask you to take liquid medicine as well.)   The day before your exam, drink extra fluids at least six 8-ounce glasses (unless your doctor tells you to restrict your fluids).  Patients over 70 or patients with diabetes or kidney problems:   If you haven t had a blood test (creatinine test) within the last 30 days, go to your clinic or Diagnostic Imaging Department for this test.  If you have diabetes:   If your kidney function is normal, continue taking your metformin (Avandamet, Glucophage, Glucovance, Metaglip) on the day of your exam.   If your kidney function is abnormal, wait 48 hours before restarting this medicine.  You will have oral contrast for this exam:   You will drink the contrast at home. Get this from your clinic or Diagnostic Imaging Department. Please follow the directions given.  Please wear loose clothing, such as a sweat suit or jogging clothes. Avoid snaps, zippers and other metal. We may ask you to undress and put on a hospital gown.  If you have any questions, please call the Imaging Department where you will have your exam.            Nov 15, 2018 12:00 PM CST   (Arrive by 11:45 AM)   Return Visit with Tanner Rojas MD   [facility] ([facility])    [address] [facility]  [address]  [facility] [address] [phone]              Who to contact     Please call your clinic at [phone] to:    Ask questions about your health    Make or cancel appointments    Discuss your  medicines    Learn about your test results    Speak to your doctor   If you have compliments or concerns about an experience at your clinic, or if you wish to file a complaint, please contact [facility] Patient Relations at [phone] or email us at [email or web address]         Additional Information About Your Visit        [employer] Information     [employer] gives you secure access to your electronic health record. If you see a primary care provider, you can also send messages to your care team and make appointments. If you have questions, please call your primary care clinic.  If you do not have a primary care provider, please call [phone] and they will assist you.      [employer] is an electronic gateway that provides easy, online access to your medical records. With [employer], you can request a clinic appointment, read your test results, renew a prescription or communicate with your care team.     To access your existing account, please contact your [facility] Clinic or call [phone] for assistance.        Care EveryWhere ID     This is your Care EveryWhere ID. This could be used by other organizations to access your [place] medical records  [id number]         Blood Pressure from Last 3 Encounters:   12/05/17 131/49   11/18/17 124/65   11/16/17 154/65    Weight from Last 3 Encounters:   12/05/17 87.9 kg (193 lb 11.2 oz)   11/18/17 89.6 kg (197 lb 9.6 oz)   11/16/17 89.6 kg (197 lb 8.5 oz)              Today, you had the following     No orders found for display       Primary Care Provider    Albertina Gross MD       No address on file        Equal Access to Services     [facility]: Hadii aad lida hadasho Sotremayne, waaxda luqadaha, qaybta kaalmada jose miguel bingham . So [facility] [phone].    ATENCIÓN: Si habla español, tiene a vaughan disposición servicios gratuitos de asistencia lingüística. Llame al  [phone].    We comply with applicable federal civil rights laws and [place] laws. We do not discriminate on the basis of race, color, national origin, age, disability, sex, sexual orientation, or gender identity.            Thank you!     Thank you for choosing EYE CLINIC  for your care. Our goal is always to provide you with excellent care. Hearing back from our patients is one way we can continue to improve our services. Please take a few minutes to complete the written survey that you may receive in the mail after your visit with us. Thank you!             Your Updated Medication List - Protect others around you: Learn how to safely use, store and throw away your medicines at www.disposemymeds.org.          This list is accurate as of: 1/3/18 11:59 PM.  Always use your most recent med list.                   Brand Name Dispense Instructions for use Diagnosis    acetaminophen 325 MG tablet    TYLENOL    100 tablet    Take 2 tablets (650 mg) by mouth every 6 hours as needed for pain    S/P revision of total hip       acetaminophen-codeine 300-30 MG per tablet    TYLENOL w/CODEINE No. 3    30 tablet    Take 1-2 tablets by mouth every 4 hours as needed for mild pain    Pain in joint, pelvic region and thigh, unspecified laterality       amLODIPine 10 MG tablet    NORVASC    90 tablet    TAKE 1 TABLET DAILY    Benign essential hypertension       aspirin 81 MG EC tablet     90 tablet    Take 1 tablet (81 mg) by mouth daily    Hyperlipidemia LDL goal <70       atorvastatin 20 MG tablet    LIPITOR    90 tablet    Take 1 tablet (20 mg) by mouth daily    Hyperlipidemia LDL goal <70       benazepril 40 MG tablet    LOTENSIN    90 tablet    Take 1 tablet (40 mg) by mouth daily    Essential hypertension, benign       bisacodyl 10 MG Suppository    DULCOLAX    3 suppository    Place 1 suppository rectally daily as needed.    Chronic constipation       desonide 0.05 % ointment    DESOWEN    60 g    Apply topically 2  times daily To affected areas of scale and redness on the face and ears as needed until clear.    Dermatitis, seborrheic       docusate sodium 100 MG tablet    COLACE    60 tablet    Take 100 mg by mouth 2 times daily    BPH (benign prostatic hyperplasia)       gentamicin 0.1 % ointment    GARAMYCIN     Apply topically 4 times daily    B-cell lymphoma of intra-abdominal lymph nodes, unspecified B-cell lymphoma type (H)       melatonin 3 MG tablet      Take 5 mg by mouth At Bedtime        metoprolol succinate 25 MG 24 hr tablet    TOPROL-XL    45 tablet    Take 0.5 tablets (12.5 mg) by mouth daily    Essential hypertension, benign       MILK OF MAGNESIA PO      Take by mouth At Bedtime        nitroGLYcerin 0.4 MG sublingual tablet    NITROSTAT    30 tablet    Place 1 tablet (0.4 mg) under the tongue every 5 minutes as needed    Coronary artery disease involving native heart without angina pectoris, unspecified vessel or lesion type       NutriSource Fiber packet      Take 1 packet by mouth every morning        psyllium 0.52 G capsule      Take by mouth daily Powder        ranitidine 150 MG tablet    ZANTAC    180 tablet    Take 1 tablet (150 mg) by mouth 2 times daily    Gastroesophageal reflux disease without esophagitis       sildenafil 100 MG tablet    VIAGRA    10 tablet    Take 1 tablet (100 mg) by mouth daily as needed    Erectile dysfunction, unspecified erectile dysfunction type       tamsulosin 0.4 MG capsule    FLOMAX    90 capsule    Take 1 capsule (0.4 mg) by mouth daily    Hypertrophy of prostate without urinary obstruction       tolterodine 2 MG tablet    DETROL    180 tablet    Take 1 tablet (2 mg )twice a day    Hypertonicity of bladder       * triamcinolone 0.025 % cream    KENALOG    45 g    Apply topically 2 times daily    Dermatitis       * triamcinolone 0.1 % ointment    KENALOG    80 g    Apply topically 2 times daily To itchy rashes    Intrinsic atopic dermatitis       VITEYES AREDS  FORMULA/LUTEIN Caps      Take by mouth daily        * Notice:  This list has 2 medication(s) that are the same as other medications prescribed for you. Read the directions carefully, and ask your doctor or other care provider to review them with you.

## 2018-01-25 NOTE — TELEPHONE ENCOUNTER
APPT INFO    Date /Time: 2/2/18   Reason for Appt: Diminished hearing   Ref Provider/Clinic: Self   Are there internal records? Yes/No?  IF YES, list clinic names: Yes;  [facility] ([id number])  [facility] (saw Dr. Cross in Sept 2014 and Oct 2015, per Dr. Cross notes, hc of mastoid surgery 70+ years ago)   Are there outside records? Yes/No? No   Patient Contact (Y/N) & Call Details: No   Action: Chart Reviewed

## 2018-02-02 ENCOUNTER — OFFICE VISIT (OUTPATIENT)
Dept: AUDIOLOGY | Facility: CLINIC | Age: 83
End: 2018-02-02
Payer: MEDICARE

## 2018-02-02 ENCOUNTER — PRE VISIT (OUTPATIENT)
Dept: OTOLARYNGOLOGY | Facility: CLINIC | Age: 83
End: 2018-02-02

## 2018-02-02 ENCOUNTER — OFFICE VISIT (OUTPATIENT)
Dept: OTOLARYNGOLOGY | Facility: CLINIC | Age: 83
End: 2018-02-02
Payer: MEDICARE

## 2018-02-02 VITALS — BODY MASS INDEX: 27.63 KG/M2 | WEIGHT: 193 LBS | HEIGHT: 70 IN

## 2018-02-02 DIAGNOSIS — H90.8 MIXED HEARING LOSS: Primary | ICD-10-CM

## 2018-02-02 DIAGNOSIS — H61.23 BILATERAL IMPACTED CERUMEN: ICD-10-CM

## 2018-02-02 DIAGNOSIS — H90.3 SENSORINEURAL HEARING LOSS, BILATERAL: Primary | ICD-10-CM

## 2018-02-02 DIAGNOSIS — H90.3 SENSORINEURAL HEARING LOSS (SNHL) OF BOTH EARS: Primary | ICD-10-CM

## 2018-02-02 ASSESSMENT — PAIN SCALES - GENERAL: PAINLEVEL: NO PAIN (0)

## 2018-02-02 NOTE — MR AVS SNAPSHOT
After Visit Summary   2/2/2018    Daniel Mcnair    MRN: [id number]           Patient Information     Date Of Birth          10/17/1929        Visit Information        Provider Department      2/2/2018 2:30 PM Tony Kahn MD [facility]        Today's Diagnoses     Sensorineural hearing loss (SNHL) of both ears    -  1    Bilateral impacted cerumen          Care Instructions    The patient presents with a history of hearing loss and cerumen impaction. His ears are cleaned of cerumen impaction. Based upon his Audiogram and Tympanogram, the patient will be referred for a hearing aid consultation. Due to his age, an MRI scan to evaluate his 16% difference in word recognition scores is not recommended.           Follow-ups after your visit        Your next 10 appointments already scheduled     Mar 08, 2018 10:20 AM CST   (Arrive by 10:05 AM)   Return Visit with Lia Dumont MD   [facility] ([facility])    [address] [place] [address]   [phone]            Mar 12, 2018 10:00 AM CDT   Hearing Aid Consult with Stephie Greer [facility] ([facility])    [address] [place] [address]   [phone]            Apr 02, 2018  1:00 PM CDT   Hearing Aid Fitting with Stephie Greer [facility] ([facility])    [address] [place] [address]   [phone]            Apr 23, 2018  2:00 PM CDT   (Arrive by 1:45 PM)   Initial Review Program with Stephie Greer [facility] ([facility])    [address] [place] [address]   [phone]            Jun 25, 2018  9:45 AM CDT   RETURN RETINA with Luna Parker MD   Eye Clinic ([facility])    Jamey Marks [address]  [address]  [address]  [facility] [address]   [phone]            Nov 15, 2018  8:30 AM CST   [facility] Draw with  [facility] DRAW   [facility] Draw ([facility])    [address]  [address]  [facility] [address]   [phone]            Nov 15, 2018  9:00 AM CST   (Arrive by 8:45 AM)   CT ABDOMEN PELVIS W CONTRAST with UCCT1   [facility] CT ([facility])    [address]  1st Floor  [facility] [address]   [phone]           Please bring any scans or X-rays taken at other hospitals, if similar tests were done. Also bring a list of your medicines, including vitamins, minerals and over-the-counter drugs. It is safest to leave personal items at home.  Be sure to tell your doctor:   If you have any allergies.   If there s any chance you are pregnant.   If you are breastfeeding.   If you have any special needs.  You may have contrast for this exam. To prepare:   Do not eat or drink for 2 hours before your exam. If you need to take medicine, you may take it with small sips of water. (We may ask you to take liquid medicine as well.)   The day before your exam, drink extra fluids at least six 8-ounce glasses (unless your doctor tells you to restrict your fluids).  Patients over 70 or patients with diabetes or kidney problems:   If you haven t had a blood test (creatinine test) within the last 30 days, go to your clinic or Diagnostic Imaging Department for this test.  If you have diabetes:   If your kidney function is normal, continue taking your metformin (Avandamet, Glucophage, Glucovance, Metaglip) on the day of your exam.   If your kidney function is abnormal, wait 48 hours before restarting this medicine.  You will have oral contrast for this exam:   You will drink the contrast at home. Get this from your clinic or Diagnostic Imaging Department. Please follow the directions given.  Please wear loose clothing, such as a  sweat suit or jogging clothes. Avoid snaps, zippers and other metal. We may ask you to undress and put on a hospital gown.  If you have any questions, please call the Imaging Department where you will have your exam.            Nov 15, 2018 12:00 PM CST   (Arrive by 11:45 AM)   Return Visit with Tanner Rojas MD   [facility] ([facility])    [address]  [address]  [facility] [address] [phone]              Who to contact     Please call your clinic at [phone] to:    Ask questions about your health    Make or cancel appointments    Discuss your medicines    Learn about your test results    Speak to your doctor   If you have compliments or concerns about an experience at your clinic, or if you wish to file a complaint, please contact [facility] Patient Relations at [phone] or email us at [email or web address]         Additional Information About Your Visit        [employer] Information     [employer] gives you secure access to your electronic health record. If you see a primary care provider, you can also send messages to your care team and make appointments. If you have questions, please call your primary care clinic.  If you do not have a primary care provider, please call [phone] and they will assist you.      [employer] is an electronic gateway that provides easy, online access to your medical records. With [employer], you can request a clinic appointment, read your test results, renew a prescription or communicate with your care team.     To access your existing account, please contact your [facility] or call [phone] for assistance.        Care EveryWhere ID     This is your Care EveryWhere ID. This could be used by other organizations to access your [facility] medical records  [id number]        Your Vitals Were     Height BMI (Body Mass Index)                1.778 m (5'  "10\") 27.69 kg/m2           Blood Pressure from Last 3 Encounters:   12/05/17 131/49   11/18/17 124/65   11/16/17 154/65    Weight from Last 3 Encounters:   02/02/18 87.5 kg (193 lb)   12/05/17 87.9 kg (193 lb 11.2 oz)   11/18/17 89.6 kg (197 lb 9.6 oz)              We Performed the Following     AUDIO REVIEW/CONSULT     REMOVE IMPACTED CERUMEN        Primary Care Provider    Albertina Gross MD       No address on file        Equal Access to Services     [facility]: Hadii aad lida hadrojaso Sotremayne, waaxda luqadaha, qaybta kaalmada zachery, jose miguel lawrence . So [facility] [phone].    ATENCIÓN: Si habla español, tiene a vaughan disposición servicios gratuitos de asistencia lingüística. [facility] [phone].    We comply with applicable federal civil rights laws and [place] laws. We do not discriminate on the basis of race, color, national origin, age, disability, sex, sexual orientation, or gender identity.            Thank you!     Thank you for choosing [facility] EAR NOSE [facility] TH[facility]AT  for your care. Our goal is always to provide you with excellent care. Hearing back from our patients is one way we can continue to improve our services. Please take a few minutes to complete the written survey that you may receive in the mail after your visit with us. Thank you!             Your Updated Medication List - Protect others around you: Learn how to safely use, store and throw away your medicines at www.disposemymeds.org.          This list is accurate as of 2/2/18  3:04 PM.  Always use your most recent med list.                   Brand Name Dispense Instructions for use Diagnosis    acetaminophen 325 MG tablet    TYLENOL    100 tablet    Take 2 tablets (650 mg) by mouth every 6 hours as needed for pain    S/P revision of total hip       acetaminophen-codeine 300-30 MG per tablet    TYLENOL w/CODEINE No. 3    30 tablet    Take 1-2 tablets by mouth every 4 hours as needed for mild pain    Pain in " joint, pelvic region and thigh, unspecified laterality       amLODIPine 10 MG tablet    NORVASC    90 tablet    TAKE 1 TABLET DAILY    Benign essential hypertension       aspirin 81 MG EC tablet     90 tablet    Take 1 tablet (81 mg) by mouth daily    Hyperlipidemia LDL goal <70       atorvastatin 20 MG tablet    LIPITOR    90 tablet    Take 1 tablet (20 mg) by mouth daily    Hyperlipidemia LDL goal <70       benazepril 40 MG tablet    LOTENSIN    90 tablet    Take 1 tablet (40 mg) by mouth daily    Essential hypertension, benign       bisacodyl 10 MG Suppository    DULCOLAX    3 suppository    Place 1 suppository rectally daily as needed.    Chronic constipation       desonide 0.05 % ointment    DESOWEN    60 g    Apply topically 2 times daily To affected areas of scale and redness on the face and ears as needed until clear.    Dermatitis, seborrheic       docusate sodium 100 MG tablet    COLACE    60 tablet    Take 100 mg by mouth 2 times daily    BPH (benign prostatic hyperplasia)       gentamicin 0.1 % ointment    GARAMYCIN     Apply topically 4 times daily    B-cell lymphoma of intra-abdominal lymph nodes, unspecified B-cell lymphoma type (H)       melatonin 3 MG tablet      Take 5 mg by mouth At Bedtime        metoprolol succinate 25 MG 24 hr tablet    TOPROL-XL    45 tablet    Take 0.5 tablets (12.5 mg) by mouth daily    Essential hypertension, benign       MILK OF MAGNESIA PO      Take by mouth At Bedtime        nitroGLYcerin 0.4 MG sublingual tablet    NITROSTAT    30 tablet    Place 1 tablet (0.4 mg) under the tongue every 5 minutes as needed    Coronary artery disease involving native heart without angina pectoris, unspecified vessel or lesion type       [employer] Fiber packet      Take 1 packet by mouth every morning        psyllium 0.52 G capsule      Take by mouth daily Powder        ranitidine 150 MG tablet    ZANTAC    180 tablet    Take 1 tablet (150 mg) by mouth 2 times daily    Gastroesophageal  reflux disease without esophagitis       sildenafil 100 MG tablet    VIAGRA    10 tablet    Take 1 tablet (100 mg) by mouth daily as needed    Erectile dysfunction, unspecified erectile dysfunction type       tamsulosin 0.4 MG capsule    FLOMAX    90 capsule    Take 1 capsule (0.4 mg) by mouth daily    Hypertrophy of prostate without urinary obstruction       tolterodine 2 MG tablet    DETROL    180 tablet    Take 1 tablet (2 mg )twice a day    Hypertonicity of bladder       * triamcinolone 0.025 % cream    KENALOG    45 g    Apply topically 2 times daily    Dermatitis       * triamcinolone 0.1 % ointment    KENALOG    80 g    Apply topically 2 times daily To itchy rashes    Intrinsic atopic dermatitis       VITEYES AREDS FORMULA/LUTEIN Caps      Take by mouth daily        * Notice:  This list has 2 medication(s) that are the same as other medications prescribed for you. Read the directions carefully, and ask your doctor or other care provider to review them with you.

## 2018-02-02 NOTE — PROGRESS NOTES
The patient presents with a history of hearing loss and cerumen impaction. 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  The patient reports a progressive hearing loss in both ears over many years.  The patient denies ear infections, otalgia, otorrhea or dizziness. The patient denies sinusitis, rhinitis, facial pain, nasal obstruction or purulent nasal discharge. The patient denies chronic or recurrent tonsillitis, chronic or recurrent pharyngitis. The patient's Audiogram and Tympanogram are reviewed with him and they demonstrate bilateral moderate sensorineural hearing loss with a mild conductive hearing loss in the lower frequencies. The word recognition scores are 96% in the right ear and 80% in the left ar. The tympanogram on the right is normal and the left ear seal cannot be achieved.     This patient is seen in consultation as a self referral to my clinic.    All other systems were reviewed and they are either negative or they are not directly pertinent to this Otolaryngology examination.     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     Macular degeneration, dry      Other and unspecified hyperlipidemia 7/21/2011     Snoring      Spinal stenosis, lumbar region, without neurogenic claudication 7/21/2011     Stented coronary artery 2003     Unspecified essential hypertension 7/21/2011       Past Surgical History:    Past Surgical History:   Procedure Laterality Date     ARTHROPLASTY HIP  10/2008    left     ARTHROPLASTY HIP  1999    right      ARTHROPLASTY REVISION HIP  1/21/2014    Procedure: ARTHROPLASTY REVISION HIP;  Revision Left Total Hip;  Surgeon: Edgar Grewal MD;  Location: [facility] OR     BACK SURGERY  2006     CARDIAC SURGERY  2001    stent x 5      CARDIAC SURGERY  2002     CATARACT IOL, RT/LT Bilateral      CYSTOSCOPY, TRANSURETHRAL RESECTION (TUR) PROSTATE, COMBINED  11/5/2013    Procedure: COMBINED CYSTOSCOPY, TRANSURETHRAL RESECTION (TUR) PROSTATE;  Transurethral Resection Of Prostate, Bipolar;  Surgeon: Lai Street MD;  Location: [facility] OR     ESOPHAGOSCOPY, GASTROSCOPY, DUODENOSCOPY (EGD), COMBINED  2/3/2014    Procedure: COMBINED ESOPHAGOSCOPY, GASTROSCOPY, DUODENOSCOPY (EGD);;  Surgeon: Ezra Fiore MD;  Location: [facility] GI     ESOPHAGOSCOPY, GASTROSCOPY, DUODENOSCOPY (EGD), COMBINED  4/15/2014    Procedure: COMBINED ESOPHAGOSCOPY, GASTROSCOPY, DUODENOSCOPY (EGD), BIOPSY SINGLE OR MULTIPLE;  Surgeon: Erza Fiore MD;  Location: [facility] GI     EXTRACAPSULAR CATARACT EXTRATION WITH INTRAOCULAR LENS IMPLANT  2005     EXTRACAPSULAR CATARACT EXTRATION WITH INTRAOCULAR LENS IMPLANT  2010     HERNIA REPAIR       MASTOID SURGERY  1939     MOHS MICROGRAPHIC PROCEDURE       OPTICAL TRACKING SYSTEM FUSION SPINE POSTERIOR LUMBAR TWO LEVELS  7/11/2013    Procedure: OPTICAL TRACKING SYSTEM FUSION SPINE POSTERIOR LUMBAR TWO LEVELS;  Stealth Assisted Lumbar 3-4 Transforaminal Lumbar Interbody Fusion, Lumbar 2-3 Lumbar 3-4 Laminectomy Decompression;  Surgeon: Edgar Lew MD;  Location: [facility] OR       Medications:      Current Outpatient Prescriptions:      sildenafil (VIAGRA) 100 MG tablet, Take 1 tablet (100 mg) by mouth daily as needed, Disp: 10 tablet, Rfl: 2     benazepril (LOTENSIN) 40 MG tablet, Take 1 tablet (40 mg) by mouth daily, Disp: 90 tablet, Rfl: 3     amLODIPine (NORVASC) 10 MG tablet, TAKE 1 TABLET DAILY, Disp: 90 tablet, Rfl: 2     atorvastatin (LIPITOR) 20 MG tablet, Take 1 tablet (20 mg) by mouth daily, Disp: 90  tablet, Rfl: 2     tolterodine (DETROL) 2 MG tablet, Take 1 tablet (2 mg )twice a day, Disp: 180 tablet, Rfl: 3     tamsulosin (FLOMAX) 0.4 MG capsule, Take 1 capsule (0.4 mg) by mouth daily, Disp: 90 capsule, Rfl: 3     nitroglycerin (NITROSTAT) 0.4 MG sublingual tablet, Place 1 tablet (0.4 mg) under the tongue every 5 minutes as needed, Disp: 30 tablet, Rfl: 2     metoprolol (TOPROL-XL) 25 MG 24 hr tablet, Take 0.5 tablets (12.5 mg) by mouth daily, Disp: 45 tablet, Rfl: 3     ranitidine (ZANTAC) 150 MG tablet, Take 1 tablet (150 mg) by mouth 2 times daily, Disp: 180 tablet, Rfl: 3     gentamicin (GARAMYCIN) 0.1 % ointment, Apply topically 4 times daily , Disp: , Rfl: 3     acetaminophen-codeine (TYLENOL W/CODEINE NO. 3) 300-30 MG per tablet, Take 1-2 tablets by mouth every 4 hours as needed for mild pain (Patient not taking: Reported on 11/18/2017), Disp: 30 tablet, Rfl: 1     triamcinolone (KENALOG) 0.1 % ointment, Apply topically 2 times daily To itchy rashes, Disp: 80 g, Rfl: 3     desonide (DESOWEN) 0.05 % ointment, Apply topically 2 times daily To affected areas of scale and redness on the face and ears as needed until clear., Disp: 60 g, Rfl: 11     Multiple Vitamins-Minerals (VITEYES AREDS FORMULA/LUTEIN) CAPS, Take by mouth daily, Disp: , Rfl:      psyllium 0.52 G capsule, Take by mouth daily Powder, Disp: , Rfl:      triamcinolone (KENALOG) 0.025 % cream, Apply topically 2 times daily, Disp: 45 g, Rfl: 0     aspirin 81 MG EC tablet, Take 1 tablet (81 mg) by mouth daily, Disp: 90 tablet, Rfl: 3     acetaminophen (TYLENOL) 325 MG tablet, Take 2 tablets (650 mg) by mouth every 6 hours as needed for pain, Disp: 100 tablet, Rfl: 0     Magnesium Hydroxide (MILK OF MAGNESIA PO), Take by mouth At Bedtime, Disp: , Rfl:      docusate sodium 100 MG tablet, Take 100 mg by mouth 2 times daily, Disp: 60 tablet, Rfl: 1     bisacodyl (DULCOLAX) 10 MG suppository, Place 1 suppository rectally daily as needed., Disp: 3  suppository, Rfl: 0     melatonin 3 MG tablet, Take 5 mg by mouth At Bedtime , Disp: , Rfl:      Guar Gum (BENEFIBER) packet, Take 1 packet by mouth every morning , Disp: , Rfl:     Allergies:    Nka [no known allergies]    Physical Examination:    The patient is a well developed, well nourished male in no apparent distress.  He is normocephalic, atraumatic with pupils equally round and reactive to light.    Oral Cavity Examination:  Normal mucosa with no masses or lesions  Nasal Examination: Normal mucosa with no masses or lesions  Ear Examination: Ear canals are impacted with cerumen. The ear canals are cleaned of cerumen using an alligator forceps using a binocular microscope for visualization. The tympanic membranes and middle ear spaces normal  Neurological Examination: Facial nerve function intact and symmetric  Integumentary Examination: No lesions on the skin of the head and neck  Neck Examination: No masses or lesions, no lymphadenopathy  Endocrine Examination: Normal thyroid examination    Assessment and Plan:    The patient presents with a history of hearing loss and cerumen impaction. His ears are cleaned of cerumen impaction. Based upon his Audiogram and Tympanogram, the patient will be referred for a hearing aid consultation. Due to his age, an MRI scan to evaluate his 16% difference in word recognition scores is not recommended.

## 2018-02-02 NOTE — NURSING NOTE
Chief Complaint   Patient presents with     Consult     diminished hearing even with hearing aid     Josefina Velasco Medical Assistant

## 2018-02-02 NOTE — PATIENT INSTRUCTIONS
The patient presents with a history of hearing loss and cerumen impaction. His ears are cleaned of cerumen impaction. Based upon his Audiogram and Tympanogram, the patient will be referred for a hearing aid consultation. Due to his age, an MRI scan to evaluate his 16% difference in word recognition scores is not recommended.

## 2018-02-02 NOTE — LETTER
2/2/2018       RE: Daniel Mcnair  [address] [id number]     Dear Colleague,    Thank you for referring your patient, Daniel Mcnair, to the [facility] EAR NOSE AND THROAT at [facility]. Please see a copy of my visit note below.    The patient presents with a history of hearing loss and cerumen impaction. 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  The patient reports a progressive hearing loss in both ears over many years.  The patient denies ear infections, otalgia, otorrhea or dizziness. The patient denies sinusitis, rhinitis, facial pain, nasal obstruction or purulent nasal discharge. The patient denies chronic or recurrent tonsillitis, chronic or recurrent pharyngitis. The patient's Audiogram and Tympanogram are reviewed with him and they demonstrate bilateral moderate sensorineural hearing loss with a mild conductive hearing loss in the lower frequencies. The word recognition scores are 96% in the right ear and 80% in the left ar. The tympanogram on the right is normal and the left ear seal cannot be achieved.     This patient is seen in consultation as a self referral to my clinic.    All other systems were reviewed and they are either negative or they are not directly pertinent to this Otolaryngology examination.     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     Macular degeneration, dry      Other and unspecified hyperlipidemia 7/21/2011     Snoring      Spinal  stenosis, lumbar region, without neurogenic claudication 7/21/2011     Stented coronary artery 2003     Unspecified essential hypertension 7/21/2011       Past Surgical History:    Past Surgical History:   Procedure Laterality Date     ARTHROPLASTY HIP  10/2008    left     ARTHROPLASTY HIP  1999    right     ARTHROPLASTY REVISION HIP  1/21/2014    Procedure: ARTHROPLASTY REVISION HIP;  Revision Left Total Hip;  Surgeon: Edgar Grewal MD;  Location: [facility] OR     BACK SURGERY  2006     CARDIAC SURGERY  2001    stent x 5      CARDIAC SURGERY  2002     CATARACT IOL, RT/LT Bilateral      CYSTOSCOPY, TRANSURETHRAL RESECTION (TUR) PROSTATE, COMBINED  11/5/2013    Procedure: COMBINED CYSTOSCOPY, TRANSURETHRAL RESECTION (TUR) PROSTATE;  Transurethral Resection Of Prostate, Bipolar;  Surgeon: Lai Street MD;  [facility]: [facility]     ESOPHAGOSCOPY, GASTROSCOPY, DUODENOSCOPY (EGD), COMBINED  2/3/2014    Procedure: COMBINED ESOPHAGOSCOPY, GASTROSCOPY, DUODENOSCOPY (EGD);;  Surgeon: Ezra Fiore MD;  [facility]: [facility] GI     ESOPHAGOSCOPY, GASTROSCOPY, DUODENOSCOPY (EGD), COMBINED  4/15/2014    Procedure: COMBINED ESOPHAGOSCOPY, GASTROSCOPY, DUODENOSCOPY (EGD), BIOPSY SINGLE OR MULTIPLE;  Surgeon: Ezra Fiore MD;  Location: [facility] GI     EXTRACAPSULAR CATARACT EXTRATION WITH INTRAOCULAR LENS IMPLANT  2005     EXTRACAPSULAR CATARACT EXTRATION WITH INTRAOCULAR LENS IMPLANT  2010     HERNIA REPAIR       MASTOID SURGERY  1939     MOHS MICROGRAPHIC PROCEDURE       OPTICAL TRACKING SYSTEM FUSION SPINE POSTERIOR LUMBAR TWO LEVELS  7/11/2013    Procedure: OPTICAL TRACKING SYSTEM FUSION SPINE POSTERIOR LUMBAR TWO LEVELS;  Stealth Assisted Lumbar 3-4 Transforaminal Lumbar Interbody Fusion, Lumbar 2-3 Lumbar 3-4 Laminectomy Decompression;  Surgeon: Edgar Lew MD;  Location: [facility] OR       Medications:      Current Outpatient Prescriptions:      sildenafil (VIAGRA) 100 MG tablet, Take 1 tablet (100 mg) by mouth daily  as needed, Disp: 10 tablet, Rfl: 2     benazepril (LOTENSIN) 40 MG tablet, Take 1 tablet (40 mg) by mouth daily, Disp: 90 tablet, Rfl: 3     amLODIPine (NORVASC) 10 MG tablet, TAKE 1 TABLET DAILY, Disp: 90 tablet, Rfl: 2     atorvastatin (LIPITOR) 20 MG tablet, Take 1 tablet (20 mg) by mouth daily, Disp: 90 tablet, Rfl: 2     tolterodine (DETROL) 2 MG tablet, Take 1 tablet (2 mg )twice a day, Disp: 180 tablet, Rfl: 3     tamsulosin (FLOMAX) 0.4 MG capsule, Take 1 capsule (0.4 mg) by mouth daily, Disp: 90 capsule, Rfl: 3     nitroglycerin (NITROSTAT) 0.4 MG sublingual tablet, Place 1 tablet (0.4 mg) under the tongue every 5 minutes as needed, Disp: 30 tablet, Rfl: 2     metoprolol (TOPROL-XL) 25 MG 24 hr tablet, Take 0.5 tablets (12.5 mg) by mouth daily, Disp: 45 tablet, Rfl: 3     ranitidine (ZANTAC) 150 MG tablet, Take 1 tablet (150 mg) by mouth 2 times daily, Disp: 180 tablet, Rfl: 3     gentamicin (GARAMYCIN) 0.1 % ointment, Apply topically 4 times daily , Disp: , Rfl: 3     acetaminophen-codeine (TYLENOL W/CODEINE NO. 3) 300-30 MG per tablet, Take 1-2 tablets by mouth every 4 hours as needed for mild pain (Patient not taking: Reported on 11/18/2017), Disp: 30 tablet, Rfl: 1     triamcinolone (KENALOG) 0.1 % ointment, Apply topically 2 times daily To itchy rashes, Disp: 80 g, Rfl: 3     desonide (DESOWEN) 0.05 % ointment, Apply topically 2 times daily To affected areas of scale and redness on the face and ears as needed until clear., Disp: 60 g, Rfl: 11     Multiple Vitamins-Minerals (VITEYES AREDS FORMULA/LUTEIN) CAPS, Take by mouth daily, Disp: , Rfl:      psyllium 0.52 G capsule, Take by mouth daily Powder, Disp: , Rfl:      triamcinolone (KENALOG) 0.025 % cream, Apply topically 2 times daily, Disp: 45 g, Rfl: 0     aspirin 81 MG EC tablet, Take 1 tablet (81 mg) by mouth daily, Disp: 90 tablet, Rfl: 3     acetaminophen (TYLENOL) 325 MG tablet, Take 2 tablets (650 mg) by mouth every 6 hours as needed for pain,  Disp: 100 tablet, Rfl: 0     Magnesium Hydroxide (MILK OF MAGNESIA PO), Take by mouth At Bedtime, Disp: , Rfl:      docusate sodium 100 MG tablet, Take 100 mg by mouth 2 times daily, Disp: 60 tablet, Rfl: 1     bisacodyl (DULCOLAX) 10 MG suppository, Place 1 suppository rectally daily as needed., Disp: 3 suppository, Rfl: 0     melatonin 3 MG tablet, Take 5 mg by mouth At Bedtime , Disp: , Rfl:      Guar Gum (BENEFIBER) packet, Take 1 packet by mouth every morning , Disp: , Rfl:     Allergies:    Nka [no known allergies]    Physical Examination:    The patient is a well developed, well nourished male in no apparent distress.  He is normocephalic, atraumatic with pupils equally round and reactive to light.    Oral Cavity Examination:  Normal mucosa with no masses or lesions  Nasal Examination: Normal mucosa with no masses or lesions  Ear Examination: Ear canals are impacted with cerumen. The ear canals are cleaned of cerumen using an alligator forceps using a binocular microscope for visualization. The tympanic membranes and middle ear spaces normal  Neurological Examination: Facial nerve function intact and symmetric  Integumentary Examination: No lesions on the skin of the head and neck  Neck Examination: No masses or lesions, no lymphadenopathy  Endocrine Examination: Normal thyroid examination    Assessment and Plan:    The patient presents with a history of hearing loss and cerumen impaction. His ears are cleaned of cerumen impaction. Based upon his Audiogram and Tympanogram, the patient will be referred for a hearing aid consultation. Due to his age, an MRI scan to evaluate his 16% difference in word recognition scores is not recommended.     Again, thank you for allowing me to participate in the care of your patient.      Sincerely,    Tony Kahn MD

## 2018-02-02 NOTE — PROCEDURES
AUDIOLOGY REPORT    SUMMARY: Audiology visit completed. See audiogram for results.      RECOMMENDATIONS: Follow-up with ENT.    Tashia Boucher, [facility]  Licensed Audiologist  [place] License #[id number]

## 2018-02-02 NOTE — MR AVS SNAPSHOT
After Visit Summary   2/2/2018    Daniel Mcnair    MRN: [id number]           Patient Information     Date Of Birth          10/17/1929        Visit Information        Provider Department      2/2/2018 1:30 PM María العلي, Stephie [facility]        Today's Diagnoses     Sensorineural hearing loss, bilateral    -  1       Follow-ups after your visit        Your next 10 appointments already scheduled     Feb 02, 2018  2:30 PM CST   (Arrive by 2:15 PM)   New Patient Visit with Tony Kahn MD   [facility] ([facility] [place])    [address]  4th Floor  [place] [place] [address]   [phone]            Mar 08, 2018 10:20 AM CST   (Arrive by 10:05 AM)   Return Visit with Lia Dumont MD   [facility] ([facility])    [address]  [address] [facility] [address]   [phone]            Jun 25, 2018  9:45 AM CDT   RETURN RETINA with Luna Parker MD   Eye Clinic ([facility])    [address]  [address]  [place] [place] [address]   [phone]            Nov 15, 2018  8:30 AM CST   [facility] Draw with  [facility] DRAW   [facility] Draw ([facility])    [address]  [address]  [place] [place] [address]   [phone]            Nov 15, 2018  9:00 AM CST   (Arrive by 8:45 AM)   CT ABDOMEN PELVIS W CONTRAST with UCCT1   [facility] ([facility])    [address]  [address] [facility] [address] [phone]           Please bring any scans or X-rays taken at other hospitals, if similar tests were done. Also bring a list of your medicines, including vitamins, minerals and over-the-counter drugs. It is safest to leave personal items at home.  Be sure to tell your doctor:   If you have any allergies.   If there s any chance you are  pregnant.   If you are breastfeeding.   If you have any special needs.  You may have contrast for this exam. To prepare:   Do not eat or drink for 2 hours before your exam. If you need to take medicine, you may take it with small sips of water. (We may ask you to take liquid medicine as well.)   The day before your exam, drink extra fluids at least six 8-ounce glasses (unless your doctor tells you to restrict your fluids).  Patients over 70 or patients with diabetes or kidney problems:   If you haven t had a blood test (creatinine test) within the last 30 days, go to your clinic or Diagnostic Imaging Department for this test.  If you have diabetes:   If your kidney function is normal, continue taking your metformin (Avandamet, Glucophage, Glucovance, Metaglip) on the day of your exam.   If your kidney function is abnormal, wait 48 hours before restarting this medicine.  You will have oral contrast for this exam:   You will drink the contrast at home. Get this from your clinic or Diagnostic Imaging Department. Please follow the directions given.  Please wear loose clothing, such as a sweat suit or jogging clothes. Avoid snaps, zippers and other metal. We may ask you to undress and put on a hospital gown.  If you have any questions, please call the Imaging Department where you will have your exam.            Nov 15, 2018 12:00 PM CST   (Arrive by 11:45 AM)   Return Visit with Tanner Rojas MD   [facility] ([facility])    [address]  [address]  [facility] [address] [phone]              Who to contact     Please call your clinic at [phone] to:    Ask questions about your health    Make or cancel appointments    Discuss your medicines    Learn about your test results    Speak to your doctor   If you have compliments or concerns about an experience at your clinic, or if you wish to file a complaint, please contact [facility]  Physicians Patient Relations at [phone] or email us at [email or web address]         Additional Information About Your Visit        The [employer] Information     [employer]â„¢t gives you secure access to your electronic health record. If you see a primary care provider, you can also send messages to your care team and make appointments. If you have questions, please call your primary care clinic.  If you do not have a primary care provider, please call [phone] and they will assist you.      [employer] is an electronic gateway that provides easy, online access to your medical records. With [employer], you can request a clinic appointment, read your test results, renew a prescription or communicate with your care team.     To access your existing account, please contact your [facility] or call [phone] for assistance.        Care EveryWhere ID     This is your Care EveryWhere ID. This could be used by other organizations to access your [place] medical records  [id number]         Blood Pressure from Last 3 Encounters:   12/05/17 131/49   11/18/17 124/65   11/16/17 154/65    Weight from Last 3 Encounters:   12/05/17 87.9 kg (193 lb 11.2 oz)   11/18/17 89.6 kg (197 lb 9.6 oz)   11/16/17 89.6 kg (197 lb 8.5 oz)              We Performed the Following     AUDIOGRAM/TYMPANOGRAM - INTERFACE     [facility] Audiometry Thrshld Eval & Speech Recog (42559)     Tymps / Reflex   (37707)        Primary Care Provider    Albertina Gross MD       No address on file        Equal Access to Services     [facility] JESSICA : Hadii aad ku hadasho Soomaali, waaxda luqadaha, qaybta kaalmada adeegyada, waxay idiin haysandhya vargasarajose francisco lawrence . So [facility] [phone].    ATENCIÓN: Si habla español, tiene a vaughan disposición servicios gratuitos de asistencia lingüística. Llame al [phone].    We comply with applicable federal civil rights laws and [place] laws. We do not discriminate on the basis of race,  color, national origin, age, disability, sex, sexual orientation, or gender identity.            Thank you!     Thank you for choosing [facility] AUDIOLOGY  for your care. Our goal is always to provide you with excellent care. Hearing back from our patients is one way we can continue to improve our services. Please take a few minutes to complete the written survey that you may receive in the mail after your visit with us. Thank you!             Your Updated Medication List - Protect others around you: Learn how to safely use, store and throw away your medicines at www.disposemymeds.org.          This list is accurate as of 2/2/18  2:23 PM.  Always use your most recent med list.                   Brand Name Dispense Instructions for use Diagnosis    acetaminophen 325 MG tablet    TYLENOL    100 tablet    Take 2 tablets (650 mg) by mouth every 6 hours as needed for pain    S/P revision of total hip       acetaminophen-codeine 300-30 MG per tablet    TYLENOL w/CODEINE No. 3    30 tablet    Take 1-2 tablets by mouth every 4 hours as needed for mild pain    Pain in joint, pelvic region and thigh, unspecified laterality       amLODIPine 10 MG tablet    NORVASC    90 tablet    TAKE 1 TABLET DAILY    Benign essential hypertension       aspirin 81 MG EC tablet     90 tablet    Take 1 tablet (81 mg) by mouth daily    Hyperlipidemia LDL goal <70       atorvastatin 20 MG tablet    LIPITOR    90 tablet    Take 1 tablet (20 mg) by mouth daily    Hyperlipidemia LDL goal <70       benazepril 40 MG tablet    LOTENSIN    90 tablet    Take 1 tablet (40 mg) by mouth daily    Essential hypertension, benign       bisacodyl 10 MG Suppository    DULCOLAX    3 suppository    Place 1 suppository rectally daily as needed.    Chronic constipation       desonide 0.05 % ointment    DESOWEN    60 g    Apply topically 2 times daily To affected areas of scale and redness on the face and ears as needed until clear.    Dermatitis, seborrheic        docusate sodium 100 MG tablet    COLACE    60 tablet    Take 100 mg by mouth 2 times daily    BPH (benign prostatic hyperplasia)       gentamicin 0.1 % ointment    GARAMYCIN     Apply topically 4 times daily    B-cell lymphoma of intra-abdominal lymph nodes, unspecified B-cell lymphoma type (H)       melatonin 3 MG tablet      Take 5 mg by mouth At Bedtime        metoprolol succinate 25 MG 24 hr tablet    TOPROL-XL    45 tablet    Take 0.5 tablets (12.5 mg) by mouth daily    Essential hypertension, benign       MILK OF MAGNESIA PO      Take by mouth At Bedtime        nitroGLYcerin 0.4 MG sublingual tablet    NITROSTAT    30 tablet    Place 1 tablet (0.4 mg) under the tongue every 5 minutes as needed    Coronary artery disease involving native heart without angina pectoris, unspecified vessel or lesion type       N[employer]riSource Fiber packet      Take 1 packet by mouth every morning        psyllium 0.52 G capsule      Take by mouth daily Powder        ranitidine 150 MG tablet    ZANTAC    180 tablet    Take 1 tablet (150 mg) by mouth 2 times daily    Gastroesophageal reflux disease without esophagitis       sildenafil 100 MG tablet    VIAGRA    10 tablet    Take 1 tablet (100 mg) by mouth daily as needed    Erectile dysfunction, unspecified erectile dysfunction type       tamsulosin 0.4 MG capsule    FLOMAX    90 capsule    Take 1 capsule (0.4 mg) by mouth daily    Hypertrophy of prostate without urinary obstruction       tolterodine 2 MG tablet    DETROL    180 tablet    Take 1 tablet (2 mg )twice a day    Hypertonicity of bladder       * triamcinolone 0.025 % cream    KENALOG    45 g    Apply topically 2 times daily    Dermatitis       * triamcinolone 0.1 % ointment    KENALOG    80 g    Apply topically 2 times daily To itchy rashes    Intrinsic atopic dermatitis       VITEYES AREDS FORMULA/LUTEIN Caps      Take by mouth daily        * Notice:  This list has 2 medication(s) that are the same as other medications  prescribed for you. Read the directions carefully, and ask your doctor or other care provider to review them with you.

## 2018-03-07 DIAGNOSIS — K21.9 GASTROESOPHAGEAL REFLUX DISEASE WITHOUT ESOPHAGITIS: ICD-10-CM

## 2018-03-08 ENCOUNTER — OFFICE VISIT (OUTPATIENT)
Dept: INTERNAL MEDICINE | Facility: CLINIC | Age: 83
End: 2018-03-08
Payer: MEDICARE

## 2018-03-08 VITALS
DIASTOLIC BLOOD PRESSURE: 55 MMHG | BODY MASS INDEX: 28.35 KG/M2 | SYSTOLIC BLOOD PRESSURE: 118 MMHG | HEART RATE: 52 BPM | WEIGHT: 197.6 LBS

## 2018-03-08 DIAGNOSIS — Z23 NEED FOR VACCINATION: ICD-10-CM

## 2018-03-08 DIAGNOSIS — R53.81 PHYSICAL DECONDITIONING: ICD-10-CM

## 2018-03-08 DIAGNOSIS — I10 BENIGN ESSENTIAL HYPERTENSION: ICD-10-CM

## 2018-03-08 DIAGNOSIS — I10 ESSENTIAL HYPERTENSION, BENIGN: ICD-10-CM

## 2018-03-08 DIAGNOSIS — M25.552 HIP PAIN, LEFT: Primary | ICD-10-CM

## 2018-03-08 DIAGNOSIS — E78.5 HYPERLIPIDEMIA LDL GOAL <70: ICD-10-CM

## 2018-03-08 DIAGNOSIS — G47.10 EXCESSIVE SLEEPINESS: ICD-10-CM

## 2018-03-08 RX ORDER — ATORVASTATIN CALCIUM 20 MG/1
20 TABLET, FILM COATED ORAL DAILY
Qty: 90 TABLET | Refills: 2 | Status: SHIPPED | OUTPATIENT
Start: 2018-03-08 | End: 2019-01-23

## 2018-03-08 RX ORDER — METOPROLOL SUCCINATE 25 MG/1
12.5 TABLET, EXTENDED RELEASE ORAL DAILY
Qty: 45 TABLET | Refills: 3 | Status: SHIPPED | OUTPATIENT
Start: 2018-03-08 | End: 2019-05-16

## 2018-03-08 ASSESSMENT — PAIN SCALES - GENERAL: PAINLEVEL: MILD PAIN (2)

## 2018-03-08 NOTE — PROGRESS NOTES
HPI:  Daniel Mcnair is a 88 year old male presenting for a check up. His describes left hip aching. He has had PT for this in the past, which helped significantly. He has been doing exercises on his own as well. He is able to participate in his normal daily activities. No new falls or trauma to that area. He wants to avoid surgery if at all possible.    He also cites increased fatigue and sleepiness. He gets sleepy during the late morning and early afternoon, and takes a nap every day after lunch. He goes to bed between 8:30-9:30PM. He sleeps restfully until 1AM and then wakes frequently the rest of the night. He takes melatonin, which he thinks helps. Together we completed  STOP-BANG and PHQ-9 questionnaires      PHQ-9 score of 10 (moderate).  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)     Benign essential hypertension     Coronary artery disease involving native coronary artery of native heart without angina pectoris     Advance Care Planning     Osteoarthritis of multiple joints, unspecified osteoarthritis type     Hyperlipidemia, unspecified hyperlipidemia type     Other fatigue     B-cell lymphoma, unspecified B-cell lymphoma type, unspecified body region (H)     IPMN (intraductal papillary mucinous neoplasm)     Past Surgical History:   Procedure Laterality Date     ARTHROPLASTY HIP  10/2008    left     ARTHROPLASTY HIP  1999    right     ARTHROPLASTY REVISION HIP  1/21/2014    Procedure: ARTHROPLASTY REVISION HIP;  Revision Left Total Hip;  Surgeon: Edgar Grewal MD;  Location: [facility]     BACK SURGERY  2006     CARDIAC SURGERY  2001    stent x 5      CARDIAC SURGERY  2002     CATARACT IOL, RT/LT Bilateral      CYSTOSCOPY, TRANSURETHRAL RESECTION (TUR) PROSTATE, COMBINED  11/5/2013    Procedure: COMBINED CYSTOSCOPY, TRANSURETHRAL RESECTION (TUR) PROSTATE;  Transurethral Resection Of Prostate, Bipolar;  Surgeon: Lai Street MD;  Location: [facility]     ESOPHAGOSCOPY, GASTROSCOPY, DUODENOSCOPY (EGD), COMBINED  2/3/2014    Procedure: COMBINED ESOPHAGOSCOPY, GASTROSCOPY, DUODENOSCOPY (EGD);;  Surgeon: Ezra Fiore MD;  [facility]     ESOPHAGOSCOPY, GASTROSCOPY, DUODENOSCOPY (EGD), COMBINED  4/15/2014    Procedure: COMBINED ESOPHAGOSCOPY, GASTROSCOPY, DUODENOSCOPY (EGD), BIOPSY SINGLE OR MULTIPLE;  Surgeon: Ezra Fiore MD;  Location: [facility]     EXTRACAPSULAR  CATARACT EXTRATION WITH INTRAOCULAR LENS IMPLANT  2005     EXTRACAPSULAR CATARACT EXTRATION WITH INTRAOCULAR LENS IMPLANT  2010     HERNIA REPAIR       MASTOID SURGERY  1939     MOHS MICROGRAPHIC PROCEDURE       OPTICAL TRACKING SYSTEM FUSION SPINE POSTERIOR LUMBAR TWO LEVELS  7/11/2013    Procedure: OPTICAL TRACKING SYSTEM FUSION SPINE POSTERIOR LUMBAR TWO LEVELS;  Stealth Assisted Lumbar 3-4 Transforaminal Lumbar Interbody Fusion, Lumbar 2-3 Lumbar 3-4 Laminectomy Decompression;  Surgeon: Edgar Lew MD;  Location: [facility]     Current Outpatient Prescriptions   Medication Sig Dispense Refill     atorvastatin (LIPITOR) 20 MG tablet Take 1 tablet (20 mg) by mouth daily 90 tablet 2     metoprolol succinate (TOPROL-XL) 25 MG 24 hr tablet Take 0.5 tablets (12.5 mg) by mouth daily 45 tablet 3     ranitidine (ZANTAC) 150 MG tablet Take 1 tablet (150 mg) by mouth 2 times daily 180 tablet 3     sildenafil (VIAGRA) 100 MG tablet Take 1 tablet (100 mg) by mouth daily as needed 10 tablet 2     benazepril (LOTENSIN) 40 MG tablet Take 1 tablet (40 mg) by mouth daily 90 tablet 3     amLODIPine (NORVASC) 10 MG tablet TAKE 1 TABLET DAILY 90 tablet 2     tolterodine (DETROL) 2 MG tablet Take 1 tablet (2 mg )twice a day 180 tablet 3     tamsulosin (FLOMAX) 0.4 MG capsule Take 1 capsule (0.4 mg) by mouth daily 90 capsule 3     nitroglycerin (NITROSTAT) 0.4 MG sublingual tablet Place 1 tablet (0.4 mg) under the tongue every 5 minutes as needed 30 tablet 2     gentamicin (GARAMYCIN) 0.1 % ointment Apply topically 4 times daily   3     acetaminophen-codeine (TYLENOL W/CODEINE NO. 3) 300-30 MG per tablet Take 1-2 tablets by mouth every 4 hours as needed for mild pain 30 tablet 1     triamcinolone (KENALOG) 0.1 % ointment Apply topically 2 times daily To itchy rashes 80 g 3     desonide (DESOWEN) 0.05 % ointment Apply topically 2 times daily To affected areas of scale and redness on the face and ears as needed until  clear. 60 g 11     Multiple Vitamins-Minerals (VITEYES AREDS FORMULA/LUTEIN) CAPS Take by mouth daily       psyllium 0.52 G capsule Take by mouth daily Powder       triamcinolone (KENALOG) 0.025 % cream Apply topically 2 times daily 45 g 0     aspirin 81 MG EC tablet Take 1 tablet (81 mg) by mouth daily 90 tablet 3     acetaminophen (TYLENOL) 325 MG tablet Take 2 tablets (650 mg) by mouth every 6 hours as needed for pain 100 tablet 0     Magnesium Hydroxide (MILK OF MAGNESIA PO) Take by mouth At Bedtime       docusate sodium 100 MG tablet Take 100 mg by mouth 2 times daily 60 tablet 1     bisacodyl (DULCOLAX) 10 MG suppository Place 1 suppository rectally daily as needed. 3 suppository 0     melatonin 3 MG tablet Take 5 mg by mouth At Bedtime        Guar Gum (BENEFIBER) packet Take 1 packet by mouth every morning        [DISCONTINUED] atorvastatin (LIPITOR) 20 MG tablet Take 1 tablet (20 mg) by mouth daily 90 tablet 2     [DISCONTINUED] metoprolol (TOPROL-XL) 25 MG 24 hr tablet Take 0.5 tablets (12.5 mg) by mouth daily 45 tablet 3     Allergies   Allergen Reactions     Nka [No Known Allergies]        OBJECTIVE:  /55  Pulse 52  Wt 89.6 kg (197 lb 9.6 oz)  BMI 28.35 kg/m2    Physical Examination:  General:  Pleasant, alert, no acute distress  Eyes:  Pupils 2-3 mm, sclera white, EOM's full.    Throat/Mouth:  No pharyngeal erythema or exudate, oral mucosa and tongue moist, no suspicious oral lesions  Neck:  Full AROM, supple, thyroid smooth, symmetric, not enlarged, no nodules  Lungs:  Clear to auscultation throughout, no wheezes, rhonchi or rales.  C/V:  Regular rate and rhythm, no murmurs, rubs or gallops.  No peripheral edema.   Abdomen:  Not distended.  Bowel sounds active.  No tenderness, no hepatosplenomegaly or masses.  No CVA tenderness or masses.  Lymph:  No cervical lymph nodes.   Neuro:  Alert and oriented, face symmetric. No tremor.  Gait steady.    M/S:  Mild pain and decreased ROM with external  rotation of the left hip. Non-tender to palpation. Full ROM of knees bilaterally.   Skin:   No rashes or jaundice.  Normal moisture, good skin turgor.  Psych:  Broad range affect.  Not psychomotor slowed.  No signs of anxiety or agitation.      ASSESSMENT/PLAN:  Daniel Mcnair is an 88 year old male presenting for check up. He has decreased ROM on the left hip, especially with external rotation. His hip pain has improved in the past with PT. Will put in a referral for PT either here or at [facility]. As for his fatigue, the differential diagnosis includes normal aging, deconditioning, ASHLEY, medication side effect, depression. Will encourage physical activity and refer to sleep clinic for evaluation and possible PSG. Will reconsider depression and medication effects at future visits. Daniel is highly motivated to joint a meditation program led by his interim  at [facility].  We also discussed shingrix, and he will check with his insurance before making a nursing visit to get the vaccine.    - physical therapy  - referral to sleep clinic  - shingrix vaccination    F/U 2 months      This document was prepared by Jamil Tidwell, acting as a medical scribe, on behalf of Dr. Mireles, based on observations and statements made by the provider. This document has been reviewed and approved by the provider.     Lia Dumont MD  March 8, 2018

## 2018-03-08 NOTE — PROGRESS NOTES
[facility]  [facility]   Lia Dumont MD  03/08/2018      Chief Complaint:   No chief complaint on file.       History of Present Illness:   Daniel Mcnair is a 88 year old male with a complex medical history including conductive hearing loss bilaterally, chronic back and hip pain, B-cell lymphoma, hypertension, hyperlipidemia , and coronary artery disease s/p PCI to LAD/D2 (5 stents total) who presents for evaluation of ***.    Recent visit for hearing loss and cerumen impaction  Monocular diplopia while reading in the AM  Vision decreased  Stable fatigue  48 Holter in 12/2017 for bradycardia   Chronic back and hip pain    Other concerns discussed:  ***     Review of Systems:   Pertinent items are noted in HPI.  All other systems are negative.    Active Medications:      ranitidine (ZANTAC) 150 MG tablet, Take 1 tablet (150 mg) by mouth 2 times daily, Disp: 180 tablet, Rfl: 3     sildenafil (VIAGRA) 100 MG tablet, Take 1 tablet (100 mg) by mouth daily as needed, Disp: 10 tablet, Rfl: 2     benazepril (LOTENSIN) 40 MG tablet, Take 1 tablet (40 mg) by mouth daily, Disp: 90 tablet, Rfl: 3     amLODIPine (NORVASC) 10 MG tablet, TAKE 1 TABLET DAILY, Disp: 90 tablet, Rfl: 2     atorvastatin (LIPITOR) 20 MG tablet, Take 1 tablet (20 mg) by mouth daily, Disp: 90 tablet, Rfl: 2     tolterodine (DETROL) 2 MG tablet, Take 1 tablet (2 mg )twice a day, Disp: 180 tablet, Rfl: 3     tamsulosin (FLOMAX) 0.4 MG capsule, Take 1 capsule (0.4 mg) by mouth daily, Disp: 90 capsule, Rfl: 3     nitroglycerin (NITROSTAT) 0.4 MG sublingual tablet, Place 1 tablet (0.4 mg) under the tongue every 5 minutes as needed, Disp: 30 tablet, Rfl: 2     metoprolol (TOPROL-XL) 25 MG 24 hr tablet, Take 0.5 tablets (12.5 mg) by mouth daily, Disp: 45 tablet, Rfl: 3     gentamicin (GARAMYCIN) 0.1 % ointment, Apply topically 4 times daily , Disp: , Rfl: 3 ***      acetaminophen-codeine (TYLENOL W/CODEINE NO. 3) 300-30 MG per tablet, Take 1-2  tablets by mouth every 4 hours as needed for mild pain, Disp: 30 tablet, Rfl: 1     triamcinolone (KENALOG) 0.1 % ointment, Apply topically 2 times daily To itchy rashes, Disp: 80 g, Rfl: 3 ***      desonide (DESOWEN) 0.05 % ointment, Apply topically 2 times daily To affected areas of scale and redness on the face and ears as needed until clear., Disp: 60 g, Rfl: 11 ***      Multiple Vitamins-Minerals (VITEYES AREDS FORMULA/LUTEIN) CAPS, Take by mouth daily, Disp: , Rfl:      psyllium 0.52 G capsule, Take by mouth daily Powder, Disp: , Rfl:      aspirin 81 MG EC tablet, Take 1 tablet (81 mg) by mouth daily, Disp: 90 tablet, Rfl: 3     Magnesium Hydroxide (MILK OF MAGNESIA PO), Take by mouth At Bedtime, Disp: , Rfl:      docusate sodium 100 MG tablet, Take 100 mg by mouth 2 times daily, Disp: 60 tablet, Rfl: 1     bisacodyl (DULCOLAX) 10 MG suppository, Place 1 suppository rectally daily as needed., Disp: 3 suppository, Rfl: 0     melatonin 3 MG tablet, Take 5 mg by mouth At Bedtime , Disp: , Rfl:      Guar Gum (BENEFIBER) packet, Take 1 packet by mouth every morning , Disp: , Rfl:       Allergies:   No Known Drug Allergies      Past Medical History:  Actinic keratosis  Stenosis of rectum and anus  Hypertrophy of prostate without urinary obstruction  Breast lump  Choroidal detachment  Exudative senile macular degeneration of retina   Conductive hearing loss, tympanic membrane  Nonexudative senile macular degeneration of retina  Essential hypertension  Hemorrhoids  Hypertonicity of bladder  Hyperlipidemia  Spinal stenosis, lumbar region, without neurogenic claudication  Neoplasm of uncertain behavior  Senile nuclear sclerosis  Impotence of organic origin  Osteoarthritis  Hematoma complicating a procedure  Vitreous degeneration  Lens replaced by other means  Other seborrheic keratosis  Senile cataract  Anal stricture  Idiopathic gynecomastia  H. 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   Coronary artery disease involving native coronary artery of native heart without angina pectoris  Advance Care Planning  Osteoarthritis of multiple joints, unspecified osteoarthritis type  Other fatigue  B-cell lymphoma, unspecified B-cell lymphoma type, unspecified body region   IPMN (intraductal papillary mucinous neoplasm)  Baker's cyst  Snoring    Past Surgical History:  Arthroplasty hip, right  Arthroplasty hip, left  Arthroplasty revision hip, left  Back surgery   Cardiac surgery, stent x5  Cardiac surgery   Cataract IOL, RT/LT  Cystoscopy transurethral resection (TUR) prostate, combined  Esophagoscopy, gastroscopy, duodenoscopy combined x2  Extracapsular cataract extraction with intraocular lens implant x2  Hernia repair  Mastoid surgery  MOHs Migrographic Procedure  Optical tacking system fusion spine posterior lumbar two levels     Family History:   Hypertension - Mother      Social History:  The patient was accompanied to the appointment by ***.  Smoking Status: Never  Smokeless Tobacco: Never  Alcohol Use: Yes, occasionally      Physical Exam:   There were no vitals taken for this visit.   ***      Assessment and Plan:  There are no diagnoses linked to this encounter.         Follow-up: Data Unavailable         Scribe Disclosure:   Estephanie KEE, am serving as a scribe to document services personally performed by Lia Dmuont MD at this visit, based upon the provider's statements to me. All documentation has been reviewed by the aforementioned provider prior to being entered into the official  medical record.     Portions of this medical record were completed by a scribe. UPON MY REVIEW AND AUTHENTICATION BY ELECTRONIC SIGNATURE, this confirms (a) I performed the applicable clinical services, and (b) the record is accurate.

## 2018-03-08 NOTE — PATIENT INSTRUCTIONS
[facility]: [phone]     [facility] Medication Refill Request Information:  * Please contact your pharmacy regarding ANY request for medication refills.  ** [facility] Prescription Fax = [phone]  * Please allow 3 business days for routine medication refills.  * Please allow 5 business days for controlled substance medication refills.     [facility] Test Result notification information:  *You will be notified with in 7-10 days of your appointment day regarding the results of your test.  If you are on MyChart you will be notified as soon as the provider has reviewed the results and signed off on them.  Sleep Clinic [phone] ([address])

## 2018-03-08 NOTE — MR AVS SNAPSHOT
After Visit Summary   3/8/2018    Daniel Mcnair    MRN: [id number]           Patient Information     Date Of Birth          10/17/1929        Visit Information        Provider Department      3/8/2018 10:20 AM Lia Dumont MD [facility]        Today's Diagnoses     Routine general medical examination at a health care facility    -  1    Hip pain, left        Excessive sleepiness        Physical deconditioning        Need for vaccination          Care Instructions    [facility]: [phone]     Primary Care Center Medication Refill Request Information:  * Please contact your pharmacy regarding ANY request for medication refills.  ** [facility] Prescription Fax = [phone]  * Please allow 3 business days for routine medication refills.  * Please allow 5 business days for controlled substance medication refills.     Primary Care Center Test Result notification information:  *You will be notified with in 7-10 days of your appointment day regarding the results of your test.  If you are on MyChart you will be notified as soon as the provider has reviewed the results and signed off on them.  Sleep Clinic [phone] ([address])             Follow-ups after your visit        Additional Services     PHYSICAL THERAPY REFERRAL       Patient may schedule at location of choice, here or at [facility].  Dr. Dumont    *This therapy referral will be filtered to a centralized scheduling office at [facility] and the patient will receive a call to schedule an appointment at a [place] location most convenient for them. *     [facility] provides Physical Therapy evaluation and treatment and many specialty services across the [place] system.  If requesting a specialty program, please choose from the list below.    If you have not heard from the scheduling office within 2 business days, please call [phone] for all  "locations, with the exception of [place], please call [phone] and [place], please call [phone]  Treatment: Evaluation & Treatment  Special Instructions/Modalities: none  Special Programs: None    Please be aware that coverage of these services is subject to the terms and limitations of your health insurance plan.  Call member services at your health plan with any benefit or coverage questions.      **Note to Provider:  If you are referring outside of [place] for the therapy appointment, please list the name of the location in the \"special instructions\" above, print the referral and give to the patient to schedule the appointment.            SLEEP EVALUATION & MANAGEMENT REFERRAL - ADULT -[facility] / [facility]  [phone] (Age 2 and up)       Please be aware that coverage of these services is subject to the terms and limitations of your health insurance plan.  Call member services at your health plan with any benefit or coverage questions.      Please bring the following to your appointment:    >>   List of current medications   >>   This referral request   >>   Any documents/labs given to you for this referral                      Follow-up notes from your care team     Return in about 2 months (around 5/8/2018) for sleepiness, joint pain.      Your next 10 appointments already scheduled     Mar 12, 2018 10:00 AM CDT   Hearing Aid Consult with Stephie Greer [facility] ([facility])    [address] [place] [address] [phone]            Mar 21, 2018 12:00 PM CDT   New Sleep Patient with Snigdhasmrithi Rigo Vasquez MD   [facility] [place], Sleep Study ([facility])    [address] [place] [id number] [phone]            Apr 02, 2018  1:00 PM CDT   Hearing Aid Fitting with Stephie Greer [facility] (" [facility])    [address]  4th Floor  [facility] [address]   [phone]            Apr 23, 2018  2:00 PM CDT   (Arrive by 1:45 PM)   Initial Review Program with Stephie Greer   [facility] ([facility])    [address]  4th [facility] [address]   [phone]            May 10, 2018 10:20 AM CDT   (Arrive by 10:05 AM)   Return Visit with Lia Dumont MD   [facility] ([facility])    [address]  4th Floor  [facility] [address]   [phone]            Jun 25, 2018  9:45 AM CDT   RETURN RETINA with Luna Parker MD   [facility] Clinic ([facility])    [address]  [address] [address]  [facility] [address]   [phone]            Nov 15, 2018  8:30 AM CST   [facility] Draw with  [facility] DRAW   [facility] Draw ([facility])    [address]  [address]  [facility] [address]   [phone]            Nov 15, 2018  9:00 AM CST   (Arrive by 8:45 AM)   CT ABDOMEN PELVIS W CONTRAST with UCCT1   [facility] ([facility])    [address]  1st Floor  [facility] [address]   [phone]           Please bring any scans or X-rays taken at other hospitals, if similar tests were done. Also bring a list of your medicines, including vitamins, minerals and over-the-counter drugs. It is safest to leave personal items at home.  Be sure to tell your doctor:   If you have any allergies.   If there s any chance you are pregnant.   If you are breastfeeding.  You may have contrast for this exam. To prepare:   Do not eat or drink for 2 hours before your exam. If you need to take medicine, you may take it with small sips of water. (We may ask you to take liquid medicine as well.)   The day before your exam, drink extra fluids at least  six 8-ounce glasses (unless your doctor tells you to restrict your fluids).   You will be given instructions on how to drink the contrast.  Patients over 70 or patients with diabetes or kidney problems:   If you haven t had a blood test (creatinine test) within the last 30 days, the Cardiologist/Radiologist may require you to get this test prior to your exam.  If you have diabetes:   Continue to take your metformin medication on the day of your exam  Please wear loose clothing, such as a sweat suit or jogging clothes. Avoid snaps, zippers and other metal. We may ask you to undress and put on a hospital gown.  If you have any questions, please call the Imaging Department where you will have your exam.            Nov 15, 2018 12:00 PM CST   (Arrive by 11:45 AM)   Return Visit with Tanner Rojas MD   [facility] [place] ([facility])    [address] [place] [address]  [address]  [facility] [address] [phone]              Future tests that were ordered for you today     Open Future Orders        Priority Expected Expires Ordered    PHYSICAL THERAPY REFERRAL Routine  3/8/2019 3/8/2018    ZOSTER VACCINE RECOMBINANT ADJUVANTED IM NJX ([facility]) Routine  3/8/2019 3/8/2018    SLEEP EVALUATION & MANAGEMENT REFERRAL - [facility] / [facility]  [phone] (Age 2 and up) Routine  3/8/2019 3/8/2018            Who to contact     Please call your clinic at [phone] to:    Ask questions about your health    Make or cancel appointments    Discuss your medicines    Learn about your test results    Speak to your doctor            Additional Information About Your Visit        [employer] Information     "[employer]" gives you secure access to your electronic health record. If you see a primary care provider, you can also send messages to your care team and make appointments. If you have questions, please call your primary care clinic.  If you do not have a  primary care provider, please call [phone] and they will assist you.      [employer] is an electronic gateway that provides easy, online access to your medical records. With [employer], you can request a clinic appointment, read your test results, renew a prescription or communicate with your care team.     To access your existing account, please contact your [facility] or call [phone] for assistance.        Care EveryWhere ID     This is your Care EveryWhere ID. This could be used by other organizations to access your [facility] medical records  [id number]        Your Vitals Were     Pulse BMI (Body Mass Index)                52 28.35 kg/m2           Blood Pressure from Last 3 Encounters:   03/08/18 118/55   12/05/17 131/49   11/18/17 124/65    Weight from Last 3 Encounters:   03/08/18 89.6 kg (197 lb 9.6 oz)   02/02/18 87.5 kg (193 lb)   12/05/17 87.9 kg (193 lb 11.2 oz)               Primary Care Provider    None Specified       No primary provider on file.        Equal Access to Services     PARRISH LOPEZ : Hadii brandy choudhuryo Sotremayne, waaxda luqadaha, qaybta kaalmada adenila, jose miguel lawrence . So [facility] [phone].    ATENCIÓN: Si habla español, tiene a vaughan disposición servicios gratuitos de asistencia lingüística. Llame al [phone].    We comply with applicable federal civil rights laws and [place] laws. We do not discriminate on the basis of race, color, national origin, age, disability, sex, sexual orientation, or gender identity.            Thank you!     Thank you for choosing [facility]  for your care. Our goal is always to provide you with excellent care. Hearing back from our patients is one way we can continue to improve our services. Please take a few minutes to complete the written survey that you may receive in the mail after your visit with us. Thank you!             Your Updated Medication List - Protect others  around you: Learn how to safely use, store and throw away your medicines at www.disposemymeds.org.          This list is accurate as of 3/8/18 11:41 AM.  Always use your most recent med list.                   Brand Name Dispense Instructions for use Diagnosis    acetaminophen 325 MG tablet    TYLENOL    100 tablet    Take 2 tablets (650 mg) by mouth every 6 hours as needed for pain    S/P revision of total hip       acetaminophen-codeine 300-30 MG per tablet    TYLENOL w/CODEINE No. 3    30 tablet    Take 1-2 tablets by mouth every 4 hours as needed for mild pain    Pain in joint, pelvic region and thigh, unspecified laterality       amLODIPine 10 MG tablet    NORVASC    90 tablet    TAKE 1 TABLET DAILY    Benign essential hypertension       aspirin 81 MG EC tablet     90 tablet    Take 1 tablet (81 mg) by mouth daily    Hyperlipidemia LDL goal <70       atorvastatin 20 MG tablet    LIPITOR    90 tablet    Take 1 tablet (20 mg) by mouth daily    Hyperlipidemia LDL goal <70       benazepril 40 MG tablet    LOTENSIN    90 tablet    Take 1 tablet (40 mg) by mouth daily    Essential hypertension, benign       bisacodyl 10 MG Suppository    DULCOLAX    3 suppository    Place 1 suppository rectally daily as needed.    Chronic constipation       desonide 0.05 % ointment    DESOWEN    60 g    Apply topically 2 times daily To affected areas of scale and redness on the face and ears as needed until clear.    Dermatitis, seborrheic       docusate sodium 100 MG tablet    COLACE    60 tablet    Take 100 mg by mouth 2 times daily    BPH (benign prostatic hyperplasia)       gentamicin 0.1 % ointment    GARAMYCIN     Apply topically 4 times daily    B-cell lymphoma of intra-abdominal lymph nodes, unspecified B-cell lymphoma type (H)       melatonin 3 MG tablet      Take 5 mg by mouth At Bedtime        metoprolol succinate 25 MG 24 hr tablet    TOPROL-XL    45 tablet    Take 0.5 tablets (12.5 mg) by mouth daily    Essential  hypertension, benign       MILK OF MAGNESIA PO      Take by mouth At Bedtime        nitroGLYcerin 0.4 MG sublingual tablet    NITROSTAT    30 tablet    Place 1 tablet (0.4 mg) under the tongue every 5 minutes as needed    Coronary artery disease involving native heart without angina pectoris, unspecified vessel or lesion type       N[employer]ri[employer] Fiber packet      Take 1 packet by mouth every morning        psyllium 0.52 G capsule      Take by mouth daily Powder        ranitidine 150 MG tablet    ZANTAC    180 tablet    Take 1 tablet (150 mg) by mouth 2 times daily    Gastroesophageal reflux disease without esophagitis       sildenafil 100 MG tablet    VIAGRA    10 tablet    Take 1 tablet (100 mg) by mouth daily as needed    Erectile dysfunction, unspecified erectile dysfunction type       tamsulosin 0.4 MG capsule    FLOMAX    90 capsule    Take 1 capsule (0.4 mg) by mouth daily    Hypertrophy of prostate without urinary obstruction       tolterodine 2 MG tablet    DETROL    180 tablet    Take 1 tablet (2 mg )twice a day    Hypertonicity of bladder       * triamcinolone 0.025 % cream    KENALOG    45 g    Apply topically 2 times daily    Dermatitis       * triamcinolone 0.1 % ointment    KENALOG    80 g    Apply topically 2 times daily To itchy rashes    Intrinsic atopic dermatitis       VITEYES AREDS FORMULA/LUTEIN Caps      Take by mouth daily        * Notice:  This list has 2 medication(s) that are the same as other medications prescribed for you. Read the directions carefully, and ask your doctor or other care provider to review them with you.

## 2018-03-08 NOTE — NURSING NOTE
Chief Complaint   Patient presents with     Refill Request     Patient here for medication refills and check up.     Adelaida Simpson LPN at 9:41 AM on 3/8/2018.

## 2018-03-14 ENCOUNTER — TELEPHONE (OUTPATIENT)
Dept: INTERNAL MEDICINE | Facility: CLINIC | Age: 83
End: 2018-03-14

## 2018-03-14 DIAGNOSIS — R53.81 PHYSICAL DECONDITIONING: ICD-10-CM

## 2018-03-14 DIAGNOSIS — M25.552 HIP PAIN, LEFT: Primary | ICD-10-CM

## 2018-03-14 NOTE — TELEPHONE ENCOUNTER
----- Message from Hawk Calderon sent at 3/13/2018  2:47 PM CDT -----  Regarding: Referral   Contact: [phone]  Re: PT referral    Wants to see if the referral can be sent to [facility] instead of a [facility] re[facility]. Would like to discuss about this with you.     [facility]  Fax # [phone]  Phone# [phone]    He states it is more convenient for them to do [facility].

## 2018-03-14 NOTE — TELEPHONE ENCOUNTER
Re: PT referral     Wants to see if the referral can be sent to [facility] instead of a [facility] re[facility]. Would like to discuss about this with you.     [facility]   Fax # [phone]   Phone# [phone]     He states it is more convenient for them to do [facility].      Referral sent.  Alka Kellogg RN 1:00 PM on 3/14/2018.

## 2018-03-14 NOTE — TELEPHONE ENCOUNTER
----- Message from Hawk Calderon sent at 3/13/2018  2:47 PM CDT -----  Regarding: Referral   Contact: [phone]  Re: PT referral    Wants to see if the referral can be sent to [facility] instead of a [facility] re[facility]. Would like to discuss about this with you.     [facility]  Fax # [phone]  Phone# [phone]    He states it is more convenient for them to do [facility].           Faxed.  Alka Kellogg RN 1:20 PM on 3/14/2018.

## 2018-03-21 ENCOUNTER — OFFICE VISIT (OUTPATIENT)
Dept: SLEEP MEDICINE | Facility: CLINIC | Age: 83
End: 2018-03-21
Attending: INTERNAL MEDICINE
Payer: MEDICARE

## 2018-03-21 VITALS
BODY MASS INDEX: 28.2 KG/M2 | RESPIRATION RATE: 16 BRPM | SYSTOLIC BLOOD PRESSURE: 139 MMHG | OXYGEN SATURATION: 96 % | HEIGHT: 70 IN | WEIGHT: 197 LBS | DIASTOLIC BLOOD PRESSURE: 64 MMHG | HEART RATE: 56 BPM

## 2018-03-21 DIAGNOSIS — G47.10 EXCESSIVE SLEEPINESS: Primary | ICD-10-CM

## 2018-03-21 PROCEDURE — 99205 OFFICE O/P NEW HI 60 MIN: CPT | Performed by: INTERNAL MEDICINE

## 2018-03-21 NOTE — NURSING NOTE
"Rechecked blood pressure    Vital signs:      BP: 139/64 Pulse: 56   Resp: 16 SpO2: 96 % O2 Device: None (Room air)   Height: 177.8 cm (5' 10\") Weight: 89.4 kg (197 lb)  Estimated body mass index is 28.27 kg/(m^2) as calculated from the following:    Height as of this encounter: 1.778 m (5' 10\").    Weight as of this encounter: 89.4 kg (197 lb).        Ammy Rene [facility] [place] [facility]    "

## 2018-03-21 NOTE — PROGRESS NOTES
Sleep Consultation Note:    Date on this visit: 3/21/2018    Daniel Mcnair is sent by Dr. Lia Dumont for a sleep consultation regarding nighttime awakening and daytime sleepiness.    CC: Daytime sleepiness, Nighttime awakening    Daniel Mcnair 88 year old with PMH hypertension, heart disease, and osteoarthritis who complains frequent nighttime awakenings, and daytime sleepiness. This has been going on for about 2 years.  He thinks he may have had a previous sleep study but cannot recall when or the results.    Sleep Disordered Breathing  Daniel does not complain of snoring, snort arousals, choking/gasping for air, witnessed apneas.  He denies  morning headaches, or non-refreshing sleep, though he starts getting tired sometimes in the afternoon hours. He does have dry mouth and daytime sleepiness.   Daniel has gained 5-10 pounds in the last year.    Sleep Schedule/Sleep Complaints  Patient does not have difficulty with falling asleep. Daniel goes to bed at 8 PM and will listen to the radio for about an hour. It usually takes 10 minutes to fall asleep. He will awake 3-4 times per night to urinate but does not have trouble falling back asleep. He will then wake up around 4-5 AM and not be able to fall back asleep. He will start to have anxiety and ruminating thoughts about not being able to fall back asleep. He is able to get back to sleep sometimes eventually, but thinks that he is more in the lighter sleep and not  getting into deeper sleep.  He wakes up around 7:30 AM.  As he is retired, his schedule remains the same during weekdays and weekends.    Daniel does not complain of restlessness feelings in the legs but does have swelling(chronic) and numbness in both legs, R>L.    Patient does not use electronics in bed - listens to the radio. Patient does have a regular bed partner who notes louder breathing when on his back. Patient sleeps on his back and side.  Patient does not have any pets in the  bedroom at night during sleep.  He does use a sleep aid melatonin.      Sleep Behaviors  He denies any cataplexy, sleep paralysis, sleep hallucinations.    He denies any night time behaviors - sleep walking, sleep talking, sleep eating, or dream enactment behavior.    Daytime Functioning  Daniel naps 1-2 times per day for 60-90 minutes, feels refreshed after naps.    He denies dozing while driving.    Patient's [place] Sleepiness score 4/24.      Social History  Daniel is retired. Lives at a senior facility with his wife.  He does drink caffeine, about 3 drinks/day. Last caffeine intake is  not within 6 hours of bed time.  He drinks alcohol, 1 drink/few weeks.  Does not use alcohol as a sleep aid.  Patient is a never smoker. Secondhand exposure years ago.  Patient does not use drugs.  No future surgeries planned.    Allergies:    Allergies   Allergen Reactions     Nka [No Known Allergies]        Medications:    Current Outpatient Prescriptions   Medication Sig Dispense Refill     atorvastatin (LIPITOR) 20 MG tablet Take 1 tablet (20 mg) by mouth daily 90 tablet 2     metoprolol succinate (TOPROL-XL) 25 MG 24 hr tablet Take 0.5 tablets (12.5 mg) by mouth daily 45 tablet 3     ranitidine (ZANTAC) 150 MG tablet Take 1 tablet (150 mg) by mouth 2 times daily 180 tablet 3     sildenafil (VIAGRA) 100 MG tablet Take 1 tablet (100 mg) by mouth daily as needed 10 tablet 2     benazepril (LOTENSIN) 40 MG tablet Take 1 tablet (40 mg) by mouth daily 90 tablet 3     amLODIPine (NORVASC) 10 MG tablet TAKE 1 TABLET DAILY 90 tablet 2     tolterodine (DETROL) 2 MG tablet Take 1 tablet (2 mg )twice a day 180 tablet 3     tamsulosin (FLOMAX) 0.4 MG capsule Take 1 capsule (0.4 mg) by mouth daily 90 capsule 3     nitroglycerin (NITROSTAT) 0.4 MG sublingual tablet Place 1 tablet (0.4 mg) under the tongue every 5 minutes as needed 30 tablet 2     gentamicin (GARAMYCIN) 0.1 % ointment Apply topically 4 times daily   3      acetaminophen-codeine (TYLENOL W/CODEINE NO. 3) 300-30 MG per tablet Take 1-2 tablets by mouth every 4 hours as needed for mild pain 30 tablet 1     triamcinolone (KENALOG) 0.1 % ointment Apply topically 2 times daily To itchy rashes 80 g 3     desonide (DESOWEN) 0.05 % ointment Apply topically 2 times daily To affected areas of scale and redness on the face and ears as needed until clear. 60 g 11     Multiple Vitamins-Minerals (VITEYES AREDS FORMULA/LUTEIN) CAPS Take by mouth daily       psyllium 0.52 G capsule Take by mouth daily Powder       triamcinolone (KENALOG) 0.025 % cream Apply topically 2 times daily 45 g 0     aspirin 81 MG EC tablet Take 1 tablet (81 mg) by mouth daily 90 tablet 3     acetaminophen (TYLENOL) 325 MG tablet Take 2 tablets (650 mg) by mouth every 6 hours as needed for pain 100 tablet 0     Magnesium Hydroxide (MILK OF MAGNESIA PO) Take by mouth At Bedtime       docusate sodium 100 MG tablet Take 100 mg by mouth 2 times daily 60 tablet 1     bisacodyl (DULCOLAX) 10 MG suppository Place 1 suppository rectally daily as needed. 3 suppository 0     melatonin 3 MG tablet Take 5 mg by mouth At Bedtime        Guar Gum (BENEFIBER) packet Take 1 packet by mouth every morning          Problem List:  Patient Active Problem List    Diagnosis Date Noted     IPMN (intraductal papillary mucinous neoplasm) 11/19/2017     Priority: Medium     Osteoarthritis of multiple joints, unspecified osteoarthritis type 11/18/2017     Priority: Medium     Hyperlipidemia, unspecified hyperlipidemia type 11/18/2017     Priority: Medium     Other fatigue 11/18/2017     Priority: Medium     B-cell lymphoma, unspecified B-cell lymphoma type, unspecified body region (H) 11/18/2017     Priority: Medium     Advance Care Planning 03/28/2017     Priority: Medium     Advance Care Planning 3/28/2017: Receipt of ACP document:  Received: Health Care Directive which was witnessed or notarized on 8/26/2016.  Document previously  scanned on 1/11/2017.  Validation form completed and scanned.  Code Status needs to be updated to reflect choices in most recent ACP choices. Recommend goals of care discussion with patient or legal decision maker(s) to confirm current choices. Update code status order as applicable. Confirmed/documented designated decision maker(s).  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 Provider to review       Sacroiliitis (H) 08/29/2014     Priority: Medium     Pain in joint, pelvic region and thigh 07/03/2014     Priority: Medium     Hemorrhage of gastrointestinal tract 03/12/2014     Priority: Medium     Problem list name updated by automated process. 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 pylori infection 01/24/2012     Priority: Medium     Anal stricture 07/29/2011     Priority: Medium     Idiopathic  gynecomastia 07/29/2011     Priority: Medium     Actinic keratosis 07/21/2011     Priority: Medium     Stenosis of rectum and anus 07/21/2011     Priority: Medium     Hypertrophy of prostate without urinary obstruction 07/21/2011     Priority: Medium     Problem list name updated by automated process. Provider to review       Breast lump 07/21/2011     Priority: Medium     Choroidal detachment 07/21/2011     Priority: Medium     Problem list name updated by automated process. 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 Provider to review       Hemorrhoids 07/21/2011     Priority: Medium     Problem list name updated by automated process. 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 Provider to review       Spinal stenosis, lumbar region, without neurogenic claudication 07/21/2011     Priority: Medium     Neoplasm of uncertain behavior 07/21/2011     Priority: Medium     Problem list name updated by automated process. Provider to review       Senile nuclear sclerosis 07/21/2011     Priority: Medium     Impotence of organic origin 07/21/2011     Priority: Medium     Osteoarthritis 07/21/2011     Priority: Medium     Problem list name updated by automated process. Provider to review       Hematoma complicating a procedure 07/21/2011     Priority: Medium     Vitreous degeneration 07/21/2011     Priority: Medium     Lens replaced by other means 07/21/2011     Priority: Medium     Other seborrheic keratosis 07/21/2011     Priority: Medium     Senile  cataract 07/21/2011     Priority: Medium     Problem list name updated by automated process. Provider to review          Past Medical/Surgical History:  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     Macular degeneration, dry      Other and unspecified hyperlipidemia 7/21/2011     Snoring      Spinal stenosis, lumbar region, without neurogenic claudication 7/21/2011     Stented coronary artery 2003     Unspecified essential hypertension 7/21/2011     Past Surgical History:   Procedure Laterality Date     ARTHROPLASTY HIP  10/2008    left     ARTHROPLASTY HIP  1999    right     ARTHROPLASTY REVISION HIP  1/21/2014    Procedure: ARTHROPLASTY REVISION HIP;  Revision Left Total Hip;  Surgeon: Edgar Grewal MD;  Location: [facility] OR     BACK SURGERY  2006     CARDIAC SURGERY  2001    stent x 5      CARDIAC SURGERY  2002     CATARACT IOL, RT/LT Bilateral      CYSTOSCOPY, TRANSURETHRAL RESECTION (TUR) PROSTATE, COMBINED  11/5/2013    Procedure: COMBINED CYSTOSCOPY, TRANSURETHRAL RESECTION (TUR) PROSTATE;  Transurethral Resection Of Prostate, Bipolar;  Surgeon: Lai Street MD;  Location: [facility] OR     ESOPHAGOSCOPY, GASTROSCOPY, DUODENOSCOPY (EGD), COMBINED  2/3/2014    Procedure: COMBINED ESOPHAGOSCOPY, GASTROSCOPY, DUODENOSCOPY (EGD);;  Surgeon: Ezra Fiore MD;  Location: [facility] GI     ESOPHAGOSCOPY, GASTROSCOPY, DUODENOSCOPY (EGD), COMBINED  4/15/2014    Procedure: COMBINED ESOPHAGOSCOPY, GASTROSCOPY, DUODENOSCOPY (EGD), BIOPSY SINGLE OR MULTIPLE;  Surgeon: Ezra Fiore MD;  Location: [facility] GI     EXTRACAPSULAR  CATARACT EXTRATION WITH INTRAOCULAR LENS IMPLANT  2005     EXTRACAPSULAR CATARACT EXTRATION WITH INTRAOCULAR LENS IMPLANT  2010     HERNIA REPAIR       MASTOID SURGERY  1939     MOHS MICROGRAPHIC PROCEDURE       OPTICAL TRACKING SYSTEM FUSION SPINE POSTERIOR LUMBAR TWO LEVELS  7/11/2013    Procedure: OPTICAL TRACKING SYSTEM FUSION SPINE POSTERIOR LUMBAR TWO LEVELS;  Stealth Assisted Lumbar 3-4 Transforaminal Lumbar Interbody Fusion, Lumbar 2-3 Lumbar 3-4 Laminectomy Decompression;  Surgeon: Edgar Lew MD;  Location:  OR       Social History:  Social History     Social History     Marital status:      Spouse name: N/A     Number of children: N/A     Years of education: N/A     Occupational History     Not on file.     Social History Main Topics     Smoking status: Never Smoker     Smokeless tobacco: Never Used     Alcohol use 1.0 oz/week      Comment: occ     Drug use: No     Sexual activity: Not on file     Other Topics Concern     Not on file     Social History Narrative       Family History:  Family History   Problem Relation Age of Onset     DIABETES Maternal Grandfather      Alcohol/Drug Maternal Grandfather      Arthritis Maternal Grandfather      Obesity Maternal Grandfather      CEREBROVASCULAR DISEASE Maternal Grandmother      Hypertension Mother      willa rand     Glaucoma No family hx of      Macular Degeneration No family hx of      CANCER No family hx of      No known family hx of skin cancer     Skin Cancer No family hx of        Review of Systems:  The complete ROS was completed. In addition to symptoms listed under HPI, and the symptoms listed below, the rest of the ROS is negative:  CONSTITUTIONAL: NEGATIVE for weight gain/loss, fever, chills, sweats or night sweats, drug allergies.  EYES: POSITIVE for changes in vision and double vision, follows with ophthalmology.  ENT: NEGATIVE for ear pain, sore throat, sinus pain, post-nasal drip, runny nose, bloody nose. POSITIVE  "for congestion.  CARDIAC: NEGATIVE for fast heartbeats or fluttering in chest, chest pain or pressure, breathlessness when lying flat. POSITIVE for irregular hear rate and swollen legs or swollen feet.  NEUROLOGIC: NEGATIVE headaches, weakness or numbness in the arms or legs.  DERMATOLOGIC: NEGATIVE for rashes. POSITIVE for new moles or change in mole(s)  PULMONARY: NEGATIVE SOB at rest, productive cough, coughing up blood, wheezing or whistling when breathing.  POSITIVE for SOB with activity, dry cough  GASTROINTESTINAL: NEGATIVE for nausea or vomitting, loose or watery stools, fat or grease in stools, abdominal pain, bowel movements black in color or blood noted. POSITIVE for constipation.  GENITOURINARY: NEGATIVE for pain during urination, blood in urine, urinating more frequently than usual  MUSCULOSKELETAL: POSITIVE for muscle pain, bone or joint pain, swollen joints.  ENDOCRINE: POSITIVE for increased thirst or urination, NEGATIVE for diabetes.  LYMPHATIC: POSITIVE for swollen lymph nodes (history of cancer).  PSYCHE: NEGATIVE for depression, POSITIVE for anxiety    Physical Examination:  Vitals: /68  Pulse 72  Resp 16  Ht 1.778 m (5' 10\")  Wt 89.4 kg (197 lb)  SpO2 96%  BMI 28.27 kg/m2  BMI= Body mass index is 28.27 kg/(m^2).    Neck Cir (cm): 41 cm    [place] Total Score 3/21/2018   Total score - [place] 4       General: No apparent distress, appropriately groomed  Head: Normocephalic, atraumatic  Eyes: no icterus, PERRL  Nose: Nares patent. No exudate/erythema. No septal deviation noted.  Mouth: op pink and moist, teeth: dentures on bottom, tongue: normal,  Orophraynx: Opening is narrowed, uvula: visualized, normal   Mallampati Class: III.   Tonsillar Stage: 1  hidden by pillars.  Neck: Supple, Circumference: 16 inches  Cardiac: Regular rate and rhythm  Chest: Symmetric air movement, lungs clear to auscultation bilaterally  Extremities: Bilateral  LE1+ edema noted  Skin: Warm, dry, " intact  Psych: Mood pleasant, affect congruent  Neuro:  Mental status: Awake, alert, attentive, oriented.  Motor: Tone within normal limit, uses a cane while walking  No focal neurological deficit       Impression/Plan:    Chronic insomnia(maintenance)  Daytime sleepiness    Patient is an 88 year old male with history of hypertension, heart disease, and osteoarthritis is being evaluated for trouble maintaining sleep and daytime sleepiness.  STOP BANG score is 4. Possible these symptoms of waking regularly at night and daytime sleepiness are related to ASHLEY, especially given age, history of heart disease, and regularity of his awakenings. He may have REM related ASHLEY as he awakes in the later part of his sleep cycle, when REM is most prominent. Physical exam significant for crowded oropharynx. Also could consider age appropriate sleep patterns. We discussed circadian rhythm changes that happen with age and that an earlier bedtime and awakening is expected in the elderly, however his primary care provider does seem concerned about excessive daytime fatigue and sleepiness.     Discussed risks vs benefits of performing PSG. Discussed it would be reasonable to monitor symptoms with his age, however the only way find etiology of sleep problem and diagnose ASHLEY would be a home sleep study/supervised sleep study. We discussed polysomnography, consequences of untreated sleep apnea and the treatment options and he is willing to consider CPAP if it is indicated . Recommend in-lab sleep study as he is intermediate risk for ASHLEY. Discussed that given his age, CPAP would only improve sleep quality and daytime sleepiness but would not provide any preventative cardiovascular benefits. Patient has agreed to this plan.    Also discussed sleep hygiene to help with symptoms, including going to bed only when tired, stimulus control measures, not engaging in stimulating activity before bed, and limiting daytime naps to <40  "minutes.    Patient was strongly advised to avoid driving, operating any heavy machinery or other hazardous situations while drowsy or sleepy.  Patient was counseled on the importance of driving while alert, to pull over if drowsy, or nap before getting into the vehicle if sleepy.        He will follow up with me in approximately two weeks after his sleep study has been competed to review the results and discuss plan of care.        CC: Lia Dumont      Scribed by Griselda Mancia, MS4 for Dr. Sierra.      Scribe Disclosure:   I,Griselda Mancia, MS4 , am serving as a scribe; to document services personally performed by Betzaida Vasquez- -based on data collection and the provider's statements to me.     Provider Disclosure:  I, Betzaida Vasquez, agree with above History, Review of Systems, Physical exam and Plan.  I have reviewed the content of the documentation and have edited it as needed. I have personally performed the services documented here and the documentation accurately represents those services and the decisions I have made.        \"I spent a total of 60  minutes face to face with Daniel Mcnair during today's office visit. Over 50% of this time was spent counseling the patient and  coordinating care regarding sleep apnea, insomnia, sleep hygiene .\"       Betzaida Vasquez MD   of Medicine,  Division of Pulmonary/Sleep Medicine  [facility].                "

## 2018-03-21 NOTE — MR AVS SNAPSHOT
After Visit Summary   3/21/2018    Daniel Mcnair    MRN: [id number]           Patient Information     Date Of Birth          10/17/1929        Visit Information        Provider Department      3/21/2018 12:00 PM Betzaida Vasquez MD [facility], [place], Sleep Study        Today's Diagnoses     Excessive sleepiness    -  1      Care Instructions      Your BMI is Body mass index is 28.27 kg/(m^2).  Weight management is a personal decision.  If you are interested in exploring weight loss strategies, the following discussion covers the approaches that may be successful. Body mass index (BMI) is one way to tell whether you are at a healthy weight, overweight, or obese. It measures your weight in relation to your height.  A BMI of 18.5 to 24.9 is in the healthy range. A person with a BMI of 25 to 29.9 is considered overweight, and someone with a BMI of 30 or greater is considered obese. More than two-thirds of American adults are considered overweight or obese.  Being overweight or obese increases the risk for further weight gain. Excess weight may lead to heart disease and diabetes.  Creating and following plans for healthy eating and physical activity may help you improve your health.  Weight control is part of healthy lifestyle and includes exercise, emotional health, and healthy eating habits. Careful eating habits lifelong are the mainstay of weight control. Though there are significant health benefits from weight loss, long-term weight loss with diet alone may be very difficult to achieve- studies show long-term success with dietary management in less than 10% of people. Attaining a healthy weight may be especially difficult to achieve in those with severe obesity. In some cases, medications, devices and surgical management might be considered.  What can you do?  If you are overweight or obese and are interested in methods for weight loss, you should discuss this with your  provider.     Consider reducing daily calorie intake by 500 calories.     Keep a food journal.     Avoiding skipping meals, consider cutting portions instead.    Diet combined with exercise helps maintain muscle while optimizing fat loss. Strength training is particularly important for building and maintaining muscle mass. Exercise helps reduce stress, increase energy, and improves fitness. Increasing exercise without diet control, however, may not burn enough calories to loose weight.       Start walking three days a week 10-20 minutes at a time    Work towards walking thirty minutes five days a week     Eventually, increase the speed of your walking for 1-2 minutes at time    In addition, we recommend that you review healthy lifestyles and methods for weight loss available through the National Institutes of Health patient information sites:  http://win.niddk.nih.gov/publications/index.htm    And look into health and wellness programs that may be available through your health insurance provider, employer, local community center, or alondra club.    Your blood pressure was checked while you were in clinic today.  Please read the guidelines below about what these numbers mean and what you should do about them.  Your systolic blood pressure is the top number.  This is the pressure when the heart is pumping.  Your diastolic blood pressure is the bottom number.  This is the pressure in between beats.  If your systolic blood pressure is less than 120 and your diastolic blood pressure is less than 80, then your blood pressure is normal. There is nothing more that you need to do about it  If your systolic blood pressure is 120-139 or your diastolic blood pressure is 80-89, your blood pressure may be higher than it should be.  You should have your blood pressure re-checked within a year by a primary care provider.  If your systolic blood pressure is 140 or greater or your diastolic blood pressure is 90 or greater, you may  "have high blood pressure.  High blood pressure is treatable, but if left untreated over time it can put you at risk for heart attack, stroke, or kidney failure.  You should have your blood pressure re-checked by a primary care provider within the next four weeks.        MY TREATMENT INFORMATION FOR SLEEP APNEA-  Daniel Mcnair    DOCTOR : [facility]  SLEEP CENTER :      MY CONTACT NUMBER:     Am I having a sleep study at a sleep center?  Make sure you have an appointment for the study before you leave!    Am I having a home sleep study?  Watch this video:  https://www.[employer].com/watch?v=CteI_GhyP9g&list=[email or web address]    Frequently asked questions:  1. What is Obstructive Sleep Apnea (ASHLEY)? ASHLEY is the most common type of sleep apnea. Apnea means, \"without breath.\"  Apnea is most often caused by narrowing or collapse of the upper airway as muscles relax during sleep.   Almost everyone has occasional apneas. Most people with sleep apnea have had brief interruptions at night frequently for many years.  The severity of sleep apnea is related to how frequent and severe the events are.   2. What are the consequences of ASHLEY? Symptoms include: feeling sleepy during the day, snoring loudly, gasping or stopping of breathing, trouble sleeping, and occasionally morning headaches or heartburn at night.  Sleepiness can be serious and even increase the risk of falling asleep while driving. Other health consequences may include development of high blood pressure and other cardiovascular disease in persons who are susceptible. Untreated ASHLEY  can contribute to heart disease, stroke and diabetes.   3. What are the treatment options? In most situations, sleep apnea is a lifelong disease that must be managed with daily therapy. Medications are not effective for sleep apnea and surgery is generally not considered until other therapies have been tried. Your treatment is your choice . " Continuous Positive Airway (CPAP) works right away and is the therapy that is effective in nearly everyone. An oral device to hold your jaw forward is usually the next most reliable option. Other options include postioning devices (to keep you off your back), weight loss, and surgery including a tongue pacing device. There is more detail about some of these options below.    Important tips for using CPAP and similar devices   Know your equipment:  CPAP is continuous positive airway pressure that prevents obstructive sleep apnea by keeping the throat from collapsing while you are sleeping. In most cases, the device is  smart  and can slowly self-adjusts if your throat collapses and keeps a record every day of how well you are treated-this information is available to you and your care team.  BPAP is bilevel positive airway pressure that keeps your throat open and also assists each breath with a pressure boost to maintain adequate breathing.  Special kinds of BPAP are used in patients who have inadequate breathing from lung or heart disease. In most cases, the device is  smart  and can slowly self-adjusts to assist breathing. Like CPAP, the device keeps a record of how well you are treated.  Your mask is your connection to the device. You get to choose what feels most comfortable and the staff will help to make sure if fits. Here: are some examples of the different masks that are available:       Key points to remember on your journey with sleep apnea:  1. Sleep study.  PAP devices often need to be adjusted during a sleep study to show that they are effective and adjusted right.  2. Good tips to remember: Try wearing just the mask during a quiet time during the day so your body adapts to wearing it. A humidifier is recommended for comfort in most cases to prevent drying of your nose and throat. Allergy medication from your provider may help you if you are having nasal congestion.  3. Getting settled-in. It takes more  than one night for most of us to get used to wearing a mask. Try wearing just the mask during a quiet time during the day so your body adapts to wearing it. A humidifier is recommended for comfort in most cases. Our team will work with you carefully on the first day and will be in contact within 4 days and again at 2 and 4 weeks for advice and remote device adjustments. Your therapy is evaluated by the device each day.   4. Use it every night. The more you are able to sleep naturally for 7-8 hours, the more likely you will have good sleep and to prevent health risks or symptoms from sleep apnea. Even if you use it 4 hours it helps. Occasionally all of us are unable to use a medical therapy, in sleep apnea, it is not dangerous to miss one night.   5. Communicate. Call our skilled team on the number provided on the first day if your visit for problems that make it difficult to wear the device. Over 2 out of 3 patients can learn to wear the device long-term with help from our team. Remember to call our team or your sleep providers if you are unable to wear the device as we may have other solutions for those who cannot adapt to mask CPAP therapy. It is recommended that you sleep your sleep provider within the first 3 months and yearly after that if you are not having problems.   Take care of your equipment. Make sure you clean your mask and tubing using directions every day and that your filter and mask are replaced as recommended or if they are not working.     BESIDES CPAP, WHAT OTHER THERAPIES ARE THERE?    Positioning Device  Positioning devices are generally used when sleep apnea is mild and only occurs on your back.This example shows a pillow that straps around the waist. It may be appropriate for those whose sleep study shows milder sleep apnea that occurs primarily when lying flat on one's back. Preliminary studies have shown benefit but effectiveness at home may need to be verified by a home sleep test. These  devices are generally not covered by medical insurance.  Examples of devices that maintain sleeping on the back to prevent snoring and mild sleep apnea.    Belt type body positioner  Http://[employer].com/    Electronic reminder  Http://nightshifttherapy.com/  Http://www.[employer].au/    Oral Appliance  What is oral appliance therapy?  An oral appliance device fits on your teeth at night like a retainer used after having braces. The device is made by a specialized dentist and requires several visits over 1-2 months before a manufactured device is made to fit your teeth and is adjusted to prevent your sleep apnea. Once an oral device is working properly, snoring should be improved. A home sleep test may be recommended at that time if to determine whether the sleep apnea is adequately treated.       Some things to remember:  -Oral devices are often, but not always, covered by your medical insurance. Be sure to check with your insurance provider.   -If you are referred for oral therapy, you will be given a list of specialized dentists to consider or you may choose to visit the Web site of the American Academy of Dental Sleep Medicine  -Oral devices are less likely to work if you have severe sleep apnea or are extremely overweight.     More detailed information  An oral appliance is a small acrylic device that fits over the upper and lower teeth  (similar to a retainer or a mouth guard). This device slightly moves jaw forward, which moves the base of the tongue forward, opens the airway, improves breathing for effective treat snoring and obstructive sleep apnea in perhaps 7 out of 10 people .  The best working devices are custom-made by a dental device  after a mold is made of the teeth 1, 2, 3.  When is an oral appliance indicated?  Oral appliance therapy is recommended as a first-line treatment for patients with primary snoring, mild sleep apnea, and for patients with moderate sleep apnea who prefer  appliance therapy to use of CPAP4, 5. Severity of sleep apnea is determined by sleep testing and is based on the number of respiratory events per hour of sleep.   How successful is oral appliance therapy?  The success rate of oral appliance therapy in patients with mild sleep apnea is 75-80% while in patients with moderate sleep apnea it is 50-70%. The chance of success in patients with severe sleep apnea is 40-50%. The research also shows that oral appliances have a beneficial effect on the cardiovascular health of ASHLEY patients at the same magnitude as CPAP therapy7.  Oral appliances should be a second-line treatment in cases of severe sleep apnea, but if not completely successful then a combination therapy utilizing CPAP plus oral appliance therapy may be effective. Oral appliances tend to be effective in a broad range of patients although studies show that the patients who have the highest success are females, younger patients, those with milder disease, and less severe obesity. 3, 6.   Finding a dentist that practices dental sleep medicine  Specific training is available through the American Academy of Dental Sleep Medicine for dentists interested in working in the field of sleep. To find a dentist who is educated in the field of sleep and the use of oral appliances, near you, visit the Web site of the American Academy of Dental Sleep Medicine.    References  1. Zackary, et al. Objectively measured vs self-reported compliance during oral appliance therapy for sleep-disordered breathing. Chest 2013; 144(5): 9496-7875.  2. Melisa et al. Objective measurement of compliance during oral appliance therapy for sleep-disordered breathing. Thorax 2013; 68(1): 91-96.  3. Suellen et al. Mandibular advancement devices in 620 men and women with ASHLEY and snoring: tolerability and predictors of treatment success. Chest 2004; 125: 3079-3992.  4. Festus, et al. Oral appliances for snoring and ASHLEY: a review. Sleep  2006; 29: 244-262.  5. Diana et al. Oral appliance treatment for ASHLEY: an update. J Clin Sleep Med 2014; 10(2): 215-227.  6. Mirian et al. Predictors of OSAH treatment outcome. J Dent Res 2007; 86: 8647-5417.      Weight Loss:    Weight loss is a long-term strategy that may improve sleep apnea in some patients.    Weight management is a personal decision and the decision should be based on your interest and the potential benefits.  If you are interested in exploring weight loss strategies, the following discussion covers the impact on weight loss on sleep apnea and the approaches that may be successful.    Being overweight does not necessarily mean you will have health consequences.  Those who have BMI over 35 or over 27 with existing medical conditions carries greater risk.   Weight loss decreases severity of sleep apnea in most people with obesity. For those with mild obesity who have developed snoring with weight gain, even 15-30 pound weight loss can improve and occasionally eliminate sleep apnea.  Structured and life-long dietary and health habits are necessary to lose weight and keep healthier weight levels.     Though there may be significant health benefits from weight loss, long-term weight loss is very difficult to achieve- studies show success with dietary management in less than 10% of people. In addition, substantial weight loss may require years of dietary control and may be difficult if patients have severe obesity. In these cases, surgical management may be considered.  Finally, older individuals who have tolerated obesity without health complications may be less likely to benefit from weight loss strategies.        Your BMI is Body mass index is 28.27 kg/(m^2).  Weight management is a personal decision.  If you are interested in exploring weight loss strategies, the following discussion covers the approaches that may be successful. Body mass index (BMI) is one way to tell whether you are at a  healthy weight, overweight, or obese. It measures your weight in relation to your height.  A BMI of 18.5 to 24.9 is in the healthy range. A person with a BMI of 25 to 29.9 is considered overweight, and someone with a BMI of 30 or greater is considered obese. More than two-thirds of American adults are considered overweight or obese.  Being overweight or obese increases the risk for further weight gain. Excess weight may lead to heart disease and diabetes.  Creating and following plans for healthy eating and physical activity may help you improve your health.  Weight control is part of healthy lifestyle and includes exercise, emotional health, and healthy eating habits. Careful eating habits lifelong are the mainstay of weight control. Though there are significant health benefits from weight loss, long-term weight loss with diet alone may be very difficult to achieve- studies show long-term success with dietary management in less than 10% of people. Attaining a healthy weight may be especially difficult to achieve in those with severe obesity. In some cases, medications, devices and surgical management might be considered.  What can you do?  If you are overweight or obese and are interested in methods for weight loss, you should discuss this with your provider.     Consider reducing daily calorie intake by 500 calories.     Keep a food journal.     Avoiding skipping meals, consider cutting portions instead.    Diet combined with exercise helps maintain muscle while optimizing fat loss. Strength training is particularly important for building and maintaining muscle mass. Exercise helps reduce stress, increase energy, and improves fitness. Increasing exercise without diet control, however, may not burn enough calories to loose weight.       Start walking three days a week 10-20 minutes at a time    Work towards walking thirty minutes five days a week     Eventually, increase the speed of your walking for 1-2 minutes at  time    In addition, we recommend that you review healthy lifestyles and methods for weight loss available through the National Institutes of Health patient information sites:  http://win.niddk.nih.gov/publications/index.htm    And look into health and wellness programs that may be available through your health insurance provider, employer, local community center, or alondra club.          Surgery:    Surgery for obstructive sleep apnea is considered generally only when other therapies fail to work. Surgery may be discussed with you if you are having a difficult time tolerating CPAP and or when there is an abnormal structure that requires surgical correction.  Nose and throat surgeries often enlarge the airway to prevent collapse.  Most of these surgeries create pain for 1-2 weeks and up to half of the most common surgeries are not effective throughout life.  You should carefully discuss the benefits and drawbacks to surgery with your sleep provider and surgeon to determine if it is the best solution for you.   More information  Surgery for ASHLEY is directed at areas that are responsible for narrowing or complete obstruction of the airway during sleep.  There are a wide range of procedures available to enlarge and/or stabilize the airway to prevent blockage of breathing in the three major areas where it can occur: the palate, tongue, and nasal regions.  Successful surgical treatment depends on the accurate identification of the factors responsible for obstructive sleep apnea in each person.  A personalized approach is required because there is no single treatment that works well for everyone.  Because of anatomic variation, consultation with an examination by a sleep surgeon is a critical first step in determining what surgical options are best for each patient.  In some cases, examination during sedation may be recommended in order to guide the selection of procedures.  Patients will be counseled about risks and  benefits as well as the typical recovery course after surgery. Surgery is typically not a cure for a person s ASHLEY.  However, surgery will often significantly improve one s ASHLEY severity (termed  success rate ).  Even in the absence of a cure, surgery will decrease the cardiovascular risk associated with OSA7; improve overall quality of life8 (sleepiness, functionality, sleep quality, etc).      Palate Procedures:  Patients with ASHLEY often have narrowing of their airway in the region of their tonsils and uvula.  The goals of palate procedures are to widen the airway in this region as well as to help the tissues resist collapse.  Modern palate procedure techniques focus on tissue conservation and soft tissue rearrangement, rather than tissue removal.  Often the uvula is preserved in this procedure. Residual sleep apnea is common in patient after pharyngoplasty with an average reduction in sleep apnea events of 33%2.      Tongue Procedures:  ExamWhile patients are awake, the muscles that surround the throat are active and keep this region open for breathing. These muscles relax during sleep, allowing the tongue and other structures to collapse and block breathing.  There are several different tongue procedures available.  Selection of a tongue base procedure depends on characteristics seen on physical exam.  Generally, procedures are aimed at removing bulky tissues in this area or preventing the back of the tongue from falling back during sleep.  Success rates for tongue surgery range from 50-62%3.    Hypoglossal Nerve Stimulation:  Hypoglossal nerve stimulation has recently received approval from the [place] Food and Drug Administration for the treatment of obstructive sleep apnea.  This is based on research showing that the system was safe and effective in treating sleep apnea6.  Results showed that the median AHI score decreased 68%, from 29.3 to 9.0. This therapy uses an implant system that senses breathing  patterns and delivers mild stimulation to airway muscles, which keeps the airway open during sleep.  The system consists of three fully implanted components: a small generator (similar in size to a pacemaker), a breathing sensor, and a stimulation lead.  Using a small handheld remote, a patient turns the therapy on before bed and off upon awakening.    Candidates for this device must be greater than 22 years of age, have moderate to severe ASHLEY (AHI between 20-65), BMI less than 32, have tried CPAP/oral appliance without success, and have appropriate upper airway anatomy (determined by a sleep endoscopy performed by Dr. Bone).    Hypoglossal Nerve Stimulation Pathway:    The sleep surgeon s office will work with the patient through the insurance prior-authorization process (including communications and appeals).    Nasal Procedures:  Nasal obstruction can interfere with nasal breathing during the day and night.  Studies have shown that relief of nasal obstruction can improve the ability of some patients to tolerate positive airway pressure therapy for obstructive sleep apnea1.  Treatment options include medications such as nasal saline, topical corticosteroid and antihistamine sprays, and oral medications such as antihistamines or decongestants. Non-surgical treatments can include external nasal dilators for selected patients. If these are not successful by themselves, surgery can improve the nasal airway either alone or in combination with these other options.      Combination Procedures:  Combination of surgical procedures and other treatments may be recommended, particularly if patients have more than one area of narrowing or persistent positional disease.  The success rate of combination surgery ranges from 66-80%2,3.    References  1. Francisco HALE. The Role of the Nose in Snoring and Obstructive Sleep Apnoea: An Update.  Eur Arch Otorhinolaryngol. 2011; 268: 1365-73.  2.  Anthony MILLER; Nell BARRON; Sherry ERVIN;  Pallanch JF; Tito MB; Dariusz SG; Les RAMOS. Surgical modifications of the upper airway for obstructive sleep apnea in adults: a systematic review and meta-analysis. SLEEP 2010;33(10):8372-6712. Bulmaro MICHAEL. Hypopharyngeal surgery in obstructive sleep apnea: an evidence-based medicine review.  Arch Otolaryngol Head Neck Surg. 2006 Feb;132(2):206-13.  3. Jonas YH1, Kristian Y, Jean SOFIYA. The efficacy of anatomically based multilevel surgery for obstructive sleep apnea. Otolaryngol Head Neck Surg. 2003 Oct;129(4):327-35.  4. Bulmaro MICHAEL, Goldberg A. Hypopharyngeal Surgery in Obstructive Sleep Apnea: An Evidence-Based Medicine Review. Arch Otolaryngol Head Neck Surg. 2006 Feb;132(2):206-13.  5. Job ZARAGOZA et al. Upper-Airway Stimulation for Obstructive Sleep Apnea.  N Engl J Med. 2014 Jan 9;370(2):139-49.  6. Josafat Y et al. Increased Incidence of Cardiovascular Disease in Middle-aged Men with Obstructive Sleep Apnea. Am J Respir Crit Care Med; 2002 166: 159-165  7. Cronin EM et al. Studying Life Effects and Effectiveness of Palatopharyngoplasty (SLEEP) study: Subjective Outcomes of Isolated Uvulopalatopharyngoplasty. Otolaryngol Head Neck Surg. 2011; 144: 623-631.    [facility]      Treating Insomnia  Good sleeping habits are a key part of treatment. If needed, some medications may help you sleep better at first. Making healthy lifestyle changes and learning to relax can improve your sleep. Treating insomnia takes commitment, but trust that your efforts will pay off. Talk to your doctor before taking any medication.    Healthy Lifestyle  Your lifestyle affects your health and your sleep. Here are some healthy habits:    Keep a regular sleep schedule. Go to bed and get up at the same time each day.    Exercise regularly. It may help you reduce stress. Avoid strenuous exercise for two to four hours before bedtime.    Avoid or limit naps.    Use your bed only for sleep and sex.    Don t spend too much time in bed  trying to fall asleep. If you can t fall asleep, get up and do something until you become tired and drowsy.    Avoid or limit caffeine and nicotine. They can keep you awake at night. Also avoid alcohol. It may help you fall asleep at first, but your sleep will not be restful.    Before Bedtime  To sleep better every night, try these tips:    Have a bedtime routine to let your body and mind know when it s time to sleep.    Going to bed should be relaxing so try to do only relaxing things around bedtime. Sleep will come sooner.    If your worries don t let you sleep, write them down in a diary. Then close it, and go to bed.    Make sure the room is not too hot or too cold. If it s not dark enough, an eye mask can help. If it s noisy, try using earplugs.    Learn to Relax  Stress, anxiety, and body tension may keep you awake at night. To unwind before bedtime, try reading a book, meditation, or yoga. Also, try the following:    Deep breathing. Sit or lie back in a chair. Take a slow, deep breath. Hold it for 5 counts. Then breathe out slowly through your mouth. Keep doing this until you feel relaxed.    Imagery. Think of the last fun trip you took. In your mind, walk through the trip from start to finish. Put as much detail into the memory as you can remember. It will help you relax.    Cognitive Behavioral Treatment (CBT)  CBT is the most effective treatment for long-term insomnia. It tries to address the underlying causes of your sleep problems, including your habits and how you think about sleep.      Individual Therapy   Mahesh Guzman    Sleep Psychologist      Online Programs     www.SHUTi.me (pronounced shut eye). There is a fee for this program. Enter the code  [place]  if you decide to enroll in this program.      www.[email or web address].com (pronounced sleep ee oh). There is a fee for this program. Enter the code  [place]  if you decide to enroll in this program.     Suggested Resources  Insomnia Treatment Books      Overcoming Insomnia by Rico Ball and Char Solomon (2008)    No More Sleepless Nights by Benjamin Bain and Madelin Thompson (1996)    Say Renetta to Insomnia by Bryan Martínez (2009)    The Insomnia Workbook by Ingris Calles and Fly Fortune (2009)    The Insomnia Answer by Heron Rocha and Albaro Moya (2006)      Stress Management and Relaxation Books    The Relaxation and Stress Reduction Workbook by Rayna Jerry, Aniya Clifton and Tony Kim (2008)    Stress Management Workbook: Techniques and Self-Assessment Procedures by Candelaria Saez and Cayetano Ortiz (1997)    A Mindfulness-Based Stress Reduction Workbook by Cody Esquivel and Estela Streeter (2010)    The Complete Stress Management Workbook by Matteo Maradiaga, Camacho Camara and Gurpreet Mancia (1996)    Assert Yourself by Chiquita Vasquez and Boston Vasquez (1977)    Relaxation Resources for Computer Download   These websites offer resources to help you relax. This list is for information only. [facility] is not responsible for the quality of services or the actions of any person or organization.  Progressive Muscle Relaxation (PMR):     http://www.Socrata/progressive-muscle-relaxation-exercise.html     http://studentsupport.[facility]/counseling/resources/self-help/relaxation-and-stress-management/   Deep Breathing Exercises:    http://www.[employer].Ubiq Mobile/breathing-awareness.html     Meditation:     www.Trifacta    www.theSemprus BioSciencesguidedSemprus BioSciencesmeditation-site.com You may have to pay for some of these resources.    Guided Imagery:    http://www.Socrata/guided-imagery-scripts.html     http://Tourlandish/library/zdbslmvorp-naahyk-zmwaiai/     Counseling / Behavioral Health  [facility]  Visit [email or web address] or call [phone] to find a clinic close to you.      This is not a prescription and these resources are optional. You must pay for any  costs when using these resources. Please ask your insurance carrier if you can be reimbursed for these resources. If so, you are responsible for sending the needed details to your insurance carrier. These resources may also be tax deductible as medical expenses. Check with your .     These programs and publications are not affiliated in any way with [facility].    Please do not drive if drowsy or sleepy;  pull over if drowsy.                Follow-ups after your visit        Follow-up notes from your care team     Return in about 2 weeks (around 4/4/2018).      Your next 10 appointments already scheduled     Mar 23, 2018  1:30 PM CDT   Neuro Eval with Anahy Ford, PT   [facility] ([facility])    [address]  [address]  [facility] [address]   [phone]            Apr 16, 2018  9:00 AM CDT   Hearing Aid Consult with Stephie Greer [facility] ([facility])    [address] [place] [address]  4th [facility] [address] [phone]            Apr 18, 2018  8:00 PM CDT   PSG Split with SLEEP STUDY RM 5   [facility], Sleep Study ([facility])    [address] [place] [address]   [phone]            May 02, 2018 10:30 AM CDT   Return Sleep Patient with Betzaida Vasquez MD   [facility], Sleep Study ([facility])    [address] [place] [id number] [phone]            May 07, 2018 10:00 AM CDT   Hearing Aid Fitting with Stephie Greer [facility] ([facility])    [address] [place] [address] [phone]            May 10, 2018 10:20 AM CDT   (Arrive by 10:05 AM)   Return Visit with Lia Dumont MD   [facility] ([facility])    [address] [place] [address]  [place] [address]  [address]th [facility] [address]   [phone]            May 30, 2018  9:00 AM CDT   (Arrive by 8:45 AM)   Initial Review Program with Stephie Greer   [facility] ([facility])    [address]  4th [facility] [address]   [phone]            Jun 25, 2018  9:45 AM CDT   RETURN RETINA with Luna Parker MD   [facility] ([facility])    [address]  [address] [address]  [facility] [address]   [phone]            Nov 15, 2018  9:00 AM CST   (Arrive by 8:45 AM)   CT ABDOMEN PELVIS W CONTRAST with UCCT1   [facility] [place] [facility] ([facility])    [address]  [address]st [facility] [address]   [phone]           Please bring any scans or X-rays taken at other hospitals, if similar tests were done. Also bring a list of your medicines, including vitamins, minerals and over-the-counter drugs. It is safest to leave personal items at home.  Be sure to tell your doctor:   If you have any allergies.   If there s any chance you are pregnant.   If you are breastfeeding.  You may have contrast for this exam. To prepare:   Do not eat or drink for 2 hours before your exam. If you need to take medicine, you may take it with small sips of water. (We may ask you to take liquid medicine as well.)   The day before your exam, drink extra fluids at least six 8-ounce glasses (unless your doctor tells you to restrict your fluids).   You will be given instructions on how to drink the contrast.  Patients over 70 or patients with diabetes or kidney problems:   If you haven t had a blood test (creatinine test) within the last 30 days, the Cardiologist/Radiologist may require you to get this test prior to your exam.  If you have diabetes:   Continue to take your metformin medication on the day of your exam  Please wear loose clothing, such as a sweat suit or jogging  clothes. Avoid snaps, zippers and other metal. We may ask you to undress and put on a hospital gown.  If you have any questions, please call the Imaging Department where you will have your exam.            Nov 15, 2018 12:00 PM CST   (Arrive by 11:45 AM)   Return Visit with Tanner Rojas MD   [facility] ([facility])    [address]  [address]  [facility] [address] [phone]              Future tests that were ordered for you today     Open Future Orders        Priority Expected Expires Ordered    Comprehensive Sleep Study Routine  9/17/2018 3/21/2018            Who to contact     If you have questions or need follow up information about today's clinic visit or your schedule please contact [facility] directly at [phone].  Normal or non-critical lab and imaging results will be communicated to you by Phantomhart, letter or phone within 4 business days after the clinic has received the results. If you do not hear from us within 7 days, please contact the clinic through Phantomhart or phone. If you have a critical or abnormal lab result, we will notify you by phone as soon as possible.  Submit refill requests through [employer] or call your pharmacy and they will forward the refill request to us. Please allow 3 business days for your refill to be completed.          Additional Information About Your Visit        [facility] Information     [employer] gives you secure access to your electronic health record. If you see a primary care provider, you can also send messages to your care team and make appointments. If you have questions, please call your primary care clinic.  If you do not have a primary care provider, please call [phone] and they will assist you.        Care EveryWhere ID     This is your Care EveryWhere ID. This could be used by other organizations to access your [place] medical records  [id number]        Your Vitals Were      "Pulse Respirations Height Pulse Oximetry BMI (Body Mass Index)       56 16 1.778 m (5' 10\") 96% 28.27 kg/m2        Blood Pressure from Last 3 Encounters:   03/21/18 139/64   03/08/18 118/55   12/05/17 131/49    Weight from Last 3 Encounters:   03/21/18 89.4 kg (197 lb)   03/08/18 89.6 kg (197 lb 9.6 oz)   02/02/18 87.5 kg (193 lb)              We Performed the Following     SLEEP EVALUATION & MANAGEMENT REFERRAL - [facility]  [phone] (Age 2 and up)        Primary Care Provider Fax #    Physician No Ref-Primary [phone]       No address on file        Equal Access to Services     PARRISH LOPEZ : Shruthi Camargo, waaxda luqadaha, qaybta kaalmada adeboazyawaylon, jose miguel lopez. So [facility] [phone].    ATENCIÓN: Si habla español, tiene a vaughan disposición servicios gratuitos de asistencia lingüística. Llame al [phone].    We comply with applicable federal civil rights laws and [place] laws. We do not discriminate on the basis of race, color, national origin, age, disability, sex, sexual orientation, or gender identity.            Thank you!     Thank you for choosing [facility] SLEEP STUDY  for your care. Our goal is always to provide you with excellent care. Hearing back from our patients is one way we can continue to improve our services. Please take a few minutes to complete the written survey that you may receive in the mail after your visit with us. Thank you!             Your Updated Medication List - Protect others around you: Learn how to safely use, store and throw away your medicines at www.disposemymeds.org.          This list is accurate as of 3/21/18  1:28 PM.  Always use your most recent med list.                   Brand Name Dispense Instructions for use Diagnosis    acetaminophen 325 MG tablet    TYLENOL    100 tablet    Take 2 tablets (650 mg) by mouth every 6 hours as needed for pain    S/P " revision of total hip       acetaminophen-codeine 300-30 MG per tablet    TYLENOL w/CODEINE No. 3    30 tablet    Take 1-2 tablets by mouth every 4 hours as needed for mild pain    Pain in joint, pelvic region and thigh, unspecified laterality       amLODIPine 10 MG tablet    NORVASC    90 tablet    TAKE 1 TABLET DAILY    Benign essential hypertension       aspirin 81 MG EC tablet     90 tablet    Take 1 tablet (81 mg) by mouth daily    Hyperlipidemia LDL goal <70       atorvastatin 20 MG tablet    LIPITOR    90 tablet    Take 1 tablet (20 mg) by mouth daily    Hyperlipidemia LDL goal <70       benazepril 40 MG tablet    LOTENSIN    90 tablet    Take 1 tablet (40 mg) by mouth daily    Essential hypertension, benign       bisacodyl 10 MG Suppository    DULCOLAX    3 suppository    Place 1 suppository rectally daily as needed.    Chronic constipation       desonide 0.05 % ointment    DESOWEN    60 g    Apply topically 2 times daily To affected areas of scale and redness on the face and ears as needed until clear.    Dermatitis, seborrheic       docusate sodium 100 MG tablet    COLACE    60 tablet    Take 100 mg by mouth 2 times daily    BPH (benign prostatic hyperplasia)       gentamicin 0.1 % ointment    GARAMYCIN     Apply topically 4 times daily    B-cell lymphoma of intra-abdominal lymph nodes, unspecified B-cell lymphoma type (H)       melatonin 3 MG tablet      Take 5 mg by mouth At Bedtime        metoprolol succinate 25 MG 24 hr tablet    TOPROL-XL    45 tablet    Take 0.5 tablets (12.5 mg) by mouth daily    Essential hypertension, benign       MILK OF MAGNESIA PO      Take by mouth At Bedtime        nitroGLYcerin 0.4 MG sublingual tablet    NITROSTAT    30 tablet    Place 1 tablet (0.4 mg) under the tongue every 5 minutes as needed    Coronary artery disease involving native heart without angina pectoris, unspecified vessel or lesion type       NutriSource Fiber packet      Take 1 packet by mouth every morning         psyllium 0.52 G capsule      Take by mouth daily Powder        ranitidine 150 MG tablet    ZANTAC    180 tablet    Take 1 tablet (150 mg) by mouth 2 times daily    Gastroesophageal reflux disease without esophagitis       sildenafil 100 MG tablet    VIAGRA    10 tablet    Take 1 tablet (100 mg) by mouth daily as needed    Erectile dysfunction, unspecified erectile dysfunction type       tamsulosin 0.4 MG capsule    FLOMAX    90 capsule    Take 1 capsule (0.4 mg) by mouth daily    Hypertrophy of prostate without urinary obstruction       tolterodine 2 MG tablet    DETROL    180 tablet    Take 1 tablet (2 mg )twice a day    Hypertonicity of bladder       * triamcinolone 0.025 % cream    KENALOG    45 g    Apply topically 2 times daily    Dermatitis       * triamcinolone 0.1 % ointment    KENALOG    80 g    Apply topically 2 times daily To itchy rashes    Intrinsic atopic dermatitis       VITEYES AREDS FORMULA/LUTEIN Caps      Take by mouth daily        * Notice:  This list has 2 medication(s) that are the same as other medications prescribed for you. Read the directions carefully, and ask your doctor or other care provider to review them with you.

## 2018-03-21 NOTE — NURSING NOTE
"    Chief Complaint   Patient presents with     Consult       Initial /68  Pulse 72  Resp 16  Ht 1.778 m (5' 10\")  Wt 89.4 kg (197 lb)  SpO2 96%  BMI 28.27 kg/m2 Estimated body mass index is 28.27 kg/(m^2) as calculated from the following:    Height as of this encounter: 1.778 m (5' 10\").    Weight as of this encounter: 89.4 kg (197 lb).    Neck circumference: 16 inches / 41 centimeters.    Medication Reconciliation: complete        Ammy Rene [facility] ~[place]       "

## 2018-03-21 NOTE — PATIENT INSTRUCTIONS
Your BMI is Body mass index is 28.27 kg/(m^2).  Weight management is a personal decision.  If you are interested in exploring weight loss strategies, the following discussion covers the approaches that may be successful. Body mass index (BMI) is one way to tell whether you are at a healthy weight, overweight, or obese. It measures your weight in relation to your height.  A BMI of 18.5 to 24.9 is in the healthy range. A person with a BMI of 25 to 29.9 is considered overweight, and someone with a BMI of 30 or greater is considered obese. More than two-thirds of American adults are considered overweight or obese.  Being overweight or obese increases the risk for further weight gain. Excess weight may lead to heart disease and diabetes.  Creating and following plans for healthy eating and physical activity may help you improve your health.  Weight control is part of healthy lifestyle and includes exercise, emotional health, and healthy eating habits. Careful eating habits lifelong are the mainstay of weight control. Though there are significant health benefits from weight loss, long-term weight loss with diet alone may be very difficult to achieve- studies show long-term success with dietary management in less than 10% of people. Attaining a healthy weight may be especially difficult to achieve in those with severe obesity. In some cases, medications, devices and surgical management might be considered.  What can you do?  If you are overweight or obese and are interested in methods for weight loss, you should discuss this with your provider.     Consider reducing daily calorie intake by 500 calories.     Keep a food journal.     Avoiding skipping meals, consider cutting portions instead.    Diet combined with exercise helps maintain muscle while optimizing fat loss. Strength training is particularly important for building and maintaining muscle mass. Exercise helps reduce stress, increase energy, and improves fitness.  Increasing exercise without diet control, however, may not burn enough calories to loose weight.       Start walking three days a week 10-20 minutes at a time    Work towards walking thirty minutes five days a week     Eventually, increase the speed of your walking for 1-2 minutes at time    In addition, we recommend that you review healthy lifestyles and methods for weight loss available through the National Institutes of Health patient information sites:  http://win.niddk.nih.gov/publications/index.htm    And look into health and wellness programs that may be available through your health insurance provider, employer, local community center, or [employer] club.    Your blood pressure was checked while you were in clinic today.  Please read the guidelines below about what these numbers mean and what you should do about them.  Your systolic blood pressure is the top number.  This is the pressure when the heart is pumping.  Your diastolic blood pressure is the bottom number.  This is the pressure in between beats.  If your systolic blood pressure is less than 120 and your diastolic blood pressure is less than 80, then your blood pressure is normal. There is nothing more that you need to do about it  If your systolic blood pressure is 120-139 or your diastolic blood pressure is 80-89, your blood pressure may be higher than it should be.  You should have your blood pressure re-checked within a year by a primary care provider.  If your systolic blood pressure is 140 or greater or your diastolic blood pressure is 90 or greater, you may have high blood pressure.  High blood pressure is treatable, but if left untreated over time it can put you at risk for heart attack, stroke, or kidney failure.  You should have your blood pressure re-checked by a primary care provider within the next four weeks.        MY TREATMENT INFORMATION FOR SLEEP APNEA-  Daniel Mcnair    DO[facility]  SLEEP CENTER :  "     MY CONTACT NUMBER:     Am I having a sleep study at a sleep center?  Make sure you have an appointment for the study before you leave!    Am I having a home sleep study?  Watch this video:  https://www.youMobilepolice.com/watch?v=CteI_GhyP9g&list=PLC4F_[email or web address]    Frequently asked questions:  1. What is Obstructive Sleep Apnea (ASHLEY)? ASHLEY is the most common type of sleep apnea. Apnea means, \"without breath.\"  Apnea is most often caused by narrowing or collapse of the upper airway as muscles relax during sleep.   Almost everyone has occasional apneas. Most people with sleep apnea have had brief interruptions at night frequently for many years.  The severity of sleep apnea is related to how frequent and severe the events are.   2. What are the consequences of ASHLEY? Symptoms include: feeling sleepy during the day, snoring loudly, gasping or stopping of breathing, trouble sleeping, and occasionally morning headaches or heartburn at night.  Sleepiness can be serious and even increase the risk of falling asleep while driving. Other health consequences may include development of high blood pressure and other cardiovascular disease in persons who are susceptible. Untreated ASHLEY  can contribute to heart disease, stroke and diabetes.   3. What are the treatment options? In most situations, sleep apnea is a lifelong disease that must be managed with daily therapy. Medications are not effective for sleep apnea and surgery is generally not considered until other therapies have been tried. Your treatment is your choice . Continuous Positive Airway (CPAP) works right away and is the therapy that is effective in nearly everyone. An oral device to hold your jaw forward is usually the next most reliable option. Other options include postioning devices (to keep you off your back), weight loss, and surgery including a tongue pacing device. There is more detail about some of these options below.    Important tips for using " CPAP and similar devices   Know your equipment:  CPAP is continuous positive airway pressure that prevents obstructive sleep apnea by keeping the throat from collapsing while you are sleeping. In most cases, the device is  smart  and can slowly self-adjusts if your throat collapses and keeps a record every day of how well you are treated-this information is available to you and your care team.  BPAP is bilevel positive airway pressure that keeps your throat open and also assists each breath with a pressure boost to maintain adequate breathing.  Special kinds of BPAP are used in patients who have inadequate breathing from lung or heart disease. In most cases, the device is  smart  and can slowly self-adjusts to assist breathing. Like CPAP, the device keeps a record of how well you are treated.  Your mask is your connection to the device. You get to choose what feels most comfortable and the staff will help to make sure if fits. Here: are some examples of the different masks that are available:       Key points to remember on your journey with sleep apnea:  1. Sleep study.  PAP devices often need to be adjusted during a sleep study to show that they are effective and adjusted right.  2. Good tips to remember: Try wearing just the mask during a quiet time during the day so your body adapts to wearing it. A humidifier is recommended for comfort in most cases to prevent drying of your nose and throat. Allergy medication from your provider may help you if you are having nasal congestion.  3. Getting settled-in. It takes more than one night for most of us to get used to wearing a mask. Try wearing just the mask during a quiet time during the day so your body adapts to wearing it. A humidifier is recommended for comfort in most cases. Our team will work with you carefully on the first day and will be in contact within 4 days and again at 2 and 4 weeks for advice and remote device adjustments. Your therapy is evaluated by  the device each day.   4. Use it every night. The more you are able to sleep naturally for 7-8 hours, the more likely you will have good sleep and to prevent health risks or symptoms from sleep apnea. Even if you use it 4 hours it helps. Occasionally all of us are unable to use a medical therapy, in sleep apnea, it is not dangerous to miss one night.   5. Communicate. Call our skilled team on the number provided on the first day if your visit for problems that make it difficult to wear the device. Over 2 out of 3 patients can learn to wear the device long-term with help from our team. Remember to call our team or your sleep providers if you are unable to wear the device as we may have other solutions for those who cannot adapt to mask CPAP therapy. It is recommended that you sleep your sleep provider within the first 3 months and yearly after that if you are not having problems.   Take care of your equipment. Make sure you clean your mask and tubing using directions every day and that your filter and mask are replaced as recommended or if they are not working.     BESIDES CPAP, WHAT OTHER THERAPIES ARE THERE?    Positioning Device  Positioning devices are generally used when sleep apnea is mild and only occurs on your back.This example shows a pillow that straps around the waist. It may be appropriate for those whose sleep study shows milder sleep apnea that occurs primarily when lying flat on one's back. Preliminary studies have shown benefit but effectiveness at home may need to be verified by a home sleep test. These devices are generally not covered by medical insurance.  Examples of devices that maintain sleeping on the back to prevent snoring and mild sleep apnea.    Belt type body positioner  Http://Playfish.SelSahara/    Electronic reminder  Http://nightshifttherapy.com/  Http://www.Bawtepod.com.au/    Oral Appliance  What is oral appliance therapy?  An oral appliance device fits on your teeth at night like a  retainer used after having braces. The device is made by a specialized dentist and requires several visits over 1-2 months before a manufactured device is made to fit your teeth and is adjusted to prevent your sleep apnea. Once an oral device is working properly, snoring should be improved. A home sleep test may be recommended at that time if to determine whether the sleep apnea is adequately treated.       Some things to remember:  -Oral devices are often, but not always, covered by your medical insurance. Be sure to check with your insurance provider.   -If you are referred for oral therapy, you will be given a list of specialized dentists to consider or you may choose to visit the Web site of the American Academy of Dental Sleep Medicine  -Oral devices are less likely to work if you have severe sleep apnea or are extremely overweight.     More detailed information  An oral appliance is a small acrylic device that fits over the upper and lower teeth  (similar to a retainer or a mouth guard). This device slightly moves jaw forward, which moves the base of the tongue forward, opens the airway, improves breathing for effective treat snoring and obstructive sleep apnea in perhaps 7 out of 10 people .  The best working devices are custom-made by a dental device  after a mold is made of the teeth 1, 2, 3.  When is an oral appliance indicated?  Oral appliance therapy is recommended as a first-line treatment for patients with primary snoring, mild sleep apnea, and for patients with moderate sleep apnea who prefer appliance therapy to use of CPAP4, 5. Severity of sleep apnea is determined by sleep testing and is based on the number of respiratory events per hour of sleep.   How successful is oral appliance therapy?  The success rate of oral appliance therapy in patients with mild sleep apnea is 75-80% while in patients with moderate sleep apnea it is 50-70%. The chance of success in patients with severe sleep  apnea is 40-50%. The research also shows that oral appliances have a beneficial effect on the cardiovascular health of ASHLEY patients at the same magnitude as CPAP therapy7.  Oral appliances should be a second-line treatment in cases of severe sleep apnea, but if not completely successful then a combination therapy utilizing CPAP plus oral appliance therapy may be effective. Oral appliances tend to be effective in a broad range of patients although studies show that the patients who have the highest success are females, younger patients, those with milder disease, and less severe obesity. 3, 6.   Finding a dentist that practices dental sleep medicine  Specific training is available through the American Academy of Dental Sleep Medicine for dentists interested in working in the field of sleep. To find a dentist who is educated in the field of sleep and the use of oral appliances, near you, visit the Web site of the American Academy of Dental Sleep Medicine.    References  1. Zackary et al. Objectively measured vs self-reported compliance during oral appliance therapy for sleep-disordered breathing. Chest 2013; 144(5): 6044-9461.  2. Melisa et al. Objective measurement of compliance during oral appliance therapy for sleep-disordered breathing. Thorax 2013; 68(1): 91-96.  3. Suellen, et al. Mandibular advancement devices in 620 men and women with ASHLEY and snoring: tolerability and predictors of treatment success. Chest 2004; 125: 1505-4972.  4. Festus, et al. Oral appliances for snoring and ASHLEY: a review. Sleep 2006; 29: 244-262.  5. Diana, et al. Oral appliance treatment for ASHLEY: an update. J Clin Sleep Med 2014; 10(2): 215-227.  6. Mirian et al. Predictors of OSAH treatment outcome. J Dent Res 2007; 86: 8312-3492.      Weight Loss:    Weight loss is a long-term strategy that may improve sleep apnea in some patients.    Weight management is a personal decision and the decision should be based on your  interest and the potential benefits.  If you are interested in exploring weight loss strategies, the following discussion covers the impact on weight loss on sleep apnea and the approaches that may be successful.    Being overweight does not necessarily mean you will have health consequences.  Those who have BMI over 35 or over 27 with existing medical conditions carries greater risk.   Weight loss decreases severity of sleep apnea in most people with obesity. For those with mild obesity who have developed snoring with weight gain, even 15-30 pound weight loss can improve and occasionally eliminate sleep apnea.  Structured and life-long dietary and health habits are necessary to lose weight and keep healthier weight levels.     Though there may be significant health benefits from weight loss, long-term weight loss is very difficult to achieve- studies show success with dietary management in less than 10% of people. In addition, substantial weight loss may require years of dietary control and may be difficult if patients have severe obesity. In these cases, surgical management may be considered.  Finally, older individuals who have tolerated obesity without health complications may be less likely to benefit from weight loss strategies.        Your BMI is Body mass index is 28.27 kg/(m^2).  Weight management is a personal decision.  If you are interested in exploring weight loss strategies, the following discussion covers the approaches that may be successful. Body mass index (BMI) is one way to tell whether you are at a healthy weight, overweight, or obese. It measures your weight in relation to your height.  A BMI of 18.5 to 24.9 is in the healthy range. A person with a BMI of 25 to 29.9 is considered overweight, and someone with a BMI of 30 or greater is considered obese. More than two-thirds of American adults are considered overweight or obese.  Being overweight or obese increases the risk for further weight  gain. Excess weight may lead to heart disease and diabetes.  Creating and following plans for healthy eating and physical activity may help you improve your health.  Weight control is part of healthy lifestyle and includes exercise, emotional health, and healthy eating habits. Careful eating habits lifelong are the mainstay of weight control. Though there are significant health benefits from weight loss, long-term weight loss with diet alone may be very difficult to achieve- studies show long-term success with dietary management in less than 10% of people. Attaining a healthy weight may be especially difficult to achieve in those with severe obesity. In some cases, medications, devices and surgical management might be considered.  What can you do?  If you are overweight or obese and are interested in methods for weight loss, you should discuss this with your provider.     Consider reducing daily calorie intake by 500 calories.     Keep a food journal.     Avoiding skipping meals, consider cutting portions instead.    Diet combined with exercise helps maintain muscle while optimizing fat loss. Strength training is particularly important for building and maintaining muscle mass. Exercise helps reduce stress, increase energy, and improves fitness. Increasing exercise without diet control, however, may not burn enough calories to loose weight.       Start walking three days a week 10-20 minutes at a time    Work towards walking thirty minutes five days a week     Eventually, increase the speed of your walking for 1-2 minutes at time    In addition, we recommend that you review healthy lifestyles and methods for weight loss available through the National Institutes of Health patient information sites:  http://win.niddk.nih.gov/publications/index.htm    And look into health and wellness programs that may be available through your health insurance provider, employer, local community center, or [place]  club.          Surgery:    Surgery for obstructive sleep apnea is considered generally only when other therapies fail to work. Surgery may be discussed with you if you are having a difficult time tolerating CPAP and or when there is an abnormal structure that requires surgical correction.  Nose and throat surgeries often enlarge the airway to prevent collapse.  Most of these surgeries create pain for 1-2 weeks and up to half of the most common surgeries are not effective throughout life.  You should carefully discuss the benefits and drawbacks to surgery with your sleep provider and surgeon to determine if it is the best solution for you.   More information  Surgery for ASHLEY is directed at areas that are responsible for narrowing or complete obstruction of the airway during sleep.  There are a wide range of procedures available to enlarge and/or stabilize the airway to prevent blockage of breathing in the three major areas where it can occur: the palate, tongue, and nasal regions.  Successful surgical treatment depends on the accurate identification of the factors responsible for obstructive sleep apnea in each person.  A personalized approach is required because there is no single treatment that works well for everyone.  Because of anatomic variation, consultation with an examination by a sleep surgeon is a critical first step in determining what surgical options are best for each patient.  In some cases, examination during sedation may be recommended in order to guide the selection of procedures.  Patients will be counseled about risks and benefits as well as the typical recovery course after surgery. Surgery is typically not a cure for a person s ASHLEY.  However, surgery will often significantly improve one s ASHLEY severity (termed  success rate ).  Even in the absence of a cure, surgery will decrease the cardiovascular risk associated with OSA7; improve overall quality of life8 (sleepiness, functionality, sleep  quality, etc).      Palate Procedures:  Patients with ASHLEY often have narrowing of their airway in the region of their tonsils and uvula.  The goals of palate procedures are to widen the airway in this region as well as to help the tissues resist collapse.  Modern palate procedure techniques focus on tissue conservation and soft tissue rearrangement, rather than tissue removal.  Often the uvula is preserved in this procedure. Residual sleep apnea is common in patient after pharyngoplasty with an average reduction in sleep apnea events of 33%2.      Tongue Procedures:  ExamWhile patients are awake, the muscles that surround the throat are active and keep this region open for breathing. These muscles relax during sleep, allowing the tongue and other structures to collapse and block breathing.  There are several different tongue procedures available.  Selection of a tongue base procedure depends on characteristics seen on physical exam.  Generally, procedures are aimed at removing bulky tissues in this area or preventing the back of the tongue from falling back during sleep.  Success rates for tongue surgery range from 50-62%3.    Hypoglossal Nerve Stimulation:  Hypoglossal nerve stimulation has recently received approval from the [place] Food and Drug Administration for the treatment of obstructive sleep apnea.  This is based on research showing that the system was safe and effective in treating sleep apnea6.  Results showed that the median AHI score decreased 68%, from 29.3 to 9.0. This therapy uses an implant system that senses breathing patterns and delivers mild stimulation to airway muscles, which keeps the airway open during sleep.  The system consists of three fully implanted components: a small generator (similar in size to a pacemaker), a breathing sensor, and a stimulation lead.  Using a small handheld remote, a patient turns the therapy on before bed and off upon awakening.    Candidates for this  device must be greater than 22 years of age, have moderate to severe ASHLEY (AHI between 20-65), BMI less than 32, have tried CPAP/oral appliance without success, and have appropriate upper airway anatomy (determined by a sleep endoscopy performed by Dr. Bone).    Hypoglossal Nerve Stimulation Pathway:    The sleep surgeon s office will work with the patient through the insurance prior-authorization process (including communications and appeals).    Nasal Procedures:  Nasal obstruction can interfere with nasal breathing during the day and night.  Studies have shown that relief of nasal obstruction can improve the ability of some patients to tolerate positive airway pressure therapy for obstructive sleep apnea1.  Treatment options include medications such as nasal saline, topical corticosteroid and antihistamine sprays, and oral medications such as antihistamines or decongestants. Non-surgical treatments can include external nasal dilators for selected patients. If these are not successful by themselves, surgery can improve the nasal airway either alone or in combination with these other options.      Combination Procedures:  Combination of surgical procedures and other treatments may be recommended, particularly if patients have more than one area of narrowing or persistent positional disease.  The success rate of combination surgery ranges from 66-80%2,3.    References  1. Francisco HALE. The Role of the Nose in Snoring and Obstructive Sleep Apnoea: An Update.  Eur Arch Otorhinolaryngol. 2011; 268: 1365-73.  2.  Anthony SM; Nell JA; Sherry JR; Pallanch JF; Tito MB; Dariusz SG; Les CANTUD. Surgical modifications of the upper airway for obstructive sleep apnea in adults: a systematic review and meta-analysis. SLEEP 2010;33(10):5298-0470. Bulmaro MICHAEL. Hypopharyngeal surgery in obstructive sleep apnea: an evidence-based medicine review.  Arch Otolaryngol Head Neck Surg. 2006 Feb;132(2):206-13.  3. Jonas LEE, Jean Bonner  SOFIYA. The efficacy of anatomically based multilevel surgery for obstructive sleep apnea. Otolaryngol Head Neck Surg. 2003 Oct;129(4):327-35.  4. Bulmaro MICHAEL, Goldberg A. Hypopharyngeal Surgery in Obstructive Sleep Apnea: An Evidence-Based Medicine Review. Arch Otolaryngol Head Neck Surg. 2006 Feb;132(2):206-13.  5. Job PJ et al. Upper-Airway Stimulation for Obstructive Sleep Apnea.  N Engl J Med. 2014 Jan 9;370(2):139-49.  6. Josafat Y et al. Increased Incidence of Cardiovascular Disease in Middle-aged Men with Obstructive Sleep Apnea. Am J Respir Crit Care Med; 2002 166: 159-165  7. Roseanne QUINONEZ et al. Studying Life Effects and Effectiveness of Palatopharyngoplasty (SLEEP) study: Subjective Outcomes of Isolated Uvulopalatopharyngoplasty. Otolaryngol Head Neck Surg. 2011; 144: 623-631.    [facility]      Treating Insomnia  Good sleeping habits are a key part of treatment. If needed, some medications may help you sleep better at first. Making healthy lifestyle changes and learning to relax can improve your sleep. Treating insomnia takes commitment, but trust that your efforts will pay off. Talk to your doctor before taking any medication.    Healthy Lifestyle  Your lifestyle affects your health and your sleep. Here are some healthy habits:    Keep a regular sleep schedule. Go to bed and get up at the same time each day.    Exercise regularly. It may help you reduce stress. Avoid strenuous exercise for two to four hours before bedtime.    Avoid or limit naps.    Use your bed only for sleep and sex.    Don t spend too much time in bed trying to fall asleep. If you can t fall asleep, get up and do something until you become tired and drowsy.    Avoid or limit caffeine and nicotine. They can keep you awake at night. Also avoid alcohol. It may help you fall asleep at first, but your sleep will not be restful.    Before Bedtime  To sleep better every night, try these tips:    Have a bedtime routine to let your  body and mind know when it s time to sleep.    Going to bed should be relaxing so try to do only relaxing things around bedtime. Sleep will come sooner.    If your worries don t let you sleep, write them down in a diary. Then close it, and go to bed.    Make sure the room is not too hot or too cold. If it s not dark enough, an eye mask can help. If it s noisy, try using earplugs.    Learn to Relax  Stress, anxiety, and body tension may keep you awake at night. To unwind before bedtime, try reading a book, meditation, or yoga. Also, try the following:    Deep breathing. Sit or lie back in a chair. Take a slow, deep breath. Hold it for 5 counts. Then breathe out slowly through your mouth. Keep doing this until you feel relaxed.    Imagery. Think of the last fun trip you took. In your mind, walk through the trip from start to finish. Put as much detail into the memory as you can remember. It will help you relax.    Cognitive Behavioral Treatment (CBT)  CBT is the most effective treatment for long-term insomnia. It tries to address the underlying causes of your sleep problems, including your habits and how you think about sleep.      Individual Therapy   Mahesh Guzman    Sleep Psychologist      Online Programs     www.SHUTi.me (pronounced shut eye). There is a fee for this program. Enter the code  [place]  if you decide to enroll in this program.      www.sleepIO.com (pronounced sleep ee oh). There is a fee for this program. Enter the code  [place]  if you decide to enroll in this program.     Suggested Resources  Insomnia Treatment Books     Overcoming Insomnia by Rico Ball and Char Solomon (2008)    No More Sleepless Nights by Benjamin Bain and Madelin Thompson (1996)    Say Renetta to Insomnia by Bryan Martínez (2009)    The Insomnia Workbook by Ingris Calles and Fly Fortune (2009)    The Insomnia Answer by Heron Rocha and Albaro Moya (2006)      Stress Management and Relaxation Books    The  Relaxation and Stress Reduction Workbook by Rayna Jerry, Aniya Clifton and Tony Kim (2008)    Stress Management Workbook: Techniques and Self-Assessment Procedures by Candelaria Saez and Cayetano Ortiz (1997)    A Mindfulness-Based Stress Reduction Workbook by Cody Esquivel and Estela Streeter (2010)    The Complete Stress Management Workbook by Matteo Maradiaga, Camacho Camara and Gurpreet Mancia (1996)    Assert Yourself by Chiquita Vasquez and Boston Vasquez (1977)    Relaxation Resources for Computer Download   These websites offer resources to help you relax. This list is for information only. [facility] is not responsible for the quality of services or the actions of any person or organization.  Progressive Muscle Relaxation (PMR):     http://www.TearScience/progressive-muscle-relaxation-exercise.html     http://studentsupport.[facility]/counseling/resources/self-help/relaxation-and-stress-management/   Deep Breathing Exercises:    http://www.TearScience/breathing-awareness.html     Meditation:     www.triptap    www.MerfacguidedSharelookmeditation-site.com You may have to pay for some of these resources.    Guided Imagery:    http://www.TearScience/guided-imagery-scripts.html     http://[employer]/library/knchhtyxcb-iwboqs-jabvpoe/     Counseling / Behavioral Health  [facility]  Visit www.[email or web address] or call [phone] to find a clinic close to you.      This is not a prescription and these resources are optional. You must pay for any costs when using these resources. Please ask your insurance carrier if you can be reimbursed for these resources. If so, you are responsible for sending the needed details to your insurance carrier. These resources may also be tax deductible as medical expenses. Check with your .     These programs and publications are not affiliated in any way with [facility].    Please  do not drive if drowsy or sleepy;  pull over if drowsy.

## 2018-03-23 ENCOUNTER — HOSPITAL ENCOUNTER (OUTPATIENT)
Dept: PHYSICAL THERAPY | Facility: CLINIC | Age: 83
Setting detail: THERAPIES SERIES
End: 2018-03-23
Attending: INTERNAL MEDICINE
Payer: MEDICARE

## 2018-03-23 PROCEDURE — 97161 PT EVAL LOW COMPLEX 20 MIN: CPT | Mod: GP | Performed by: PHYSICAL THERAPIST

## 2018-03-23 PROCEDURE — 40000718 ZZHC STATISTIC PT DEPARTMENT ORTHO VISIT: Performed by: PHYSICAL THERAPIST

## 2018-03-23 PROCEDURE — 97140 MANUAL THERAPY 1/> REGIONS: CPT | Mod: GP,CK | Performed by: PHYSICAL THERAPIST

## 2018-03-23 PROCEDURE — G8979 MOBILITY GOAL STATUS: HCPCS | Mod: GP,CJ | Performed by: PHYSICAL THERAPIST

## 2018-03-23 PROCEDURE — 97110 THERAPEUTIC EXERCISES: CPT | Mod: GP | Performed by: PHYSICAL THERAPIST

## 2018-03-23 PROCEDURE — G8978 MOBILITY CURRENT STATUS: HCPCS | Mod: GP,CJ | Performed by: PHYSICAL THERAPIST

## 2018-03-23 ASSESSMENT — 6 MINUTE WALK TEST (6MWT): TOTAL DISTANCE WALKED (FT): 600

## 2018-03-23 NOTE — PROGRESS NOTES
03/23/18 1300   Quick Adds   Quick Adds Certification   Type of Visit Initial OP PT Evaluation   General Information   Start of Care Date 03/23/18   Referring Physician Lia Batista MD   Orders Evaluate and Treat as Indicated   Order Date 03/14/18   Medical Diagnosis Hip pain, L; physical deconditioning   Pertinent history of current problem (include personal factors and/or comorbidities that impact the POC) The pt presents with L hip pain and deconditioning. Has had both R and L hip replacements. Has had success managing pain with PT however feels more stiffness recently. Reports some difficulty walking longer distances. PMH significant for OA, lymphoma, CAD, HLD, HTN.    Pertinent Visual History  wears glasses   Prior level of function comment Completes HEP 3-4 days/week   Patient role/Employment history Retired   Living environment Apartment/condo   Current Assistive Devices Front Wheeled Walker;Four Wheeled Walker;Standard Cane   ADL Devices Raised Toilet Seat   Patient/Family Goals Statement Decrease L hip and back pain   Fall Risk Screen   Fall screen completed by PT   Have you fallen 2 or more times in the past year? No   Have you fallen and had an injury in the past year? No   Timed Up and Go score (seconds) 19.66   Is patient a fall risk? Yes;Department fall risk interventions implemented   Fall screen comments Pt wanting to focus on weakness and L hip pain, attends regular balance class   Pain   Pain comments L lateral hip- rates 1-2/10   Cognitive Status Examination   Orientation orientation to person, place and time   Integumentary   Integumentary No deficits were identified   Posture   Posture Comments forward head, stooped posture   Range of Motion (ROM)   ROM Comment Decreased knee extension bilateral, requries assist    Strength   Strength Comments B hip flexion 4/5, hip abduction 4/5, knee extension 5/5, dorsiflexion 4-/5   Bed Mobility   Bed Mobility Comments Independent   Transfer Skills    Transfer Comments Able without use of UEs   Gait   Gait Comments Ambulates with forward flexed posture, shortened stride, decreased heel strike, trendelenberg   Gait Special Tests   Gait Special Tests SIX MINUTE WALK TEST   Gait Special Tests Six Minute Walk Test   Feet 600 Feet   Comments with SPC. Increased gait deviations when beginning to fatigue.   Modality Interventions   Planned Modality Interventions (as needed)   Planned Therapy Interventions   Planned Therapy Interventions balance training;gait training;neuromuscular re-education;ROM;strengthening;stretching;manual therapy   Clinical Impression   Criteria for Skilled Therapeutic Interventions Met yes, treatment indicated   PT Diagnosis Functional weakness and impaired mobility   Influenced by the following impairments L hip pain, LE weakness, decreased ROM   Functional limitations due to impairments limited activity tolerance, instability increases with fatigue, difficulty with household and community mobility   Clinical Presentation Stable/Uncomplicated   Clinical Decision Making (Complexity) Low complexity   Therapy Frequency 1 time/week   Predicted Duration of Therapy Intervention (days/wks) 60 days   Risk & Benefits of therapy have been explained Yes   Patient, Family & other staff in agreement with plan of care Yes   GOALS   PT Eval Goals 1;2;3   Goal 1   Goal Identifier 6MWT   Goal Description The pt will improve distance on 6MWT to >750' with LRAD due to improvements in strength and activity tolerance, improving ability to ambulate in the community   Target Date 05/22/18   Goal 2   Goal Identifier HEP   Goal Description The pt will be independent wtih a HEP focusing on improving LE strength and ROM in order to decrease pain and improve mobility outside of therapy   Target Date 05/22/18   Goal 3   Goal Identifier TUG   Goal Description Due to improvements in functional strength and mobility, the pt will decrease his time on the TUG to <15 seconds,  indicating a reduction in fall risk   Target Date 05/22/18   Total Evaluation Time   Total Evaluation Time (Minutes) 30   Therapy Certification   Certification date from 03/23/18   Certification date to 05/22/18   Medical Diagnosis hip pain, left; physical deconditioning   Certification I certify the need for these services furnished under this plan of treatment and while under my care.  (Physician co-signature of this document indicates review and certification of the therapy plan).

## 2018-03-23 NOTE — PROGRESS NOTES
[facility]        OUTPATIENT PHYSICAL THERAPY FUNCTIONAL EVALUATION  PLAN OF TREATMENT FOR OUTPATIENT REHABILITATION  (COMPLETE FOR INITIAL CLAIMS ONLY)  Patient's Last Name, First Name, M.I.  YOB: 1929  TabbyDaniel     Provider's Name   [facility]   Medical Record No.  [id number]     Start of Care Date:  03/23/18   Onset Date:   3/14/18   Type:     _X__PT   ____OT  ____SLP Medical Diagnosis:  hip pain, left; physical deconditioning     PT Diagnosis:  Functional weakness and impaired mobility Visits from SOC:  1                              __________________________________________________________________________________  Plan of Treatment/Functional Goals:  balance training, gait training, neuromuscular re-education, ROM, strengthening, stretching, manual therapy      (as needed)     GOALS  6MWT  The pt will improve distance on 6MWT to >750' with LRAD due to improvements in strength and activity tolerance, improving ability to ambulate in the community  05/22/18    HEP  The pt will be independent wtih a HEP focusing on improving LE strength and ROM in order to decrease pain and improve mobility outside of therapy  05/22/18    TUG  Due to improvements in functional strength and mobility, the pt will decrease his time on the TUG to <15 seconds, indicating a reduction in fall risk  05/22/18                   Therapy Frequency:  1 time/week   Predicted Duration of Therapy Intervention:  60 days    Anahy Ford, PT                                    I CERTIFY THE NEED FOR THESE SERVICES FURNISHED UNDER        THIS PLAN OF TREATMENT AND WHILE UNDER MY CARE     (Physician co-signature of this document indicates review and certification of the therapy plan).                Certification Date From:  03/23/18   Certification Date To:  05/22/18    Referring  Provider:  Lia Batista MD    Initial Assessment  See Epic Evaluation- Start of Care Date: 03/23/18

## 2018-04-12 ENCOUNTER — HOSPITAL ENCOUNTER (OUTPATIENT)
Dept: PHYSICAL THERAPY | Facility: CLINIC | Age: 83
Setting detail: THERAPIES SERIES
End: 2018-04-12
Attending: INTERNAL MEDICINE
Payer: MEDICARE

## 2018-04-12 PROCEDURE — 97110 THERAPEUTIC EXERCISES: CPT | Mod: GP | Performed by: PHYSICAL THERAPIST

## 2018-04-12 PROCEDURE — 40000718 ZZHC STATISTIC PT DEPARTMENT ORTHO VISIT: Performed by: PHYSICAL THERAPIST

## 2018-04-12 PROCEDURE — 97140 MANUAL THERAPY 1/> REGIONS: CPT | Mod: GP | Performed by: PHYSICAL THERAPIST

## 2018-04-18 ENCOUNTER — THERAPY VISIT (OUTPATIENT)
Dept: SLEEP MEDICINE | Facility: CLINIC | Age: 83
End: 2018-04-18
Payer: MEDICARE

## 2018-04-18 ENCOUNTER — HOSPITAL ENCOUNTER (OUTPATIENT)
Dept: PHYSICAL THERAPY | Facility: CLINIC | Age: 83
Setting detail: THERAPIES SERIES
End: 2018-04-18
Attending: INTERNAL MEDICINE
Payer: MEDICARE

## 2018-04-18 ENCOUNTER — DOCUMENTATION ONLY (OUTPATIENT)
Dept: SLEEP MEDICINE | Facility: CLINIC | Age: 83
End: 2018-04-18

## 2018-04-18 DIAGNOSIS — G47.10 EXCESSIVE SLEEPINESS: ICD-10-CM

## 2018-04-18 PROCEDURE — 97140 MANUAL THERAPY 1/> REGIONS: CPT | Mod: GP | Performed by: PHYSICAL THERAPIST

## 2018-04-18 PROCEDURE — 95810 POLYSOM 6/> YRS 4/> PARAM: CPT | Performed by: INTERNAL MEDICINE

## 2018-04-18 PROCEDURE — 97110 THERAPEUTIC EXERCISES: CPT | Mod: GP | Performed by: PHYSICAL THERAPIST

## 2018-04-18 PROCEDURE — 40000718 ZZHC STATISTIC PT DEPARTMENT ORTHO VISIT: Performed by: PHYSICAL THERAPIST

## 2018-04-18 NOTE — MR AVS SNAPSHOT
After Visit Summary   4/18/2018    Daniel Mcnair    MRN: [id number]           Patient Information     Date Of Birth          10/17/1929        Visit Information        Provider Department      4/18/2018 8:00 PM SLEEP STUDY RM 5 [facility], Sleep Study        Today's Diagnoses     Excessive sleepiness          Care Instructions    [place] [facility]    1. Your sleep study will be reviewed by a sleep physician within the next few days.     2. Please follow up in the sleep clinic as scheduled, or, make an appointment with your sleep provider to be seen within two weeks to discuss the results of the sleep study.    3. If you have any questions or problems with your treatment plan, please contact your sleep clinic provider at [phone] to further manage your condition.    4. Please review your attached medication list, and, at your follow-up appointment advise your sleep clinic provider about any changes.    5. Go to [email or web address] for more information about your sleep problems.    KENDRA Le  April 18, 2018                Follow-ups after your visit        Your next 10 appointments already scheduled     Apr 25, 2018 10:15 AM CDT   Neuro Treatment with Anahy Ford, PT   [facility] ([facility])    [address]  [facility] [address]   [phone]            May 02, 2018 10:30 AM CDT   Return Sleep Patient with Snmicrithi Rigo Vasquez MD   [facility], Sleep Study ([facility])    [address] [place] [id number] [phone]            May 02, 2018  1:30 PM CDT   Neuro Treatment with Anahy Ford, PT   [facility] ([facility])    [address]  [facility] [address]   [phone]            May 07, 2018 10:00 AM CDT   Hearing Aid Consult with  Stephie Greer [facility] ([facility])    [address] [place] [address]   [phone]            May 10, 2018 10:20 AM CDT   (Arrive by 10:05 AM)   Return Visit with Lia Dumont MD   [facility] ([facility])    [address] [place] [address]   [phone]            May 16, 2018 11:45 AM CDT   Neuro Treatment with Anahy Ford, PT   [facility] ([facility])    [address]  [address]  [facility] [address]   [phone]            May 23, 2018 11:45 AM CDT   Neuro Treatment with Anahy Ford, PT   [facility] ([facility])    [address]  [address]  [facility] [address]   [phone]            May 24, 2018  1:00 PM CDT   Hearing Aid Fitting with Stephie Bell [facility] ([facility])    [address] [place] [address]   [phone]            Jun 18, 2018  2:00 PM CDT   (Arrive by 1:45 PM)   Initial Review Program with Stephie Greer [facility] ([facility])    [address] [place] [address]   [phone]            Jun 26, 2018  8:30 AM CDT   RETURN RETINA with Luna Parker MD   [facility] Clinic ([facility])    [address]  [facility]  [facility] [address] [phone]              Who to contact     If you have questions or need follow up information about today's clinic visit or your schedule please [facility] directly at [phone].  Normal or non-critical lab and imaging results will be communicated to you by MyChart, letter or phone within 4 business days after the clinic has received the results. If you do not hear from us  within 7 days, please contact the clinic through [employer] or phone. If you have a critical or abnormal lab result, we will notify you by phone as soon as possible.  Submit refill requests through [employer] or call your pharmacy and they will forward the refill request to us. Please allow 3 business days for your refill to be completed.          Additional Information About Your Visit        [employer] Information     [facility] gives you secure access to your electronic health record. If you see a primary care provider, you can also send messages to your care team and make appointments. If you have questions, please call your primary care clinic.  If you do not have a primary care provider, please call [phone] and they will assist you.        Care EveryWhere ID     This is your Care EveryWhere ID. This could be used by other organizations to access your [facility] medical records  [id number]         Blood Pressure from Last 3 Encounters:   03/21/18 139/64   03/08/18 118/55   12/05/17 131/49    Weight from Last 3 Encounters:   03/21/18 89.4 kg (197 lb)   03/08/18 89.6 kg (197 lb 9.6 oz)   02/02/18 87.5 kg (193 lb)              We Performed the Following     Comprehensive Sleep Study        Primary Care Provider Fax #    Physician No Ref-Primary [phone]       No address on file        Equal Access to Services     PARRISH LOPEZ : Hadii aad ku hadasho Soomaali, waaxda luqadaha, qaybta kaalmada adeegyada, jose miguel lopez. So [facility] [phone].    ATENCIÓN: Si habla español, tiene a vaughan disposición servicios gratuitos de asistencia lingüística. Llame al [phone].    We comply with applicable federal civil rights laws and [place] laws. We do not discriminate on the basis of race, color, national origin, age, disability, sex, sexual orientation, or gender identity.            Thank you!     Thank you for choosing [facility], SLEEP STUDY  for your care. Our goal is always to provide you  with excellent care. Hearing back from our patients is one way we can continue to improve our services. Please take a few minutes to complete the written survey that you may receive in the mail after your visit with us. Thank you!             Your Updated Medication List - Protect others around you: Learn how to safely use, store and throw away your medicines at www.disposemymeds.org.          This list is accurate as of 4/18/18 11:59 PM.  Always use your most recent med list.                   Brand Name Dispense Instructions for use Diagnosis    acetaminophen 325 MG tablet    TYLENOL    100 tablet    Take 2 tablets (650 mg) by mouth every 6 hours as needed for pain    S/P revision of total hip       acetaminophen-codeine 300-30 MG per tablet    TYLENOL w/CODEINE No. 3    30 tablet    Take 1-2 tablets by mouth every 4 hours as needed for mild pain    Pain in joint, pelvic region and thigh, unspecified laterality       amLODIPine 10 MG tablet    NORVASC    90 tablet    TAKE 1 TABLET DAILY    Benign essential hypertension       aspirin 81 MG EC tablet     90 tablet    Take 1 tablet (81 mg) by mouth daily    Hyperlipidemia LDL goal <70       atorvastatin 20 MG tablet    LIPITOR    90 tablet    Take 1 tablet (20 mg) by mouth daily    Hyperlipidemia LDL goal <70       benazepril 40 MG tablet    LOTENSIN    90 tablet    Take 1 tablet (40 mg) by mouth daily    Essential hypertension, benign       bisacodyl 10 MG Suppository    DULCOLAX    3 suppository    Place 1 suppository rectally daily as needed.    Chronic constipation       desonide 0.05 % ointment    DESOWEN    60 g    Apply topically 2 times daily To affected areas of scale and redness on the face and ears as needed until clear.    Dermatitis, seborrheic       docusate sodium 100 MG tablet    COLACE    60 tablet    Take 100 mg by mouth 2 times daily    BPH (benign prostatic hyperplasia)       gentamicin 0.1 % ointment    GARAMYCIN     Apply topically 4 times daily     B-cell lymphoma of intra-abdominal lymph nodes, unspecified B-cell lymphoma type (H)       melatonin 3 MG tablet      Take 5 mg by mouth At Bedtime        metoprolol succinate 25 MG 24 hr tablet    TOPROL-XL    45 tablet    Take 0.5 tablets (12.5 mg) by mouth daily    Essential hypertension, benign       MILK OF MAGNESIA PO      Take by mouth At Bedtime        nitroGLYcerin 0.4 MG sublingual tablet    NITROSTAT    30 tablet    Place 1 tablet (0.4 mg) under the tongue every 5 minutes as needed    Coronary artery disease involving native heart without angina pectoris, unspecified vessel or lesion type       NutriSource Fiber packet      Take 1 packet by mouth every morning        psyllium 0.52 g capsule      Take by mouth daily Powder        ranitidine 150 MG tablet    ZANTAC    180 tablet    Take 1 tablet (150 mg) by mouth 2 times daily    Gastroesophageal reflux disease without esophagitis       sildenafil 100 MG tablet    VIAGRA    10 tablet    Take 1 tablet (100 mg) by mouth daily as needed    Erectile dysfunction, unspecified erectile dysfunction type       tamsulosin 0.4 MG capsule    FLOMAX    90 capsule    Take 1 capsule (0.4 mg) by mouth daily    Hypertrophy of prostate without urinary obstruction       tolterodine 2 MG tablet    DETROL    180 tablet    Take 1 tablet (2 mg )twice a day    Hypertonicity of bladder       * triamcinolone 0.025 % cream    KENALOG    45 g    Apply topically 2 times daily    Dermatitis       * triamcinolone 0.1 % ointment    KENALOG    80 g    Apply topically 2 times daily To itchy rashes    Intrinsic atopic dermatitis       VITEYES AREDS FORMULA/LUTEIN Caps      Take by mouth daily        * Notice:  This list has 2 medication(s) that are the same as other medications prescribed for you. Read the directions carefully, and ask your doctor or other care provider to review them with you.

## 2018-04-19 NOTE — PATIENT INSTRUCTIONS
[place] SLEEP [facility]    1. Your sleep study will be reviewed by a sleep physician within the next few days.     2. Please follow up in the sleep clinic as scheduled, or, make an appointment with your sleep provider to be seen within two weeks to discuss the results of the sleep study.    3. If you have any questions or problems with your treatment plan, please contact your sleep clinic provider at [phone] to further manage your condition.    4. Please review your attached medication list, and, at your follow-up appointment advise your sleep clinic provider about any changes.    5. Go to [email or web address] for more information about your sleep problems.    Katelyn Freire, RPSGT  April 18, 2018

## 2018-04-25 ENCOUNTER — HOSPITAL ENCOUNTER (OUTPATIENT)
Dept: PHYSICAL THERAPY | Facility: CLINIC | Age: 83
Setting detail: THERAPIES SERIES
End: 2018-04-25
Attending: INTERNAL MEDICINE
Payer: MEDICARE

## 2018-04-25 LAB — SLPCOMP: NORMAL

## 2018-04-25 PROCEDURE — 97110 THERAPEUTIC EXERCISES: CPT | Mod: GP | Performed by: PHYSICAL THERAPIST

## 2018-04-25 PROCEDURE — 40000718 ZZHC STATISTIC PT DEPARTMENT ORTHO VISIT: Performed by: PHYSICAL THERAPIST

## 2018-04-25 PROCEDURE — 97140 MANUAL THERAPY 1/> REGIONS: CPT | Mod: GP | Performed by: PHYSICAL THERAPIST

## 2018-04-25 NOTE — PROCEDURES
"SLEEP STUDY INTERPRETATION  DIAGNOSTIC POLYSOMNOGRAPHY REPORT      Patient: SUMAN FRAZIER  YOB: 1929  Study Date: 4/18/2018  MRN: [id number]  Referring Provider: Lia Dumont MD  Ordering Provider: Carine Vasquez mD    Indications for Polysomnography: The patient is a 88 y old Male who is 5' 10\" and weighs 197.0 lbs. His BMI is 28.5, Loiza sleepiness scale 4.0 and neck circumference is 41.0 cm. Relevant medical history includes HTN, HL, and CAD s/p PCI to LAD/D2 (5 stents total) 5/2002-6/2003. Patient reports frequent nocturnal awakenings, fatigue and EDS, though it  does not correlate with the ESS he endorses. A diagnostic polysomnogram was performed to evaluate for sleep apnea.    Polysomnogram Data: A full night polysomnogram recorded the standard physiologic parameters including EEG, EOG, EMG, ECG, nasal and oral airflow. Respiratory parameters of chest and abdominal movements were recorded with respiratory inductance plethysmography. Oxygen saturation was recorded by pulse oximetry. Hypopnea scoring rule used: 1B 4%.    Sleep Architecture: reduced sleep efficiency due to increased wake after sleep onset. All sleep stages except stage N3 were seen. REM sleep was increased.  The total recording time of the polysomnogram was 505.1 minutes. The total sleep time was 322.5 minutes. Sleep latency was increased at 29.6 minutes without the use of a sleep aid. REM latency was 75.5 minutes. Arousal index was normal at 17.1 arousals per hour. Sleep efficiency was decreased at 63.8% due to increased wake after sleep onset at 144.5 minutes. The patient spent 12.9% of total sleep time in Stage N1, 62.0% in Stage N2, 0.0% in Stage N3, and 25.1% in REM. Time in REM supine was 0 minutes.    Respiration:  snoring was reported, but there is no evidence of clinically significant ASHLEY. The obstructive events were pronounced during supine position. Since REM supine was not observed, it is " plausible that the overall severity of the sleep apnea may have been underestimated.    Events ? The polysomnogram revealed a presence of 8 obstructive, 0 central, and 2 mixed apneas resulting in an apnea index of 1.9 events per hour. There were 4 obstructive hypopneas and 0 central hypopneas resulting in an obstructive hypopnea index of 0.7 and central hypopnea index of 0 events per hour. The combined apnea/hypopnea index was 2.6 events per hour (central apnea/hypopnea index was 0 events per hour). The REM AHI was 3.0 events per hour. The supine AHI was 6.9 events per hour. The RERA index was 4.1 events per hour.  The RDI was 6.7 events per hour.    Snoring - was reported as mild to moderate.    Respiratory rate and pattern - was notable for normal respiratory rate and pattern.    Sustained Sleep Associated Hypoventilation - Transcutaneous carbon dioxide monitoring was not used, however significant hypoventilation was not suggested by oximetry.    Sleep Associated Hypoxemia - (Greater than 5 minutes O2 sat at or below 88%) was not present. Baseline oxygen saturation was 93.0%. Lowest oxygen saturation was 87.5%. Time spent less than or equal to 88% was 0.2 minutes. Time spent less than or equal to 89% was 0.8 minutes.    Movement Activity: frequent periodic limb movements were seen but they were not associated with  arousals.    Periodic Limb Activity - There were 127 PLMs during the entire study. The PLM index was 23.6 movements per hour. The PLM Arousal Index was 0 per hour.    REM EMG Activity - Excessive transient/sustained muscle activity was not present.    Nocturnal Behavior - Abnormal sleep related behaviors were not noted during/arising out of NREM / REM sleep.    Bruxism - None apparent.    Cardiac Summary: Frequent PACs were seen with occasional bradycardia..  The average pulse rate was 55.2 bpm. The minimum pulse rate was 30.9 bpm while the maximum pulse rate was 87.5 bpm. Frequent PACs were seen with  occasional bradycardia.         Assessment:     Snoring was reported, but there is no evidence of clinically significant ASHLEY. The obstructive events were pronounced during supine position. Since REM supine was not observed, it is plausible that the overall severity of the sleep apnea may have been underestimated.    Sleep architecture was remarkable for reduced sleep efficiency due to increased wake after sleep onset. All sleep stages except stage N3 were seen. REM sleep was increased.    Frequent periodic limb movements were seen, but they were not associated with arousals.    Frequent PACs were seen with occasional bradycardia.    Recommendations:    Suggest maximizing sleep on sides avoiding supine sleep using positional restricting device such as tennis ball T shirt or body pillow or FDA approved zzoma pillow.    Advice regarding the risks of drowsy driving.    Suggest optimizing sleep schedule and avoiding sleep deprivation.    Weight management (if BMI > 30).    Pharmacologic therapy should be used for management of restless legs syndrome only if present and clinically indicated and not based on the presence of periodic limb movements alone.    Suggest follow up with his  cardiologist for the dysrhythmias.      Diagnostic Codes:   Periodic Limb Movement Disorder G47.61  Snoring R06.83       _____________________________________   Electronically Signed By: (Carine Vasquez MD), 4/24/18

## 2018-05-02 ENCOUNTER — OFFICE VISIT (OUTPATIENT)
Dept: SLEEP MEDICINE | Facility: CLINIC | Age: 83
End: 2018-05-02
Payer: MEDICARE

## 2018-05-02 ENCOUNTER — HOSPITAL ENCOUNTER (OUTPATIENT)
Dept: PHYSICAL THERAPY | Facility: CLINIC | Age: 83
Setting detail: THERAPIES SERIES
End: 2018-05-02
Attending: INTERNAL MEDICINE
Payer: MEDICARE

## 2018-05-02 VITALS
BODY MASS INDEX: 27.77 KG/M2 | OXYGEN SATURATION: 98 % | HEIGHT: 70 IN | WEIGHT: 194 LBS | SYSTOLIC BLOOD PRESSURE: 140 MMHG | DIASTOLIC BLOOD PRESSURE: 71 MMHG | HEART RATE: 56 BPM | RESPIRATION RATE: 16 BRPM

## 2018-05-02 DIAGNOSIS — R00.1 BRADYCARDIA: ICD-10-CM

## 2018-05-02 DIAGNOSIS — R06.83 SNORING: Primary | ICD-10-CM

## 2018-05-02 DIAGNOSIS — F51.04 PSYCHOPHYSIOLOGICAL INSOMNIA: ICD-10-CM

## 2018-05-02 PROCEDURE — 97110 THERAPEUTIC EXERCISES: CPT | Mod: GP | Performed by: PHYSICAL THERAPIST

## 2018-05-02 PROCEDURE — 40000718 ZZHC STATISTIC PT DEPARTMENT ORTHO VISIT: Performed by: PHYSICAL THERAPIST

## 2018-05-02 PROCEDURE — 97140 MANUAL THERAPY 1/> REGIONS: CPT | Mod: GP | Performed by: PHYSICAL THERAPIST

## 2018-05-02 PROCEDURE — 99214 OFFICE O/P EST MOD 30 MIN: CPT | Performed by: INTERNAL MEDICINE

## 2018-05-02 NOTE — PATIENT INSTRUCTIONS
Please listen to radio in a different room and go to be d when ready to sleep.  If it takes more than 30 minutes to resume sleep after waking up between 4-5 AM, please leave be room, listen to music or read and return to bed only  if sleepy. Please  do not spend excessive time in bed awake.  Please limit nap duration to no more than 30 minutes a day.  Please reduced caffeine consumption.    Please do not drive if drowsy or sleepy;  pull over if drowsy.

## 2018-05-02 NOTE — MR AVS SNAPSHOT
After Visit Summary   5/2/2018    Daniel Mcnair    MRN: [id number]           Patient Information     Date Of Birth          10/17/1929        Visit Information        Provider Department      5/2/2018 10:30 AM Betzaida Vasquez MD [facility], [place], Sleep Study        Care Instructions             Please listen to radio in a different room and go to be d when ready to sleep.  If it takes more than 30 minutes to resume sleep after waking up between 4-5 AM, please leave be room, listen to music or read and return to bed only  if sleepy. Please  do not spend excessive time in bed awake.  Please limit nap duration to no more than 30 minutes a day.  Please reduced caffeine consumption.    Please do not drive if drowsy or sleepy;  pull over if drowsy.            Follow-ups after your visit        Follow-up notes from your care team     Return if symptoms worsen or fail to improve.      Your next 10 appointments already scheduled     May 02, 2018  1:30 PM CDT   Neuro Treatment with Anahy Ford, PT   [facility] ([facility])    [address] [place] [address]   [phone]            May 07, 2018 10:00 AM CDT   Hearing Aid Consult with Stephie Greer   [facility] ([facility])    [address] [place] [address]   [phone]            May 10, 2018 10:20 AM CDT   (Arrive by 10:05 AM)   Return Visit with Lia Dumont MD   [facility] ([facility])    [address] [place] [address]   [phone]            May 16, 2018 11:45 AM CDT   Neuro Treatment with Anahy Ford, PT   [facility] ([facility])    [address] [place] [address]   [phone]            May 23, 2018 11:45 AM CDT   Neuro Treatment with  Anahy Ford, PT   [facility] ([facility])    [address]  [address]  [facility] [address]   [phone]            May 24, 2018  1:00 PM CDT   Hearing Aid Fitting with Stephie Bell [facility] ([facility])    [address]  4th Floor  [facility] [address]   [phone]            Jun 18, 2018  2:00 PM CDT   (Arrive by 1:45 PM)   Initial Review Program with Stephie Greer [facility] ([facility])    [address]  4th Floor  [facility] [place] [facility] [address]   [phone]            Jun 26, 2018  8:30 AM CDT   RETURN RETINA with Luna Parker MD   [facility] ([facility])    [address]  [facility] [address]   [phone]            Oct 09, 2018 11:30 AM CDT   (Arrive by 11:15 AM)   Return Visit with Santana Martinez MD   [facility] ([facility])    [address]  [address]  [place] [address] [phone]            Nov 15, 2018 12:00 PM CST   (Arrive by 11:45 AM)   Return Visit with Tanner Rojas MD   [facility] ([facility])    [address]  [address]  [facility] [place] [facility] [id number] [phone]              Who to contact     If you have questions or need follow up information about today's clinic visit or your schedule please contact [facility] directly at [phone].  Normal or non-critical lab and imaging results will be communicated to you by MyChart, letter or phone within 4 business days after the clinic has received the results. If you do not hear from us within 7 days, please contact the clinic through MyChart or phone. If you have a critical or abnormal lab result, we will notify you by phone as soon as possible.  Submit refill requests through  "MyChart or call your pharmacy and they will forward the refill request to us. Please allow 3 business days for your refill to be completed.          Additional Information About Your Visit        MyChart Information     [employer] gives you secure access to your electronic health record. If you see a primary care provider, you can also send messages to your care team and make appointments. If you have questions, please call your primary care clinic.  If you do not have a primary care provider, please call [phone] and they will assist you.        Care EveryWhere ID     This is your Care EveryWhere ID. This could be used by other organizations to access your [facility] medical records  [id number]        Your Vitals Were     Pulse Respirations Height Pulse Oximetry BMI (Body Mass Index)       56 16 1.778 m (5' 10\") 98% 27.84 kg/m2        Blood Pressure from Last 3 Encounters:   05/02/18 140/71   03/21/18 139/64   03/08/18 118/55    Weight from Last 3 Encounters:   05/02/18 88 kg (194 lb)   03/21/18 89.4 kg (197 lb)   03/08/18 89.6 kg (197 lb 9.6 oz)              Today, you had the following     No orders found for display       Primary Care Provider Fax #    Physician No Ref-Primary [phone]       No address on file        Equal Access to Services     PARRISH LOPEZ : Hadii brandy choudhuryo Soneldaali, waaxda luqadaha, qaybta kaalmada adeegyada, jose miguel lawrence . So [facility] [phone].    ATENCIÓN: Si habla español, tiene a vaughan disposición servicios gratuitos de asistencia lingüística. Llame al [phone].    We comply with applicable federal civil rights laws and [place] laws. We do not discriminate on the basis of race, color, national origin, age, disability, sex, sexual orientation, or gender identity.            Thank you!     Thank you for choosing [facility], SLEEP STUDY  for your care. Our goal is always to provide you with excellent care. Hearing back from our patients is one way " we can continue to improve our services. Please take a few minutes to complete the written survey that you may receive in the mail after your visit with us. Thank you!             Your Updated Medication List - Protect others around you: Learn how to safely use, store and throw away your medicines at www.disposemymeds.org.          This list is accurate as of 5/2/18 11:35 AM.  Always use your most recent med list.                   Brand Name Dispense Instructions for use Diagnosis    acetaminophen 325 MG tablet    TYLENOL    100 tablet    Take 2 tablets (650 mg) by mouth every 6 hours as needed for pain    S/P revision of total hip       acetaminophen-codeine 300-30 MG per tablet    TYLENOL w/CODEINE No. 3    30 tablet    Take 1-2 tablets by mouth every 4 hours as needed for mild pain    Pain in joint, pelvic region and thigh, unspecified laterality       amLODIPine 10 MG tablet    NORVASC    90 tablet    TAKE 1 TABLET DAILY    Benign essential hypertension       aspirin 81 MG EC tablet     90 tablet    Take 1 tablet (81 mg) by mouth daily    Hyperlipidemia LDL goal <70       atorvastatin 20 MG tablet    LIPITOR    90 tablet    Take 1 tablet (20 mg) by mouth daily    Hyperlipidemia LDL goal <70       benazepril 40 MG tablet    LOTENSIN    90 tablet    Take 1 tablet (40 mg) by mouth daily    Essential hypertension, benign       bisacodyl 10 MG Suppository    DULCOLAX    3 suppository    Place 1 suppository rectally daily as needed.    Chronic constipation       desonide 0.05 % ointment    DESOWEN    60 g    Apply topically 2 times daily To affected areas of scale and redness on the face and ears as needed until clear.    Dermatitis, seborrheic       docusate sodium 100 MG tablet    COLACE    60 tablet    Take 100 mg by mouth 2 times daily    BPH (benign prostatic hyperplasia)       gentamicin 0.1 % ointment    GARAMYCIN     Apply topically 4 times daily    B-cell lymphoma of intra-abdominal lymph nodes, unspecified  B-cell lymphoma type (H)       melatonin 3 MG tablet      Take 5 mg by mouth At Bedtime        metoprolol succinate 25 MG 24 hr tablet    TOPROL-XL    45 tablet    Take 0.5 tablets (12.5 mg) by mouth daily    Essential hypertension, benign       MILK OF MAGNESIA PO      Take by mouth At Bedtime        nitroGLYcerin 0.4 MG sublingual tablet    NITROSTAT    30 tablet    Place 1 tablet (0.4 mg) under the tongue every 5 minutes as needed    Coronary artery disease involving native heart without angina pectoris, unspecified vessel or lesion type       NutriSource Fiber packet      Take 1 packet by mouth every morning        psyllium 0.52 g capsule      Take by mouth daily Powder        ranitidine 150 MG tablet    ZANTAC    180 tablet    Take 1 tablet (150 mg) by mouth 2 times daily    Gastroesophageal reflux disease without esophagitis       sildenafil 100 MG tablet    VIAGRA    10 tablet    Take 1 tablet (100 mg) by mouth daily as needed    Erectile dysfunction, unspecified erectile dysfunction type       tamsulosin 0.4 MG capsule    FLOMAX    90 capsule    Take 1 capsule (0.4 mg) by mouth daily    Hypertrophy of prostate without urinary obstruction       tolterodine 2 MG tablet    DETROL    180 tablet    Take 1 tablet (2 mg )twice a day    Hypertonicity of bladder       * triamcinolone 0.025 % cream    KENALOG    45 g    Apply topically 2 times daily    Dermatitis       * triamcinolone 0.1 % ointment    KENALOG    80 g    Apply topically 2 times daily To itchy rashes    Intrinsic atopic dermatitis       VITEYES AREDS FORMULA/LUTEIN Caps      Take by mouth daily        * Notice:  This list has 2 medication(s) that are the same as other medications prescribed for you. Read the directions carefully, and ask your doctor or other care provider to review them with you.

## 2018-05-02 NOTE — Clinical Note
Hi Dr. Martinez,  This is as an FYI regarding abnormal EKG findings during recent sleep study. We noticed frequent PACs with occasional bradycardia(rhythm strip is attached in my clinic notes from today).  Patient was last seen by you on December 5, 2017, for bradycardia. He was recommended to continue the beta-blocker and no further workup was indicated due to absence of symptomatic bradycardia. During today's visit he reports dyspnea on exertion occasionally,  which is a change since his visit with you in December 2017 though he denies any chest pain, dyspnea at rest, PND, orthopnea, palpitations, lightheadedness or syncope.  I discussed with the patient that I'll communicate with you  about the EKG findings and his symptoms of dyspnea on exertion.   Please let me kw if any questions.  Thank you, Betzaida Vasquez MD  of Medicine, Division of Pulmonary/Sleep Medicine [facility].

## 2018-05-07 ENCOUNTER — OFFICE VISIT (OUTPATIENT)
Dept: AUDIOLOGY | Facility: CLINIC | Age: 83
End: 2018-05-07
Payer: MEDICARE

## 2018-05-07 DIAGNOSIS — H90.3 SENSORY HEARING LOSS, BILATERAL: Primary | ICD-10-CM

## 2018-05-07 NOTE — MR AVS SNAPSHOT
After Visit Summary   5/7/2018    Daniel Mcnair    MRN: [id number]           Patient Information     Date Of Birth          10/17/1929        Visit Information        Provider Department      5/7/2018 10:00 AM Jennifer Beach AuD M [facility] Audiology        Today's Diagnoses     Sensory hearing loss, bilateral    -  1       Follow-ups after your visit        Your next 10 appointments already scheduled     May 10, 2018 10:20 AM CDT   (Arrive by 10:05 AM)   Return Visit with Lia Dumont MD   [facility] ([facility])    [address] [place] [address]   [phone]            May 16, 2018 11:45 AM CDT   Neuro Treatment with Anahy Ford, PT   [facility] ([facility])    [address] [place] [address]   [phone]            May 23, 2018 11:45 AM CDT   Neuro Treatment with Anahy Ford, PT   [facility] ([facility])    [address] [place] [address] [address]  [facility] [address]   [phone]            May 24, 2018  1:00 PM CDT   Hearing Aid Fitting with Stephie Bell [facility]iology ([facility])    [address] [place] [address]   [phone]            Jun 18, 2018  2:00 PM CDT   (Arrive by 1:45 PM)   Initial Review Program with Stephie Greer [facility]iology ([facility])    [address] [place] [address]   [phone]            Jun 26, 2018  8:30 AM CDT   RETURN RETINA with Luna Parker MD   [facility] Clinic ([facility])    [address] [address]   [phone]            Oct 09, 2018 11:30 AM CDT   (Arrive by 11:15 AM)   Return Visit with Santana Martinez MD   [facility]  [facility])    [address]  [address]  [facility] [address]   [phone]            Nov 15, 2018  8:30 AM CST   [facility] Draw with  [facility] DRAW   [facility] Draw ([facility])    [address]  [address]  [facility] [address]   [phone]            Nov 15, 2018  9:00 AM CST   CT ABDOMEN PELVIS W CONTRAST with UCCT1   [facility] CT ([facility])    [address]  1st Floor  [facility] [address]   [phone]           Please bring any scans or X-rays taken at other hospitals, if similar tests were done. Also bring a list of your medicines, including vitamins, minerals and over-the-counter drugs. It is safest to leave personal items at home.  Be sure to tell your doctor:   If you have any allergies.   If there s any chance you are pregnant.   If you are breastfeeding.  How to prepare:   Do not eat or drink for 2 hours before your exam. If you need to take medicine, you may take it with small sips of water. (We may ask you to take liquid medicine as well.)   Please wear loose clothing, such as a sweat suit or jogging clothes. Avoid snaps, zippers and other metal. We may ask you to undress and put on a hospital gown.  Please arrive 30 minutes early for your CT. Once in the department you might be asked to drink water 15-20 minutes prior to your exam.  If indicated you may be asked to drink an oral contrast in advance of your CT.  If this is the case, the imaging team will let you know or be in contact with you prior to your appointment  Patients over 70 or patients with diabetes or kidney problems:   If you haven t had a blood test (creatinine test) within the last 30 days, the Cardiologist/Radiologist may require you to get this test prior to your exam.  If you have diabetes:   Continue to take your metformin medication on the day of your exam  If you have any questions,  please call the Imaging Department where you will have your exam.            Nov 15, 2018 12:00 PM CST   (Arrive by 11:45 AM)   Return Visit with Tanner Rojas MD   [facility] ([facility])    [address]  [address]  [facility] [address] [phone]              Who to contact     Please call your clinic at [phone] to:    Ask questions about your health    Make or cancel appointments    Discuss your medicines    Learn about your test results    Speak to your doctor            Additional Information About Your Visit        [employer]star[employer] Information     Wylio gives you secure access to your electronic health record. If you see a primary care provider, you can also send messages to your care team and make appointments. If you have questions, please call your primary care clinic.  If you do not have a primary care provider, please call [phone] and they will assist you.      Wylio is an electronic gateway that provides easy, online access to your medical records. With W[employer]io, you can request a clinic appointment, read your test results, renew a prescription or communicate with your care team.     To access your existing account, please contact your [facility] or call [phone] for assistance.        Care EveryWhere ID     This is your Care EveryWhere ID. This could be used by other organizations to access your [place] medical records  [id number]         Blood Pressure from Last 3 Encounters:   05/02/18 140/71   03/21/18 139/64   03/08/18 118/55    Weight from Last 3 Encounters:   05/02/18 88 kg (194 lb)   03/21/18 89.4 kg (197 lb)   03/08/18 89.6 kg (197 lb 9.6 oz)              We Performed the Following     Hearing Aid Exam, Binaural ([id number])        Primary Care Provider Fax #    Physician No Ref-Primary [phone]       No address on file        Equal Access to Services     PARRISH LOPEZ : Shruthi godoy  Sotremayne, wapuneetda luqadaha, qaybta kaalviktoriya bingham, jose miguel leongkenya john. So [facility] [phone].    ATENCIÓN: Si drew culver, tiene a vaughan disposición servicios gratuitos de asistencia lingüística. Glenna al [phone].    We comply with applicable federal civil rights laws and [place] laws. We do not discriminate on the basis of race, color, national origin, age, disability, sex, sexual orientation, or gender identity.            Thank you!     Thank you for choosing [facility] AUDIOLOGY  for your care. Our goal is always to provide you with excellent care. Hearing back from our patients is one way we can continue to improve our services. Please take a few minutes to complete the written survey that you may receive in the mail after your visit with us. Thank you!             Your Updated Medication List - Protect others around you: Learn how to safely use, store and throw away your medicines at www.disposemymeds.org.          This list is accurate as of 5/7/18  2:21 PM.  Always use your most recent med list.                   Brand Name Dispense Instructions for use Diagnosis    acetaminophen 325 MG tablet    TYLENOL    100 tablet    Take 2 tablets (650 mg) by mouth every 6 hours as needed for pain    S/P revision of total hip       acetaminophen-codeine 300-30 MG per tablet    TYLENOL w/CODEINE No. 3    30 tablet    Take 1-2 tablets by mouth every 4 hours as needed for mild pain    Pain in joint, pelvic region and thigh, unspecified laterality       amLODIPine 10 MG tablet    NORVASC    90 tablet    TAKE 1 TABLET DAILY    Benign essential hypertension       aspirin 81 MG EC tablet     90 tablet    Take 1 tablet (81 mg) by mouth daily    Hyperlipidemia LDL goal <70       atorvastatin 20 MG tablet    LIPITOR    90 tablet    Take 1 tablet (20 mg) by mouth daily    Hyperlipidemia LDL goal <70       benazepril 40 MG tablet    LOTENSIN    90 tablet    Take 1 tablet (40 mg) by mouth daily     Essential hypertension, benign       bisacodyl 10 MG Suppository    DULCOLAX    3 suppository    Place 1 suppository rectally daily as needed.    Chronic constipation       desonide 0.05 % ointment    DESOWEN    60 g    Apply topically 2 times daily To affected areas of scale and redness on the face and ears as needed until clear.    Dermatitis, seborrheic       docusate sodium 100 MG tablet    COLACE    60 tablet    Take 100 mg by mouth 2 times daily    BPH (benign prostatic hyperplasia)       gentamicin 0.1 % ointment    GARAMYCIN     Apply topically 4 times daily    B-cell lymphoma of intra-abdominal lymph nodes, unspecified B-cell lymphoma type (H)       melatonin 3 MG tablet      Take 5 mg by mouth At Bedtime        metoprolol succinate 25 MG 24 hr tablet    TOPROL-XL    45 tablet    Take 0.5 tablets (12.5 mg) by mouth daily    Essential hypertension, benign       MILK OF MAGNESIA PO      Take by mouth At Bedtime        nitroGLYcerin 0.4 MG sublingual tablet    NITROSTAT    30 tablet    Place 1 tablet (0.4 mg) under the tongue every 5 minutes as needed    Coronary artery disease involving native heart without angina pectoris, unspecified vessel or lesion type       NutriSource Fiber packet      Take 1 packet by mouth every morning        psyllium 0.52 g capsule      Take by mouth daily Powder        ranitidine 150 MG tablet    ZANTAC    180 tablet    Take 1 tablet (150 mg) by mouth 2 times daily    Gastroesophageal reflux disease without esophagitis       sildenafil 100 MG tablet    VIAGRA    10 tablet    Take 1 tablet (100 mg) by mouth daily as needed    Erectile dysfunction, unspecified erectile dysfunction type       tamsulosin 0.4 MG capsule    FLOMAX    90 capsule    Take 1 capsule (0.4 mg) by mouth daily    Hypertrophy of prostate without urinary obstruction       tolterodine 2 MG tablet    DETROL    180 tablet    Take 1 tablet (2 mg )twice a day    Hypertonicity of bladder       * triamcinolone 0.025 %  cream    KENALOG    45 g    Apply topically 2 times daily    Dermatitis       * triamcinolone 0.1 % ointment    KENALOG    80 g    Apply topically 2 times daily To itchy rashes    Intrinsic atopic dermatitis       VITEYES AREDS FORMULA/LUTEIN Caps      Take by mouth daily        * Notice:  This list has 2 medication(s) that are the same as other medications prescribed for you. Read the directions carefully, and ask your doctor or other care provider to review them with you.

## 2018-05-07 NOTE — PROGRESS NOTES
AUDIOLOGY REPORT    SUBJECTIVE: Daniel Mcnair is a 88 year old male was seen in the Audiology Clinic at  [facility] on 5/07/18 to discuss concerns with hearing and functional communication difficulties. Daniel has been seen previously on 2/2/2018, and results revealed a mild moderate bilateral sensorineural hearing loss.  The patient was medically evaluated and determined to be cleared for binaural hearing aids by Dr. Tony Kahn. Daniel notes difficulty with communication in a variety of listening situations. He is a retired , who has a 4 year old economy Unitron AkuminaE instrument, with a skeleton earmold.  He has not worn a hearing aid in the left ear due to the narrow ear canal.    OBJECTIVE:  Patient is a hearing aid candidate. Patient would like to move forward with a hearing aid evaluation today. Therefore, the patient was presented with different options for amplification to help aid in communication. Discussed styles, levels of technology and monaural vs. binaural fitting.     The hearing aid(s) mutually chosen were:  Binaural: Seimens 3NX Pure 13  COLOR: White  BATTERY SIZE: 13  EARMOLD/TIPS: RITE Size 2 M  Dome:  Small closed right and XSmall closed left    Otoscopy revealed ears are clear of cerumen bilaterally.     ASSESSMENT:   Reviewed purchase information and warranty information with patient. The 45 day trial period was explained to patient. The patient was given a copy of the [place] Department of Health consumer brochure on purchasing hearing instruments. Patient risk factors have been provided to the patient in writing prior to the sale of the hearing aid per FDA regulation. The risk factors are also available in the User Instructional Booklet to be presented on the day of the hearing aid fitting. Hearing aids ordered. Hearing aid evaluation completed.    PLAN: Daniel is scheduled to return in 2-3 weeks for a hearing aid fitting and programming.  Purchase agreement will be completed on that date. If there are problems with the JANICE style, it will be changed to the BTE with earmolds.Please contact this clinic with any questions or concerns.      Davon Vyas, CCC-A  Licensed Audiologist  [place] #[id number]

## 2018-05-10 ENCOUNTER — OFFICE VISIT (OUTPATIENT)
Dept: INTERNAL MEDICINE | Facility: CLINIC | Age: 83
End: 2018-05-10
Payer: MEDICARE

## 2018-05-10 VITALS
RESPIRATION RATE: 20 BRPM | DIASTOLIC BLOOD PRESSURE: 67 MMHG | HEART RATE: 54 BPM | BODY MASS INDEX: 27.52 KG/M2 | SYSTOLIC BLOOD PRESSURE: 117 MMHG | WEIGHT: 191.8 LBS | OXYGEN SATURATION: 97 %

## 2018-05-10 DIAGNOSIS — K59.00 CONSTIPATION, UNSPECIFIED CONSTIPATION TYPE: Primary | ICD-10-CM

## 2018-05-10 DIAGNOSIS — R35.1 NOCTURIA: ICD-10-CM

## 2018-05-10 DIAGNOSIS — R03.1 LOW BLOOD PRESSURE READING: ICD-10-CM

## 2018-05-10 DIAGNOSIS — K62.4 STENOSIS OF RECTUM AND ANUS: ICD-10-CM

## 2018-05-10 DIAGNOSIS — H90.3 SENSORINEURAL HEARING LOSS (SNHL) OF BOTH EARS: ICD-10-CM

## 2018-05-10 DIAGNOSIS — G47.00 INSOMNIA, UNSPECIFIED TYPE: ICD-10-CM

## 2018-05-10 ASSESSMENT — PAIN SCALES - GENERAL: PAINLEVEL: NO PAIN (0)

## 2018-05-10 NOTE — PATIENT INSTRUCTIONS
[facility]: [phone]     [facility] Medication Refill Request Information:  * Please contact your pharmacy regarding ANY request for medication refills.  ** [facility] Prescription Fax = [phone]  * Please allow 3 business days for routine medication refills.  * Please allow 5 business days for controlled substance medication refills.     [facility] Test Result notification information:  *You will be notified with in 7-10 days of your appointment day regarding the results of your test.  If you are on MyChart you will be notified as soon as the provider has reviewed the results and signed off on them.

## 2018-05-10 NOTE — PROGRESS NOTES
[facility]  Primary Care Center   Lia Dumont MD  05/10/2018      Chief Complaint:   Joint pain, Sleep problem       History of Present Illness:   Daniel Mcnair is a 88 year old male with a history of CAD, B-cell lymphoma, and HTN who presents for evaluation of joint pain and a sleep problem.    He had a sleep study to evaluate for possible sleep apnea.  He did snore more and had some obstructive events when laying on his back but he did not have actual clinically significant sleep apnea.  He is actually sleeping better and is no longer taking Melatonin.    He is worried about his blood pressure being 108/58 today and does feel a little lightheaded today.    He gets constipated from time to time and was constipated yesterday and today to where he has to strain to have a bowel movement.  He therefore took a Dulcolax suppository last night which did improve things.  Daily he takes Colace, apple sauce, prune juice, milk of magnesia, and what he thinks is a clear fiber which he mixes in his coffee.  They do not think it is Miralax although they also have this at home.  This regimen does usually control his constipation.  He has a history of anal stricture and is still dilating daily for this.  He does not feel the caliber of his stool has changed.  He did have some blood in stool this morning after straining for a bowel movement.      He gets up 4 - 5 times a night to urinate and keeps a urinal at his bedside for this.  He is on Tamsulosin and feels his frequency has been stable in the past several years.  He is comfortable with his current symptoms.  We discussed the risks of sedating medications vs. The benefit of treating the nocturia.  No other symptoms suggestive of UTI.    He is going to be getting a new hearing aid as his hearing has started impacting his daily life more.      He has been seeing PT for left hip pain and general deconditioning and thinks this is going well overall.    He checked with  insurance and thinks they may cover it but just in case it is not, his wife will get the shot today and he will get it next month.     Review of Systems:   Pertinent items are noted in HPI, remainder of complete ROS is negative.      Active Medications:      acetaminophen (TYLENOL) 325 MG tablet, Take 2 tablets (650 mg) by mouth every 6 hours as needed for pain, Disp: 100 tablet, Rfl: 0     acetaminophen-codeine (TYLENOL W/CODEINE NO. 3) 300-30 MG per tablet, Take 1-2 tablets by mouth every 4 hours as needed for mild pain, Disp: 30 tablet, Rfl: 1     amLODIPine (NORVASC) 10 MG tablet, TAKE 1 TABLET DAILY, Disp: 90 tablet, Rfl: 2     aspirin 81 MG EC tablet, Take 1 tablet (81 mg) by mouth daily, Disp: 90 tablet, Rfl: 3     atorvastatin (LIPITOR) 20 MG tablet, Take 1 tablet (20 mg) by mouth daily, Disp: 90 tablet, Rfl: 2     benazepril (LOTENSIN) 40 MG tablet, Take 1 tablet (40 mg) by mouth daily, Disp: 90 tablet, Rfl: 3     bisacodyl (DULCOLAX) 10 MG suppository, Place 1 suppository rectally daily as needed., Disp: 3 suppository, Rfl: 0     desonide (DESOWEN) 0.05 % ointment, Apply topically 2 times daily To affected areas of scale and redness on the face and ears as needed until clear., Disp: 60 g, Rfl: 11     docusate sodium 100 MG tablet, Take 100 mg by mouth 2 times daily, Disp: 60 tablet, Rfl: 1     gentamicin (GARAMYCIN) 0.1 % ointment, Apply topically 4 times daily , Disp: , Rfl: 3     Guar Gum (BENEFIBER) packet, Take 1 packet by mouth every morning , Disp: , Rfl:      Magnesium Hydroxide (MILK OF MAGNESIA PO), Take by mouth At Bedtime, Disp: , Rfl:      melatonin 3 MG tablet, Take 5 mg by mouth At Bedtime , Disp: , Rfl:      metoprolol succinate (TOPROL-XL) 25 MG 24 hr tablet, Take 0.5 tablets (12.5 mg) by mouth daily, Disp: 45 tablet, Rfl: 3     Multiple Vitamins-Minerals (VITEYES AREDS FORMULA/LUTEIN) CAPS, Take by mouth daily, Disp: , Rfl:      nitroglycerin (NITROSTAT) 0.4 MG sublingual tablet, Place 1  tablet (0.4 mg) under the tongue every 5 minutes as needed, Disp: 30 tablet, Rfl: 2     psyllium 0.52 G capsule, Take by mouth daily Powder, Disp: , Rfl:      ranitidine (ZANTAC) 150 MG tablet, Take 1 tablet (150 mg) by mouth 2 times daily, Disp: 180 tablet, Rfl: 3     sildenafil (VIAGRA) 100 MG tablet, Take 1 tablet (100 mg) by mouth daily as needed, Disp: 10 tablet, Rfl: 2     tamsulosin (FLOMAX) 0.4 MG capsule, Take 1 capsule (0.4 mg) by mouth daily, Disp: 90 capsule, Rfl: 3     tolterodine (DETROL) 2 MG tablet, Take 1 tablet (2 mg )twice a day, Disp: 180 tablet, Rfl: 3     triamcinolone (KENALOG) 0.025 % cream, Apply topically 2 times daily, Disp: 45 g, Rfl: 0     triamcinolone (KENALOG) 0.1 % ointment, Apply topically 2 times daily To itchy rashes, Disp: 80 g, Rfl: 3      Allergies:   No known drug allergies.       Past Medical History:  Coronary artery disease   Benign essential hypertension   Hyperlipidemia  Osteoarthritis   Spinal facet arthropathy  Sacroiliitis   Degenerative spondylolisthesis   Prostatic hypertrophy  Intraductal papillary mucinous neoplasm  B-cell lymphoma  Basal cell carcinoma  Idiopathic gynecomastia  Anal stricture   Hemorrhoids   Senile cataract   Vitreous degeneration  Macular degeneration  Choroidal detachment    Past Surgical History:  Total hip arthroplasty revision, left 1/21/14  Cystoscopy, transurethral prostatectomy 11/5/13  Posterior lumbar fusion, L2-3, L3-4  Total hip arthoplasty, left 10/2008  Back surgery 2006  Cataract surgery, bilateral 2002  Coronary angioplasty, stent placement 2001  Mastoid surgery 1939  Total hip arthroplasty, right 1999    Family History:   Diabetes - maternal grandfather  Alcohol/drug use - maternal grandfather   Arthritis - maternal grandfather   Obesity - maternal grandfather   Stroke - maternal grandmother   Hypertension - mother      Social History:   Marital Status:   Presents to clinic with his wife, Zuleyka  Tobacco Use: No previous  or current tobacco use.  Alcohol Use: Occasional alcohol use.      Physical Exam:   /58 (BP Location: Right arm, Patient Position: Sitting, Cuff Size: Adult Large)  Pulse 51  Resp 20  Wt 87 kg (191 lb 12.8 oz)  SpO2 97%  BMI 27.52 kg/m2     Orthostatic blood pressures:  Lyin/71 with a pulse of 67   Sittin/57 with a pulse of 55   Standin/67 with a pulse of 54    Physical Examination:  General:  Pleasant, alert, no acute distress      Assessment and Plan:  Insomnia, unspecified type  Improved, recent sleep study did not show clinically significant sleep apnea.    Nocturia  Since he is comfortable with his current stable symptoms, will not change his Tamsulosin as increasing this may cause more side effects.    Constipation, unspecified constipation type  Generally well managed with his current regimen described above.    Stenosis of rectum and anus  No change in stool caliber per patient.  He still does self dilation as taught by colorectal several years ago. He will resume his usual bowel regimen.    Low blood pressure reading.  See orthostatic BP/HR above.  Repeat blood pressure was improved.  I advised him to stay well hydrated, avoid straining and change position slowly.    Sensorineural hearing loss (SNHL) of both ears        Follow-up: Return in about 4 months (around 9/10/2018) for Routine Visit.       Total time spent 25 minutes.  More than 50% of the time spent with Mr. Mcnair on counseling / coordinating his care        Scribe Disclosure:   I, Eboni Malloydavi, am serving as a scribe to document services personally performed by Lia Dumont MD at this visit, based upon the provider's statements to me. All documentation has been reviewed by the aforementioned provider prior to being entered into the official medical record.     Portions of this medical record were completed by a scribe. UPON MY REVIEW AND AUTHENTICATION BY ELECTRONIC SIGNATURE, this confirms (a) I performed the  applicable clinical services, and (b) the record is accurate.      Lia Dumont M.D.  Internal Medicine  Primary Care Center   pager [phone]

## 2018-05-10 NOTE — NURSING NOTE
Orthostatic blood pressures:    Lyin/71-pulse(67)  Sittin/57- (55)  Standin/67-(54)  Marnie Mendez LPN 11:36 AM on 5/10/2018

## 2018-05-10 NOTE — NURSING NOTE
"Chief Complaint   Patient presents with     Pain     follow up joint pain- pains get \"achy\" with ambulation     Sleep Problem     follow up sleepiness   Marnie Mendez LPN 9:58 AM on 5/10/2018    Rooming Note  Health Maintenance   There are no preventive care reminders to display for this patient. All health maintenance items UP TO DATE  "

## 2018-05-10 NOTE — MR AVS SNAPSHOT
After Visit Summary   5/10/2018    Daniel Mcnair    MRN: [id number]           Patient Information     Date Of Birth          10/17/1929        Visit Information        Provider Department      5/10/2018 10:20 AM Lia Dumont MD [facility]        Today's Diagnoses     Constipation, unspecified constipation type    -  1    Insomnia, unspecified type        Nocturia        Stenosis of rectum and anus        Low blood pressure reading        Sensorineural hearing loss (SNHL) of both ears          Care Instructions    Primary Care Center: [phone]     Primary Care Center Medication Refill Request Information:  * Please contact your pharmacy regarding ANY request for medication refills.  ** [facility] Prescription Fax = [phone]  * Please allow 3 business days for routine medication refills.  * Please allow 5 business days for controlled substance medication refills.     Primary Care Center Test Result notification information:  *You will be notified with in 7-10 days of your appointment day regarding the results of your test.  If you are on MyChart you will be notified as soon as the provider has reviewed the results and signed off on them.          Follow-ups after your visit        Follow-up notes from your care team     Return in about 4 months (around 9/10/2018) for Routine Visit.      Your next 10 appointments already scheduled     May 16, 2018 11:45 AM CDT   Neuro Treatment with Anahy Ford, PT   [facility] Physical Therapy ([facility])    [address] [place] [address]   [phone]            May 23, 2018 11:45 AM CDT   Neuro Treatment with Anahy Ford, PT   [facility] ([facility])    [address] [place] [address]   [phone]            May 24, 2018  1:00 PM CDT   Hearing Aid Fitting with Stephie Bell [facility] Audiology ([facility])    [address]  4th [facility] [address]   [phone]            Jun 18, 2018  2:00 PM CDT   (Arrive by 1:45 PM)   Initial Review Program with Stephie Greer   [facility] Audiology ([facility])    [address]  [address] [facility] [address]   [phone]            Jun 26, 2018  8:30 AM CDT   RETURN RETINA with Luna Parker MD   Eye Clinic ([facility])    [address]  9[address] Clin 9a  [facility] [address]   [phone]            Sep 13, 2018 10:15 AM CDT   (Arrive by 10:00 AM)   Return Visit with Lia Dumont MD   [facility] ([facility])    [address]  [address] [facility] [address]   [phone]            Oct 09, 2018 11:30 AM CDT   (Arrive by 11:15 AM)   Return Visit with Santana Martinez MD   [facility] Heart Care ([facility])    [address]  [address]  [facility] [address]   [phone]            Nov 15, 2018  8:30 AM CST   Masonic Lab Draw with  MASONIC LAB DRAW   [facility] Masonic Lab Draw ([facility])    [address] [place] [address]  [address]  [facility] [address]   [phone]            Nov 15, 2018  9:00 AM CST   CT ABDOMEN PELVIS W CONTRAST with UCCT1   [facility] Imaging Center CT ([facility])    [address] [place] [address]  1st [facility] [address]   [phone]           Please bring any scans or X-rays taken at other hospitals, if similar tests were done. Also bring a list of your medicines, including vitamins, minerals and over-the-counter drugs. It is safest to leave personal items at home.  Be sure to tell your doctor:   If you have any allergies.   If there s any chance you are pregnant.   If you are breastfeeding.  How to prepare:   Do not eat or drink for 2 hours before  your exam. If you need to take medicine, you may take it with small sips of water. (We may ask you to take liquid medicine as well.)   Please wear loose clothing, such as a sweat suit or jogging clothes. Avoid snaps, zippers and other metal. We may ask you to undress and put on a hospital gown.  Please arrive 30 minutes early for your CT. Once in the department you might be asked to drink water 15-20 minutes prior to your exam.  If indicated you may be asked to drink an oral contrast in advance of your CT.  If this is the case, the imaging team will let you know or be in contact with you prior to your appointment  Patients over 70 or patients with diabetes or kidney problems:   If you haven t had a blood test (creatinine test) within the last 30 days, the Cardiologist/Radiologist may require you to get this test prior to your exam.  If you have diabetes:   Continue to take your metformin medication on the day of your exam  If you have any questions, please call the Imaging Department where you will have your exam.            Nov 15, 2018 12:00 PM CST   (Arrive by 11:45 AM)   Return Visit with Tanner Rojas MD   [facility] ([facility])    [address]  [address]  [facility] [address] [phone]              Who to contact     Please call your clinic at [phone] to:    Ask questions about your health    Make or cancel appointments    Discuss your medicines    Learn about your test results    Speak to your doctor            Additional Information About Your Visit        [employer] Information     [employer] gives you secure access to your electronic health record. If you see a primary care provider, you can also send messages to your care team and make appointments. If you have questions, please call your primary care clinic.  If you do not have a primary care provider, please call [phone] and they will assist you.      [employer] is an electronic  gateway that provides easy, online access to your medical records. With [employer], you can request a clinic appointment, read your test results, renew a prescription or communicate with your care team.     To access your existing account, please contact your [facility] or call [phone] for assistance.        Care EveryWhere ID     This is your Care EveryWhere ID. This could be used by other organizations to access your [place] medical records  [id number]        Your Vitals Were     Pulse Respirations Pulse Oximetry BMI (Body Mass Index)          51 20 97% 27.52 kg/m2         Blood Pressure from Last 3 Encounters:   05/10/18 109/58   05/02/18 140/71   03/21/18 139/64    Weight from Last 3 Encounters:   05/10/18 87 kg (191 lb 12.8 oz)   05/02/18 88 kg (194 lb)   03/21/18 89.4 kg (197 lb)              Today, you had the following     No orders found for display       Primary Care Provider Fax #    Physician No Ref-Primary [phone]       No address on file        Equal Access to Services     PARRISH LOPEZ : Hadii brandy ku hadasho Soomaali, waaxda luqadaha, qaybta kaalmada adeegyada, jose miguel lawrence . So [facility] [phone].    ATENCIÓN: Si habla español, tiene a vaughan disposición servicios gratuitos de asistencia lingüística. Llame al [phone].    We comply with applicable federal civil rights laws and [place] laws. We do not discriminate on the basis of race, color, national origin, age, disability, sex, sexual orientation, or gender identity.            Thank you!     Thank you for choosing [facility]IMARY [facility]ARE CL[facility]IC  for your care. Our goal is always to provide you with excellent care. Hearing back from our patients is one way we can continue to improve our services. Please take a few minutes to complete the written survey that you may receive in the mail after your visit with us. Thank you!             Your Updated Medication List - Protect others  around you: Learn how to safely use, store and throw away your medicines at www.disposemymeds.org.          This list is accurate as of 5/10/18 11:21 AM.  Always use your most recent med list.                   Brand Name Dispense Instructions for use Diagnosis    acetaminophen 325 MG tablet    TYLENOL    100 tablet    Take 2 tablets (650 mg) by mouth every 6 hours as needed for pain    S/P revision of total hip       acetaminophen-codeine 300-30 MG per tablet    TYLENOL w/CODEINE No. 3    30 tablet    Take 1-2 tablets by mouth every 4 hours as needed for mild pain    Pain in joint, pelvic region and thigh, unspecified laterality       amLODIPine 10 MG tablet    NORVASC    90 tablet    TAKE 1 TABLET DAILY    Benign essential hypertension       aspirin 81 MG EC tablet     90 tablet    Take 1 tablet (81 mg) by mouth daily    Hyperlipidemia LDL goal <70       atorvastatin 20 MG tablet    LIPITOR    90 tablet    Take 1 tablet (20 mg) by mouth daily    Hyperlipidemia LDL goal <70       benazepril 40 MG tablet    LOTENSIN    90 tablet    Take 1 tablet (40 mg) by mouth daily    Essential hypertension, benign       bisacodyl 10 MG Suppository    DULCOLAX    3 suppository    Place 1 suppository rectally daily as needed.    Chronic constipation       desonide 0.05 % ointment    DESOWEN    60 g    Apply topically 2 times daily To affected areas of scale and redness on the face and ears as needed until clear.    Dermatitis, seborrheic       docusate sodium 100 MG tablet    COLACE    60 tablet    Take 100 mg by mouth 2 times daily    BPH (benign prostatic hyperplasia)       gentamicin 0.1 % ointment    GARAMYCIN     Apply topically 4 times daily    B-cell lymphoma of intra-abdominal lymph nodes, unspecified B-cell lymphoma type (H)       melatonin 3 MG tablet      Take 5 mg by mouth At Bedtime        metoprolol succinate 25 MG 24 hr tablet    TOPROL-XL    45 tablet    Take 0.5 tablets (12.5 mg) by mouth daily    Essential  hypertension, benign       MILK OF MAGNESIA PO      Take by mouth At Bedtime        nitroGLYcerin 0.4 MG sublingual tablet    NITROSTAT    30 tablet    Place 1 tablet (0.4 mg) under the tongue every 5 minutes as needed    Coronary artery disease involving native heart without angina pectoris, unspecified vessel or lesion type       NutriSource Fiber packet      Take 1 packet by mouth every morning        psyllium 0.52 g capsule      Take by mouth daily Powder        ranitidine 150 MG tablet    ZANTAC    180 tablet    Take 1 tablet (150 mg) by mouth 2 times daily    Gastroesophageal reflux disease without esophagitis       sildenafil 100 MG tablet    VIAGRA    10 tablet    Take 1 tablet (100 mg) by mouth daily as needed    Erectile dysfunction, unspecified erectile dysfunction type       tamsulosin 0.4 MG capsule    FLOMAX    90 capsule    Take 1 capsule (0.4 mg) by mouth daily    Hypertrophy of prostate without urinary obstruction       tolterodine 2 MG tablet    DETROL    180 tablet    Take 1 tablet (2 mg )twice a day    Hypertonicity of bladder       * triamcinolone 0.025 % cream    KENALOG    45 g    Apply topically 2 times daily    Dermatitis       * triamcinolone 0.1 % ointment    KENALOG    80 g    Apply topically 2 times daily To itchy rashes    Intrinsic atopic dermatitis       VITEYES AREDS FORMULA/LUTEIN Caps      Take by mouth daily        * Notice:  This list has 2 medication(s) that are the same as other medications prescribed for you. Read the directions carefully, and ask your doctor or other care provider to review them with you.

## 2018-05-16 ENCOUNTER — HOSPITAL ENCOUNTER (OUTPATIENT)
Dept: PHYSICAL THERAPY | Facility: CLINIC | Age: 83
Setting detail: THERAPIES SERIES
End: 2018-05-16
Attending: INTERNAL MEDICINE
Payer: MEDICARE

## 2018-05-16 PROCEDURE — G8979 MOBILITY GOAL STATUS: HCPCS | Mod: GP,CJ | Performed by: PHYSICAL THERAPIST

## 2018-05-16 PROCEDURE — G8978 MOBILITY CURRENT STATUS: HCPCS | Mod: GP,CK | Performed by: PHYSICAL THERAPIST

## 2018-05-16 PROCEDURE — 97110 THERAPEUTIC EXERCISES: CPT | Mod: GP | Performed by: PHYSICAL THERAPIST

## 2018-05-16 PROCEDURE — 40000718 ZZHC STATISTIC PT DEPARTMENT ORTHO VISIT: Performed by: PHYSICAL THERAPIST

## 2018-05-16 PROCEDURE — 97140 MANUAL THERAPY 1/> REGIONS: CPT | Mod: GP | Performed by: PHYSICAL THERAPIST

## 2018-05-24 ENCOUNTER — OFFICE VISIT (OUTPATIENT)
Dept: AUDIOLOGY | Facility: CLINIC | Age: 83
End: 2018-05-24

## 2018-05-24 DIAGNOSIS — H90.3 SENSORINEURAL HEARING LOSS, BILATERAL: Primary | ICD-10-CM

## 2018-05-24 NOTE — MR AVS SNAPSHOT
After Visit Summary   5/24/2018    Daniel Mcnair    MRN: [id number]           Patient Information     Date Of Birth          10/17/1929        Visit Information        Provider Department      5/24/2018 1:00 PM María العلي [facility] Audiology        Today's Diagnoses     Sensorineural hearing loss, bilateral    -  1       Follow-ups after your visit        Your next 10 appointments already scheduled     May 30, 2018  9:30 AM CDT   Neuro Treatment with Anahy Ford, PT   [facility] Physical Therapy ([facility])    [address]  [address]  [facility] [address]   [phone]            Jun 18, 2018  2:00 PM CDT   (Arrive by 1:45 PM)   Initial Review Program with Stephie Greer [facility] Audiology ([facility])    [address] [place] [address]  4th [facility] [address]   [phone]            Jun 26, 2018  8:30 AM CDT   RETURN RETINA with Luna Parker MD   Eye Clinic ([facility])    [address] Clin [address]  [facility] [address]   [phone]            Sep 13, 2018 10:15 AM CDT   (Arrive by 10:00 AM)   Return Visit with Lia Dumont MD   [facility] Primary [facility] ([facility])    [address] [place] [address]  4th [facility] [address]   [phone]            Oct 09, 2018 11:30 AM CDT   (Arrive by 11:15 AM)   Return Visit with Santana Martinez MD   [facility] ([facility])    [address] [place] [address]  [address]  [facility] [address]   [phone]            Nov 15, 2018  8:30 AM CST   Masonic Lab Draw with  MASONIC LAB DRAW   [facility] Masonic Lab Draw ([facility])    [address] [place] [address]  [address]  [facility] [address]   [phone]            Nov 15, 2018  9:00 AM CST   CT ABDOMEN PELVIS W CONTRAST with UCCT1   [facility] Imaging [place] [facility] (  [facility] [place] [facility])    [address]  1st Floor  [facility] [id number] [phone]           Please bring any scans or X-rays taken at other hospitals, if similar tests were done. Also bring a list of your medicines, including vitamins, minerals and over-the-counter drugs. It is safest to leave personal items at home.  Be sure to tell your doctor:   If you have any allergies.   If there s any chance you are pregnant.   If you are breastfeeding.  How to prepare:   Do not eat or drink for 2 hours before your exam. If you need to take medicine, you may take it with small sips of water. (We may ask you to take liquid medicine as well.)   Please wear loose clothing, such as a sweat suit or jogging clothes. Avoid snaps, zippers and other metal. We may ask you to undress and put on a hospital gown.  Please arrive 30 minutes early for your CT. Once in the department you might be asked to drink water 15-20 minutes prior to your exam.  If indicated you may be asked to drink an oral contrast in advance of your CT.  If this is the case, the imaging team will let you know or be in contact with you prior to your appointment  Patients over 70 or patients with diabetes or kidney problems:   If you haven t had a blood test (creatinine test) within the last 30 days, the Cardiologist/Radiologist may require you to get this test prior to your exam.  If you have diabetes:   Continue to take your metformin medication on the day of your exam  If you have any questions, please call the Imaging Department where you will have your exam.            Nov 15, 2018 12:00 PM CST   (Arrive by 11:45 AM)   Return Visit with Tanner Rojas MD   [facility] ([facility])    [address]  [address]  [facility] [address] [phone]              Who to contact     Please call your clinic at [phone] to:    Ask questions about your health    Make or cancel  appointments    Discuss your medicines    Learn about your test results    Speak to your doctor            Additional Information About Your Visit        [employer] Information     [employer] gives you secure access to your electronic health record. If you see a primary care provider, you can also send messages to your care team and make appointments. If you have questions, please call your primary care clinic.  If you do not have a primary care provider, please call [phone] and they will assist you.      [employer] is an electronic gateway that provides easy, online access to your medical records. With [employer], you can request a clinic appointment, read your test results, renew a prescription or communicate with your care team.     To access your existing account, please contact your [facility] or call [phone] for assistance.        Care EveryWhere ID     This is your Care EveryWhere ID. This could be used by other organizations to access your [place] medical records  [id number]         Blood Pressure from Last 3 Encounters:   05/10/18 117/67   05/02/18 140/71   03/21/18 139/64    Weight from Last 3 Encounters:   05/10/18 87 kg (191 lb 12.8 oz)   05/02/18 88 kg (194 lb)   03/21/18 89.4 kg (197 lb)              We Performed the Following     Hearing Aid Fitting        Primary Care Provider Fax #    Physician No Ref-Primary [phone]       No address on file        Equal Access to Services     PARRISH LOPEZ : Hadii brandy hilton hadasho Soomaali, waaxda luqadaha, qaybta kaalmada adeegyada, jose miguel lawrence . So [facility] [phone].    ATENCIÓN: Si habla español, tiene a vaughan disposición servicios gratuitos de asistencia lingüística. Llame al [phone].    We comply with applicable federal civil rights laws and [place] laws. We do not discriminate on the basis of race, color, national origin, age, disability, sex, sexual orientation, or gender identity.             Thank you!     Thank you for choosing [facility] AUDIOLOGY  for your care. Our goal is always to provide you with excellent care. Hearing back from our patients is one way we can continue to improve our services. Please take a few minutes to complete the written survey that you may receive in the mail after your visit with us. Thank you!             Your Updated Medication List - Protect others around you: Learn how to safely use, store and throw away your medicines at www.disposemymeds.org.          This list is accurate as of 5/24/18  3:54 PM.  Always use your most recent med list.                   Brand Name Dispense Instructions for use Diagnosis    acetaminophen 325 MG tablet    TYLENOL    100 tablet    Take 2 tablets (650 mg) by mouth every 6 hours as needed for pain    S/P revision of total hip       acetaminophen-codeine 300-30 MG per tablet    TYLENOL w/CODEINE No. 3    30 tablet    Take 1-2 tablets by mouth every 4 hours as needed for mild pain    Pain in joint, pelvic region and thigh, unspecified laterality       amLODIPine 10 MG tablet    NORVASC    90 tablet    TAKE 1 TABLET DAILY    Benign essential hypertension       aspirin 81 MG EC tablet     90 tablet    Take 1 tablet (81 mg) by mouth daily    Hyperlipidemia LDL goal <70       atorvastatin 20 MG tablet    LIPITOR    90 tablet    Take 1 tablet (20 mg) by mouth daily    Hyperlipidemia LDL goal <70       benazepril 40 MG tablet    LOTENSIN    90 tablet    Take 1 tablet (40 mg) by mouth daily    Essential hypertension, benign       bisacodyl 10 MG Suppository    DULCOLAX    3 suppository    Place 1 suppository rectally daily as needed.    Chronic constipation       desonide 0.05 % ointment    DESOWEN    60 g    Apply topically 2 times daily To affected areas of scale and redness on the face and ears as needed until clear.    Dermatitis, seborrheic       docusate sodium 100 MG tablet    COLACE    60 tablet    Take 100 mg by mouth 2 times daily    BPH  (benign prostatic hyperplasia)       gentamicin 0.1 % ointment    GARAMYCIN     Apply topically 4 times daily    B-cell lymphoma of intra-abdominal lymph nodes, unspecified B-cell lymphoma type (H)       melatonin 3 MG tablet      Take 5 mg by mouth At Bedtime        metoprolol succinate 25 MG 24 hr tablet    TOPROL-XL    45 tablet    Take 0.5 tablets (12.5 mg) by mouth daily    Essential hypertension, benign       MILK OF MAGNESIA PO      Take by mouth At Bedtime        nitroGLYcerin 0.4 MG sublingual tablet    NITROSTAT    30 tablet    Place 1 tablet (0.4 mg) under the tongue every 5 minutes as needed    Coronary artery disease involving native heart without angina pectoris, unspecified vessel or lesion type       N[employer]riSource Fiber packet      Take 1 packet by mouth every morning        psyllium 0.52 g capsule      Take by mouth daily Powder        ranitidine 150 MG tablet    ZANTAC    180 tablet    Take 1 tablet (150 mg) by mouth 2 times daily    Gastroesophageal reflux disease without esophagitis       sildenafil 100 MG tablet    VIAGRA    10 tablet    Take 1 tablet (100 mg) by mouth daily as needed    Erectile dysfunction, unspecified erectile dysfunction type       tamsulosin 0.4 MG capsule    FLOMAX    90 capsule    Take 1 capsule (0.4 mg) by mouth daily    Hypertrophy of prostate without urinary obstruction       tolterodine 2 MG tablet    DETROL    180 tablet    Take 1 tablet (2 mg )twice a day    Hypertonicity of bladder       * triamcinolone 0.025 % cream    KENALOG    45 g    Apply topically 2 times daily    Dermatitis       * triamcinolone 0.1 % ointment    KENALOG    80 g    Apply topically 2 times daily To itchy rashes    Intrinsic atopic dermatitis       VITEYES AREDS FORMULA/LUTEIN Caps      Take by mouth daily        * Notice:  This list has 2 medication(s) that are the same as other medications prescribed for you. Read the directions carefully, and ask your doctor or other care provider to review  them with you.

## 2018-05-24 NOTE — PROGRESS NOTES
AUDIOLOGY REPORT    SUBJECTIVE: Daniel Mcnair is a 88 year old male who was seen in Audiology at the [facility] for a fitting of binaural amplification. Previous results on 2/2/18 have revealed a bilateral mild to moderately-severe sensorineural hearing loss. The patient is a long time user of a right Unitron Quantum 2 HP BTE hearing aid with canal lock. The patient was given medical clearance to pursue amplification by Tony Kahn MD and chose to move forward with ordering of binaural Signia 13 3Nx BTE hearing aids.    OBJECTIVE: The hearing aid conformity evaluation was completed.The hearing aids were placed and they provided a good fit. Real-ear-probe-microphone measurements were completed on the [employer] system and were a good match to NAL-NL1 target with soft sounds audible, moderate sounds comfortable, and loud sounds below discomfort. UCLs are verified through maximum power output measures and demonstrate appropriate limiting of loud inputs. Daniel was oriented to proper hearing aid use, care, cleaning (no water, dry brush), batteries (size 13, insertion/removal, toxicity, low-battery signal), aid insertion/removal, user booklet, warranty information, storage cases, and other hearing aid details. The patient confirmed understanding of hearing aid use and care, and showed proper insertion of hearing aid and batteries while in the office today.Daniel reported good volume and sound quality today.   Hearing aids were programmed as follows:  Program 1:Universal    EAR(S) FIT: Bilateral  HEARING AID MODEL NAME: Pure 13 3Nx BTE  HEARING AID STYLE: -in-the-ear behind-the-ear  EARMOLDS/TIP: RITE Size 2 right ear with small closed sleeved and size 3 right with with 4 mm open dome.  SERIAL NUMBERS: Right: [id number] Left: [id number]  WARRANTY END DATE: 6/8/21    Note: Patient left with hearing aids programmed to NAL-NL1 targets with overall gain decreased  by 2 dB. Due to computer error the fitting was saved in the hearing aids but the session did not save in [place]. After making payment for the hearing aids the patient returned with complaints that the hearing aids were uncomfortably loud. I was unavailable to assist him in decreasing the gain so he was seen by another audiologist who unknowingly opened the first fit session instead of reading from connected hearing aids therefore fitting at target was lost. He was sent home at first fit settings with [employer]'s proprietory fitting formula. His initial review appointment will be extended to an hour to allow for refitting of the hearing aids to target.    ASSESSMENT: Signia Pure 13 NX hearing aid(s) were fit today. Verification measures were performed. Daniel signed the Hearing Aid Purchase Agreement and was given a copy, as well as details on his hearing aids. Patient was counseled that exact out of pocket amounts cannot be determined for hearing aid claims being sent to insurance. Any insurance coverage information presented to the patient is an estimate only, and is not a guarantee of payment. Patient has been advised to check with their own insurance.    PLAN: Daniel will return for follow-up in 2-3 weeks for a hearing aid review appointment. Please call this clinic with questions regarding today s appointment.    Tashia Boucher, [facility] [place]  Licensed Audiologist  [place] License #[id number]

## 2018-05-29 ENCOUNTER — OFFICE VISIT (OUTPATIENT)
Dept: AUDIOLOGY | Facility: CLINIC | Age: 83
End: 2018-05-29
Payer: MEDICARE

## 2018-05-29 DIAGNOSIS — H90.3 SENSORINEURAL HEARING LOSS, BILATERAL: Primary | ICD-10-CM

## 2018-05-29 NOTE — PROGRESS NOTES
"AUDIOLOGY REPORT    SUBJECTIVE: Daniel Mcnair is a 88 year old male who was seen in the Audiology Clinic at the [facility] for a hearing aid follow up. Previous results have revealed a bilateral essentially sensorineural hearing loss. The patient was previously seen in this clinic on 5/24/18 for the fitting of binaural Signia Pure 13 3Nx hearing aids.  Hearing aids had been programmed to real -ear measures but the patient reported that it was \"too loud\" and so returned shortly after as an \"add-on\" to have the overall gain decreased.  Due to issues with programming, the hearing aid volume was significantly decreased to a \"flat\" hearing loss.  He was initially happy with the changes made but returns today as he feels he is not hearing well with the devices.     OBJECTIVE: The hearing aid conformity evaluation was completed again.The hearing aids were placed and they provided a good fit. Real-ear-probe-microphone measurements were completed on the 15Five system and were a good match to NAL-NL1 target with soft sounds audible, moderate sounds comfortable, and loud sounds below discomfort. UCLs are verified through maximum power output measures and demonstrate appropriate limiting of loud inputs.   The hearing aids were set to 70% of target gain as anything louder was \"too loud\" and \"uncomfortable\".  The hearing aids will auto-acclimatize to 100% over 8 weeks.  Patient walked around the [facility] area to be sure that the hearing aids were set at an acceptable level.    Of note, the hearing aids were producing significant feedback - the right more than the left. The overall gain was slightly decreased an additional 3 dB in the right ear which greatly reduced the feedback.  The option of a custom earmold was discussed, if the patient decides to keep the hearing aids - consideration should be given to a custom earmold to help with feedback.  Additional time was spent practicing inserting the " "left hearing aid correctly, it was reviewed that the  wire should be close to the side of the head and that can be used as a guide to be sure that the hearing aid is in correctly.  He practiced in office and was able to get the device inserted correctly.    ASSESSMENT: Binaural hearing aid(s) were re-fit fit today. Verification measures were performed.   PLAN:Daniel will return for follow-up in 2-3 weeks for a hearing aid review appointment. Today's appointment is a \"No-Charge\" visit as the hearing aids are under warranty. Please call this clinic with questions regarding today s appointment.    Stephie Sky  Audiologist  [place] License  #[id number]          "

## 2018-05-29 NOTE — MR AVS SNAPSHOT
After Visit Summary   5/29/2018    Daniel Mcnair    MRN: [id number]           Patient Information     Date Of Birth          10/17/1929        Visit Information        Provider Department      5/29/2018 11:30 AM María Waldron AuD [facility] Audiology        Today's Diagnoses     Sensorineural hearing loss, bilateral    -  1       Follow-ups after your visit        Your next 10 appointments already scheduled     May 30, 2018  9:30 AM CDT   Neuro Treatment with Anahy Ford, PT   [facility] Physical Therapy ([facility])    [address]  [address]  [facility] [address]   [phone]            Jun 18, 2018  2:00 PM CDT   (Arrive by 1:45 PM)   Initial Review Program with Stephie Greer [facility] Audiology ([facility])    [address] [place] [address]  [address]th [facility] [address]   [phone]            Jun 26, 2018  8:30 AM CDT   RETURN RETINA with Luna Parker MD   Eye Clinic ([facility])    [address]  [facility] [address]   [phone]            Aug 10, 2018 10:30 AM CDT   (Arrive by 10:15 AM)   Return Visit with Yovana Camarena MD   [facility] Dermatology ([facility])    [address]  3rd [facility] [address]   [phone]            Sep 13, 2018 10:15 AM CDT   (Arrive by 10:00 AM)   Return Visit with Lia Dumont MD   [facility] Primary Care Clinic ([facility])    [address]  4th Floor  [facility] [address]   [phone]            Oct 09, 2018 11:30 AM CDT   (Arrive by 11:15 AM)   Return Visit with Santana Martinez MD   [facility] Heart Care ([facility])    [address] [place] [address]  [address] [place] [address]   [phone]            Nov 15, 2018  8:30 AM CST   [facility] Lab Draw with  [facility] DRAW     [facility] Lab Draw ([facility])    [address]  [address]  [facility] [id number] [phone]            Nov 15, 2018  9:00 AM CST   CT ABDOMEN PELVIS W CONTRAST with UCCT1   [facility] CT ([facility])    [address]  1st Floor  [facility] [address] [phone]           Please bring any scans or X-rays taken at other hospitals, if similar tests were done. Also bring a list of your medicines, including vitamins, minerals and over-the-counter drugs. It is safest to leave personal items at home.  Be sure to tell your doctor:   If you have any allergies.   If there s any chance you are pregnant.   If you are breastfeeding.  How to prepare:   Do not eat or drink for 2 hours before your exam. If you need to take medicine, you may take it with small sips of water. (We may ask you to take liquid medicine as well.)   Please wear loose clothing, such as a sweat suit or jogging clothes. Avoid snaps, zippers and other metal. We may ask you to undress and put on a hospital gown.  Please arrive 30 minutes early for your CT. Once in the department you might be asked to drink water 15-20 minutes prior to your exam.  If indicated you may be asked to drink an oral contrast in advance of your CT.  If this is the case, the imaging team will let you know or be in contact with you prior to your appointment  Patients over 70 or patients with diabetes or kidney problems:   If you haven t had a blood test (creatinine test) within the last 30 days, the Cardiologist/Radiologist may require you to get this test prior to your exam.  If you have diabetes:   Continue to take your metformin medication on the day of your exam  If you have any questions, please call the Imaging Department where you will have your exam.            Nov 15, 2018 12:00 PM CST   (Arrive by 11:45 AM)   Return Visit with Tanner Rojas MD   [facility]  ([facility])    [address]  [address]  [facility] [place] [facility] [id number] [phone]              Who to contact     Please call your clinic at [phone] to:    Ask questions about your health    Make or cancel appointments    Discuss your medicines    Learn about your test results    Speak to your doctor            Additional Information About Your Visit        [employer] Information     [employer] gives you secure access to your electronic health record. If you see a primary care provider, you can also send messages to your care team and make appointments. If you have questions, please call your primary care clinic.  If you do not have a primary care provider, please call [phone] and they will assist you.      [employer] is an electronic gateway that provides easy, online access to your medical records. With [employer], you can request a clinic appointment, read your test results, renew a prescription or communicate with your care team.     To access your existing account, please contact your [facility] or call [phone] for assistance.        Care EveryWhere ID     This is your Care EveryWhere ID. This could be used by other organizations to access your [facility] medical records  [id number]         Blood Pressure from Last 3 Encounters:   05/10/18 117/67   05/02/18 140/71   03/21/18 139/64    Weight from Last 3 Encounters:   05/10/18 87 kg (191 lb 12.8 oz)   05/02/18 88 kg (194 lb)   03/21/18 89.4 kg (197 lb)              We Performed the Following     No Charge, Hearing Aid Clinic Visit        Primary Care Provider Fax #    Physician No Ref-Primary [phone]       No address on file        Equal Access to Services     PARRISH LOPEZ : Hadii brandy godoy Sotremayne, waaxda luqadaha, qaybta kaalmada jose miguel bingham. So [facility] [phone].    ATENCIÓN: Si habla español, tiene a vaughan disposición servicios gratuitos de  sophia lingüísticaleta. Glenna al [phone].    We comply with applicable federal civil rights laws and [place] laws. We do not discriminate on the basis of race, color, national origin, age, disability, sex, sexual orientation, or gender identity.            Thank you!     Thank you for choosing [facility] AUDIOLOGY  for your care. Our goal is always to provide you with excellent care. Hearing back from our patients is one way we can continue to improve our services. Please take a few minutes to complete the written survey that you may receive in the mail after your visit with us. Thank you!             Your Updated Medication List - Protect others around you: Learn how to safely use, store and throw away your medicines at www.disposemymeds.org.          This list is accurate as of 5/29/18  3:50 PM.  Always use your most recent med list.                   Brand Name Dispense Instructions for use Diagnosis    acetaminophen 325 MG tablet    TYLENOL    100 tablet    Take 2 tablets (650 mg) by mouth every 6 hours as needed for pain    S/P revision of total hip       acetaminophen-codeine 300-30 MG per tablet    TYLENOL w/CODEINE No. 3    30 tablet    Take 1-2 tablets by mouth every 4 hours as needed for mild pain    Pain in joint, pelvic region and thigh, unspecified laterality       amLODIPine 10 MG tablet    NORVASC    90 tablet    TAKE 1 TABLET DAILY    Benign essential hypertension       aspirin 81 MG EC tablet     90 tablet    Take 1 tablet (81 mg) by mouth daily    Hyperlipidemia LDL goal <70       atorvastatin 20 MG tablet    LIPITOR    90 tablet    Take 1 tablet (20 mg) by mouth daily    Hyperlipidemia LDL goal <70       benazepril 40 MG tablet    LOTENSIN    90 tablet    Take 1 tablet (40 mg) by mouth daily    Essential hypertension, benign       bisacodyl 10 MG Suppository    DULCOLAX    3 suppository    Place 1 suppository rectally daily as needed.    Chronic constipation       desonide 0.05 % ointment     DESOWEN    60 g    Apply topically 2 times daily To affected areas of scale and redness on the face and ears as needed until clear.    Dermatitis, seborrheic       docusate sodium 100 MG tablet    COLACE    60 tablet    Take 100 mg by mouth 2 times daily    BPH (benign prostatic hyperplasia)       gentamicin 0.1 % ointment    GARAMYCIN     Apply topically 4 times daily    B-cell lymphoma of intra-abdominal lymph nodes, unspecified B-cell lymphoma type (H)       melatonin 3 MG tablet      Take 5 mg by mouth At Bedtime        metoprolol succinate 25 MG 24 hr tablet    TOPROL-XL    45 tablet    Take 0.5 tablets (12.5 mg) by mouth daily    Essential hypertension, benign       MILK OF MAGNESIA PO      Take by mouth At Bedtime        nitroGLYcerin 0.4 MG sublingual tablet    NITROSTAT    30 tablet    Place 1 tablet (0.4 mg) under the tongue every 5 minutes as needed    Coronary artery disease involving native heart without angina pectoris, unspecified vessel or lesion type       N[employer]ri[employer] Fiber packet      Take 1 packet by mouth every morning        psyllium 0.52 g capsule      Take by mouth daily Powder        ranitidine 150 MG tablet    ZANTAC    180 tablet    Take 1 tablet (150 mg) by mouth 2 times daily    Gastroesophageal reflux disease without esophagitis       sildenafil 100 MG tablet    VIAGRA    10 tablet    Take 1 tablet (100 mg) by mouth daily as needed    Erectile dysfunction, unspecified erectile dysfunction type       tamsulosin 0.4 MG capsule    FLOMAX    90 capsule    Take 1 capsule (0.4 mg) by mouth daily    Hypertrophy of prostate without urinary obstruction       tolterodine 2 MG tablet    DETROL    180 tablet    Take 1 tablet (2 mg )twice a day    Hypertonicity of bladder       * triamcinolone 0.025 % cream    KENALOG    45 g    Apply topically 2 times daily    Dermatitis       * triamcinolone 0.1 % ointment    KENALOG    80 g    Apply topically 2 times daily To itchy rashes    Intrinsic atopic  dermatitis       VITEYES AREDS FORMULA/LUTEIN Caps      Take by mouth daily        * Notice:  This list has 2 medication(s) that are the same as other medications prescribed for you. Read the directions carefully, and ask your doctor or other care provider to review them with you.

## 2018-05-30 ENCOUNTER — HOSPITAL ENCOUNTER (OUTPATIENT)
Dept: PHYSICAL THERAPY | Facility: CLINIC | Age: 83
Setting detail: THERAPIES SERIES
End: 2018-05-30
Attending: INTERNAL MEDICINE
Payer: MEDICARE

## 2018-05-30 PROCEDURE — 40000718 ZZHC STATISTIC PT DEPARTMENT ORTHO VISIT: Performed by: PHYSICAL THERAPIST

## 2018-05-30 PROCEDURE — 97110 THERAPEUTIC EXERCISES: CPT | Mod: GP | Performed by: PHYSICAL THERAPIST

## 2018-05-30 PROCEDURE — G8980 MOBILITY D/C STATUS: HCPCS | Mod: GP,CJ | Performed by: PHYSICAL THERAPIST

## 2018-05-30 PROCEDURE — 97140 MANUAL THERAPY 1/> REGIONS: CPT | Mod: GP | Performed by: PHYSICAL THERAPIST

## 2018-05-30 PROCEDURE — G8979 MOBILITY GOAL STATUS: HCPCS | Mod: GP,CJ | Performed by: PHYSICAL THERAPIST

## 2018-05-30 NOTE — PROGRESS NOTES
[facility]      OUTPATIENT PHYSICAL THERAPY  PLAN OF TREATMENT FOR OUTPATIENT REHABILITATION    Patient's Last Name, First Name, M.I.                YOB: 1929  TabbyDaniel Gamino                        Provider's Name  [facility] Medical Record No.  [id number]                               Onset Date: 3/14/18   Start of Care Date: 3/23/18   Type:     _X_PT   ___OT   ___SLP Medical Diagnosis: L hip pain, physical deconditioning                       PT Diagnosis: functional weakness and impaired mobility      _________________________________________________________________________________  Plan of Treatment:     Frequency/Duration: 1 more visit on 5/10/18     Goals:  6MWT  The pt will improve distance on 6MWT to >750' with LRAD due to improvements in strength and activity tolerance, improving ability to ambulate in the community  05/22/18     HEP  The pt will be independent wtih a HEP focusing on improving LE strength and ROM in order to decrease pain and improve mobility outside of therapy  05/22/18     TUG  Due to improvements in functional strength and mobility, the pt will decrease his time on the TUG to <15 seconds, indicating a reduction in fall risk  05/22/18    Certification date from 5/16/18 to 5/30/18.    Anahy Ford, PT          I CERTIFY THE NEED FOR THESE SERVICES FURNISHED UNDER        THIS PLAN OF TREATMENT AND WHILE UNDER MY CARE     (Physician co-signature of this document indicates review and certification of the therapy plan).                Referring Provider: Dr. Dumont

## 2018-05-30 NOTE — PROGRESS NOTES
Outpatient Physical Therapy Discharge Note     Patient: Daniel Mcnair  : 10/17/1929    Beginning/End Dates of Reporting Period:  3/23/18 to 2018    Referring Provider: Lia Dumont MD    Therapy Diagnosis: Functional weakness and impaired mobility     Client Self Report: Reports that he has been trying to change his hearing aids.    Objective Measurements:  Objective Measure: TUG  Details: 12.97  Objective Measure: 6MWT  Details: 765'           Goals:  Goal Identifier 6MWT   Goal Description The pt will improve distance on 6MWT to >750' with LRAD due to improvements in strength and activity tolerance, improving ability to ambulate in the community   Target Date 18   Date Met      Progress:     Goal Identifier HEP   Goal Description The pt will be independent wtih a HEP focusing on improving LE strength and ROM in order to decrease pain and improve mobility outside of therapy   Target Date 18   Date Met      Progress:     Goal Identifier TUG   Goal Description Due to improvements in functional strength and mobility, the pt will decrease his time on the TUG to <15 seconds, indicating a reduction in fall risk   Target Date 18   Date Met      Progress:   Progress Toward Goals:   Progress this reporting period: The pt demonstrated an improvement in functional mobility and a reduction in hip pain. The pt is completing a regular HEP. His 6MWT and TUG scores improved, meeting both goals.     Plan:  Discharge from therapy.    Discharge:    Reason for Discharge: Patient has met all goals.      Discharge Plan: Patient to continue home program.

## 2018-06-01 ENCOUNTER — TELEPHONE (OUTPATIENT)
Dept: AUDIOLOGY | Facility: CLINIC | Age: 83
End: 2018-06-01

## 2018-06-01 NOTE — TELEPHONE ENCOUNTER
[facility] Call Center    Phone Message    May a detailed message be left on voicemail: yes    Reason for Call: Other: Patient would like to speak with a nurse about his hearing and issues with his hearing aides     Action Taken: Other: yandy

## 2018-06-01 NOTE — TELEPHONE ENCOUNTER
Pt called about hearing aid concerns. Having feeedback left ear and trouble with insertion. Offered appt Monday to come in and trouble shoot. He accepted.        Daovn Oviedo, [facility]  Licensed Audiologist  [place] #[id number]

## 2018-06-04 ENCOUNTER — OFFICE VISIT (OUTPATIENT)
Dept: AUDIOLOGY | Facility: CLINIC | Age: 83
End: 2018-06-04
Payer: MEDICARE

## 2018-06-04 DIAGNOSIS — H90.3 SENSORINEURAL HEARING LOSS, BILATERAL: Primary | ICD-10-CM

## 2018-06-04 NOTE — MR AVS SNAPSHOT
After Visit Summary   6/4/2018    Daniel Mcnair    MRN: [id number]           Patient Information     Date Of Birth          10/17/1929        Visit Information        Provider Department      6/4/2018 8:00 AM María العلي AuD M [facility]        Today's Diagnoses     Sensorineural hearing loss, bilateral    -  1       Follow-ups after your visit        Your next 10 appointments already scheduled     Jun 18, 2018  2:00 PM CDT   (Arrive by 1:45 PM)   Initial Review Program with Stephie Greer [facility] ([facility])    [address] [place] [address]  4th [facility] [address]   [phone]            Jun 26, 2018  8:30 AM CDT   RETURN RETINA with Luna Parker MD   [facility] Clinic ([facility])    [address]  [facility] [address]   [phone]            Aug 10, 2018 10:30 AM CDT   (Arrive by 10:15 AM)   Return Visit with Yovana Camarena MD   [facility] ([facility])    [address] [place] [address]  3rd [facility] [address]   [phone]            Sep 13, 2018 10:15 AM CDT   (Arrive by 10:00 AM)   Return Visit with Lia Dumont MD   [facility] ([facility])    [address] [place] [address]  4th Floor  [facility] [address]   [phone]            Oct 09, 2018 11:30 AM CDT   (Arrive by 11:15 AM)   Return Visit with Santana Martinez MD   [facility] ([facility])    [address] [place] [address]  [address]  [facility] [address]   [phone]            Nov 15, 2018  8:30 AM CST   [facility] Draw with [facility]   [facility] ([facility])    [address] [place] [address]  [address]  [facility] [address]   [phone]            Nov 15, 2018  9:00 AM CST   CT ABDOMEN PELVIS W CONTRAST with UC[facility]   [facility] CT ([facility])    [address]  1st Floor  [facility] [id number] [phone]           Please bring any scans or X-rays taken at other hospitals, if similar tests were done. Also bring a list of your medicines, including vitamins, minerals and over-the-counter drugs. It is safest to leave personal items at home.  Be sure to tell your doctor:   If you have any allergies.   If there s any chance you are pregnant.   If you are breastfeeding.  How to prepare:   Do not eat or drink for 2 hours before your exam. If you need to take medicine, you may take it with small sips of water. (We may ask you to take liquid medicine as well.)   Please wear loose clothing, such as a sweat suit or jogging clothes. Avoid snaps, zippers and other metal. We may ask you to undress and put on a hospital gown.  Please arrive 30 minutes early for your CT. Once in the department you might be asked to drink water 15-20 minutes prior to your exam.  If indicated you may be asked to drink an oral contrast in advance of your CT.  If this is the case, the imaging team will let you know or be in contact with you prior to your appointment  Patients over 70 or patients with diabetes or kidney problems:   If you haven t had a blood test (creatinine test) within the last 30 days, the Cardiologist/Radiologist may require you to get this test prior to your exam.  If you have diabetes:   Continue to take your metformin medication on the day of your exam  If you have any questions, please call the Imaging Department where you will have your exam.            Nov 15, 2018 12:00 PM CST   (Arrive by 11:45 AM)   Return Visit with Tanner Rojas MD   [facility] ([facility])    [address]  [address]  [facility] [address] [phone]              Who to contact     Please call your clinic at [phone] to:    Ask questions about your  health    Make or cancel appointments    Discuss your medicines    Learn about your test results    Speak to your doctor            Additional Information About Your Visit        AppsFunderharNextInput Information     Known gives you secure access to your electronic health record. If you see a primary care provider, you can also send messages to your care team and make appointments. If you have questions, please call your primary care clinic.  If you do not have a primary care provider, please call [phone] and they will assist you.      Known is an electronic gateway that provides easy, online access to your medical records. With Known, you can request a clinic appointment, read your test results, renew a prescription or communicate with your care team.     To access your existing account, please contact your [facility] or call [phone] for assistance.        Care EveryWhere ID     This is your Care EveryWhere ID. This could be used by other organizations to access your [facility] medical records  [id number]         Blood Pressure from Last 3 Encounters:   05/10/18 117/67   05/02/18 140/71   03/21/18 139/64    Weight from Last 3 Encounters:   05/10/18 87 kg (191 lb 12.8 oz)   05/02/18 88 kg (194 lb)   03/21/18 89.4 kg (197 lb)              We Performed the Following     No Charge, Hearing Aid Clinic Visit        Primary Care Provider Fax #    Physician No Ref-Primary [phone]       No address on file        Equal Access to Services     PARRISH LOPEZ : Hadii brandy ku hadasho Soneldaali, waaxda luqadaha, qaybta kaalmada adeboazyada, jose miguel lopez. So [facility] [phone].    ATENCIÓN: Si habla español, tiene a vaughan disposición servicios gratuitos de asistencia lingüística. Llame al [phone].    We comply with applicable federal civil rights laws and [place] laws. We do not discriminate on the basis of race, color, national origin, age, disability, sex, sexual  orientation, or gender identity.            Thank you!     Thank you for choosing [facility]  for your care. Our goal is always to provide you with excellent care. Hearing back from our patients is one way we can continue to improve our services. Please take a few minutes to complete the written survey that you may receive in the mail after your visit with us. Thank you!             Your Updated Medication List - Protect others around you: Learn how to safely use, store and throw away your medicines at www.disposemymeds.org.          This list is accurate as of 6/4/18 11:59 PM.  Always use your most recent med list.                   Brand Name Dispense Instructions for use Diagnosis    acetaminophen 325 MG tablet    TYLENOL    100 tablet    Take 2 tablets (650 mg) by mouth every 6 hours as needed for pain    S/P revision of total hip       acetaminophen-codeine 300-30 MG per tablet    TYLENOL w/CODEINE No. 3    30 tablet    Take 1-2 tablets by mouth every 4 hours as needed for mild pain    Pain in joint, pelvic region and thigh, unspecified laterality       amLODIPine 10 MG tablet    NORVASC    90 tablet    TAKE 1 TABLET DAILY    Benign essential hypertension       aspirin 81 MG EC tablet     90 tablet    Take 1 tablet (81 mg) by mouth daily    Hyperlipidemia LDL goal <70       atorvastatin 20 MG tablet    LIPITOR    90 tablet    Take 1 tablet (20 mg) by mouth daily    Hyperlipidemia LDL goal <70       benazepril 40 MG tablet    LOTENSIN    90 tablet    Take 1 tablet (40 mg) by mouth daily    Essential hypertension, benign       bisacodyl 10 MG Suppository    DULCOLAX    3 suppository    Place 1 suppository rectally daily as needed.    Chronic constipation       desonide 0.05 % ointment    DESOWEN    60 g    Apply topically 2 times daily To affected areas of scale and redness on the face and ears as needed until clear.    Dermatitis, seborrheic       docusate sodium 100 MG tablet    COLACE    60 tablet     Take 100 mg by mouth 2 times daily    BPH (benign prostatic hyperplasia)       gentamicin 0.1 % ointment    GARAMYCIN     Apply topically 4 times daily    B-cell lymphoma of intra-abdominal lymph nodes, unspecified B-cell lymphoma type (H)       melatonin 3 MG tablet      Take 5 mg by mouth At Bedtime        metoprolol succinate 25 MG 24 hr tablet    TOPROL-XL    45 tablet    Take 0.5 tablets (12.5 mg) by mouth daily    Essential hypertension, benign       MILK OF MAGNESIA PO      Take by mouth At Bedtime        nitroGLYcerin 0.4 MG sublingual tablet    NITROSTAT    30 tablet    Place 1 tablet (0.4 mg) under the tongue every 5 minutes as needed    Coronary artery disease involving native heart without angina pectoris, unspecified vessel or lesion type       NutriSource Fiber packet      Take 1 packet by mouth every morning        psyllium 0.52 g capsule      Take by mouth daily Powder        ranitidine 150 MG tablet    ZANTAC    180 tablet    Take 1 tablet (150 mg) by mouth 2 times daily    Gastroesophageal reflux disease without esophagitis       sildenafil 100 MG tablet    VIAGRA    10 tablet    Take 1 tablet (100 mg) by mouth daily as needed    Erectile dysfunction, unspecified erectile dysfunction type       tamsulosin 0.4 MG capsule    FLOMAX    90 capsule    Take 1 capsule (0.4 mg) by mouth daily    Hypertrophy of prostate without urinary obstruction       tolterodine 2 MG tablet    DETROL    180 tablet    Take 1 tablet (2 mg )twice a day    Hypertonicity of bladder       * triamcinolone 0.025 % cream    KENALOG    45 g    Apply topically 2 times daily    Dermatitis       * triamcinolone 0.1 % ointment    KENALOG    80 g    Apply topically 2 times daily To itchy rashes    Intrinsic atopic dermatitis       VITEYES AREDS FORMULA/LUTEIN Caps      Take by mouth daily        * Notice:  This list has 2 medication(s) that are the same as other medications prescribed for you. Read the directions carefully, and ask  your doctor or other care provider to review them with you.

## 2018-06-05 NOTE — PROGRESS NOTES
AUDIOLOGY REPORT    SUBJECTIVE: Daniel Mcnair is a 88 year old male who was seen in the Audiology Clinic the [facility], [facility] and [facility] for a hearing aid follow up. Previous results have revealed a bilateral essentially sensorineural hearing loss. The patient was previously seen in this clinic on 5/24/18 and 5/29/17 for the fitting of binaural Signia Pure 13 3Nx hearing aids.  He reports that he gets significant feedback from the left device. He reports his wife hears this feedback as well. He is also uncertain if he is inserting the left device properly.     OBJECTIVE: Visual inspection revealed that the left hearing aid was seated appropriately in the ear canal but feedback was noted. Otoscopy revealed some cerumen build up that I was unable to remove with the lighted curette due to his small canal. The  has the smallest dome on the , however it manages to become unseated easily. I added a retention line to help hold it in place. I reran the feedback management system and decreased gain by 3 dB from 4-8 kHz.  We spent time discussing the source of the feedback which could be caused by the wax buildup and that even though it is not a substantial amount of wax it is problematic due to the small anatomy of his ear canal. We discussed using Debrox or other eardrops regularly to help manage the ear wax. We also discussed that we may need to try a custom earmold to help with retention and to better keep the sound from leaking out and being reamplified. He will try to manage his ear wax and will let us know if that resolves the feedback otherwise we will further discuss options such as a custom earmold or returning the left hearing aid for credit at his initial review appointment.     ASSESSMENT: Left hearing aid feedback issues and non-occluding cerumen. Adjustments noted above.     PLAN: Daniel will return for follow-up in 2-3 weeks for a hearing aid review  appointment. Please call this clinic with questions regarding today s appointment.    Tashia Boucher, [facility]  Licensed Audiologist  [place] License #[id number]

## 2018-06-18 ENCOUNTER — OFFICE VISIT (OUTPATIENT)
Dept: AUDIOLOGY | Facility: CLINIC | Age: 83
End: 2018-06-18
Payer: MEDICARE

## 2018-06-18 DIAGNOSIS — H90.3 SENSORY HEARING LOSS, BILATERAL: Primary | ICD-10-CM

## 2018-06-18 NOTE — PROGRESS NOTES
AUDIOLOGY REPORT    SUBJECTIVE: Daniel Mcnair is a 88 year old male who was seen in the Audiology Clinic the [facility] for a hearing aid follow up on 6/18/2018. Previous results have revealed a bilateral essentially sensorineural hearing loss. The patient was previously seen in this clinic on 5/24/18, 5/29/17, and 6/4/2018 for the fitting of binaural Signia Pure 13 3Nx hearing aids.  He had significant feedback from the left device, but it is better after his last 6/4/2018 appointment.  He is also uncertain if he is inserting the devices properly. It was noted that they were not inserted sufficiently.  He had some questions about use.    OBJECTIVE:  Otoscopy revealed some cerumen build up left that I was unable to remove with the lighted curette due to his small canal. Time was spent discussing the source of the feedback which could be caused by the wax buildup and that even though it is not a substantial amount of wax it is problematic due to the small anatomy of his ear canal. As Debrox plugged his canal and hearing aid, it was suggested he have it removed each time he sees his physician.    A sport lock was also placed on the right for better retention.  He was instructed on how to place the hearing aids in tighter  Hearing aids were checked electroacoustically for a baseline response.    He was given back-up domes (4mm left and 10 mm closed right) and instructed in the use.  Normal care and use was reviewed.  The International Outcome Inventory-Hearing Aids (IOI-HA) was administered today.The patient s responses to the 7 questions can be compared to normative data relative to how others are performing with their hearing aids, as well as focusing audiologic care and counseling.This patient s Quality of Life score (Question 7) was 3, which is at normative average.  However, when he came in, he said he underestimated his benefit and found he does quite well with  the hearing aids.  ASSESSMENT:  A follow-up appointment for hearing aid fitting was completed today. IOI-HA administered today. Changes to hearing aid was completed as outlined above.      PLAN: Dainel will return for follow-up in about 3 months for follow-up at [facility], or sooner if problems. Please call this clinic with questions regarding today s appointment.      Davon Vyas, CCC-A  Licensed Audiologist  [place] #[id number]

## 2018-06-18 NOTE — MR AVS SNAPSHOT
After Visit Summary   6/18/2018    Daniel Mcnair    MRN: [id number]           Patient Information     Date Of Birth          10/17/1929        Visit Information        Provider Department      6/18/2018 2:00 PM Jennifer Beach AuD [facility] Audiology        Today's Diagnoses     Sensory hearing loss, bilateral    -  1       Follow-ups after your visit        Your next 10 appointments already scheduled     Jul 24, 2018  8:30 AM CDT   RETURN RETINA with Luna Parker MD   Eye Clinic ([facility])    [address]  9[facility] 9a  [facility] [address]   [phone]            Aug 10, 2018 10:30 AM CDT   (Arrive by 10:15 AM)   Return Visit with Yovana Camarena MD   [facility] Dermatology ([facility])    [address] [place] [address]  3rd Floor  [facility] [address]   [phone]            Sep 13, 2018 10:15 AM CDT   (Arrive by 10:00 AM)   Return Visit with Lia Dumont MD   [facility] ([facility])    [address] [place] [address]  4th Floor  [facility] [address]   [phone]            Oct 09, 2018 11:30 AM CDT   (Arrive by 11:15 AM)   Return Visit with Santana Martinez MD   [facility] ([facility])    [address] [place] [address]  [address]  [facility] [address]   [phone]            Nov 15, 2018  8:30 AM CST   Masonic Lab Draw with  MASONIC LAB DRAW   [facility] Masonic Lab Draw ([facility])    [address] [place] [address]  [address]  [facility] [address] [phone]            Nov 15, 2018  9:00 AM CST   CT ABDOMEN PELVIS W CONTRAST with UCCT1   [facility] Imaging [facility] ([facility])    [address] [place] [address]  1st Floor  [facility] [address] [phone]           Please bring any scans or X-rays taken at other hospitals, if similar tests were done. Also bring a list of your  medicines, including vitamins, minerals and over-the-counter drugs. It is safest to leave personal items at home.  Be sure to tell your doctor:   If you have any allergies.   If there s any chance you are pregnant.   If you are breastfeeding.  How to prepare:   Do not eat or drink for 2 hours before your exam. If you need to take medicine, you may take it with small sips of water. (We may ask you to take liquid medicine as well.)   Please wear loose clothing, such as a sweat suit or jogging clothes. Avoid snaps, zippers and other metal. We may ask you to undress and put on a hospital gown.  Please arrive 30 minutes early for your CT. Once in the department you might be asked to drink water 15-20 minutes prior to your exam.  If indicated you may be asked to drink an oral contrast in advance of your CT.  If this is the case, the imaging team will let you know or be in contact with you prior to your appointment  Patients over 70 or patients with diabetes or kidney problems:   If you haven t had a blood test (creatinine test) within the last 30 days, the Cardiologist/Radiologist may require you to get this test prior to your exam.  If you have diabetes:   Continue to take your metformin medication on the day of your exam  If you have any questions, please call the Imaging Department where you will have your exam.            Nov 15, 2018 12:00 PM CST   (Arrive by 11:45 AM)   Return Visit with Tanner Rojas MD   [facility] [place] ([facility])    [address] [place] [address]  [address]  [facility] [address] [phone]              Who to contact     Please call your clinic at [phone] to:    Ask questions about your health    Make or cancel appointments    Discuss your medicines    Learn about your test results    Speak to your doctor            Additional Information About Your Visit        Plays.[email or web address] Power Information     ValueClick gives you secure access to your electronic health  record. If you see a primary care provider, you can also send messages to your care team and make appointments. If you have questions, please call your primary care clinic.  If you do not have a primary care provider, please call [phone] and they will assist you.      mSeller is an electronic gateway that provides easy, online access to your medical records. With mSeller, you can request a clinic appointment, read your test results, renew a prescription or communicate with your care team.     To access your existing account, please contact your [facility] or call [phone] for assistance.        Care EveryWhere ID     This is your Care EveryWhere ID. This could be used by other organizations to access your [place] medical records  [id number]         Blood Pressure from Last 3 Encounters:   05/10/18 117/67   05/02/18 140/71   03/21/18 139/64    Weight from Last 3 Encounters:   05/10/18 87 kg (191 lb 12.8 oz)   05/02/18 88 kg (194 lb)   03/21/18 89.4 kg (197 lb)              We Performed the Following     No Charge, Hearing Aid Clinic Visit        Primary Care Provider Fax #    Physician No Ref-Primary [phone]       No address on file        Equal Access to Services     PARRISH LOPEZ : Hadii brandy choudhuryo Soneldaali, waaxda luqadaha, qaybta kaalmada adeegyada, jose miguel lopez. So [facility] [phone].    ATENCIÓN: Si habla español, tiene a vaughan disposición servicios gratuitos de asistencia lingüística. Llame al [phone].    We comply with applicable federal civil rights laws and [place] laws. We do not discriminate on the basis of race, color, national origin, age, disability, sex, sexual orientation, or gender identity.            Thank you!     Thank you for choosing [facility] AUDIOLOGY  for your care. Our goal is always to provide you with excellent care. Hearing back from our patients is one way we can continue to improve our services. Please  take a few minutes to complete the written survey that you may receive in the mail after your visit with us. Thank you!             Your Updated Medication List - Protect others around you: Learn how to safely use, store and throw away your medicines at www.disposemymeds.org.          This list is accurate as of 6/18/18  3:02 PM.  Always use your most recent med list.                   Brand Name Dispense Instructions for use Diagnosis    acetaminophen 325 MG tablet    TYLENOL    100 tablet    Take 2 tablets (650 mg) by mouth every 6 hours as needed for pain    S/P revision of total hip       acetaminophen-codeine 300-30 MG per tablet    TYLENOL w/CODEINE No. 3    30 tablet    Take 1-2 tablets by mouth every 4 hours as needed for mild pain    Pain in joint, pelvic region and thigh, unspecified laterality       amLODIPine 10 MG tablet    NORVASC    90 tablet    TAKE 1 TABLET DAILY    Benign essential hypertension       aspirin 81 MG EC tablet     90 tablet    Take 1 tablet (81 mg) by mouth daily    Hyperlipidemia LDL goal <70       atorvastatin 20 MG tablet    LIPITOR    90 tablet    Take 1 tablet (20 mg) by mouth daily    Hyperlipidemia LDL goal <70       benazepril 40 MG tablet    LOTENSIN    90 tablet    Take 1 tablet (40 mg) by mouth daily    Essential hypertension, benign       bisacodyl 10 MG Suppository    DULCOLAX    3 suppository    Place 1 suppository rectally daily as needed.    Chronic constipation       desonide 0.05 % ointment    DESOWEN    60 g    Apply topically 2 times daily To affected areas of scale and redness on the face and ears as needed until clear.    Dermatitis, seborrheic       docusate sodium 100 MG tablet    COLACE    60 tablet    Take 100 mg by mouth 2 times daily    BPH (benign prostatic hyperplasia)       gentamicin 0.1 % ointment    GARAMYCIN     Apply topically 4 times daily    B-cell lymphoma of intra-abdominal lymph nodes, unspecified B-cell lymphoma type (H)       melatonin 3 MG  tablet      Take 5 mg by mouth At Bedtime        metoprolol succinate 25 MG 24 hr tablet    TOPROL-XL    45 tablet    Take 0.5 tablets (12.5 mg) by mouth daily    Essential hypertension, benign       MILK OF MAGNESIA PO      Take by mouth At Bedtime        nitroGLYcerin 0.4 MG sublingual tablet    NITROSTAT    30 tablet    Place 1 tablet (0.4 mg) under the tongue every 5 minutes as needed    Coronary artery disease involving native heart without angina pectoris, unspecified vessel or lesion type       NutriSource Fiber packet      Take 1 packet by mouth every morning        psyllium 0.52 g capsule      Take by mouth daily Powder        ranitidine 150 MG tablet    ZANTAC    180 tablet    Take 1 tablet (150 mg) by mouth 2 times daily    Gastroesophageal reflux disease without esophagitis       sildenafil 100 MG tablet    VIAGRA    10 tablet    Take 1 tablet (100 mg) by mouth daily as needed    Erectile dysfunction, unspecified erectile dysfunction type       tamsulosin 0.4 MG capsule    FLOMAX    90 capsule    Take 1 capsule (0.4 mg) by mouth daily    Hypertrophy of prostate without urinary obstruction       tolterodine 2 MG tablet    DETROL    180 tablet    Take 1 tablet (2 mg )twice a day    Hypertonicity of bladder       * triamcinolone 0.025 % cream    KENALOG    45 g    Apply topically 2 times daily    Dermatitis       * triamcinolone 0.1 % ointment    KENALOG    80 g    Apply topically 2 times daily To itchy rashes    Intrinsic atopic dermatitis       VITEYES AREDS FORMULA/LUTEIN Caps      Take by mouth daily        * Notice:  This list has 2 medication(s) that are the same as other medications prescribed for you. Read the directions carefully, and ask your doctor or other care provider to review them with you.

## 2018-06-21 DIAGNOSIS — I10 BENIGN ESSENTIAL HYPERTENSION: ICD-10-CM

## 2018-06-22 RX ORDER — AMLODIPINE BESYLATE 10 MG/1
10 TABLET ORAL DAILY
Qty: 90 TABLET | Refills: 3 | Status: SHIPPED | OUTPATIENT
Start: 2018-06-22 | End: 2019-06-20

## 2018-07-09 DIAGNOSIS — N31.8 HYPERTONICITY OF BLADDER: ICD-10-CM

## 2018-07-09 RX ORDER — TOLTERODINE TARTRATE 2 MG/1
TABLET, EXTENDED RELEASE ORAL
Qty: 180 TABLET | Refills: 2 | Status: SHIPPED | OUTPATIENT
Start: 2018-07-09 | End: 2019-04-09

## 2018-07-16 DIAGNOSIS — H35.3130 BILATERAL NONEXUDATIVE AGE-RELATED MACULAR DEGENERATION: Primary | ICD-10-CM

## 2018-07-17 ENCOUNTER — OFFICE VISIT (OUTPATIENT)
Dept: AUDIOLOGY | Facility: CLINIC | Age: 83
End: 2018-07-17
Payer: MEDICARE

## 2018-07-17 DIAGNOSIS — H90.3 SENSORINEURAL HEARING LOSS (SNHL) OF BOTH EARS: Primary | ICD-10-CM

## 2018-07-17 PROCEDURE — V5299 HEARING SERVICE: HCPCS | Performed by: AUDIOLOGIST

## 2018-07-17 NOTE — PROGRESS NOTES
AUDIOLOGY REPORT    BACKGROUND INFORMATION: Daniel Mcnair was seen in the Audiology Clinic  at [facility] on 7/17/2018 for follow-up.  The patient has been seen previously and results revealed a bilateral sensorineural hearing loss.  The patient was fit with [employer]ia Pure 13 3Nx hearing aids on 6/4/18.  The patient reports several questions about care and maintenance of his devices. .    TEST RESULTS AND PROCEDURES: An electroacoustic hearing aid check was performed. The patient was counseled on insertion and removal as well as maintenance of his hearing aids.     SUMMARY AND RECOMMENDATIONS: A hearing aid check was performed today and the patient reports that all his questions were addressed today.  Adjustments were made as noted above and the patient will return as needed or at least every 4-6 months for cleaning and assessment of hearing aid.  Call this clinic with questions regarding today s visit.    Tashia Chisholm.  Doctor of Audiology  [place] License # [id number]

## 2018-07-17 NOTE — MR AVS SNAPSHOT
After Visit Summary   7/17/2018    Daniel Mcanir    MRN: [id number]           Patient Information     Date Of Birth          10/17/1929        Visit Information        Provider Department      7/17/2018 10:00 AM Jude Mendez, [facility]        Today's Diagnoses     Sensorineural hearing loss (SNHL) of both ears    -  1       Follow-ups after your visit        Your next 10 appointments already scheduled     Jul 24, 2018  8:30 AM CDT   RETURN RETINA with Luna Parker MD   Eye Clinic ([facility])    [address]  [address] Clin 9a  [facility] [address]   [phone]            Aug 10, 2018 10:30 AM CDT   (Arrive by 10:15 AM)   Return Visit with Yovana Camarena MD   [facility] Dermatology ([facility] [place])    [address] [place] [address]  3rd Floor  [facility] [address] [phone]            Sep 13, 2018 10:15 AM CDT   (Arrive by 10:00 AM)   Return Visit with Lia Dumont MD   [facility] Primary Care Clinic ([facility])    [address] [place] [address]  4th Floor  [facility] [address] [phone]            Oct 09, 2018 11:30 AM CDT   (Arrive by 11:15 AM)   Return Visit with Santana Martinez MD   [facility] Heart Care ([facility])    [address] [place] [address]  [address]  [facility] [address] [phone]            Nov 15, 2018  8:30 AM CST   Masonic Lab Draw with  MASONIC LAB DRAW   [facility] Masonic Lab Draw ([facility])    [address] [place] [address]  [address]  [facility] [address] [phone]            Nov 15, 2018  9:00 AM CST   CT ABDOMEN PELVIS W CONTRAST with UCCT1   [facility] Imaging [place] [facility] ([facility])    [address] [place] [address]  1st Floor  [facility] [address] [phone]           Please bring any scans or X-rays taken at other hospitals, if similar tests were done.  Also bring a list of your medicines, including vitamins, minerals and over-the-counter drugs. It is safest to leave personal items at home.  Be sure to tell your doctor:   If you have any allergies.   If there s any chance you are pregnant.   If you are breastfeeding.  How to prepare:   Do not eat or drink for 2 hours before your exam. If you need to take medicine, you may take it with small sips of water. (We may ask you to take liquid medicine as well.)   Please wear loose clothing, such as a sweat suit or jogging clothes. Avoid snaps, zippers and other metal. We may ask you to undress and put on a hospital gown.  Please arrive 30 minutes early for your CT. Once in the department you might be asked to drink water 15-20 minutes prior to your exam.  If indicated you may be asked to drink an oral contrast in advance of your CT.  If this is the case, the imaging team will let you know or be in contact with you prior to your appointment  Patients over 70 or patients with diabetes or kidney problems:   If you haven t had a blood test (creatinine test) within the last 30 days, the Cardiologist/Radiologist may require you to get this test prior to your exam.  If you have diabetes:   Continue to take your metformin medication on the day of your exam  If you have any questions, please call the Imaging Department where you will have your exam.            Nov 15, 2018 12:00 PM CST   (Arrive by 11:45 AM)   Return Visit with Tanner Rojas MD   [facility] ([facility])    [address] [place] [address]  [address]  [facility] [address] [phone]              Future tests that were ordered for you today     Open Future Orders        Priority Expected Expires Ordered    OCT Retina Spectralis OU (both eyes) Routine  7/16/2019 7/16/2018            Who to contact     If you have questions or need follow up information about today's clinic visit or your schedule please contact [facility]  [facility] directly at [phone].  Normal or non-critical lab and imaging results will be communicated to you by ArchiveSocialhart, letter or phone within 4 business days after the clinic has received the results. If you do not hear from us within 7 days, please contact the clinic through ArchiveSocialhart or phone. If you have a critical or abnormal lab result, we will notify you by phone as soon as possible.  Submit refill requests through [employer] or call your pharmacy and they will forward the refill request to us. Please allow 3 business days for your refill to be completed.          Additional Information About Your Visit        [employer] Information     [employer] gives you secure access to your electronic health record. If you see a primary care provider, you can also send messages to your care team and make appointments. If you have questions, please call your primary care clinic.  If you do not have a primary care provider, please call [phone] and they will assist you.      [employer] is an electronic gateway that provides easy, online access to your medical records. With [employer], you can request a clinic appointment, read your test results, renew a prescription or communicate with your care team.     To access your existing account, please contact your [facility] or call [phone] for assistance.        Care EveryWhere ID     This is your Care EveryWhere ID. This could be used by other organizations to access your [place] medical records  [id number]         Blood Pressure from Last 3 Encounters:   05/10/18 117/67   05/02/18 140/71   03/21/18 139/64    Weight from Last 3 Encounters:   05/10/18 191 lb 12.8 oz (87 kg)   05/02/18 194 lb (88 kg)   03/21/18 197 lb (89.4 kg)              We Performed the Following     HEARING AID CHECK/NO CHARGE        Primary Care Provider Office Phone # Fax #    Lia Dumont [phone]       [address] [place] [address]  [place] [address]        Equal Access to Services     [facility] Center[facility] : Hadii aad ku hadrojaso Sotremayne, waaxda luqadaha, qaybta kaalmada radhanadinewaylon, waxnoam placido vargasfrancajose francisco lopez. So [facility] [phone].    ATENCIÓN: Si habla español, tiene a vaughan disposición servicios gratuitos de asistencia lingüística. Glenna al [phone].    We comply with applicable federal civil rights laws and [place] laws. We do not discriminate on the basis of race, color, national origin, age, disability, sex, sexual orientation, or gender identity.            Thank you!     Thank you for choosing [facility]  for your care. Our goal is always to provide you with excellent care. Hearing back from our patients is one way we can continue to improve our services. Please take a few minutes to complete the written survey that you may receive in the mail after your visit with us. Thank you!             Your Updated Medication List - Protect others around you: Learn how to safely use, store and throw away your medicines at www.disposemymeds.org.          This list is accurate as of 7/17/18 11:35 AM.  Always use your most recent med list.                   Brand Name Dispense Instructions for use Diagnosis    acetaminophen 325 MG tablet    TYLENOL    100 tablet    Take 2 tablets (650 mg) by mouth every 6 hours as needed for pain    S/P revision of total hip       acetaminophen-codeine 300-30 MG per tablet    TYLENOL w/CODEINE No. 3    30 tablet    Take 1-2 tablets by mouth every 4 hours as needed for mild pain    Pain in joint, pelvic region and thigh, unspecified laterality       amLODIPine 10 MG tablet    NORVASC    90 tablet    Take 1 tablet (10 mg) by mouth daily    Benign essential hypertension       aspirin 81 MG EC tablet     90 tablet    Take 1 tablet (81 mg) by mouth daily    Hyperlipidemia LDL goal <70       atorvastatin 20 MG tablet    LIPITOR    90 tablet    Take 1 tablet (20 mg) by mouth daily    Hyperlipidemia LDL goal  <70       benazepril 40 MG tablet    LOTENSIN    90 tablet    Take 1 tablet (40 mg) by mouth daily    Essential hypertension, benign       bisacodyl 10 MG Suppository    DULCOLAX    3 suppository    Place 1 suppository rectally daily as needed.    Chronic constipation       desonide 0.05 % ointment    DESOWEN    60 g    Apply topically 2 times daily To affected areas of scale and redness on the face and ears as needed until clear.    Dermatitis, seborrheic       docusate sodium 100 MG tablet    COLACE    60 tablet    Take 100 mg by mouth 2 times daily    BPH (benign prostatic hyperplasia)       gentamicin 0.1 % ointment    GARAMYCIN     Apply topically 4 times daily    B-cell lymphoma of intra-abdominal lymph nodes, unspecified B-cell lymphoma type (H)       melatonin 3 MG tablet      Take 5 mg by mouth At Bedtime        metoprolol succinate 25 MG 24 hr tablet    TOPROL-XL    45 tablet    Take 0.5 tablets (12.5 mg) by mouth daily    Essential hypertension, benign       MILK OF MAGNESIA PO      Take by mouth At Bedtime        nitroGLYcerin 0.4 MG sublingual tablet    NITROSTAT    30 tablet    Place 1 tablet (0.4 mg) under the tongue every 5 minutes as needed    Coronary artery disease involving native heart without angina pectoris, unspecified vessel or lesion type       NutriSource Fiber packet      Take 1 packet by mouth every morning        psyllium 0.52 g capsule      Take by mouth daily Powder        ranitidine 150 MG tablet    ZANTAC    180 tablet    Take 1 tablet (150 mg) by mouth 2 times daily    Gastroesophageal reflux disease without esophagitis       sildenafil 100 MG tablet    VIAGRA    10 tablet    Take 1 tablet (100 mg) by mouth daily as needed    Erectile dysfunction, unspecified erectile dysfunction type       tamsulosin 0.4 MG capsule    FLOMAX    90 capsule    Take 1 capsule (0.4 mg) by mouth daily    Hypertrophy of prostate without urinary obstruction       tolterodine 2 MG tablet    DETROL    180  tablet    Take 1 tablet (2 mg )twice a day    Hypertonicity of bladder       * triamcinolone 0.025 % cream    KENALOG    45 g    Apply topically 2 times daily    Dermatitis       * triamcinolone 0.1 % ointment    KENALOG    80 g    Apply topically 2 times daily To itchy rashes    Intrinsic atopic dermatitis       VITEYES AREDS FORMULA/LUTEIN Caps      Take by mouth daily        * Notice:  This list has 2 medication(s) that are the same as other medications prescribed for you. Read the directions carefully, and ask your doctor or other care provider to review them with you.

## 2018-07-20 ENCOUNTER — TELEPHONE (OUTPATIENT)
Dept: INTERNAL MEDICINE | Facility: CLINIC | Age: 83
End: 2018-07-20

## 2018-07-20 DIAGNOSIS — M62.830 BACK MUSCLE SPASM: Primary | ICD-10-CM

## 2018-07-20 DIAGNOSIS — N40.0 HYPERTROPHY OF PROSTATE WITHOUT URINARY OBSTRUCTION: ICD-10-CM

## 2018-07-20 NOTE — TELEPHONE ENCOUNTER
Called patient back and he complains of back spasms.  He is requesting Flexeril as his daughter has used this in the past and it  has helped her.  He does have an appointment scheduled with Dr. Dumont 7/30 to discuss his back pain.  He is wondering as if he can receive this before his appointment. informed patient that writer will make RN/provider aware of his request.   Harley Cifuentes LPN at 1:16 PM on 7/20/2018      Left message for pt to call back.  Alka Kellogg RN 8:45 AM on 7/24/2018.  RX for Flexeril sent to pt's pharmacy.  Alka Kellogg RN 8:48 AM on 7/25/2018.

## 2018-07-20 NOTE — TELEPHONE ENCOUNTER
[facility] Call Center    Phone Message    May a detailed message be left on voicemail: yes    Reason for Call: Other: Please follow up with pt, he would like to discuss being prescribed a new medication of cyclobenzaprine.     Action Taken: Message routed to:  [facility] ([facility]): shani evans

## 2018-07-22 NOTE — TELEPHONE ENCOUNTER
tamsulosin (FLOMAX) 0.4 MG capsule  Last Written Prescription Date:  6/21/17  Last Fill Quantity: 90,   # refills: 3  Last Office Visit : 5/10/18  Future Office visit:  7/30/18    Routing  Because:  sildenafil (VIAGRA

## 2018-07-24 RX ORDER — TAMSULOSIN HYDROCHLORIDE 0.4 MG/1
0.4 CAPSULE ORAL DAILY
Qty: 90 CAPSULE | Refills: 3 | Status: SHIPPED | OUTPATIENT
Start: 2018-07-24 | End: 2019-01-11

## 2018-07-25 RX ORDER — CYCLOBENZAPRINE HCL 5 MG
5 TABLET ORAL 3 TIMES DAILY PRN
Qty: 30 TABLET | Refills: 0 | Status: SHIPPED | OUTPATIENT
Start: 2018-07-25 | End: 2018-10-04

## 2018-07-30 ENCOUNTER — OFFICE VISIT (OUTPATIENT)
Dept: INTERNAL MEDICINE | Facility: CLINIC | Age: 83
End: 2018-07-30
Payer: MEDICARE

## 2018-07-30 ENCOUNTER — OFFICE VISIT (OUTPATIENT)
Dept: OPHTHALMOLOGY | Facility: CLINIC | Age: 83
End: 2018-07-30
Attending: OPHTHALMOLOGY
Payer: MEDICARE

## 2018-07-30 VITALS
WEIGHT: 190.7 LBS | OXYGEN SATURATION: 99 % | SYSTOLIC BLOOD PRESSURE: 133 MMHG | BODY MASS INDEX: 27.36 KG/M2 | DIASTOLIC BLOOD PRESSURE: 63 MMHG | HEART RATE: 51 BPM | TEMPERATURE: 97.7 F

## 2018-07-30 DIAGNOSIS — H35.3130 BILATERAL NONEXUDATIVE AGE-RELATED MACULAR DEGENERATION: ICD-10-CM

## 2018-07-30 DIAGNOSIS — M54.50 LEFT-SIDED LOW BACK PAIN WITHOUT SCIATICA, UNSPECIFIED CHRONICITY: Primary | ICD-10-CM

## 2018-07-30 PROCEDURE — 92134 CPTRZ OPH DX IMG PST SGM RTA: CPT | Mod: ZF | Performed by: OPHTHALMOLOGY

## 2018-07-30 PROCEDURE — G0463 HOSPITAL OUTPT CLINIC VISIT: HCPCS | Mod: ZF

## 2018-07-30 ASSESSMENT — TONOMETRY
OD_IOP_MMHG: 18
OS_IOP_MMHG: 19
IOP_METHOD: TONOPEN

## 2018-07-30 ASSESSMENT — CONF VISUAL FIELD
OS_INFERIOR_NASAL_RESTRICTION: 3
OS_SUPERIOR_TEMPORAL_RESTRICTION: 3
OD_INFERIOR_TEMPORAL_RESTRICTION: 3
OD_INFERIOR_NASAL_RESTRICTION: 3
OD_SUPERIOR_TEMPORAL_RESTRICTION: 3
OS_INFERIOR_TEMPORAL_RESTRICTION: 3
OD_SUPERIOR_NASAL_RESTRICTION: 3
OS_SUPERIOR_NASAL_RESTRICTION: 3

## 2018-07-30 ASSESSMENT — REFRACTION_WEARINGRX
OS_AXIS: 175
OD_CYLINDER: +0.25
OD_ADD: +3.00
OS_SPHERE: -2.50
OD_AXIS: 040
OS_ADD: +3.00
OD_SPHERE: -0.50
OS_CYLINDER: +1.75

## 2018-07-30 ASSESSMENT — VISUAL ACUITY
OS_PH_CC: 20/30
OD_PH_CC: 20/40
OD_CC+: +1
OD_CC: 20/50
CORRECTION_TYPE: GLASSES
METHOD: SNELLEN - LINEAR
OS_CC: 20/40

## 2018-07-30 ASSESSMENT — EXTERNAL EXAM - RIGHT EYE: OD_EXAM: NORMAL

## 2018-07-30 ASSESSMENT — CUP TO DISC RATIO
OS_RATIO: 0.3
OD_RATIO: 0.3

## 2018-07-30 ASSESSMENT — SLIT LAMP EXAM - LIDS
COMMENTS: UPPER LID DERMATOCHALASIS
COMMENTS: UPPER LID DERMATOCHALASIS

## 2018-07-30 ASSESSMENT — PAIN SCALES - GENERAL: PAINLEVEL: MILD PAIN (2)

## 2018-07-30 ASSESSMENT — EXTERNAL EXAM - LEFT EYE: OS_EXAM: NORMAL

## 2018-07-30 NOTE — MR AVS SNAPSHOT
After Visit Summary   7/30/2018    Daniel Mcnair    MRN: [id number]           Patient Information     Date Of Birth          10/17/1929        Visit Information        Provider Department      7/30/2018 1:30 PM Carlos Biswas MD [facility]        Today's Diagnoses     Bilateral nonexudative age-related macular degeneration           Follow-ups after your visit        Follow-up notes from your care team     Return in about 6 months (around 1/30/2019) for Follow Up DFE and Mac OCT with Dr. Parker.      Your next 10 appointments already scheduled     Aug 10, 2018 10:30 AM CDT   (Arrive by 10:15 AM)   Return Visit with Yovana Camarena MD   [facility] Dermatology ([facility])    [address] [place] [address]  3rd Floor  [facility] [address] [phone]            Sep 13, 2018 10:40 AM CDT   (Arrive by 10:25 AM)   Return Visit with Lia Dumont MD   [facility] Primary [facility] ([facility])    [address]  4th Floor  [facility] [id number] [phone]            Oct 09, 2018 11:30 AM CDT   (Arrive by 11:15 AM)   Return Visit with Santana Martinez MD   [facility] Heart Care ([facility])    [address] [place] [address]  [address]  [facility] [address] [phone]            Nov 15, 2018  8:30 AM CST   [facility] Draw with  [facility]RAW   [facility] Draw ([facility])    [address] [place] [address]  [address]  [facility] [id number] [phone]            Nov 15, 2018  9:00 AM CST   CT ABDOMEN PELVIS W CONTRAST with UCCT1   [facility] Imaging [place] [facility] ([facility])    [address] [place] [address]  1st Floor  [facility] [id number] [phone]           Please bring any scans or X-rays taken at other hospitals, if similar tests were done. Also bring a list of your medicines, including vitamins, minerals and over-the-counter  drugs. It is safest to leave personal items at home.  Be sure to tell your doctor:   If you have any allergies.   If there s any chance you are pregnant.   If you are breastfeeding.  How to prepare:   Do not eat or drink for 2 hours before your exam. If you need to take medicine, you may take it with small sips of water. (We may ask you to take liquid medicine as well.)   Please wear loose clothing, such as a sweat suit or jogging clothes. Avoid snaps, zippers and other metal. We may ask you to undress and put on a hospital gown.  Please arrive 30 minutes early for your CT. Once in the department you might be asked to drink water 15-20 minutes prior to your exam.  If indicated you may be asked to drink an oral contrast in advance of your CT.  If this is the case, the imaging team will let you know or be in contact with you prior to your appointment  Patients over 70 or patients with diabetes or kidney problems:   If you haven t had a blood test (creatinine test) within the last 30 days, the Cardiologist/Radiologist may require you to get this test prior to your exam.  If you have diabetes:   Continue to take your metformin medication on the day of your exam  If you have any questions, please call the Imaging Department where you will have your exam.            Nov 15, 2018 12:00 PM CST   (Arrive by 11:45 AM)   Return Visit with Tanner Rojas MD   [facility] ([facility])    [address]  [address]  [facility] [address]   [phone]            Jan 29, 2019 10:15 AM CST   RETURN RETINA with Luna Parker MD   [facility] ([facility])    [address]  [facility] [address]   [phone]              Future tests that were ordered for you today     Open Future Orders        Priority Expected Expires Ordered    OCT Retina Spectralis OU (both eyes) Routine  1/31/2020 7/30/2018    XR Lumbar  Spine 2-3 Views Routine 7/30/2018 7/30/2019 7/30/2018    XR Pelvis and Hip Bilateral 2 Views Routine 7/30/2018 7/30/2019 7/30/2018            Who to contact     Please call your clinic at [phone] to:    Ask questions about your health    Make or cancel appointments    Discuss your medicines    Learn about your test results    Speak to your doctor            Additional Information About Your Visit        [employer] Information     rankur gives you secure access to your electronic health record. If you see a primary care provider, you can also send messages to your care team and make appointments. If you have questions, please call your primary care clinic.  If you do not have a primary care provider, please call [phone] and they will assist you.      rankur is an electronic gateway that provides easy, online access to your medical records. With rankur, you can request a clinic appointment, read your test results, renew a prescription or communicate with your care team.     To access your existing account, please contact your [facility] or call [phone] for assistance.        Care EveryWhere ID     This is your Care EveryWhere ID. This could be used by other organizations to access your [facility] medical records  [id number]         Blood Pressure from Last 3 Encounters:   07/30/18 133/63   05/10/18 117/67   05/02/18 140/71    Weight from Last 3 Encounters:   07/30/18 86.5 kg (190 lb 11.2 oz)   05/10/18 87 kg (191 lb 12.8 oz)   05/02/18 88 kg (194 lb)              We Performed the Following     OCT Retina Spectralis OU (both eyes)        Primary Care Provider Office Phone # Fax #    Lia Dumont [phone]       [address] [place] [address]        Equal Access to Services     PARRISH LOPEZ : ad Henderson, jose miguel de jesus. So [facility]  [phone].    ATENCIÓN: Si drew culver, tiene a vaughan disposición servicios gratuitos de asistencia lingüística. Glenna davis [phone].    We comply with applicable federal civil rights laws and [place] laws. We do not discriminate on the basis of race, color, national origin, age, disability, sex, sexual orientation, or gender identity.            Thank you!     Thank you for choosing EYE CLINIC  for your care. Our goal is always to provide you with excellent care. Hearing back from our patients is one way we can continue to improve our services. Please take a few minutes to complete the written survey that you may receive in the mail after your visit with us. Thank you!             Your Updated Medication List - Protect others around you: Learn how to safely use, store and throw away your medicines at www.disposemymeds.org.          This list is accurate as of 7/30/18  2:41 PM.  Always use your most recent med list.                   Brand Name Dispense Instructions for use Diagnosis    acetaminophen 325 MG tablet    TYLENOL    100 tablet    Take 2 tablets (650 mg) by mouth every 6 hours as needed for pain    S/P revision of total hip       acetaminophen-codeine 300-30 MG per tablet    TYLENOL w/CODEINE No. 3    30 tablet    Take 1-2 tablets by mouth every 4 hours as needed for mild pain    Pain in joint, pelvic region and thigh, unspecified laterality       amLODIPine 10 MG tablet    NORVASC    90 tablet    Take 1 tablet (10 mg) by mouth daily    Benign essential hypertension       aspirin 81 MG EC tablet     90 tablet    Take 1 tablet (81 mg) by mouth daily    Hyperlipidemia LDL goal <70       atorvastatin 20 MG tablet    LIPITOR    90 tablet    Take 1 tablet (20 mg) by mouth daily    Hyperlipidemia LDL goal <70       benazepril 40 MG tablet    LOTENSIN    90 tablet    Take 1 tablet (40 mg) by mouth daily    Essential hypertension, benign       bisacodyl 10 MG Suppository    DULCOLAX    3 suppository     Place 1 suppository rectally daily as needed.    Chronic constipation       cyclobenzaprine 5 MG tablet    FLEXERIL    30 tablet    Take 1 tablet (5 mg) by mouth 3 times daily as needed for muscle spasms    Back muscle spasm       desonide 0.05 % ointment    DESOWEN    60 g    Apply topically 2 times daily To affected areas of scale and redness on the face and ears as needed until clear.    Dermatitis, seborrheic       docusate sodium 100 MG tablet    COLACE    60 tablet    Take 100 mg by mouth 2 times daily    BPH (benign prostatic hyperplasia)       gentamicin 0.1 % ointment    GARAMYCIN     Apply topically 4 times daily    B-cell lymphoma of intra-abdominal lymph nodes, unspecified B-cell lymphoma type (H)       melatonin 3 MG tablet      Take 5 mg by mouth At Bedtime        metoprolol succinate 25 MG 24 hr tablet    TOPROL-XL    45 tablet    Take 0.5 tablets (12.5 mg) by mouth daily    Essential hypertension, benign       MILK OF MAGNESIA PO      Take by mouth At Bedtime        nitroGLYcerin 0.4 MG sublingual tablet    NITROSTAT    30 tablet    Place 1 tablet (0.4 mg) under the tongue every 5 minutes as needed    Coronary artery disease involving native heart without angina pectoris, unspecified vessel or lesion type       N[employer] Fiber packet      Take 1 packet by mouth every morning        psyllium 0.52 g capsule      Take by mouth daily Powder        ranitidine 150 MG tablet    ZANTAC    180 tablet    Take 1 tablet (150 mg) by mouth 2 times daily    Gastroesophageal reflux disease without esophagitis       sildenafil 100 MG tablet    VIAGRA    10 tablet    Take 1 tablet (100 mg) by mouth daily as needed    Erectile dysfunction, unspecified erectile dysfunction type       tamsulosin 0.4 MG capsule    FLOMAX    90 capsule    Take 1 capsule (0.4 mg) by mouth daily    Hypertrophy of prostate without urinary obstruction       tolterodine 2 MG tablet    DETROL    180 tablet    Take 1 tablet (2 mg )twice a day     Hypertonicity of bladder       * triamcinolone 0.025 % cream    KENALOG    45 g    Apply topically 2 times daily    Dermatitis       * triamcinolone 0.1 % ointment    KENALOG    80 g    Apply topically 2 times daily To itchy rashes    Intrinsic atopic dermatitis       VITEYES AREDS FORMULA/LUTEIN Caps      Take by mouth daily        * Notice:  This list has 2 medication(s) that are the same as other medications prescribed for you. Read the directions carefully, and ask your doctor or other care provider to review them with you.

## 2018-07-30 NOTE — NURSING NOTE
Chief Complaints and History of Present Illnesses   Patient presents with     Follow Up For      Dry Age related macular degeneration      HPI    Affected eye(s):  Both   Symptoms:        Frequency:  Constant       Do you have eye pain now?:  No      Comments:  Feels that the va has changed   Rare floaters  +dry  Tayler Whitt COT 1:56 PM July 30, 2018

## 2018-07-30 NOTE — PATIENT INSTRUCTIONS
Primary Care Center Phone Number [phone]  Primary Care Center Medication Refill Request Information:  * Please contact your pharmacy regarding ANY request for medication refills.  ** [facility] Prescription Fax = [phone]  * Please allow 3 business days for routine medication refills.  * Please allow 5 business days for controlled substance medication refills.     Primary Care Center Test Result notification information:  *You will be notified with in 7-10 days of your appointment day regarding the results of your test.  If you are on MyChart you will be notified as soon as the provider has reviewed the results and signed off on them.                   [facility]         Internal Medicine Resident                   Continuity Clinic    Who We Are    Resident Continuity Clinic is a part of the [facility] Primary Care Clinic.  Resident physicians see patients independently and establish a relationship with them over the course of their three-year residency program.  As with the Primary Care Clinic, our Resident Continuity Clinic models a group practice.  If your doctor is not available, you will be able to see another resident physician.  At the end of a resident s training, patients will be transitioned to a new resident physician for ongoing care.     We treat patients with a wide array of medical needs from routine physicals, to acute illnesses, to diabetes and blood pressure management, to complex medical illness.  What is a Resident Physician?    Resident physicians hold medical degrees and are doctors. They are training to become specialists in Internal Medicine. They work under the supervision of board-certified faculty physicians.  Expectations for Your Care    We strive to provide accessible, quality care at all times.    In order to provide this care, it is best to see your primary care resident doctor consistently rather switching between providers.  In the event you do see another physician,  you should schedule a follow-up visit with your usual primary care doctor.    If you are transitioning your care from another clinic, it is helpful to have your records available for your doctor to review.    We do not prescribe controlled substances, such as ADD medications or narcotic pain medications, on your first visit.  We will review your health records and concerns prior to devising a treatment plan with you in order to provide the best care.      Clinic Services     Extended clinic hours; patient  to help navigate your visit;  parking; laboratory and imaging services with evening and weekend hours    Multiple medical and surgical specialties in one building    Complementary services, including Nutrition, Integrative Medicine, Pharmacy consultations, Mental and Behavioral Health, Sports Medicine and Physical Therapy    Thank You    We would like to thank you for choosing the [facility] Internal Medicine Resident Continuity Clinic for your primary care. You are making a priceless contribution to the training of the next generation of health care practitioners.     Contact us at [phone] for appointments or questions.    Resident Clinic Hours are Tuesdays and Thursdays, 7:30am-5:00pm    Residents  Sweta Garcia MD   (Female )   Fabiola Wing MD   (Female)   Telly Gates MD  (Male)   Tony Jaramillo MD  (Male)   Olivia Gayle MD   (Female)   Giovany Goldberg MD  (Male)    Cayetano Loving MD  (Male)   Shorty Cristobal MD  (Male)   Tony Perrin MD (Male)   Torsten Burns MD  (Male)   Raegan Marsh MD (Female)    Daija Heredia MD (Female)   Camrelo Chris MD  (Male)   Cami Zambrano MD(Female)   Jemima Shea MD  (Female)    Supervising Physicians   MD Marcelina Neri MD Briar Duffy, MD James Langland, MD Mary Logeais, MD Tanya Melnik, MD Charles Moldow, MD Heather Thompson Buum, MD Kathleen Watson, MD

## 2018-07-30 NOTE — PROGRESS NOTES
[facility]  Primary Care Center   Lia Dumont MD  07/30/2018      Chief Complaint:   Back Pain       History of Present Illness:   Daniel Mcnair is a 88 year old male with a history of CAD, B-cell lymphoma, and hypertension who presents for evaluation of back pain. The patient comes to clinic today accompanied by his wife. He has had some back spasms more recently that steam from low left sided back pain. At first he did not realize they were spasms but called clinic to get a prescription of Flexural and this has since helped. He has noticed the spasms increased during a long car ride to [place] at the beginning of July and when he lost his cane in the middle of July. The spasms are the worst when he is trying to get into bed at night or picking something up off the floor. The Flexural has helped with the pain especially before bed. He has no trouble sleeping but occasionally will get up to urinate frequently throughout the night but will fall back asleep immediately. He does get early morning stiffness and lightheadedness after long periods of laying down. He feels the pain in his back is focused in the same spot he got an injection in over a year ago. He has not seen physical medicine recently but in the past has seen Dr. Valdez and Dr. Sanon. He also has not had an x-ray of the back since May 2016. The patient feels the pain is at a baseline 2-4 out of 10. But during the spasms he noted it was 5 out of 5 in pain during the visit today. To help treat this pain he has tried Salonpas and Acetaminophen 500 mg twice a day which has helped with his pain. He has also used ice which gives him temporary relief. He also reports numbness in his right leg on top of his foot and around the ankle, accompanied by swelling of the right leg which has been present for years. He notes no fevers, sweats, chills or changes in bowel/urinary function.        Review of Systems:   Pertinent items are noted in HPI, remainder of  complete ROS is negative.      Active Medications:     Current Outpatient Prescriptions:      acetaminophen (TYLENOL) 325 MG tablet, Take 2 tablets (650 mg) by mouth every 6 hours as needed for pain, Disp: 100 tablet, Rfl: 0     acetaminophen-codeine (TYLENOL W/CODEINE NO. 3) 300-30 MG per tablet, Take 1-2 tablets by mouth every 4 hours as needed for mild pain, Disp: 30 tablet, Rfl: 1     amLODIPine (NORVASC) 10 MG tablet, Take 1 tablet (10 mg) by mouth daily, Disp: 90 tablet, Rfl: 3     aspirin 81 MG EC tablet, Take 1 tablet (81 mg) by mouth daily, Disp: 90 tablet, Rfl: 3     atorvastatin (LIPITOR) 20 MG tablet, Take 1 tablet (20 mg) by mouth daily, Disp: 90 tablet, Rfl: 2     benazepril (LOTENSIN) 40 MG tablet, Take 1 tablet (40 mg) by mouth daily, Disp: 90 tablet, Rfl: 3     bisacodyl (DULCOLAX) 10 MG suppository, Place 1 suppository rectally daily as needed., Disp: 3 suppository, Rfl: 0     cyclobenzaprine (FLEXERIL) 5 MG tablet, Take 1 tablet (5 mg) by mouth 3 times daily as needed for muscle spasms, Disp: 30 tablet, Rfl: 0     desonide (DESOWEN) 0.05 % ointment, Apply topically 2 times daily To affected areas of scale and redness on the face and ears as needed until clear., Disp: 60 g, Rfl: 11     docusate sodium 100 MG tablet, Take 100 mg by mouth 2 times daily, Disp: 60 tablet, Rfl: 1     gentamicin (GARAMYCIN) 0.1 % ointment, Apply topically 4 times daily , Disp: , Rfl: 3     Guar Gum (BENEFIBER) packet, Take 1 packet by mouth every morning , Disp: , Rfl:      Magnesium Hydroxide (MILK OF MAGNESIA PO), Take by mouth At Bedtime, Disp: , Rfl:      melatonin 3 MG tablet, Take 5 mg by mouth At Bedtime , Disp: , Rfl:      metoprolol succinate (TOPROL-XL) 25 MG 24 hr tablet, Take 0.5 tablets (12.5 mg) by mouth daily, Disp: 45 tablet, Rfl: 3     Multiple Vitamins-Minerals (VITEYES AREDS FORMULA/LUTEIN) CAPS, Take by mouth daily, Disp: , Rfl:      nitroglycerin (NITROSTAT) 0.4 MG sublingual tablet, Place 1 tablet  (0.4 mg) under the tongue every 5 minutes as needed, Disp: 30 tablet, Rfl: 2     psyllium 0.52 G capsule, Take by mouth daily Powder, Disp: , Rfl:      ranitidine (ZANTAC) 150 MG tablet, Take 1 tablet (150 mg) by mouth 2 times daily, Disp: 180 tablet, Rfl: 3     sildenafil (VIAGRA) 100 MG tablet, Take 1 tablet (100 mg) by mouth daily as needed, Disp: 10 tablet, Rfl: 2     tamsulosin (FLOMAX) 0.4 MG capsule, Take 1 capsule (0.4 mg) by mouth daily, Disp: 90 capsule, Rfl: 3     tolterodine (DETROL) 2 MG tablet, Take 1 tablet (2 mg )twice a day, Disp: 180 tablet, Rfl: 2     triamcinolone (KENALOG) 0.025 % cream, Apply topically 2 times daily, Disp: 45 g, Rfl: 0     triamcinolone (KENALOG) 0.1 % ointment, Apply topically 2 times daily To itchy rashes, Disp: 80 g, Rfl: 3      Allergies:   Nka [no known allergies]      Past Medical History:  Coronary artery disease   Benign essential hypertension   Hyperlipidemia  Osteoarthritis   Spinal facet arthropathy  Sacroiliitis   Degenerative spondylolisthesis   Prostatic hypertrophy  Intraductal papillary mucinous neoplasm  B-cell lymphoma  Basal cell carcinoma  Idiopathic gynecomastia  Anal stricture   Hemorrhoids   Senile cataract   Vitreous degeneration  Macular degeneration  Choroidal detachment     Past Surgical History:  Arthroplasty hip, left   Arthroplasty hip, right  Arthroplasty revision hip, left  Back surgery  Cardiac surgery, stent x5  Cataract surgery, bilateral  Cystoscopy, transurethral resection, prostate, combined  Extracapsular cataract extration with intraocular lens implant x2  Hernia repair  Mastoid surgery  MOHS micrographic procedure  Optical tracking system fusion spine posterior lumbar two levels    Family History:   Diabetes - maternal grandfather  Alcohol/drug use - maternal grandfather   Arthritis - maternal grandfather   Obesity - maternal grandfather   Stroke - maternal grandmother   Hypertension - mother      Social History:   Tobacco Use:  none  Alcohol Use: socially   PCP: Lia Dumont      Physical Exam:   /63  Pulse 51  Temp 97.7  F (36.5  C) (Oral)  Wt 86.5 kg (190 lb 11.2 oz)  SpO2 99%  BMI 27.36 kg/m2   Physical Examination:  General:  Pleasant, alert, no acute distress  Lungs:  Clear to auscultation throughout, no wheezes, rhonchi or rales.  C/V:  Regular rate and rhythm, no murmurs, rubs or gallops.  No JVD, no carotid bruits.  No peripheral edema.   Neuro:  Alert and oriented, face symmetric. No tremor.  Gait steady.    M/S:   No joint deformities noted.  No joint swelling. Pain in the top of lumbar spine area by the L4-L5 just to the left; not a lot of bending ability, Difficulty standing on tip toes and heals, Decreased reflexes, Weakness in right ankle flexion 4/5, flexion on left ankle 5/5, extension of left ankle 5/5, extension of right ankle 5/5, weakness in right knee flexion 4+/5, right knee extension 5/5, weakness in left knee flexion 4+/5, left knee extension 5/5, hip flexors bilaterally 5/5, straight leg raise negative.   Psych:  Broad range affect.  Not psychomotor slowed.  No signs of anxiety or agitation.       Assessment and Plan:  Left-sided low back pain without sciatica, unspecified chronicity  The patient will get a repeat x-ray of the lumbar spine. At this time, I do not believe a CAT scan or MRI are warranted. He should get back to seeing physical medicine and they will decide further treatment. He should try to stay as active as possible.   - PHYSIATRY (PM&R) REFERRAL  - XR Lumbar Spine 2-3 Views  - XR Pelvis and Hip Bilateral 2 Views  -Continue cyclobenzaprine and acetaminophen for symptom relief, continue walking        Follow-up: Return in about 3 months (around 10/30/2018) for follow up low back pain.      Total time spent 25 minutes.  More than 50% of the time spent with Mr. Mcnair on counseling / coordinating his care           Scribe Disclosure:   CHILANGO, Itzel Cole, am serving as a scribe to document  services personally performed by Lia Dumont MD at this visit, based upon the provider's statements to me. All documentation has been reviewed by the aforementioned provider prior to being entered into the official medical record.     Portions of this medical record were completed by a scribe. UPON MY REVIEW AND AUTHENTICATION BY ELECTRONIC SIGNATURE, this confirms (a) I performed the applicable clinical services, and (b) the record is accurate.     Lia Dumont M.D.  Internal Medicine  Primary Care Center   pager [phone]

## 2018-07-30 NOTE — NURSING NOTE
Chief Complaint   Patient presents with     Back Pain     Pt. has questions about Flexeral. Requests X-Rays.       Lindsay Dominguez LPN at 12:02 PM on 7/30/2018

## 2018-07-30 NOTE — PROGRESS NOTES
I have confirmed the patient's and reviewed Past Medical History, Past Surgical History, Social History, Family History, Problem List, Medication List and agree with Tech note.      CC -   Dry AMD    INTERVAL HISTORY -   Patient feels like his vision has changed subtly.  Eye feels dry and has rare floaters.  Overall happy with vision.  Got glasses recently and was hoping to notice a bigger change.  No flashing/floaters/curtain.  Has been taking AREDs and following amsler regularly.    HPI - 87yo male with hx of dry ARMD, dbh right eye noted in 3/2015, CE/IOL OU.   Now taking AREDS II, never smoked in the past and using Amsler not noticing diplopia      PAST OCULAR SURGERY  CE/IOL both eyes ~ 2005    RETINAL IMAGING  OCT  07/30/18   right eye- 2+ drusen , drusenoid pigment epithelial detachments (?tr increase), no fluid  left eye - 2+ drusen, drusenoid pigment epithelial detachments, no fluid      ASSESSMENT & PLAN  1.  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  Diplopia   - historically monocular diplopia   - patient not symptomatic today    3. Posterior vitreous detachment (PVD) both eyes    - advised S/Sx RD    4.  H/O dot blot hemorrhage right eye   - seen previously 3/2015 visit, resolved since    - ?etiolgy   - h/o HTN, doubt Choroidal neovascular membrane, but in far temporal macula   - continue observation    5.  Pseudophakia both eyes   - Observe    6. Dry Eyes, bilateral   continue At to 4-6x daily, currently using twice a day    -start warm compress    7. Dermatochalasis    return to clinic:6 monhts, dilated fundus exam, OCT each eye with Dr. Elena Alcocer M.D.  PGY-3, Ophthalmology     ATTESTATION:     I have seen and examined the patient with Dr. Alcocer and agree with the findings in this note, and the interpretation of the diagnostic  tests.    Carlos Rogers MD PhD.  Professor & Chair

## 2018-08-03 ENCOUNTER — RADIANT APPOINTMENT (OUTPATIENT)
Dept: GENERAL RADIOLOGY | Facility: CLINIC | Age: 83
End: 2018-08-03
Attending: PHYSICAL MEDICINE & REHABILITATION
Payer: MEDICARE

## 2018-08-03 ENCOUNTER — OFFICE VISIT (OUTPATIENT)
Dept: PHYSICAL MEDICINE AND REHAB | Facility: CLINIC | Age: 83
End: 2018-08-03
Payer: MEDICARE

## 2018-08-03 VITALS
DIASTOLIC BLOOD PRESSURE: 51 MMHG | WEIGHT: 191 LBS | OXYGEN SATURATION: 98 % | HEIGHT: 70 IN | SYSTOLIC BLOOD PRESSURE: 127 MMHG | HEART RATE: 52 BPM | BODY MASS INDEX: 27.35 KG/M2

## 2018-08-03 DIAGNOSIS — M54.50 CHRONIC LEFT-SIDED LOW BACK PAIN WITHOUT SCIATICA: Primary | ICD-10-CM

## 2018-08-03 DIAGNOSIS — M47.816 LUMBAR FACET ARTHROPATHY: ICD-10-CM

## 2018-08-03 DIAGNOSIS — M25.552 CHRONIC LEFT HIP PAIN: ICD-10-CM

## 2018-08-03 DIAGNOSIS — G89.29 CHRONIC LEFT HIP PAIN: ICD-10-CM

## 2018-08-03 DIAGNOSIS — Z96.642 HISTORY OF LEFT HIP REPLACEMENT: ICD-10-CM

## 2018-08-03 DIAGNOSIS — M51.369 LUMBAR DEGENERATIVE DISC DISEASE: ICD-10-CM

## 2018-08-03 DIAGNOSIS — Z98.890 HISTORY OF SPINAL SURGERY: ICD-10-CM

## 2018-08-03 DIAGNOSIS — G89.29 CHRONIC LEFT-SIDED LOW BACK PAIN WITHOUT SCIATICA: Primary | ICD-10-CM

## 2018-08-03 NOTE — PATIENT INSTRUCTIONS
We addressed the following today:    1.  Chronic left low back pain without radiation  2.  Lumbar arthritis  3.  History of lumbar spine surgery  4.  Chronic left hip pain  5.  History of left hip surgeries    Activity modification as discussed  Home exercise program as instructed  Topical Treatments: Ice or Heat  Over the counter medication: Acetaminophen (Tylenol) 1000mg every 6 hours with food (Maximum of 3000mg/day)  X-rays of the left hip in radiology and MRI of the lumbar spine at [facility] 1-2 business days after completion of diagnostic testing for discussion of results/further medical care   Follow-up 1-2 business days upon completion of further diagnostic imaging for discussion of results/further medical care (sooner if needed; call direct clinic number [[phone]] at any time with questions/concerns)

## 2018-08-03 NOTE — LETTER
8/3/2018       RE: Daniel Mcnair  [address]  [place] [phone]     Dear Colleague,    Thank you for referring your patient, Daniel Mcnair, to the [facility] PHYSICAL MEDICINE AND REHABILITATION at [facility]. Please see a copy of my visit note below.    [facility] Physical Medicine & Rehabilitation Clinic Note  Clinic Visit s Aug 3, 2018    Subjective:  Daniel Mcnair is a 88 year old male who is seen in consultation at the request of Dr. Dumont for evaluation of left low back pain without radiation and left anterior hip pain without radiation.    Symptoms began years ago, worse since July 2018.  Reports insidious onset without acute precipitating event.  Reports left low back and left anterior hip pain without radiation.  Symptoms are generally worse with getting in bed and better with use of Flexeril.  Recently completed physical therapy at [facility] and [place] (last in April - May 2018) with completion of home exercises currently.  Has used Acetaminophen for improvement of pain.  Uses a cane and/or walker for ambulation purposes.  Associated symptoms include denies any bowel/bladder dysfunction or saddle anesthesia.  Denies any numbness/tingling/weakness of the left lower extremity.  Notes numbness of the right ankle region.    Complex medical history with a L3-4 TLIF with L2-3 and L3-4 laminectomy and removal of hardware at L4-5 with Dr. Lew in July 2013.  Had an L4-5 decompression and posterior instrumented spinal fusion in February 2006.  Completed a right total hip arthroplasty in 1999.  Had a left total hip arthroplasty with Dr. Grewal in October 2008.  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 Grewal.      Has had multiple injections into the spine in the past including multiple right L5-S1 transforaminal epidural corticosteroid injections, an L1-2  "interlaminar epidural corticosteroid injection, an L2-3 interlaminar epidural corticosteroid injection, a right sacroiliac joint corticosteroid injection, multiple right L5-S1 lumbar facet joint corticosteroid injectiosn, and a right L2-3 transforaminal epidural corticosteroid injection over the years at [facility].    Patient's past medical, surgical, social, and family histories are reviewed today.  Complex medical history as noted above.  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     Macular degeneration, dry      Other and unspecified hyperlipidemia 7/21/2011     Snoring      Spinal stenosis, lumbar region, without neurogenic claudication 7/21/2011     Stented coronary artery 2003     Unspecified essential hypertension 7/21/2011       Review of Systems:  Constitutional: NEGATIVE for fever, chills, or change in weight  Skin: NEGATIVE for worrisome rashes, moles, or lesions  Neuro: NEGATIVE for weakness of the lower extremiteis  MSK: see HPI    Objective:  /51 (BP Location: Left arm, Patient Position: Sitting, Cuff Size: Adult Regular)  Pulse 52  Ht 1.778 m (5' 10\")  Wt 86.6 kg (191 lb)  SpO2 98%  BMI 27.41 kg/m2  General: healthy, frail-appearing, and in no acute distress  Skin: no suspicious lesions or rashes  Psych: mentation appears normal and affect normal/bright  HEENT: no scleral icterus  CV: no pedal edema  Resp: normal respiratory effort without conversational dyspnea   Neuro: motor strength as noted below  Lymph: no palpable lymphadenopathy    MSK:    THORACIC/LUMBAR SPINE  Inspection:    Large healed " midline surgical incision noted of the lower lumbar spine with curvature of the lumbar spine to the right  Palpation:    Tender about the left para lumbar muscles, left SI joint, and left sciatic notch    No tenderness over the lumbar spinous processes, right para lumbar muscles, right SI joint, or right sciatic notch  Range of Motion:     Lumbar flexion within normal limits    Lumbar extension limited by pain  Strength:    5/5 - quadriceps, hamstrings, tibialis anterior, gastrocsoleus, and extensor hallicus longus (left)  Special Tests:    Positive: LIZZETTE (left)    Negative: Straight leg raise (left), SI joint compression (bilateral), and Gaenslen's test (left)    LEFT HIP  Passive Range of Motion:    Flexion within normal limits / IR within normal limits / ER within normal limits with pain  Strength:    Flexion 5-/5 / adduction 5-/5 / abduction 4/5  Special Tests:    Positive: Resisted gluteus medius provocation, Maryse's, and modified Murrieta's testing    Negative: Logroll    Imaging:  No x-rays indicated during today's visit  Previous films were reviewed today, independent visualization of images was performed, and results were discussed with the patient  Lumbar spine x-rays were reviewed from 5/5/2016, independent visualization of images was performed, and interpreted in the office today  IMPRESSION:  1. Postsurgical changes of instrumented posterior fusion and interbody fusion device at L3-L4 are stable.  2. Grade 1 anterolisthesis of L3 on L2 is again noted.  3. Stable right total hip arthroplasty.    ASSESSMENT:  1. Chronic left sided low back pain without sciatica  2. Lumbar facet arthropathy/lumbar degenerative disc disease  3. History of lumbar spine surgeries  4. Chronic left hip pain  5. History of multiple left hip surgeries    PLAN:  1. Plain radiographs of the left hip to be completed in radiology .  2. MRI of the lumbar spine with and without contrast for further evaluation at [facility].  3. Activity  modification as discussed, including limitation of activities that cause pain/discomfort.  4. Acetaminophen/ice/heat as needed for improved pain control.  5. Continue with his home exercise program as previously prescribed during formal physical therapy.  6. Continue with use of a cane/walker for ambulation purposes.  7. MyChart 1-2 business days after completion of left hip x-rays for discussion of results/further medical care.  8. Follow-up 1-2 business days upon completion of MRI of the lumbar spine with and without contrast at [facility] for discussion of results/further medical care.  Consider further formal physical therapy, bone scan, lumbar facet joint corticosteroid injections, lumbar medial branch blocks with progression to lumbar radiofrequency ablation, etc. as deemed appropriate moving forward.    I spent a total of 25 minutes face-to-face with the patient during today's office visit.  Over 50% of the time was spent counseling the patient and/or coordinating care.  See office note for details.    Meir Pena DO, ARMANDOM    Department of Rehabilitation Medicine

## 2018-08-03 NOTE — PROGRESS NOTES
[facility] Physical Medicine & Rehabilitation Clinic Note  Clinic Visit s Aug 3, 2018    Subjective:  Daniel Mcnair is a 88 year old male who is seen in consultation at the request of Dr. Dumont for evaluation of left low back pain without radiation and left anterior hip pain without radiation.    Symptoms began years ago, worse since July 2018.  Reports insidious onset without acute precipitating event.  Reports left low back and left anterior hip pain without radiation.  Symptoms are generally worse with getting in bed and better with use of Flexeril.  Recently completed physical therapy at [facility] and [place] (last in April - May 2018) with completion of home exercises currently.  Has used Acetaminophen for improvement of pain.  Uses a cane and/or walker for ambulation purposes.  Associated symptoms include denies any bowel/bladder dysfunction or saddle anesthesia.  Denies any numbness/tingling/weakness of the left lower extremity.  Notes numbness of the right ankle region.    Complex medical history with a L3-4 TLIF with L2-3 and L3-4 laminectomy and removal of hardware at L4-5 with Dr. Lew in July 2013.  Had an L4-5 decompression and posterior instrumented spinal fusion in February 2006.  Completed a right total hip arthroplasty in 1999.  Had a left total hip arthroplasty with Dr. Grewal in October 2008.  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 Grewal.      Has had multiple injections into the spine in the past including multiple right L5-S1 transforaminal epidural corticosteroid injections, an L1-2 interlaminar epidural corticosteroid injection, an L2-3 interlaminar epidural corticosteroid injection, a right sacroiliac joint corticosteroid injection, multiple right L5-S1 lumbar facet joint corticosteroid injectiosn, and a right L2-3 transforaminal epidural corticosteroid injection over the years at [facility].    Patient's past medical,  "surgical, social, and family histories are reviewed today.  Complex medical history as noted above.  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     Macular degeneration, dry      Other and unspecified hyperlipidemia 7/21/2011     Snoring      Spinal stenosis, lumbar region, without neurogenic claudication 7/21/2011     Stented coronary artery 2003     Unspecified essential hypertension 7/21/2011       Review of Systems:  Constitutional: NEGATIVE for fever, chills, or change in weight  Skin: NEGATIVE for worrisome rashes, moles, or lesions  Neuro: NEGATIVE for weakness of the lower extremiteis  MSK: see HPI    Objective:  /51 (BP Location: Left arm, Patient Position: Sitting, Cuff Size: Adult Regular)  Pulse 52  Ht 1.778 m (5' 10\")  Wt 86.6 kg (191 lb)  SpO2 98%  BMI 27.41 kg/m2  General: healthy, frail-appearing, and in no acute distress  Skin: no suspicious lesions or rashes  Psych: mentation appears normal and affect normal/bright  HEENT: no scleral icterus  CV: no pedal edema  Resp: normal respiratory effort without conversational dyspnea   Neuro: motor strength as noted below  Lymph: no palpable lymphadenopathy    MSK:    THORACIC/LUMBAR SPINE  Inspection:    Large healed midline surgical incision noted of the lower lumbar spine with curvature of the lumbar spine to the right  Palpation:    Tender about the left para lumbar muscles, left SI joint, and left sciatic notch    No tenderness over the lumbar spinous processes, right para lumbar muscles, right SI joint, or right sciatic notch  Range of Motion: "     Lumbar flexion within normal limits    Lumbar extension limited by pain  Strength:    5/5 - quadriceps, hamstrings, tibialis anterior, gastrocsoleus, and extensor hallicus longus (left)  Special Tests:    Positive: LIZZETTE (left)    Negative: Straight leg raise (left), SI joint compression (bilateral), and Gaenslen's test (left)    LEFT HIP  Passive Range of Motion:    Flexion within normal limits / IR within normal limits / ER within normal limits with pain  Strength:    Flexion 5-/5 / adduction 5-/5 / abduction 4/5  Special Tests:    Positive: Resisted gluteus medius provocation, Maryse's, and modified Murrieta's testing    Negative: Logroll    Imaging:  No x-rays indicated during today's visit  Previous films were reviewed today, independent visualization of images was performed, and results were discussed with the patient  Lumbar spine x-rays were reviewed from 5/5/2016, independent visualization of images was performed, and interpreted in the office today  IMPRESSION:  1. Postsurgical changes of instrumented posterior fusion and interbody fusion device at L3-L4 are stable.  2. Grade 1 anterolisthesis of L3 on L2 is again noted.  3. Stable right total hip arthroplasty.    ASSESSMENT:  1. Chronic left sided low back pain without sciatica  2. Lumbar facet arthropathy/lumbar degenerative disc disease  3. History of lumbar spine surgeries  4. Chronic left hip pain  5. History of multiple left hip surgeries    PLAN:  1. Plain radiographs of the left hip to be completed in radiology .  2. MRI of the lumbar spine with and without contrast for further evaluation at [facility].  3. Activity modification as discussed, including limitation of activities that cause pain/discomfort.  4. Acetaminophen/ice/heat as needed for improved pain control.  5. Continue with his home exercise program as previously prescribed during formal physical therapy.  6. Continue with use of a cane/walker for ambulation purposes.  7. [employer] 1-2 business  days after completion of left hip x-rays for discussion of results/further medical care.  8. Follow-up 1-2 business days upon completion of MRI of the lumbar spine with and without contrast at [facility] for discussion of results/further medical care.  Consider further formal physical therapy, bone scan, lumbar facet joint corticosteroid injections, lumbar medial branch blocks with progression to lumbar radiofrequency ablation, etc. as deemed appropriate moving forward.    I spent a total of 25 minutes face-to-face with the patient during today's office visit.  Over 50% of the time was spent counseling the patient and/or coordinating care.  See office note for details.    Meir Pena DO, LARON    Department of Rehabilitation Medicine

## 2018-08-03 NOTE — MR AVS SNAPSHOT
After Visit Summary   8/3/2018    Daniel Mcnair    MRN: [id number]           Patient Information     Date Of Birth          10/17/1929        Visit Information        Provider Department      8/3/2018 9:30 AM Meir Pena DO [facility] Physical Medicine and Rehabilitation        Today's Diagnoses     Chronic left-sided low back pain without sciatica    -  1    Lumbar facet arthropathy        Lumbar degenerative disc disease        History of spinal surgery        Chronic left hip pain        History of left hip replacement          Care Instructions    We addressed the following today:    1.  Chronic left low back pain without radiation  2.  Lumbar arthritis  3.  History of lumbar spine surgery  4.  Chronic left hip pain  5.  History of left hip surgeries    Activity modification as discussed  Home exercise program as instructed  Topical Treatments: Ice or Heat  Over the counter medication: Acetaminophen (Tylenol) 1000mg every 6 hours with food (Maximum of 3000mg/day)  X-rays of the left hip in radiology and MRI of the lumbar spine at [facility] 1-2 business days after completion of diagnostic testing for discussion of results/further medical care   Follow-up 1-2 business days upon completion of further diagnostic imaging for discussion of results/further medical care (sooner if needed; call direct clinic number [[phone]] at any time with questions/concerns)            Follow-ups after your visit        Your next 10 appointments already scheduled     Aug 10, 2018 10:30 AM CDT   (Arrive by 10:15 AM)   Return Visit with Yovana Camarena MD   [facility] D[facility]atology ([facility])    [address] [place] [id number]   [phone]            Sep 13, 2018 10:40 AM CDT   (Arrive by 10:25 AM)   Return Visit with Lia Dumont MD   [facility] ([facility])    [address] [place] [address]  Floor  [facility] [address]   [phone]            Oct 09, 2018 11:30 AM CDT   (Arrive by 11:15 AM)   Return Visit with Santana Martinez MD   [facility] ([facility])    [address]  [address]  [facility] [address]   [phone]            Nov 15, 2018  8:30 AM CST   [facility] with  [facility]   [facility] Draw ([facility])    [address]  [address]  [facility] [address]   [phone]            Nov 15, 2018  9:00 AM CST   CT ABDOMEN PELVIS W CONTRAST with UCCT1   [facility] CT ([facility])    [address]  1st Floor  [facility] [address]   [phone]           Please bring any scans or X-rays taken at other hospitals, if similar tests were done. Also bring a list of your medicines, including vitamins, minerals and over-the-counter drugs. It is safest to leave personal items at home.  Be sure to tell your doctor:   If you have any allergies.   If there s any chance you are pregnant.   If you are breastfeeding.  How to prepare:   Do not eat or drink for 2 hours before your exam. If you need to take medicine, you may take it with small sips of water. (We may ask you to take liquid medicine as well.)   Please wear loose clothing, such as a sweat suit or jogging clothes. Avoid snaps, zippers and other metal. We may ask you to undress and put on a hospital gown.  Please arrive 30 minutes early for your CT. Once in the department you might be asked to drink water 15-20 minutes prior to your exam.  If indicated you may be asked to drink an oral contrast in advance of your CT.  If this is the case, the imaging team will let you know or be in contact with you prior to your appointment  Patients over 70 or patients with diabetes or kidney problems:   If you haven t had a blood test (creatinine test) within the last 30 days, the  Cardiologist/Radiologist may require you to get this test prior to your exam.  If you have diabetes:   Continue to take your metformin medication on the day of your exam  If you have any questions, please call the Imaging Department where you will have your exam.            Nov 15, 2018 12:00 PM CST   (Arrive by 11:45 AM)   Return Visit with Tanner Rojas MD   [facility] ([facility])    [address]  [address]  [facility] [id number] [phone]            Jan 29, 2019 10:15 AM CST   RETURN RETINA with Luna Parker MD   [facility] ([facility])    [address]  [address]  [facility] [id number] [phone]              Future tests that were ordered for you today     Open Future Orders        Priority Expected Expires Ordered    XR Pelvis and Hip Left 2 Views Routine 8/3/2018 8/3/2019 8/3/2018            Who to contact     Please call your clinic at [phone] to:    Ask questions about your health    Make or cancel appointments    Discuss your medicines    Learn about your test results    Speak to your doctor            Additional Information About Your Visit        [employer] Information     [employer] gives you secure access to your electronic health record. If you see a primary care provider, you can also send messages to your care team and make appointments. If you have questions, please call your primary care clinic.  If you do not have a primary care provider, please call [phone] and they will assist you.      [employer] is an electronic gateway that provides easy, online access to your medical records. With [employer], you can request a clinic appointment, read your test results, renew a prescription or communicate with your care team.     To access your existing account, please contact your [facility] or call [phone] for assistance.        [employer]  "EveryWhere ID     This is your Care EveryWhere ID. This could be used by other organizations to access your [facility] medical records  [id number]        Your Vitals Were     Pulse Height Pulse Oximetry BMI (Body Mass Index)          52 1.778 m (5' 10\") 98% 27.41 kg/m2         Blood Pressure from Last 3 Encounters:   08/03/18 127/51   07/30/18 133/63   05/10/18 117/67    Weight from Last 3 Encounters:   08/03/18 86.6 kg (191 lb)   07/30/18 86.5 kg (190 lb 11.2 oz)   05/10/18 87 kg (191 lb 12.8 oz)               Primary Care Provider Office Phone # Fax #    Lia Dumont [phone]       [address] [place] [address] [address] [place] [id number]        Equal Access to Services     ALIE [facility] System[facility]ARMEN : Hadii brandy hilton hadasho Soomaali, waaxda luqadaha, qaybta kaalmada adeegyada, jose miguel lawrence . So [facility] [phone].    ATENCIÓN: Si habla español, tiene a vaughan disposición servicios gratuitos de asistencia lingüística. Glenna al [phone].    We comply with applicable federal civil rights laws and [place] laws. We do not discriminate on the basis of race, color, national origin, age, disability, sex, sexual orientation, or gender identity.            Thank you!     Thank you for choosing [facility] PHYSICAL MEDICINE AND REHABILITATION  for your care. Our goal is always to provide you with excellent care. Hearing back from our patients is one way we can continue to improve our services. Please take a few minutes to complete the written survey that you may receive in the mail after your visit with us. Thank you!             Your Updated Medication List - Protect others around you: Learn how to safely use, store and throw away your medicines at www.disposemymeds.org.          This list is accurate as of 8/3/18 10:13 AM.  Always use your most recent med list.                   Brand Name Dispense Instructions for use Diagnosis    acetaminophen 325 MG tablet    TYLENOL    100 tablet    Take 2 " tablets (650 mg) by mouth every 6 hours as needed for pain    S/P revision of total hip       acetaminophen-codeine 300-30 MG per tablet    TYLENOL w/CODEINE No. 3    30 tablet    Take 1-2 tablets by mouth every 4 hours as needed for mild pain    Pain in joint, pelvic region and thigh, unspecified laterality       amLODIPine 10 MG tablet    NORVASC    90 tablet    Take 1 tablet (10 mg) by mouth daily    Benign essential hypertension       aspirin 81 MG EC tablet     90 tablet    Take 1 tablet (81 mg) by mouth daily    Hyperlipidemia LDL goal <70       atorvastatin 20 MG tablet    LIPITOR    90 tablet    Take 1 tablet (20 mg) by mouth daily    Hyperlipidemia LDL goal <70       benazepril 40 MG tablet    LOTENSIN    90 tablet    Take 1 tablet (40 mg) by mouth daily    Essential hypertension, benign       bisacodyl 10 MG Suppository    DULCOLAX    3 suppository    Place 1 suppository rectally daily as needed.    Chronic constipation       cyclobenzaprine 5 MG tablet    FLEXERIL    30 tablet    Take 1 tablet (5 mg) by mouth 3 times daily as needed for muscle spasms    Back muscle spasm       desonide 0.05 % ointment    DESOWEN    60 g    Apply topically 2 times daily To affected areas of scale and redness on the face and ears as needed until clear.    Dermatitis, seborrheic       docusate sodium 100 MG tablet    COLACE    60 tablet    Take 100 mg by mouth 2 times daily    BPH (benign prostatic hyperplasia)       gentamicin 0.1 % ointment    GARAMYCIN     Apply topically 4 times daily    B-cell lymphoma of intra-abdominal lymph nodes, unspecified B-cell lymphoma type (H)       melatonin 3 MG tablet      Take 5 mg by mouth At Bedtime        metoprolol succinate 25 MG 24 hr tablet    TOPROL-XL    45 tablet    Take 0.5 tablets (12.5 mg) by mouth daily    Essential hypertension, benign       MILK OF MAGNESIA PO      Take by mouth At Bedtime        nitroGLYcerin 0.4 MG sublingual tablet    NITROSTAT    30 tablet    Place 1  tablet (0.4 mg) under the tongue every 5 minutes as needed    Coronary artery disease involving native heart without angina pectoris, unspecified vessel or lesion type       [employer] Fiber packet      Take 1 packet by mouth every morning        psyllium 0.52 g capsule      Take by mouth daily Powder        ranitidine 150 MG tablet    ZANTAC    180 tablet    Take 1 tablet (150 mg) by mouth 2 times daily    Gastroesophageal reflux disease without esophagitis       sildenafil 100 MG tablet    VIAGRA    10 tablet    Take 1 tablet (100 mg) by mouth daily as needed    Erectile dysfunction, unspecified erectile dysfunction type       tamsulosin 0.4 MG capsule    FLOMAX    90 capsule    Take 1 capsule (0.4 mg) by mouth daily    Hypertrophy of prostate without urinary obstruction       tolterodine 2 MG tablet    DETROL    180 tablet    Take 1 tablet (2 mg )twice a day    Hypertonicity of bladder       * triamcinolone 0.025 % cream    KENALOG    45 g    Apply topically 2 times daily    Dermatitis       * triamcinolone 0.1 % ointment    KENALOG    80 g    Apply topically 2 times daily To itchy rashes    Intrinsic atopic dermatitis       VITEYES AREDS FORMULA/LUTEIN Caps      Take by mouth daily        * Notice:  This list has 2 medication(s) that are the same as other medications prescribed for you. Read the directions carefully, and ask your doctor or other care provider to review them with you.

## 2018-08-03 NOTE — NURSING NOTE
Chief Complaint   Patient presents with     Consult     lEFT LOW BACK PAIN AND HIP     Malathi Daily

## 2018-08-10 ENCOUNTER — TELEPHONE (OUTPATIENT)
Dept: ANESTHESIOLOGY | Facility: CLINIC | Age: 83
End: 2018-08-10

## 2018-08-10 ENCOUNTER — OFFICE VISIT (OUTPATIENT)
Dept: DERMATOLOGY | Facility: CLINIC | Age: 83
End: 2018-08-10
Payer: MEDICARE

## 2018-08-10 ENCOUNTER — OFFICE VISIT (OUTPATIENT)
Dept: PHYSICAL MEDICINE AND REHAB | Facility: CLINIC | Age: 83
End: 2018-08-10
Payer: MEDICARE

## 2018-08-10 VITALS
HEIGHT: 70 IN | BODY MASS INDEX: 27.35 KG/M2 | SYSTOLIC BLOOD PRESSURE: 149 MMHG | HEART RATE: 59 BPM | WEIGHT: 191 LBS | DIASTOLIC BLOOD PRESSURE: 68 MMHG | OXYGEN SATURATION: 98 %

## 2018-08-10 DIAGNOSIS — M51.369 LUMBAR DEGENERATIVE DISC DISEASE: ICD-10-CM

## 2018-08-10 DIAGNOSIS — Z98.890 HISTORY OF SPINAL SURGERY: ICD-10-CM

## 2018-08-10 DIAGNOSIS — Z96.642 HISTORY OF LEFT HIP REPLACEMENT: ICD-10-CM

## 2018-08-10 DIAGNOSIS — L82.1 SEBORRHEIC KERATOSIS: ICD-10-CM

## 2018-08-10 DIAGNOSIS — M54.50 CHRONIC LEFT-SIDED LOW BACK PAIN WITHOUT SCIATICA: Primary | ICD-10-CM

## 2018-08-10 DIAGNOSIS — D48.5 NEOPLASM OF UNCERTAIN BEHAVIOR OF SKIN: Primary | ICD-10-CM

## 2018-08-10 DIAGNOSIS — L57.0 AK (ACTINIC KERATOSIS): ICD-10-CM

## 2018-08-10 DIAGNOSIS — M47.816 LUMBAR FACET ARTHROPATHY: ICD-10-CM

## 2018-08-10 DIAGNOSIS — M25.552 CHRONIC LEFT HIP PAIN: ICD-10-CM

## 2018-08-10 DIAGNOSIS — G89.29 CHRONIC LEFT HIP PAIN: ICD-10-CM

## 2018-08-10 DIAGNOSIS — G89.29 CHRONIC LEFT-SIDED LOW BACK PAIN WITHOUT SCIATICA: Primary | ICD-10-CM

## 2018-08-10 DIAGNOSIS — D22.9 BENIGN NEVUS: ICD-10-CM

## 2018-08-10 PROCEDURE — 88305 TISSUE EXAM BY PATHOLOGIST: CPT | Mod: TC | Performed by: DERMATOLOGY

## 2018-08-10 RX ORDER — LIDOCAINE HYDROCHLORIDE AND EPINEPHRINE 10; 10 MG/ML; UG/ML
3 INJECTION, SOLUTION INFILTRATION; PERINEURAL ONCE
Qty: 3 ML | Refills: 0 | OUTPATIENT
Start: 2018-08-10 | End: 2018-08-10

## 2018-08-10 ASSESSMENT — PAIN SCALES - GENERAL
PAINLEVEL: NO PAIN (0)
PAINLEVEL: NO PAIN (0)
PAINLEVEL: MILD PAIN (3)

## 2018-08-10 NOTE — LETTER
"8/10/2018       RE: Daniel Mcnair  [address]  [place] [id number]     Dear Colleague,    Thank you for referring your patient, Daniel Mcnair, to the [facility] PHYSICAL MEDICINE AND REHABILITATION at [facility]. Please see a copy of my visit note below.    [facility] Physical Medicine & Rehabilitation Follow-Up Clinic Note  Follow-up Visit s Aug 10, 2018    Subjective:  Daniel Mcnair is a 88 year old male who is seen in follow up for evaluation of left low back without radiation for discussion of MRI of the lumbar spine with and without contrast results from [facility] and left hip x-rays completed at the [facility].  Last visit was on 8/3/2018.  Since that time, symptoms have been a little worse.  Been using Salon pas for improvement of pain/discomfort.    Notes left low back pain without radiation.  Symptoms are worse with bending backwards.  Denies tingling/weakness of the lower extremities.  Notes numbness of the right foot/ankle.  Denies any bowel/bladder incontinence.  Denies any saddle anesthesia.  Denies any trauma/falls since last clinical visit.    Patient's past medical, surgical, social, and family histories are reviewed today    Objective:  /68 (BP Location: Left arm, Patient Position: Sitting, Cuff Size: Adult Regular)  Pulse 59  Ht 1.778 m (5' 10\")  Wt 86.6 kg (191 lb)  SpO2 98%  BMI 27.41 kg/m2  General: alert and in no distress    Imaging:  No x-rays indicated during today's visit  Previous films were reviewed today, Discussed with Dr. Grewal from orthopedics, independent visualization of images was performed, and results were discussed with the patient  Outside films from CDI were reviewed today, independent visualization of images was performed, and results were discussed with the patient  MR Lumbar Spine with and without Contrast - 8/8/2018  CONCLUSION: Multilevel lumbar degenerative changes " with interval L2-3 and L3-4 dorsal decompression and L3-4 interbody/instrumented dorsolateral fusion, with the following specific findings:  1. L2-3 new moderate to advanced disc degeneration, retrolisthesis, and severe bilateral foraminal stenosis with L2 nerve root impingement supra-adjacent to the fusion. Progression of moderate bilateral facet degeneration. Degenerative endplate edema at this level is a potential source of axial low back pain.  2. No residual central or subarticular stenosis from L2-3 through L4-5. L4-5 moderate left foraminal stenosis with left L4 nerve root encroachment.  3. L5-S1 subarticular encroachment of the right S1 root, and moderate to severe left/moderate right foraminal stenosis with left greater than right L5 nerve root impingement infra-adjacent to the fusion. Advanced right greater than left facet degeneration.  4. No spondylolysis, acute fracture, or destructive osseous lesion allowing for artifact.  5. Dorsolateral fusion is solid bilaterally at L4-5, and on the right L3-4. L3-4 interbody fusion is solid.    Left hip x-rays - 8/3/2018  Impression:   1. Postsurgical changes of bilateral total hip arthroplasties, without complication of right prosthetis.  2. 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   3. No acute osseous abnormality.    ASSESSMENT:  1. Chronic left low back pain without sciatica  2. Lumbar facet arthropathy/lumbar degenerative disc disease  3. History of lumbar spine surgeries  4. Chronic left hip pain  5. History of left hip surgeries    PLAN:  1. Discussed potential side effects and complications of left L4-S1 lumbar medial branch blocks with progression to lumbar radiofrequency ablation as appropriate including but not necessarily limited to infection, bleeding, allergic reaction, post-injection flare, local tissue breakdown, injury to soft tissue and/or nerves and seizure.  Patient indicated their understanding  and agreed to proceed.    2. Continue with home exercise program as previously prescribed during formal physical therapy.  3. Acetaminophen/ice/heat as needed for improved pain control.  4. Follow-up with Dr. Grewal from orthopedics for further evaluation of left hip with consideration of a CT of the left hip without contrast, bone scan, etc. as deemed appropriate moving forward.  5. Follow-up as needed for further evaluation/medical care.  Consider a left L5-S1 lumbar facet joint corticosteroid injection, etc. as deemed appropriate moving forward.    Patient's conditions were thoroughly discussed during today's visit with greater than 50% of the visit spent counseling the patient with total time spent face-to-face with the patient being 25 minutes.      Again, thank you for allowing me to participate in the care of your patient.      Sincerely,    Meir Pena, DO

## 2018-08-10 NOTE — PROGRESS NOTES
[facility] Dermatology Note        Dermatology Problem List:  1.  NMSC  -BCC, right lateral neck, s/p Mohs 03/11/2016  -BCC, left antihelix, s/p Mohs 11/13/2015  2. Hx of AKs  3. Non-Hodgkin lymphoma, 2012, followed by oncology  4. ISK biopsied from left upper back 10/19/2015  5. Pilar vs epidermal inclusion cyst     Dermatology Clinic Visit Note      CHIEF COMPLAINT:  Skin check.      HISTORY OF PRESENT ILLNESS:  Mr. Mcnair is an 88-year-old male returning for a yearly skin check.  He denies any new or concerning lesions.  He notes that he often scratches the areas on the chest that peel off.  He also has a lump on his right lateral 5th finger that feels firm.  It does not drain or hurt.  He has no other concerning skin changes today.      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)     Benign essential hypertension     Coronary artery disease involving native coronary artery of native heart without angina pectoris     Advance Care Planning     Osteoarthritis of multiple joints, unspecified osteoarthritis type     Hyperlipidemia, unspecified hyperlipidemia type     Other fatigue     B-cell lymphoma, unspecified B-cell lymphoma type, unspecified body region (H)     IPMN (intraductal papillary mucinous neoplasm)     Sensorineural hearing loss (SNHL) of both ears       Allergies   Allergen Reactions     Nka [No Known Allergies]          Current Outpatient Prescriptions   Medication     acetaminophen (TYLENOL) 325 MG tablet     acetaminophen-codeine (TYLENOL W/CODEINE NO. 3) 300-30 MG per tablet     amLODIPine (NORVASC) 10 MG tablet     aspirin 81 MG EC tablet     atorvastatin (LIPITOR) 20 MG tablet     benazepril (LOTENSIN) 40 MG tablet     bisacodyl (DULCOLAX) 10 MG suppository     cyclobenzaprine (FLEXERIL) 5 MG tablet     desonide (DESOWEN) 0.05 % ointment     docusate sodium 100 MG tablet     gentamicin (GARAMYCIN) 0.1 % ointment     Guar Gum (BENEFIBER) packet     Magnesium Hydroxide (MILK OF MAGNESIA PO)     melatonin 3 MG tablet     metoprolol succinate (TOPROL-XL) 25 MG 24 hr tablet     Multiple Vitamins-Minerals (VITEYES AREDS FORMULA/LUTEIN) CAPS     nitroglycerin (NITROSTAT) 0.4 MG sublingual tablet     psyllium 0.52 G capsule     ranitidine (ZANTAC) 150 MG tablet     sildenafil (VIAGRA) 100 MG tablet     tamsulosin (FLOMAX) 0.4 MG capsule     tolterodine (DETROL) 2 MG tablet     triamcinolone (KENALOG) 0.025 % cream     triamcinolone (KENALOG) 0.1 % ointment     No current facility-administered medications for this visit.           SOCIAL HISTORY:  The patient is .  He is retired.      FAMILY HISTORY:  No family history  of skin cancer.      REVIEW OF SYSTEMS:  Feels well without other skin concerns.      PHYSICAL EXAMINATION:   GENERAL:  The patient is a healthy-appearing, 88-year-old male in no distress.   HEENT:  Conjunctivae are clear.   PULMONARY:  Breathing comfortably on room air.   ABDOMEN:  No abdominal distention.   SKIN:  Exam today includes the scalp, face, neck, chest, abdomen, back, arms, legs, hands, feet.  Skin exam is normal except for as follows:   - Examination of the left posterior neck shows a 1 cm, firm, subcutaneous nodule without induration.   - Scattered, waxy, osrhj-qi-zgiraomri, tan-to-brown papules and plaques on the lateral temples, the chest, the abdomen, the upper and lower back.   - Medium-brown, 5 mm macule on right lateral chest.   - Right lateral distal 5th finger with a firm, hyperkeratotic papule with a punctate center.   - Gritty papule on right lateral cheek.   - Scattered, light-brown macules on superior shoulders and lateral cheeks.     Procedure Note: Biopsy by shave technique  The risks and benefits of the procedure were described to the patient. These include but are not limited to bleeding, infection, scar, incomplete removal, and non-diagnostic biopsy. Written informed consent was obtained. The R elbow was cleansed with an alcohol pad and injected with 1% lidocaine with epinephrine buffered with sodium bicarbonate. Once anesthesia was obtained, a biopsy was performed with Gilette blade. The tissue was placed in a labeled container with formalin and sent to pathology. Hemostasis was achieved with aluminum chloride . Vaseline and a bandage were applied to the wound. The patient tolerated the procedure well and was given post biopsy care instructions.         ASSESSMENT AND PLAN:   1.  Seborrheic keratoses:  Benign, hyperkeratotic papules.  No treatment advised.   2.  Benign nevi:  No concerning features today.   3.  Actinic keratosis:  One lesion on right cheek treated with 10 second  freeze/thaw cycle with liquid nitrogen.   4.  Epidermal inclusion cyst on the left neck not bothersome to patient.  It could be excised if he desired further treatment.   5. Suspected digital mucocyst on the R 5th finger. Not bothersome would refer to ortho if treatment desired.   6. Neoplasm of uncertain behavior on the R elbow: Biopsy today.      The patient to return for yearly skin check, sooner if concern.     Yovana Camarena MD  Dermatology Staff

## 2018-08-10 NOTE — LETTER
8/10/2018       RE: Daniel Mcnair  [address] [address] [facility] [address]  [facility] [address]     Dear Colleague,    Thank you for referring your patient, Daniel Mcnair, to the [facility] DERMATOLOGY at [facility]. Please see a copy of my visit note below.    [facility] Dermatology Note        Dermatology Problem List:  1.  NMSC  -BCC, right lateral neck, s/p Mohs 03/11/2016  -BCC, left antihelix, s/p Mohs 11/13/2015  2. Hx of AKs  3. Non-Hodgkin lymphoma, 2012, followed by oncology  4. ISK biopsied from left upper back 10/19/2015  5. Pilar vs epidermal inclusion cyst     Dermatology Clinic Visit Note      CHIEF COMPLAINT:  Skin check.      HISTORY OF PRESENT ILLNESS:  Mr. Mcnair is an 88-year-old male returning for a yearly skin check.  He denies any new or concerning lesions.  He notes that he often scratches the areas on the chest that peel off.  He also has a lump on his right lateral 5th finger that feels firm.  It does not drain or hurt.  He has no other concerning skin changes today.      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)     Benign essential hypertension     Coronary artery disease involving native coronary artery of native heart without angina pectoris     Advance Care Planning     Osteoarthritis of multiple joints, unspecified osteoarthritis type     Hyperlipidemia, unspecified hyperlipidemia type     Other fatigue     B-cell lymphoma, unspecified B-cell lymphoma type, unspecified body region (H)     IPMN (intraductal papillary mucinous neoplasm)     Sensorineural hearing loss (SNHL) of both ears       Allergies   Allergen Reactions     Nka [No Known Allergies]          Current Outpatient Prescriptions   Medication     acetaminophen (TYLENOL) 325 MG tablet     acetaminophen-codeine (TYLENOL W/CODEINE NO. 3) 300-30 MG per tablet     amLODIPine (NORVASC) 10 MG tablet     aspirin 81 MG EC tablet     atorvastatin (LIPITOR) 20 MG tablet     benazepril (LOTENSIN) 40 MG tablet     bisacodyl (DULCOLAX) 10 MG suppository     cyclobenzaprine (FLEXERIL) 5 MG tablet     desonide (DESOWEN) 0.05 % ointment     docusate sodium 100 MG tablet     gentamicin (GARAMYCIN) 0.1 % ointment     Guar Gum (BENEFIBER) packet     Magnesium Hydroxide (MILK OF MAGNESIA PO)     melatonin 3 MG tablet     metoprolol succinate (TOPROL-XL) 25 MG 24 hr tablet     Multiple Vitamins-Minerals (VITEYES AREDS FORMULA/LUTEIN) CAPS     nitroglycerin (NITROSTAT) 0.4 MG sublingual tablet     psyllium 0.52 G capsule     ranitidine (ZANTAC) 150 MG tablet     sildenafil (VIAGRA) 100 MG tablet      tamsulosin (FLOMAX) 0.4 MG capsule     tolterodine (DETROL) 2 MG tablet     triamcinolone (KENALOG) 0.025 % cream     triamcinolone (KENALOG) 0.1 % ointment     No current facility-administered medications for this visit.           SOCIAL HISTORY:  The patient is .  He is retired.      FAMILY HISTORY:  No family history of skin cancer.      REVIEW OF SYSTEMS:  Feels well without other skin concerns.      PHYSICAL EXAMINATION:   GENERAL:  The patient is a healthy-appearing, 88-year-old male in no distress.   HEENT:  Conjunctivae are clear.   PULMONARY:  Breathing comfortably on room air.   ABDOMEN:  No abdominal distention.   SKIN:  Exam today includes the scalp, face, neck, chest, abdomen, back, arms, legs, hands, feet.  Skin exam is normal except for as follows:   - Examination of the left posterior neck shows a 1 cm, firm, subcutaneous nodule without induration.   - Scattered, waxy, [id number], tan-to-brown papules and plaques on the lateral temples, the chest, the abdomen, the upper and lower back.   - Medium-brown, 5 mm macule on right lateral chest.   - Right lateral distal 5th finger with a firm, hyperkeratotic papule with a punctate center.   - Gritty papule on right lateral cheek.   - Scattered, light-brown macules on superior shoulders and lateral cheeks.     Procedure Note: Biopsy by shave technique  The risks and benefits of the procedure were described to the patient. These include but are not limited to bleeding, infection, scar, incomplete removal, and non-diagnostic biopsy. Written informed consent was obtained. The R elbow was cleansed with an alcohol pad and injected with 1% lidocaine with epinephrine buffered with sodium bicarbonate. Once anesthesia was obtained, a biopsy was performed with Gilette blade. The tissue was placed in a labeled container with formalin and sent to pathology. Hemostasis was achieved with aluminum chloride . Vaseline and a bandage were applied to the wound.  The patient tolerated the procedure well and was given post biopsy care instructions.    ASSESSMENT AND PLAN:   1.  Seborrheic keratoses:  Benign, hyperkeratotic papules.  No treatment advised.   2.  Benign nevi:  No concerning features today.   3.  Actinic keratosis:  One lesion on right cheek treated with 10 second freeze/thaw cycle with liquid nitrogen.   4.  Epidermal inclusion cyst on the left neck not bothersome to patient.  It could be excised if he desired further treatment.   5. Suspected digital mucocyst on the R 5th finger. Not bothersome would refer to ortho if treatment desired.   6. Neoplasm of uncertain behavior on the R elbow: Biopsy today.      The patient to return for yearly skin check, sooner if concern.     Again, thank you for allowing me to participate in the care of your patient.      Sincerely,    Yovana Camarena MD

## 2018-08-10 NOTE — PATIENT INSTRUCTIONS
We addressed the following today:    1. Left low back pain without radiation  2. Lumbar facet arthropathy  3. History of left hip surgeries  3. History of lumbar spine surgeries    Home exercise program as instructed  Topical Treatments: Ice or Heat  Over the counter medication: Acetaminophen (Tylenol) 1000mg every 6 hours with food (Maximum of 3000mg/day)  Initiate lumbar medial branch blocks with progression to lumbar radiofrequency ablation with pain management - call [phone] to schedule an appointment  Other specific instructions: Follow-up with Dr. Grewal from orthopedics for further evaluation of left hip  Follow up as needed for further evaluation/medical care (sooner if needed; call direct clinic number [[phone]] at any time with questions/concerns)

## 2018-08-10 NOTE — PROGRESS NOTES
"[facility] Physical Medicine & Rehabilitation Follow-Up Clinic Note  Follow-up Visit s Aug 10, 2018    Subjective:  Daniel Mcnair is a 88 year old male who is seen in follow up for evaluation of left low back without radiation for discussion of MRI of the lumbar spine with and without contrast results from [facility] and left hip x-rays completed at the [facility].  Last visit was on 8/3/2018.  Since that time, symptoms have been a little worse.  Been using Salon pas for improvement of pain/discomfort.    Notes left low back pain without radiation.  Symptoms are worse with bending backwards.  Denies tingling/weakness of the lower extremities.  Notes numbness of the right foot/ankle.  Denies any bowel/bladder incontinence.  Denies any saddle anesthesia.  Denies any trauma/falls since last clinical visit.    Patient's past medical, surgical, social, and family histories are reviewed today    Objective:  /68 (BP Location: Left arm, Patient Position: Sitting, Cuff Size: Adult Regular)  Pulse 59  Ht 1.778 m (5' 10\")  Wt 86.6 kg (191 lb)  SpO2 98%  BMI 27.41 kg/m2  General: alert and in no distress    Imaging:  No x-rays indicated during today's visit  Previous films were reviewed today, Discussed with Dr. Grewal from orthopedics, independent visualization of images was performed, and results were discussed with the patient  Outside films from [facility] were reviewed today, independent visualization of images was performed, and results were discussed with the patient  MR Lumbar Spine with and without Contrast - 8/8/2018  CONCLUSION: Multilevel lumbar degenerative changes with interval L2-3 and L3-4 dorsal decompression and L3-4 interbody/instrumented dorsolateral fusion, with the following specific findings:  1. L2-3 new moderate to advanced disc degeneration, retrolisthesis, and severe bilateral foraminal stenosis with L2 nerve root impingement supra-adjacent to the fusion. Progression of moderate bilateral " facet degeneration. Degenerative endplate edema at this level is a potential source of axial low back pain.  2. No residual central or subarticular stenosis from L2-3 through L4-5. L4-5 moderate left foraminal stenosis with left L4 nerve root encroachment.  3. L5-S1 subarticular encroachment of the right S1 root, and moderate to severe left/moderate right foraminal stenosis with left greater than right L5 nerve root impingement infra-adjacent to the fusion. Advanced right greater than left facet degeneration.  4. No spondylolysis, acute fracture, or destructive osseous lesion allowing for artifact.  5. Dorsolateral fusion is solid bilaterally at L4-5, and on the right L3-4. L3-4 interbody fusion is solid.    Left hip x-rays - 8/3/2018  Impression:   1. Postsurgical changes of bilateral total hip arthroplasties, without complication of right prosthetis.  2. 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   3. No acute osseous abnormality.    ASSESSMENT:  1. Chronic left low back pain without sciatica  2. Lumbar facet arthropathy/lumbar degenerative disc disease  3. History of lumbar spine surgeries  4. Chronic left hip pain  5. History of left hip surgeries    PLAN:  1. Discussed potential side effects and complications of left L4-S1 lumbar medial branch blocks with progression to lumbar radiofrequency ablation as appropriate including but not necessarily limited to infection, bleeding, allergic reaction, post-injection flare, local tissue breakdown, injury to soft tissue and/or nerves and seizure.  Patient indicated their understanding and agreed to proceed.    2. Continue with home exercise program as previously prescribed during formal physical therapy.  3. Acetaminophen/ice/heat as needed for improved pain control.  4. Follow-up with Dr. Grewal from orthopedics for further evaluation of left hip with consideration of a CT of the left hip without contrast, bone scan, etc.  as deemed appropriate moving forward.  5. Follow-up as needed for further evaluation/medical care.  Consider a left L5-S1 lumbar facet joint corticosteroid injection, etc. as deemed appropriate moving forward.    Patient's conditions were thoroughly discussed during today's visit with greater than 50% of the visit spent counseling the patient with total time spent face-to-face with the patient being 25 minutes.    Meir Pena DO, LARON    Department of Rehabilitation Medicine

## 2018-08-10 NOTE — MR AVS SNAPSHOT
After Visit Summary   8/10/2018    Daniel Mcnair    MRN: [id number]           Patient Information     Date Of Birth          10/17/1929        Visit Information        Provider Department      8/10/2018 11:30 AM Meir Pena [facility]        Today's Diagnoses     Chronic left-sided low back pain without sciatica    -  1    Lumbar facet arthropathy        Lumbar degenerative disc disease        History of spinal surgery        Chronic left hip pain        History of left hip replacement          Care Instructions    We addressed the following today:    1. Left low back pain without radiation  2. Lumbar facet arthropathy  3. History of left hip surgeries  3. History of lumbar spine surgeries    Home exercise program as instructed  Topical Treatments: Ice or Heat  Over the counter medication: Acetaminophen (Tylenol) 1000mg every 6 hours with food (Maximum of 3000mg/day)  Initiate lumbar medial branch blocks with progression to lumbar radiofrequency ablation with pain management - call [phone] to schedule an appointment  Other specific instructions: Follow-up with Dr. Grewal from orthopedics for further evaluation of left hip  Follow up as needed for further evaluation/medical care (sooner if needed; call direct clinic number [[phone]] at any time with questions/concerns)            Follow-ups after your visit        Additional Services     [facility]       Your provider has referred you to: [facility], located on the 4th Floor of the [facility]. Please call [phone] to make an appointment.     Clinic is located at:   [facility]   [address] [place] [id number]    Please complete the following questions:    Procedure/Referral: Procedure Only -  Procedure or injection only - please call the [facility] at [phone] to schedule: Left  L4-S1 lumbar medial branch blocks with progression to lumbar radiofrequency ablation as appropriate    What is your diagnosis for the patient's pain? Lumbar facet arthropathy    What are your specific questions for the pain specialist? None    Are there any red flags that may impact the assessment or management of the patient? None    REGARDING OPIOID MEDICATIONS:  The discussion of opioids management, appropriateness of therapy, and dosing will be discussed in patients being seen for evaluation.  The pain management clinics are not long-term prescribing clinics, with transition of prescribing of medications ultimately going back to the referring provider/PCP.  If prescribing is taken over at the pain clinic, it is in actively involved patients whom are appropriate for opioids, urine drug screening is completed, and long-term prescribing plan has been determined.  Therefore, we will not be automatically taking over prescribing at the patient's first visit.  Is this agreeable to you? agrees.    Please be aware that coverage of these services is subject to the terms and limitations of your health insurance plan.  Call member services at your health plan with any benefit or coverage questions.      Please bring the following with you to your appointment or have sent to the [facility]:    (1) Any X-Rays, CTs or MRIs which have been performed that are not in Epic.  Contact the facility where they were done to arrange for  prior to your scheduled appointment.  Any new CT, MRI or other procedures ordered by your specialist must be performed at a [facility] facility or coordinated by your clinic's referral office.    (2) List of current medications   (3) This referral request   (4) Any documents/labs given to you for this referral                  Your next 10 appointments already scheduled     Sep 13, 2018 10:40 AM CDT   (Arrive by 10:25 AM)   Return Visit with Lia Dumont MD   [facility] (  [facility])    [address]  4th Floor  [place] [address]   [phone]            Oct 09, 2018 11:30 AM CDT   (Arrive by 11:15 AM)   Return Visit with Santana Martinez MD   [facility] ([facility])    [address]  [address]  [place] [address]   [phone]            Nov 15, 2018  8:30 AM CST   [facility] with  [facility]   [facility] ([facility])    [address]  [address]  [place] [address]   [phone]            Nov 15, 2018  9:00 AM CST   CT ABDOMEN PELVIS W CONTRAST with UCCT1   [facility] CT ([facility])    [address]  1st Floor  [place] [address]   [phone]           Please bring any scans or X-rays taken at other hospitals, if similar tests were done. Also bring a list of your medicines, including vitamins, minerals and over-the-counter drugs. It is safest to leave personal items at home.  Be sure to tell your doctor:   If you have any allergies.   If there s any chance you are pregnant.   If you are breastfeeding.  How to prepare:   Do not eat or drink for 2 hours before your exam. If you need to take medicine, you may take it with small sips of water. (We may ask you to take liquid medicine as well.)   Please wear loose clothing, such as a sweat suit or jogging clothes. Avoid snaps, zippers and other metal. We may ask you to undress and put on a hospital gown.  Please arrive 30 minutes early for your CT. Once in the department you might be asked to drink water 15-20 minutes prior to your exam.  If indicated you may be asked to drink an oral contrast in advance of your CT.  If this is the case, the imaging team will let you know or be in contact with you prior to your appointment  Patients over 70 or patients with diabetes or kidney problems:   If you haven t had a blood test  (creatinine test) within the last 30 days, the Cardiologist/Radiologist may require you to get this test prior to your exam.  If you have diabetes:   Continue to take your metformin medication on the day of your exam  If you have any questions, please call the Imaging Department where you will have your exam.            Nov 15, 2018 12:00 PM CST   (Arrive by 11:45 AM)   Return Visit with Tanner Rojas MD   [facility] ([facility])    [address]  [address]  [place] [address] [phone]            Jan 29, 2019 10:15 AM CST   RETURN RETINA with Luna Parker MD   [facility] ([facility])    [address]  [address]  [place] [address] [phone]              Who to contact     Please call your clinic at [phone] to:    Ask questions about your health    Make or cancel appointments    Discuss your medicines    Learn about your test results    Speak to your doctor            Additional Information About Your Visit        [employer] Information     [employer] gives you secure access to your electronic health record. If you see a primary care provider, you can also send messages to your care team and make appointments. If you have questions, please call your primary care clinic.  If you do not have a primary care provider, please call [phone] and they will assist you.      [employer] is an electronic gateway that provides easy, online access to your medical records. With [employer], you can request a clinic appointment, read your test results, renew a prescription or communicate with your care team.     To access your existing account, please contact your [facility] or call [phone] for assistance.        Care EveryWhere ID     This is your Care EveryWhere ID. This could be used by other organizations to access your [place] medical records  [id number]    "     Your Vitals Were     Pulse Height Pulse Oximetry BMI (Body Mass Index)          59 1.778 m (5' 10\") 98% 27.41 kg/m2         Blood Pressure from Last 3 Encounters:   08/10/18 149/68   08/03/18 127/51   07/30/18 133/63    Weight from Last 3 Encounters:   08/10/18 86.6 kg (191 lb)   08/03/18 86.6 kg (191 lb)   07/30/18 86.5 kg (190 lb 11.2 oz)              We Performed the Following     [facility]          Today's Medication Changes          These changes are accurate as of 8/10/18 12:15 PM.  If you have any questions, ask your nurse or doctor.               Start taking these medicines.        Dose/Directions    lidocaine 1% with EPINEPHrine 1:100,000 1 %-1:348676 injection   Used for:  Neoplasm of uncertain behavior of skin   Started by:  Yovana Camarena MD        Dose:  3 mL   Inject 3 mLs into the skin once for 1 dose   Quantity:  3 mL   Refills:  0            Where to get your medicines      Some of these will need a paper prescription and others can be bought over the counter.  Ask your nurse if you have questions.     You don't need a prescription for these medications     lidocaine 1% with EPINEPHrine 1:100,000 1 %-1:935583 injection                Primary Care Provider Office Phone # Fax #    Lia H MD Tim [phone]       [address] [place] [address]  [facility] [address]        Equal Access to Services     ALIE LOPEZ AH: Hadii brandy hilton hadrojaso Sotremayne, waaxda luqadaha, qaybta kaalmada adeegyada, waxay placido lopez. So [facility] [phone].    ATENCIÓN: Si habla español, tiene a vaughan disposición servicios gratuitos de asistencia lingüística. Llame al [phone].    We comply with applicable federal civil rights laws and [place] laws. We do not discriminate on the basis of race, color, national origin, age, disability, sex, sexual orientation, or gender identity.            Thank you!     Thank you for choosing  [facility]  for your care. Our goal is always to provide you with excellent care. Hearing back from our patients is one way we can continue to improve our services. Please take a few minutes to complete the written survey that you may receive in the mail after your visit with us. Thank you!             Your Updated Medication List - Protect others around you: Learn how to safely use, store and throw away your medicines at www.disposemymeds.org.          This list is accurate as of 8/10/18 12:15 PM.  Always use your most recent med list.                   Brand Name Dispense Instructions for use Diagnosis    acetaminophen 325 MG tablet    TYLENOL    100 tablet    Take 2 tablets (650 mg) by mouth every 6 hours as needed for pain    S/P revision of total hip       acetaminophen-codeine 300-30 MG per tablet    TYLENOL w/CODEINE No. 3    30 tablet    Take 1-2 tablets by mouth every 4 hours as needed for mild pain    Pain in joint, pelvic region and thigh, unspecified laterality       amLODIPine 10 MG tablet    NORVASC    90 tablet    Take 1 tablet (10 mg) by mouth daily    Benign essential hypertension       aspirin 81 MG EC tablet     90 tablet    Take 1 tablet (81 mg) by mouth daily    Hyperlipidemia LDL goal <70       atorvastatin 20 MG tablet    LIPITOR    90 tablet    Take 1 tablet (20 mg) by mouth daily    Hyperlipidemia LDL goal <70       benazepril 40 MG tablet    LOTENSIN    90 tablet    Take 1 tablet (40 mg) by mouth daily    Essential hypertension, benign       bisacodyl 10 MG Suppository    DULCOLAX    3 suppository    Place 1 suppository rectally daily as needed.    Chronic constipation       cyclobenzaprine 5 MG tablet    FLEXERIL    30 tablet    Take 1 tablet (5 mg) by mouth 3 times daily as needed for muscle spasms    Back muscle spasm       desonide 0.05 % ointment    DESOWEN    60 g    Apply topically 2 times daily To affected areas of scale and redness on the face and ears as needed  until clear.    Dermatitis, seborrheic       docusate sodium 100 MG tablet    COLACE    60 tablet    Take 100 mg by mouth 2 times daily    BPH (benign prostatic hyperplasia)       gentamicin 0.1 % ointment    GARAMYCIN     Apply topically 4 times daily    B-cell lymphoma of intra-abdominal lymph nodes, unspecified B-cell lymphoma type (H)       lidocaine 1% with EPINEPHrine 1:100,000 1 %-1:897070 injection     3 mL    Inject 3 mLs into the skin once for 1 dose    Neoplasm of uncertain behavior of skin       melatonin 3 MG tablet      Take 5 mg by mouth At Bedtime        metoprolol succinate 25 MG 24 hr tablet    TOPROL-XL    45 tablet    Take 0.5 tablets (12.5 mg) by mouth daily    Essential hypertension, benign       MILK OF MAGNESIA PO      Take by mouth At Bedtime        nitroGLYcerin 0.4 MG sublingual tablet    NITROSTAT    30 tablet    Place 1 tablet (0.4 mg) under the tongue every 5 minutes as needed    Coronary artery disease involving native heart without angina pectoris, unspecified vessel or lesion type       N[employer] Fiber packet      Take 1 packet by mouth every morning        psyllium 0.52 g capsule      Take by mouth daily Powder        ranitidine 150 MG tablet    ZANTAC    180 tablet    Take 1 tablet (150 mg) by mouth 2 times daily    Gastroesophageal reflux disease without esophagitis       sildenafil 100 MG tablet    VIAGRA    10 tablet    Take 1 tablet (100 mg) by mouth daily as needed    Erectile dysfunction, unspecified erectile dysfunction type       tamsulosin 0.4 MG capsule    FLOMAX    90 capsule    Take 1 capsule (0.4 mg) by mouth daily    Hypertrophy of prostate without urinary obstruction       tolterodine 2 MG tablet    DETROL    180 tablet    Take 1 tablet (2 mg )twice a day    Hypertonicity of bladder       * triamcinolone 0.025 % cream    KENALOG    45 g    Apply topically 2 times daily    Dermatitis       * triamcinolone 0.1 % ointment    KENALOG    80 g    Apply topically 2 times  daily To itchy rashes    Intrinsic atopic dermatitis       VITEYES AREDS FORMULA/LUTEIN Caps      Take by mouth daily        * Notice:  This list has 2 medication(s) that are the same as other medications prescribed for you. Read the directions carefully, and ask your doctor or other care provider to review them with you.

## 2018-08-10 NOTE — TELEPHONE ENCOUNTER
Spoke with: Patient directly    Date Scheduled: 8/29/18    Provider: Dr. Hicks    : Instructed patient to have a     Patient was educated on pre-op nurse call: Yes

## 2018-08-10 NOTE — PATIENT INSTRUCTIONS

## 2018-08-10 NOTE — NURSING NOTE
Lidocaine-1% injection   .5mL once for one use, starting 8/10/2018 ending 8/10/2018,  2mL disp, R-0, injection  Injected by Tiffany Tapia LPN

## 2018-08-10 NOTE — MR AVS SNAPSHOT
After Visit Summary   8/10/2018    Daniel Mcnair    MRN: [id number]           Patient Information     Date Of Birth          10/17/1929        Visit Information        Provider Department      8/10/2018 10:30 AM Yovana Camarena MD [facility]        Care Instructions    Wound Care After a Biopsy    What is a skin biopsy?  A skin biopsy allows the doctor to examine a very small piece of tissue under the microscope to determine the diagnosis and the best treatment for the skin condition. A local anesthetic (numbing medicine)  is injected with a very small needle into the skin area to be tested. A small piece of skin is taken from the area. Sometimes a suture (stitch) is used.     What are the risks of a skin biopsy?  I will experience scar, bleeding, swelling, pain, crusting and redness. I may experience incomplete removal or recurrence. Risks of this procedure are excessive bleeding, bruising, infection, nerve damage, numbness, thick (hypertrophic or keloidal) scar and non-diagnostic biopsy.    How should I care for my wound for the first 24 hours?    Keep the wound dry and covered for 24 hours    If it bleeds, hold direct pressure on the area for 15 minutes. If bleeding does not stop then go to the emergency room    Avoid strenuous exercise the first 1-2 days or as your doctor instructs you    How should I care for the wound after 24 hours?    After 24 hours, remove the bandage    You may bathe or shower as normal    If you had a scalp biopsy, you can shampoo as usual and can use shower water to clean the biopsy site daily    Clean the wound twice a day with gentle soap and water    Do not scrub, be gentle    Apply white petroleum/Vaseline after cleaning the wound with a cotton swab or a clean finger, and keep the site covered with a Bandaid /bandage. Bandages are not necessary with a scalp biopsy    If you are unable to cover the site with a Bandaid /bandage, re-apply ointment 2-3  times a day to keep the site moist. Moisture will help with healing    Avoid strenuous activity for first 1-2 days    Avoid lakes, rivers, pools, and oceans until the stitches are removed or the site is healed    How do I clean my wound?    Wash hands thoroughly with soap or use hand  before all wound care    Clean the wound with gentle soap and water    Apply white petroleum/Vaseline  to wound after it is clean    Replace the Bandaid /bandage to keep the wound covered for the first few days or as instructed by your doctor    If you had a scalp biopsy, warm shower water to the area on a daily basis should suffice    What should I use to clean my wound?     Cotton-tipped applicators (Qtips )    White petroleum jelly (Vaseline ). Use a clean new container and use Q-tips to apply.    Bandaids   as needed    Gentle soap     How should I care for my wound long term?    Do not get your wound dirty    Keep up with wound care for one week or until the area is healed.    A small scab will form and fall off by itself when the area is completely healed. The area will be red and will become pink in color as it heals. Sun protection is very important for how your scar will turn out. Sunscreen with an SPF 30 or greater is recommended once the area is healed.    If you have stitches, stitches need to be removed in 14 days. You may return to our clinic for this or you may have it done locally at your doctor s office.    You should have some soreness but it should be mild and slowly go away over several days. Talk to your doctor about using tylenol for pain,    When should I call my doctor?  If you have increased:     Pain or swelling    Pus or drainage (clear or slightly yellow drainage is ok)    Temperature over 100F    Spreading redness or warmth around wound    When will I hear about my results?  The biopsy results can take 2-3 weeks to come back. The clinic will call you with the results, send you a [employer] message, or  have you schedule a follow-up clinic or phone time to discuss the results. Contact our clinics if you do not hear from us in 3 weeks.     Who should I call with questions?    [facility]: [phone]     [facility]: [phone]    For urgent needs outside of business hours call the [facility] at [phone] and ask for the dermatology resident on call              Follow-ups after your visit        Your next 10 appointments already scheduled     Aug 10, 2018 11:30 AM CDT   (Arrive by 11:15 AM)   Return Visit with Meir Pena DO   [facility] ([facility])    [address]  3rd Floor  [facility] [id number] [phone]            Sep 13, 2018 10:40 AM CDT   (Arrive by 10:25 AM)   Return Visit with Lia Dumont MD   [facility] ([facility])    [address]  4th Floor  [facility] [id number] [phone]            Oct 09, 2018 11:30 AM CDT   (Arrive by 11:15 AM)   Return Visit with Santana Martinez MD   [facility] ([facility])    [address]  [address]  [facility] [address] [phone]            Nov 15, 2018  8:30 AM CST   [facility] with  [facility]   [facility] ([facility])    [address]  [address]  [facility] [id number] [phone]            Nov 15, 2018  9:00 AM CST   CT ABDOMEN PELVIS W CONTRAST with UCCT1   [facility] CT ([facility])    [address]  1st Floor  [facility] [id number] [phone]           Please bring any scans or X-rays taken at other hospitals, if similar tests were done. Also bring a list of your medicines, including vitamins, minerals and over-the-counter drugs. It is safest to leave personal items at home.   Be sure to tell your doctor:   If you have any allergies.   If there s any chance you are pregnant.   If you are breastfeeding.  How to prepare:   Do not eat or drink for 2 hours before your exam. If you need to take medicine, you may take it with small sips of water. (We may ask you to take liquid medicine as well.)   Please wear loose clothing, such as a sweat suit or jogging clothes. Avoid snaps, zippers and other metal. We may ask you to undress and put on a hospital gown.  Please arrive 30 minutes early for your CT. Once in the department you might be asked to drink water 15-20 minutes prior to your exam.  If indicated you may be asked to drink an oral contrast in advance of your CT.  If this is the case, the imaging team will let you know or be in contact with you prior to your appointment  Patients over 70 or patients with diabetes or kidney problems:   If you haven t had a blood test (creatinine test) within the last 30 days, the Cardiologist/Radiologist may require you to get this test prior to your exam.  If you have diabetes:   Continue to take your metformin medication on the day of your exam  If you have any questions, please call the Imaging Department where you will have your exam.            Nov 15, 2018 12:00 PM CST   (Arrive by 11:45 AM)   Return Visit with Tanner Rojas MD   [facility] ([facility])    [address]  [address]  [facility] [id number] [phone]            Jan 29, 2019 10:15 AM CST   RETURN RETINA with Luna Parker MD   [facility] Clinic ([facility])    [address]  [facility] [id number] [phone]              Who to contact     Please call your clinic at [phone] to:    Ask questions about your health    Make or cancel appointments    Discuss your medicines    Learn about your test results    Speak to your doctor            Additional  Information About Your Visit        [employer] Information     [employer] gives you secure access to your electronic health record. If you see a primary care provider, you can also send messages to your care team and make appointments. If you have questions, please call your primary care clinic.  If you do not have a primary care provider, please call [phone] and they will assist you.      [employer] is an electronic gateway that provides easy, online access to your medical records. With [employer], you can request a clinic appointment, read your test results, renew a prescription or communicate with your care team.     To access your existing account, please contact your [facility] or call [phone] for assistance.        Care EveryWhere ID     This is your Care EveryWhere ID. This could be used by other organizations to access your [place] medical records  [id number]         Blood Pressure from Last 3 Encounters:   08/03/18 127/51   07/30/18 133/63   05/10/18 117/67    Weight from Last 3 Encounters:   08/03/18 86.6 kg (191 lb)   07/30/18 86.5 kg (190 lb 11.2 oz)   05/10/18 87 kg (191 lb 12.8 oz)              Today, you had the following     No orders found for display       Primary Care Provider Office Phone # Fax #    Lia Dumont [phone]       [address] [place] [address] [address] [place] [id number]        Equal Access to Services     PARRISH LOPEZ : Hadii brandy choudhuryo Sotremayne, waaxda luqadaha, qaybta kaalmada jacintayada, jose miguel lawrence . So [facility] [phone].    ATENCIÓN: Si habla español, tiene a vaughan disposición servicios gratuitos de asistencia lingüística. Llame al [phone].    We comply with applicable federal civil rights laws and [place] laws. We do not discriminate on the basis of race, color, national origin, age, disability, sex, sexual orientation, or gender identity.            Thank you!     Thank you for choosing [facility]  for your care. Our goal is always to provide you with excellent care. Hearing back from our patients is one way we can continue to improve our services. Please take a few minutes to complete the written survey that you may receive in the mail after your visit with us. Thank you!             Your Updated Medication List - Protect others around you: Learn how to safely use, store and throw away your medicines at www.disposemymeds.org.          This list is accurate as of 8/10/18 11:00 AM.  Always use your most recent med list.                   Brand Name Dispense Instructions for use Diagnosis    acetaminophen 325 MG tablet    TYLENOL    100 tablet    Take 2 tablets (650 mg) by mouth every 6 hours as needed for pain    S/P revision of total hip       acetaminophen-codeine 300-30 MG per tablet    TYLENOL w/CODEINE No. 3    30 tablet    Take 1-2 tablets by mouth every 4 hours as needed for mild pain    Pain in joint, pelvic region and thigh, unspecified laterality       amLODIPine 10 MG tablet    NORVASC    90 tablet    Take 1 tablet (10 mg) by mouth daily    Benign essential hypertension       aspirin 81 MG EC tablet     90 tablet    Take 1 tablet (81 mg) by mouth daily    Hyperlipidemia LDL goal <70       atorvastatin 20 MG tablet    LIPITOR    90 tablet    Take 1 tablet (20 mg) by mouth daily    Hyperlipidemia LDL goal <70       benazepril 40 MG tablet    LOTENSIN    90 tablet    Take 1 tablet (40 mg) by mouth daily    Essential hypertension, benign       bisacodyl 10 MG Suppository    DULCOLAX    3 suppository    Place 1 suppository rectally daily as needed.    Chronic constipation       cyclobenzaprine 5 MG tablet    FLEXERIL    30 tablet    Take 1 tablet (5 mg) by mouth 3 times daily as needed for muscle spasms    Back muscle spasm       desonide 0.05 % ointment    DESOWEN    60 g    Apply topically 2 times daily To affected areas of scale and redness on the face and ears as needed until clear.     Dermatitis, seborrheic       docusate sodium 100 MG tablet    COLACE    60 tablet    Take 100 mg by mouth 2 times daily    BPH (benign prostatic hyperplasia)       gentamicin 0.1 % ointment    GARAMYCIN     Apply topically 4 times daily    B-cell lymphoma of intra-abdominal lymph nodes, unspecified B-cell lymphoma type (H)       melatonin 3 MG tablet      Take 5 mg by mouth At Bedtime        metoprolol succinate 25 MG 24 hr tablet    TOPROL-XL    45 tablet    Take 0.5 tablets (12.5 mg) by mouth daily    Essential hypertension, benign       MILK OF MAGNESIA PO      Take by mouth At Bedtime        nitroGLYcerin 0.4 MG sublingual tablet    NITROSTAT    30 tablet    Place 1 tablet (0.4 mg) under the tongue every 5 minutes as needed    Coronary artery disease involving native heart without angina pectoris, unspecified vessel or lesion type       N[employer] Fiber packet      Take 1 packet by mouth every morning        psyllium 0.52 g capsule      Take by mouth daily Powder        ranitidine 150 MG tablet    ZANTAC    180 tablet    Take 1 tablet (150 mg) by mouth 2 times daily    Gastroesophageal reflux disease without esophagitis       sildenafil 100 MG tablet    VIAGRA    10 tablet    Take 1 tablet (100 mg) by mouth daily as needed    Erectile dysfunction, unspecified erectile dysfunction type       tamsulosin 0.4 MG capsule    FLOMAX    90 capsule    Take 1 capsule (0.4 mg) by mouth daily    Hypertrophy of prostate without urinary obstruction       tolterodine 2 MG tablet    DETROL    180 tablet    Take 1 tablet (2 mg )twice a day    Hypertonicity of bladder       * triamcinolone 0.025 % cream    KENALOG    45 g    Apply topically 2 times daily    Dermatitis       * triamcinolone 0.1 % ointment    KENALOG    80 g    Apply topically 2 times daily To itchy rashes    Intrinsic atopic dermatitis       VITEYES AREDS FORMULA/LUTEIN Caps      Take by mouth daily        * Notice:  This list has 2 medication(s) that are the  same as other medications prescribed for you. Read the directions carefully, and ask your doctor or other care provider to review them with you.

## 2018-08-10 NOTE — TELEPHONE ENCOUNTER
----- Message from Wagner Brizuela RN sent at 8/10/2018  1:53 PM CDT -----  Gal Miller,    Direct procedure referral:    Please call and schedule with anyone of our MD's.    Left L4-S1 lumbar medial branch blocks     Let the patient know we'll call with pre-procedure instructions.    Once we know which doctor she will schedule under, we'll put in orders.    Thank you,   Wagner     ----- Message -----     From: Irina Ca     Sent: 8/10/2018   1:28 PM       To: [facility]    Procedure referral

## 2018-08-10 NOTE — NURSING NOTE
Dermatology Rooming Note    Daniel Mcnair's goals for this visit include:   Chief Complaint   Patient presents with     Derm Problem     Daniel is here for a skin check, has concerning areas on his right hand's pinky finger and swelling on the left side of his neck.        Tiffany Tapia LPN

## 2018-08-13 DIAGNOSIS — M47.816 LUMBAR FACET ARTHROPATHY: Primary | ICD-10-CM

## 2018-08-13 LAB — COPATH REPORT: NORMAL

## 2018-08-20 ENCOUNTER — TELEPHONE (OUTPATIENT)
Dept: PHYSICAL MEDICINE AND REHAB | Facility: CLINIC | Age: 83
End: 2018-08-20

## 2018-08-21 ENCOUNTER — OFFICE VISIT (OUTPATIENT)
Dept: AUDIOLOGY | Facility: CLINIC | Age: 83
End: 2018-08-21
Payer: MEDICARE

## 2018-08-21 DIAGNOSIS — H90.3 SENSORINEURAL HEARING LOSS (SNHL) OF BOTH EARS: Primary | ICD-10-CM

## 2018-08-21 PROCEDURE — V5299 HEARING SERVICE: HCPCS | Performed by: AUDIOLOGIST

## 2018-08-21 NOTE — MR AVS SNAPSHOT
After Visit Summary   8/21/2018    Daniel Mcnair    MRN: [id number]           Patient Information     Date Of Birth          10/17/1929        Visit Information        Provider Department      8/21/2018 10:30 AM Jude Mendez, [facility]        Today's Diagnoses     Sensorineural hearing loss (SNHL) of both ears    -  1       Follow-ups after your visit        Your next 10 appointments already scheduled     Aug 29, 2018   Procedure with Ronaldo Hicks MD   [facility] ([facility])    [address] [place] [address]  5th Floor  [facility] [id number] [phone]           Located in the Clinics and Surgery Center at [address] [place] [address].   parking is very convenient and highly recommended.  is a $6 flat rate fee.  Both  and self parkers should enter the main arrival plaza from [facility]; parking attendants will direct you based on your parking preference.            Sep 13, 2018 10:40 AM CDT   (Arrive by 10:25 AM)   Return Visit with Lia Dumont MD   [facility] ([facility])    [address] [place] [address]  4th Floor  [facility] [address] [phone]            Oct 09, 2018 11:30 AM CDT   (Arrive by 11:15 AM)   Return Visit with Santana Martinez MD   [facility] ([facility])    [address] [place] [address]  [address]  [facility] [address]   [phone]            Nov 15, 2018  8:30 AM CST   [facility] Draw with  [facility] DRAW   [facility] Draw ([facility])    [address] [place] [address]  [address]  [facility] [address] [phone]            Nov 15, 2018  9:00 AM CST   CT ABDOMEN PELVIS W CONTRAST with UCCT1   [facility] CT ([facility])    [address] [place] [address]  1st Floor  [facility] [address] [phone]            Please bring any scans or X-rays taken at other hospitals, if similar tests were done. Also bring a list of your medicines, including vitamins, minerals and over-the-counter drugs. It is safest to leave personal items at home.  Be sure to tell your doctor:   If you have any allergies.   If there s any chance you are pregnant.   If you are breastfeeding.  How to prepare:   Do not eat or drink for 2 hours before your exam. If you need to take medicine, you may take it with small sips of water. (We may ask you to take liquid medicine as well.)   Please wear loose clothing, such as a sweat suit or jogging clothes. Avoid snaps, zippers and other metal. We may ask you to undress and put on a hospital gown.  Please arrive 30 minutes early for your CT. Once in the department you might be asked to drink water 15-20 minutes prior to your exam.  If indicated you may be asked to drink an oral contrast in advance of your CT.  If this is the case, the imaging team will let you know or be in contact with you prior to your appointment  Patients over 70 or patients with diabetes or kidney problems:   If you haven t had a blood test (creatinine test) within the last 30 days, the Cardiologist/Radiologist may require you to get this test prior to your exam.  If you have diabetes:   Continue to take your metformin medication on the day of your exam  If you have any questions, please call the Imaging Department where you will have your exam.            Nov 15, 2018 12:00 PM CST   (Arrive by 11:45 AM)   Return Visit with Tanner Rojas MD   [facility] ([facility])    [address]  [address]  [facility] [id number] [phone]            Jan 29, 2019 10:15 AM CST   RETURN RETINA with Luna Parker MD   [facility] ([facility])    [address]  [facility] [id number] [phone]              Who to contact      If you have questions or need follow up information about today's clinic visit or your schedule please contact [facility] directly at [phone].  Normal or non-critical lab and imaging results will be communicated to you by [employer], letter or phone within 4 business days after the clinic has received the results. If you do not hear from us within 7 days, please contact the clinic through [employer] or phone. If you have a critical or abnormal lab result, we will notify you by phone as soon as possible.  Submit refill requests through [employer] or call your pharmacy and they will forward the refill request to us. Please allow 3 business days for your refill to be completed.          Additional Information About Your Visit        [employer] Information     [employer] gives you secure access to your electronic health record. If you see a primary care provider, you can also send messages to your care team and make appointments. If you have questions, please call your primary care clinic.  If you do not have a primary care provider, please call [phone] and they will assist you.      [employer] is an electronic gateway that provides easy, online access to your medical records. With [employer], you can request a clinic appointment, read your test results, renew a prescription or communicate with your care team.     To access your existing account, please contact your [facility] or call [phone] for assistance.        Care EveryWhere ID     This is your Care EveryWhere ID. This could be used by other organizations to access your [facility] medical records  [id number]         Blood Pressure from Last 3 Encounters:   08/10/18 149/68   08/03/18 127/51   07/30/18 133/63    Weight from Last 3 Encounters:   08/10/18 191 lb (86.6 kg)   08/03/18 191 lb (86.6 kg)   07/30/18 190 lb 11.2 oz (86.5 kg)              We Performed the Following     HEARING AID CHECK/NO CHARGE        Primary  Care Provider Office Phone # Fax #    Lia Dumont [phone]       [address] [place] [phone]  [facility] [address]        Equal Access to Services     PARRISH LOPEZ : Shruthi brandy hilton keio Raman, waaxda luqadaha, qaybta kaalmada adenila, jose miguel weaver laSarahsandhya lopez. So [facility] [phone].    ATENCIÓN: Si habla español, tiene a vaughan disposición servicios gratuitos de asistencia lingüística. Llame al [phone].    We comply with applicable federal civil rights laws and [place] laws. We do not discriminate on the basis of race, color, national origin, age, disability, sex, sexual orientation, or gender identity.            Thank you!     Thank you for choosing [facility]  for your care. Our goal is always to provide you with excellent care. Hearing back from our patients is one way we can continue to improve our services. Please take a few minutes to complete the written survey that you may receive in the mail after your visit with us. Thank you!             Your Updated Medication List - Protect others around you: Learn how to safely use, store and throw away your medicines at www.disposemymeds.org.          This list is accurate as of 8/21/18 11:46 AM.  Always use your most recent med list.                   Brand Name Dispense Instructions for use Diagnosis    acetaminophen 325 MG tablet    TYLENOL    100 tablet    Take 2 tablets (650 mg) by mouth every 6 hours as needed for pain    S/P revision of total hip       acetaminophen-codeine 300-30 MG per tablet    TYLENOL w/CODEINE No. 3    30 tablet    Take 1-2 tablets by mouth every 4 hours as needed for mild pain    Pain in joint, pelvic region and thigh, unspecified laterality       amLODIPine 10 MG tablet    NORVASC    90 tablet    Take 1 tablet (10 mg) by mouth daily    Benign essential hypertension       aspirin 81 MG EC tablet     90 tablet    Take 1 tablet (81 mg) by mouth daily    Hyperlipidemia LDL  goal <70       atorvastatin 20 MG tablet    LIPITOR    90 tablet    Take 1 tablet (20 mg) by mouth daily    Hyperlipidemia LDL goal <70       benazepril 40 MG tablet    LOTENSIN    90 tablet    Take 1 tablet (40 mg) by mouth daily    Essential hypertension, benign       bisacodyl 10 MG Suppository    DULCOLAX    3 suppository    Place 1 suppository rectally daily as needed.    Chronic constipation       cyclobenzaprine 5 MG tablet    FLEXERIL    30 tablet    Take 1 tablet (5 mg) by mouth 3 times daily as needed for muscle spasms    Back muscle spasm       desonide 0.05 % ointment    DESOWEN    60 g    Apply topically 2 times daily To affected areas of scale and redness on the face and ears as needed until clear.    Dermatitis, seborrheic       docusate sodium 100 MG tablet    COLACE    60 tablet    Take 100 mg by mouth 2 times daily    BPH (benign prostatic hyperplasia)       gentamicin 0.1 % ointment    GARAMYCIN     Apply topically 4 times daily    B-cell lymphoma of intra-abdominal lymph nodes, unspecified B-cell lymphoma type (H)       melatonin 3 MG tablet      Take 5 mg by mouth At Bedtime        metoprolol succinate 25 MG 24 hr tablet    TOPROL-XL    45 tablet    Take 0.5 tablets (12.5 mg) by mouth daily    Essential hypertension, benign       MILK OF MAGNESIA PO      Take by mouth At Bedtime        nitroGLYcerin 0.4 MG sublingual tablet    NITROSTAT    30 tablet    Place 1 tablet (0.4 mg) under the tongue every 5 minutes as needed    Coronary artery disease involving native heart without angina pectoris, unspecified vessel or lesion type       N[employer] Fiber packet      Take 1 packet by mouth every morning        psyllium 0.52 g capsule      Take by mouth daily Powder        ranitidine 150 MG tablet    ZANTAC    180 tablet    Take 1 tablet (150 mg) by mouth 2 times daily    Gastroesophageal reflux disease without esophagitis       sildenafil 100 MG tablet    VIAGRA    10 tablet    Take 1 tablet (100 mg)  by mouth daily as needed    Erectile dysfunction, unspecified erectile dysfunction type       tamsulosin 0.4 MG capsule    FLOMAX    90 capsule    Take 1 capsule (0.4 mg) by mouth daily    Hypertrophy of prostate without urinary obstruction       tolterodine 2 MG tablet    DETROL    180 tablet    Take 1 tablet (2 mg )twice a day    Hypertonicity of bladder       * triamcinolone 0.025 % cream    KENALOG    45 g    Apply topically 2 times daily    Dermatitis       * triamcinolone 0.1 % ointment    KENALOG    80 g    Apply topically 2 times daily To itchy rashes    Intrinsic atopic dermatitis       VITEYES AREDS FORMULA/LUTEIN Caps      Take by mouth daily        * Notice:  This list has 2 medication(s) that are the same as other medications prescribed for you. Read the directions carefully, and ask your doctor or other care provider to review them with you.

## 2018-08-21 NOTE — PROGRESS NOTES
AUDIOLOGY REPORT     BACKGROUND INFORMATION: Daniel Mcnair was seen in the Audiology Clinic  at [facility] on 8/21/2018 for follow-up.  The patient has been seen previously and results revealed a bilateral sensorineural hearing loss.  The patient was fit with [employer]ia Pure 13 3Nx hearing aids on 6/4/18.  The patient reports that his left hearing aid is not working.     TEST RESULTS AND PROCEDURES: THe left wax guard was replaced. An electroacoustic hearing aid check was performed. The patient was counseled on insertion and removal as well as maintenance of his hearing aids.      SUMMARY AND RECOMMENDATIONS: A hearing aid check was performed today and the patient reports that all his questions were addressed today.  Adjustments were made as noted above and the patient will return as needed or at least every 4-6 months for cleaning and assessment of hearing aid.  Call this clinic with questions regarding today s visit.     Tashia Chisholm.  Doctor of Audiology  [place] License # [id number]

## 2018-08-27 DIAGNOSIS — T84.84XA PAIN DUE TO HIP JOINT PROSTHESIS, INITIAL ENCOUNTER (H): Primary | ICD-10-CM

## 2018-08-27 DIAGNOSIS — Z96.649 PAIN DUE TO HIP JOINT PROSTHESIS, INITIAL ENCOUNTER (H): Primary | ICD-10-CM

## 2018-08-29 ENCOUNTER — RADIANT APPOINTMENT (OUTPATIENT)
Dept: RADIOLOGY | Facility: AMBULATORY SURGERY CENTER | Age: 83
End: 2018-08-29
Payer: MEDICARE

## 2018-08-29 ENCOUNTER — SURGERY (OUTPATIENT)
Age: 83
End: 2018-08-29

## 2018-08-29 ENCOUNTER — HOSPITAL ENCOUNTER (OUTPATIENT)
Facility: AMBULATORY SURGERY CENTER | Age: 83
End: 2018-08-29
Payer: MEDICARE

## 2018-08-29 VITALS
OXYGEN SATURATION: 97 % | TEMPERATURE: 99.2 F | RESPIRATION RATE: 15 BRPM | BODY MASS INDEX: 26.48 KG/M2 | DIASTOLIC BLOOD PRESSURE: 67 MMHG | HEIGHT: 70 IN | SYSTOLIC BLOOD PRESSURE: 161 MMHG | WEIGHT: 185 LBS | HEART RATE: 57 BPM

## 2018-08-29 DIAGNOSIS — R52 PAIN: ICD-10-CM

## 2018-08-29 RX ORDER — LIDOCAINE HYDROCHLORIDE 10 MG/ML
INJECTION, SOLUTION EPIDURAL; INFILTRATION; INTRACAUDAL; PERINEURAL PRN
Status: DISCONTINUED | OUTPATIENT
Start: 2018-08-29 | End: 2018-08-29 | Stop reason: HOSPADM

## 2018-08-29 RX ORDER — BUPIVACAINE HYDROCHLORIDE 2.5 MG/ML
INJECTION, SOLUTION EPIDURAL; INFILTRATION; INTRACAUDAL PRN
Status: DISCONTINUED | OUTPATIENT
Start: 2018-08-29 | End: 2018-08-29 | Stop reason: HOSPADM

## 2018-08-29 RX ADMIN — BUPIVACAINE HYDROCHLORIDE 1.5 ML: 2.5 INJECTION, SOLUTION EPIDURAL; INFILTRATION; INTRACAUDAL at 11:39

## 2018-08-29 RX ADMIN — LIDOCAINE HYDROCHLORIDE 3 ML: 10 INJECTION, SOLUTION EPIDURAL; INFILTRATION; INTRACAUDAL; PERINEURAL at 11:38

## 2018-08-29 NOTE — OP NOTE
Patient: Daniel Mcnair Age: 88 year old   MRN: [id number] Attending: Dr. Hicks     Date of Visit: August 29, 2018      PAIN MEDICINE CLINIC PROCEDURE NOTE    ATTENDING CLINICIAN:    Ronaldo Hicks MD    ASSISTANT CLINICIAN:  Amado Womack MD    PREPROCEDURE DIAGNOSES:  1. Lumbar facet arthropathy   2. Chronic low back pain     POSTPROCEDURE DIAGNOSES:  1. Lumbar facet arthropathy   2. Chronic low back pain     PROCEDURE(S) PERFORMED:  1. LEFT L3 and L4 medial branch nerve and L5 dorsal ramus nerve blocks  2.  Fluoroscopic guidance for the above procedures    ANESTHESIA:  Local.    BLOOD LOSS:  Minimal.    DRAINS AND SPECIMENS:  None.    COMPLICATIONS:  None.    INDICATIONS:    Daniel Mcnair is a 88 year old male with a history of low back pain secondary to lumar facet joint arthopathy .  The patient stated that the patient was in their usual state of health and denied recent anticoagulant use or recent infections.  Therefore, the plan is to perform above mentioned procedure.     Procedure Details:    The patient was met in the procedure room, where the patient was identified by name, medical record number and date of birth.  All of the patient s last minute questions were answered. Written informed consent was obtained and saved in the electronic medical record, after the risks, benefits, and alternatives were discussed with the patient.      A formal time-out procedure was performed, as per protocol, including patient name, title of procedure, and site of procedure, and all in the room concurred.  Routine monitors were applied.      The patient was placed in the prone position on the procedure room table.  All pressure points were checked and comfortably padded.  Routine monitors were placed.  Vital signs were stable.    A chlorhexidine prep was completed followed by sterile draping per standard procedure.     We identified each lumbar vertebral body after first identifying the S1 vertebrae.  Using  this for reference, we identified the pedicles of the left L4, L5 and sacral ala .  The targets were recognized at the junction of the transverse process and the superior articular process (and sacral ala for L5 medial branch nerves). Skin and subcutaneous tissues overlying this area were anesthetized with a 1% lidocaine. Under fluoroscopic guidance, we directed the 3.5 inch spinal needle through the skin and subcutaneous tissues until osseous contact was achieved.   After a negative aspirate to make sure that there was no intravascular placement, the stylets were removed 0.5 mL 0.25% bupivacaine was injected. Site:  Left.  Laterality Left side of the patient    Light pressure was held at the puncture sites to prevent ecchymosis and oozing.  The patient's skin was cleansed, and hemostasis was confirmed.  Band-aids were applied to the needle injection sites.      Condition:    The patient tolerated the procedure well and was monitored for approximately 15 minutes afterward in the post procedure area.  There were no immediate post procedure complications noted.  The patient was then discharged to home as per protocol.     Patient condition  stable.    Preprocedure pain score: 5/10  Postprocedure pain score: 0/10

## 2018-08-29 NOTE — IP AVS SNAPSHOT
[facility]    [address] [place] [address]    Phone:  [phone]    Fax:  [phone]                                       After Visit Summary   8/29/2018    Daniel Mcnair    MRN: [id number]           After Visit Summary Signature Page     I have received my discharge instructions, and my questions have been answered. I have discussed any challenges I see with this plan with the nurse or doctor.    ..........................................................................................................................................  Patient/Patient Representative Signature      ..........................................................................................................................................  Patient Representative Print Name and Relationship to Patient    ..................................................               ................................................  Date                                            Time    ..........................................................................................................................................  Reviewed by Signature/Title    ...................................................              ..............................................  Date                                                            Time          22EPIC Rev 08/18

## 2018-08-29 NOTE — IP AVS SNAPSHOT
MRN:[id number]                      After Visit Summary   8/29/2018    Daniel Mcnair    MRN: [id number]           Thank you!     Thank you for choosing [facility] for your care. Our goal is always to provide you with excellent care. Hearing back from our patients is one way we can continue to improve our services. Please take a few minutes to complete the written survey that you may receive in the mail after you visit with us. Thank you!        Patient Information     Date Of Birth          10/17/1929        About your hospital stay     You were admitted on:  August 29, 2018 You last received care in the:  [facility]    You were discharged on:  August 29, 2018       Who to Call     For medical emergencies, please call 911.  For non-urgent questions about your medical care, please call your primary care provider or clinic, [phone]  For questions related to your surgery, please call your surgery clinic        Attending Provider     Provider Specialty    Ronaldo Hicks MD Anesthesiology       Primary Care Provider Office Phone # Fax #    Lia Dumont -[phone]      Your next 10 appointments already scheduled     Sep 13, 2018 10:40 AM CDT   (Arrive by 10:25 AM)   Return Visit with Lia Dumont MD   [facility] ([facility] [place])    [address]  4th Floor  [facility] [id number] [phone]            Oct 09, 2018 11:30 AM CDT   (Arrive by 11:15 AM)   Return Visit with Santana Martinez MD   [facility] ([facility] [place])    [address] [place] [address]  [address]  [facility] [address] [phone]            Nov 15, 2018  8:30 AM CST   [facility] Draw with  [facility] DRAW   [facility] ([facility] [place])    [address] [place] [address]  [address]  [facility] [address] [phone]            Nov 15, 2018  9:00 AM CST    CT ABDOMEN PELVIS W CONTRAST with UCCT1   [facility] ([facility])    [address]  1st Floor  [facility] [id number] [phone]           Please bring any scans or X-rays taken at other hospitals, if similar tests were done. Also bring a list of your medicines, including vitamins, minerals and over-the-counter drugs. It is safest to leave personal items at home.  Be sure to tell your doctor:   If you have any allergies.   If there s any chance you are pregnant.   If you are breastfeeding.  How to prepare:   Do not eat or drink for 2 hours before your exam. If you need to take medicine, you may take it with small sips of water. (We may ask you to take liquid medicine as well.)   Please wear loose clothing, such as a sweat suit or jogging clothes. Avoid snaps, zippers and other metal. We may ask you to undress and put on a hospital gown.  Please arrive 30 minutes early for your CT. Once in the department you might be asked to drink water 15-20 minutes prior to your exam.  If indicated you may be asked to drink an oral contrast in advance of your CT.  If this is the case, the imaging team will let you know or be in contact with you prior to your appointment  Patients over 70 or patients with diabetes or kidney problems:   If you haven t had a blood test (creatinine test) within the last 30 days, the Cardiologist/Radiologist may require you to get this test prior to your exam.  If you have diabetes:   Continue to take your metformin medication on the day of your exam  If you have any questions, please call the Imaging Department where you will have your exam.            Nov 15, 2018 12:00 PM CST   (Arrive by 11:45 AM)   Return Visit with Tanner Rojas MD   [facility] ([facility])    [address]  [address]  [facility] [id number] [phone]            Jan 29, 2019 10:15 AM CST   RETURN RETINA with  Luna Parker MD   [facility] ([facility])    [address]  9th [address]  [facility] [address] [phone]              Further instructions from your care team       Home Care Instructions after a Medial Branch Block      In a medial branch block, a local anesthetic (numbing medicine) is injected near the medial branch nerve. This stops the transmission of pain signals from the facet joint. If this reduces your pain and improves your mobility, it may tell the doctor which facet joint is causing the pain. This procedure is a diagnostic procedure and is typically short lasting. With this injection, a steroid to increase the longevity of the blocks effect may or may not be used.    Activity  -You may resume most normal activity levels with the exception of strenuous activity. It is important for us to know if your pain with normal activity is relieved after this injection.  -DO NOT shower for 24 hours  -DO NOT remove bandaid for 24 hours    Pain  -You may experience soreness at the injection site for one or two days  -You may use an ice pack for 20 minutes every 2 hours for the first 24 hours  -You may use a heating pad after the first 24 hours  -You may use Tylenol (acetaminophen) every 4 hours or other pain medicines as     directed by your physician    You may experience numbness radiating into your legs or arms (depending on the procedure location). This numbness may last several hours. Until sensation returns to normal; please use caution in walking, climbing stairs, and stepping out of your vehicle, etc.    DID YOU RECEIVE SEDATION TODAY?  No    If you received sedation please follow these additional safety measures.  Sedation medicine, if given, may remain active for many hours. It is important for the next 24 hours that you do not:  -Drive a car  -Operate machines or power tools  -Consume alcohol, including beer  -Sign any important papers or legal  "documents    DID YOU RECEIVE STEROIDS TODAY?  No    Common side effects of steroids:  Not everyone will experience corticosteroid side effects. If side effects are experienced, they will gradually subside in the 7-10 day period following an injection. Most common side effects include:  -Flushed face and/or chest  -Feeling of warmth, particularly in the face but could be an overall feeling of warmth  -Increased blood sugar in diabetic patients  -Menstrual irregularities my occur. If taking hormone-based birth control an alternate method of birth control is recommended  -Sleep disturbances and/or mood swings are possible  -Leg cramps      PLEASE KEEP TRACK OF YOUR SYMPTOMS AND NOTE YOUR IMPROVEMENT FOR YOUR DOCTOR.     Please contact us if you have:  -Severe pain  -Fever more than 101.5 degrees Fahrenheit  -Signs of infection at the injection site (redness, swelling, or drainage)    If you have questions, please contact our office at [phone] between the hours of 7:00 am and 3:00 pm Monday through Friday. After office hours you can contact the on call provider by dialing [phone]. If you need immediate attention, we recommend that you go to a hospital emergency room or dial 911.      Pending Results     Date and Time Order Name Status Description    8/29/2018 1112 XR SURGERY INDIGO FLUORO LESS THAN 5 MIN W STILLS In process             Admission Information     Date & Time Provider Department Dept. Phone    8/29/2018 Ronaldo Hicks MD [facility] [phone]      Your Vitals Were     Blood Pressure Pulse Temperature Respirations Height Weight    132/61 (Cuff Size: Adult Regular) 51 98.7  F (37.1  C) (Temporal) 16 1.778 m (5' 10\") 83.9 kg (185 lb)    Pulse Oximetry BMI (Body Mass Index)                97% 26.54 kg/m2          [employer] Information     [employer] gives you secure access to your electronic health record. If you see a primary care provider, you can also send messages to " your care team and make appointments. If you have questions, please call your primary care clinic.  If you do not have a primary care provider, please call [phone] and they will assist you.      [employer] is an electronic gateway that provides easy, online access to your medical records. With [employer], you can request a clinic appointment, read your test results, renew a prescription or communicate with your care team.     To access your existing account, please contact your [facility] or call [phone] for assistance.        Care EveryWhere ID     This is your Care EveryWhere ID. This could be used by other organizations to access your [facility] medical records  [id number]        Equal Access to Services     PARRISH LOPEZ : Shruthi Camargo, ad portillo, rossy bingham, jose miguel lopez. So [facility] [phone].    ATENCIÓN: Si habla español, tiene a vaughan disposición servicios gratuitos de asistencia lingüística. Llame al [phone].    We comply with applicable federal civil rights laws and [place] laws. We do not discriminate on the basis of race, color, national origin, age, disability, sex, sexual orientation, or gender identity.               Review of your medicines      UNREVIEWED medicines. 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 3   Used for:  Pain in joint, pelvic region and thigh, unspecified laterality        Dose:  1-2 tablet   Take 1-2 tablets by mouth every 4 hours as needed for mild pain   Quantity:  30 tablet   Refills:  1       amLODIPine 10 MG tablet   Commonly known as:   NORVASC   Used for:  Benign essential hypertension        Dose:  10 mg   Take 1 tablet (10 mg) by mouth daily   Quantity:  90 tablet   Refills:  3       aspirin 81 MG EC tablet   Used for:  Hyperlipidemia LDL goal <70        Dose:  81 mg   Take 1 tablet (81 mg) by mouth daily   Quantity:  90 tablet   Refills:  3       atorvastatin 20 MG tablet   Commonly known as:  LIPITOR   Used for:  Hyperlipidemia LDL goal <70        Dose:  20 mg   Take 1 tablet (20 mg) by mouth daily   Quantity:  90 tablet   Refills:  2       benazepril 40 MG tablet   Commonly known as:  LOTENSIN   Used for:  Essential hypertension, benign        Dose:  40 mg   Take 1 tablet (40 mg) by mouth daily   Quantity:  90 tablet   Refills:  3       bisacodyl 10 MG Suppository   Commonly known as:  DULCOLAX   Used for:  Chronic constipation        Dose:  10 mg   Place 1 suppository rectally daily as needed.   Quantity:  3 suppository   Refills:  0       cyclobenzaprine 5 MG tablet   Commonly known as:  FLEXERIL   Used for:  Back muscle spasm        Dose:  5 mg   Take 1 tablet (5 mg) by mouth 3 times daily as needed for muscle spasms   Quantity:  30 tablet   Refills:  0       desonide 0.05 % ointment   Commonly known as:  DESOWEN   Used for:  Dermatitis, seborrheic        Apply topically 2 times daily To affected areas of scale and redness on the face and ears as needed until clear.   Quantity:  60 g   Refills:  11       docusate sodium 100 MG tablet   Commonly known as:  COLACE   Used for:  BPH (benign prostatic hyperplasia)        Dose:  100 mg   Take 100 mg by mouth 2 times daily   Quantity:  60 tablet   Refills:  1       gentamicin 0.1 % ointment   Commonly known as:  GARAMYCIN   Used for:  B-cell lymphoma of intra-abdominal lymph nodes, unspecified B-cell lymphoma type (H)        Apply topically 4 times daily   Refills:  3       metoprolol succinate 25 MG 24 hr tablet   Commonly known as:  TOPROL-XL   Used for:  Essential hypertension, benign         Dose:  12.5 mg   Take 0.5 tablets (12.5 mg) by mouth daily   Quantity:  45 tablet   Refills:  3       MILK OF MAGNESIA PO        Take by mouth At Bedtime   Refills:  0       nitroGLYcerin 0.4 MG sublingual tablet   Commonly known as:  NITROSTAT   Used for:  Coronary artery disease involving native heart without angina pectoris, unspecified vessel or lesion type        Dose:  0.4 mg   Place 1 tablet (0.4 mg) under the tongue every 5 minutes as needed   Quantity:  30 tablet   Refills:  2       NutriSource Fiber packet        Dose:  1 packet   Take 1 packet by mouth every morning   Refills:  0       psyllium 0.52 g capsule        Take by mouth daily Powder   Refills:  0       ranitidine 150 MG tablet   Commonly known as:  ZANTAC   Used for:  Gastroesophageal reflux disease without esophagitis        Dose:  150 mg   Take 1 tablet (150 mg) by mouth 2 times daily   Quantity:  180 tablet   Refills:  3       sildenafil 100 MG tablet   Commonly known as:  VIAGRA   Used for:  Erectile dysfunction, unspecified erectile dysfunction type        Dose:  100 mg   Take 1 tablet (100 mg) by mouth daily as needed   Quantity:  10 tablet   Refills:  2       tamsulosin 0.4 MG capsule   Commonly known as:  FLOMAX   Used for:  Hypertrophy of prostate without urinary obstruction        Dose:  0.4 mg   Take 1 capsule (0.4 mg) by mouth daily   Quantity:  90 capsule   Refills:  3       tolterodine 2 MG tablet   Commonly known as:  DETROL   Used for:  Hypertonicity of bladder        Take 1 tablet (2 mg )twice a day   Quantity:  180 tablet   Refills:  2       * triamcinolone 0.025 % cream   Commonly known as:  KENALOG   Used for:  Dermatitis        Apply topically 2 times daily   Quantity:  45 g   Refills:  0       * triamcinolone 0.1 % ointment   Commonly known as:  KENALOG   Used for:  Intrinsic atopic dermatitis        Apply topically 2 times daily To itchy rashes   Quantity:  80 g   Refills:  3       VITEYES AREDS FORMULA/LUTEIN Caps         Take by mouth daily   Refills:  0       * Notice:  This list has 2 medication(s) that are the same as other medications prescribed for you. Read the directions carefully, and ask your doctor or other care provider to review them with you.             Protect others around you: Learn how to safely use, store and throw away your medicines at www.disposemymeds.org.             Medication List: This is a list of all your medications and when to take them. Check marks below indicate your daily home schedule. Keep this list as a reference.      Medications           Morning Afternoon Evening Bedtime As Needed    acetaminophen 325 MG tablet   Commonly known as:  TYLENOL   Take 2 tablets (650 mg) by mouth every 6 hours as needed for pain                                acetaminophen-codeine 300-30 MG per tablet   Commonly known as:  TYLENOL w/CODEINE No. 3   Take 1-2 tablets by mouth every 4 hours as needed for mild pain                                amLODIPine 10 MG tablet   Commonly known as:  NORVASC   Take 1 tablet (10 mg) by mouth daily                                aspirin 81 MG EC tablet   Take 1 tablet (81 mg) by mouth daily                                atorvastatin 20 MG tablet   Commonly known as:  LIPITOR   Take 1 tablet (20 mg) by mouth daily                                benazepril 40 MG tablet   Commonly known as:  LOTENSIN   Take 1 tablet (40 mg) by mouth daily                                bisacodyl 10 MG Suppository   Commonly known as:  DULCOLAX   Place 1 suppository rectally daily as needed.                                cyclobenzaprine 5 MG tablet   Commonly known as:  FLEXERIL   Take 1 tablet (5 mg) by mouth 3 times daily as needed for muscle spasms                                desonide 0.05 % ointment   Commonly known as:  DESOWEN   Apply topically 2 times daily To affected areas of scale and redness on the face and ears as needed until clear.                                docusate sodium 100  MG tablet   Commonly known as:  COLACE   Take 100 mg by mouth 2 times daily                                gentamicin 0.1 % ointment   Commonly known as:  GARAMYCIN   Apply topically 4 times daily                                metoprolol succinate 25 MG 24 hr tablet   Commonly known as:  TOPROL-XL   Take 0.5 tablets (12.5 mg) by mouth daily                                MILK OF MAGNESIA PO   Take by mouth At Bedtime                                nitroGLYcerin 0.4 MG sublingual tablet   Commonly known as:  NITROSTAT   Place 1 tablet (0.4 mg) under the tongue every 5 minutes as needed                                NutriSource Fiber packet   Take 1 packet by mouth every morning                                psyllium 0.52 g capsule   Take by mouth daily Powder                                ranitidine 150 MG tablet   Commonly known as:  ZANTAC   Take 1 tablet (150 mg) by mouth 2 times daily                                sildenafil 100 MG tablet   Commonly known as:  VIAGRA   Take 1 tablet (100 mg) by mouth daily as needed                                tamsulosin 0.4 MG capsule   Commonly known as:  FLOMAX   Take 1 capsule (0.4 mg) by mouth daily                                tolterodine 2 MG tablet   Commonly known as:  DETROL   Take 1 tablet (2 mg )twice a day                                * triamcinolone 0.025 % cream   Commonly known as:  KENALOG   Apply topically 2 times daily                                * triamcinolone 0.1 % ointment   Commonly known as:  KENALOG   Apply topically 2 times daily To itchy rashes                                VITEYES AREDS FORMULA/LUTEIN Caps   Take by mouth daily                                * Notice:  This list has 2 medication(s) that are the same as other medications prescribed for you. Read the directions carefully, and ask your doctor or other care provider to review them with you.

## 2018-08-29 NOTE — DISCHARGE INSTRUCTIONS
Home Care Instructions after a Medial Branch Block      In a medial branch block, a local anesthetic (numbing medicine) is injected near the medial branch nerve. This stops the transmission of pain signals from the facet joint. If this reduces your pain and improves your mobility, it may tell the doctor which facet joint is causing the pain. This procedure is a diagnostic procedure and is typically short lasting. With this injection, a steroid to increase the longevity of the blocks effect may or may not be used.    Activity  -You may resume most normal activity levels with the exception of strenuous activity. It is important for us to know if your pain with normal activity is relieved after this injection.  -DO NOT shower for 24 hours  -DO NOT remove bandaid for 24 hours    Pain  -You may experience soreness at the injection site for one or two days  -You may use an ice pack for 20 minutes every 2 hours for the first 24 hours  -You may use a heating pad after the first 24 hours  -You may use Tylenol (acetaminophen) every 4 hours or other pain medicines as     directed by your physician    You may experience numbness radiating into your legs or arms (depending on the procedure location). This numbness may last several hours. Until sensation returns to normal; please use caution in walking, climbing stairs, and stepping out of your vehicle, etc.    DID YOU RECEIVE SEDATION TODAY?  No    If you received sedation please follow these additional safety measures.  Sedation medicine, if given, may remain active for many hours. It is important for the next 24 hours that you do not:  -Drive a car  -Operate machines or power tools  -Consume alcohol, including beer  -Sign any important papers or legal documents    DID YOU RECEIVE STEROIDS TODAY?  No    Common side effects of steroids:  Not everyone will experience corticosteroid side effects. If side effects are experienced, they will gradually subside in the 7-10 day period  following an injection. Most common side effects include:  -Flushed face and/or chest  -Feeling of warmth, particularly in the face but could be an overall feeling of warmth  -Increased blood sugar in diabetic patients  -Menstrual irregularities my occur. If taking hormone-based birth control an alternate method of birth control is recommended  -Sleep disturbances and/or mood swings are possible  -Leg cramps      PLEASE KEEP TRACK OF YOUR SYMPTOMS AND NOTE YOUR IMPROVEMENT FOR YOUR DOCTOR.     Please contact us if you have:  -Severe pain  -Fever more than 101.5 degrees Fahrenheit  -Signs of infection at the injection site (redness, swelling, or drainage)    If you have questions, please contact our office at [phone] between the hours of 7:00 am and 3:00 pm Monday through Friday. After office hours you can contact the on call provider by dialing [phone]. If you need immediate attention, we recommend that you go to a hospital emergency room or dial [phone].

## 2018-09-06 ENCOUNTER — TELEPHONE (OUTPATIENT)
Dept: ANESTHESIOLOGY | Facility: CLINIC | Age: 83
End: 2018-09-06

## 2018-09-06 ENCOUNTER — TELEPHONE (OUTPATIENT)
Dept: AUDIOLOGY | Facility: CLINIC | Age: 83
End: 2018-09-06

## 2018-09-06 NOTE — TELEPHONE ENCOUNTER
[facility] Call Center    Phone Message    May a detailed message be left on voicemail: yes    Reason for Call: Other: Pt calling had a nerve block done on 8/29 and is feeling no pain but his hip is tight and he is a little concerned. Pt was not told when he should follow up with Dr. Hicks and would like to know next steps. Per pt he feel like he is in [place] not knowing when to follow up. Please return call as soon as possible.     Action Taken: Message routed to:  [facility] ([facility]):  pain clinic

## 2018-09-06 NOTE — TELEPHONE ENCOUNTER
[facility] Call Center    Phone Message    May a detailed message be left on voicemail: yes    Reason for Call: Other: Patient is running out of batteries for his hearing aids and would like to know if he is able to pick some more up at his appt on Monday 9/10 at his appt. Please advise.      Action Taken: Message routed to:  Adult Clinics: ENT p [phone]

## 2018-09-06 NOTE — TELEPHONE ENCOUNTER
Purpose of call: Follow up after 1. LEFT L3 and L4 medial branch nerve and L5 dorsal ramus nerve blocks  2.  Fluoroscopic guidance for the above procedures   Date of service: 8/29/18  Spoke with: Daniel     Location of pain: Low back  Percentage of pain relief after procedure: 100%  Duration of pain relief: 3.5 hours     Follow up:  Pt advised to proceed with second injection.  Pt has copy of pre-procedure instructions.  [facility] routed call to schedulers to contact pt.    Jacqueline Adams RN, BSN

## 2018-09-07 DIAGNOSIS — M47.816 LUMBAR FACET ARTHROPATHY: Primary | ICD-10-CM

## 2018-09-10 ENCOUNTER — OFFICE VISIT (OUTPATIENT)
Dept: AUDIOLOGY | Facility: CLINIC | Age: 83
End: 2018-09-10
Payer: MEDICARE

## 2018-09-10 DIAGNOSIS — H90.3 SENSORINEURAL HEARING LOSS (SNHL) OF BOTH EARS: Primary | ICD-10-CM

## 2018-09-10 PROCEDURE — V5299 HEARING SERVICE: HCPCS | Performed by: AUDIOLOGIST

## 2018-09-10 NOTE — MR AVS SNAPSHOT
After Visit Summary   9/10/2018    Daniel Mcnair    MRN: [id number]           Patient Information     Date Of Birth          10/17/1929        Visit Information        Provider Department      9/10/2018 2:30 PM Jude Mendez AuD [facility]        Today's Diagnoses     Sensorineural hearing loss (SNHL) of both ears    -  1       Follow-ups after your visit        Your next 10 appointments already scheduled     Sep 13, 2018 10:40 AM CDT   (Arrive by 10:25 AM)   Return Visit with Lia Dumont MD   [facility] ([facility])    [address]  4th Floor  [facility] [address]   [phone]            Oct 09, 2018 11:30 AM CDT   (Arrive by 11:15 AM)   Return Visit with Santana Martinez MD   [facility] ([facility])    [address] [place] [address]  [address]  [facility] [address]   [phone]            Nov 15, 2018  8:30 AM CST   [facility] Draw with  [facility] DRAW   [facility] Draw ([facility])    [address] [place] [address]  [address]  [facility] [address]   [phone]            Nov 15, 2018  9:00 AM CST   CT ABDOMEN PELVIS W CONTRAST with UCCT1   [facility] CT ([facility])    [address]  1st Floor  [facility] [address]   [phone]           How do I prepare for my exam? (Food and drink instructions) To prepare: Do not eat or drink for 2 hours before your exam. If you need to take medicine, you may take it with small sips of water. (We may ask you to take liquid medicine as well.)  How do I prepare for my exam? (Other instructions) Please arrive 30 minutes early for your CT.  Once in the department you might be asked to drink water 15-20 minutes prior to your exam.  If indicated you may be asked to drink an oral contrast in advance of your CT.  If this is the case, the imaging team will let  you know or be in contact with you prior to your appointment  Patients over 70 or patients with diabetes or kidney problems: If you haven t had a blood test (creatinine test) within the last 30 days, the Cardiologist/Radiologist may require you to get this test prior to your exam.  If you have diabetes:  Continue to take your metformin medication on the day of your exam  What should I wear: Please wear loose clothing, such as a sweat suit or jogging clothes. Avoid snaps, zippers and other metal. We may ask you to undress and put on a hospital gown.  How long does the exam take: Most scans take less than 20 minutes.  What should I bring: Please bring any scans or X-rays taken at other hospitals, if similar tests were done. Also bring a list of your medicines, including vitamins, minerals and over-the-counter drugs. It is safest to leave personal items at home.  Do I need a : No  is needed.  What do I need to tell my doctor? Be sure to tell your doctor: * If you have any allergies. * If there s any chance you are pregnant. * If you are breastfeeding.  What should I do after the exam: No restrictions, You may resume normal activities.  What is this test: A CT (computed tomography) scan is a series of pictures that allows us to look inside your body. The scanner creates images of the body in cross sections, much like slices of bread. This helps us see any problems more clearly. You may receive contrast (X-ray dye) before or during your scan. You will be asked to drink the contrast.  Who should I call with questions: If you have any questions, please call the Imaging Department where you will have your exam. Directions, parking instructions, and other information is available on our website, [email or web address] With [email or web address].            Nov 15, 2018 12:00 PM CST   (Arrive by 11:45 AM)   Return Visit with Tanner Rojas MD   [facility] ([facility])    [address] [place] [address]  [address]  [address]  [facility] [address]   [phone]            Jan 29, 2019 10:15 AM CST   RETURN RETINA with Luna Parker MD   [facility] ([facility])    [address]  9th Fl Clin [address]a  [facility] [address]   [phone]              Who to contact     If you have questions or need follow up information about today's clinic visit or your schedule please contact [facility] directly at [phone].  Normal or non-critical lab and imaging results will be communicated to you by [facility], letter or phone within 4 business days after the clinic has received the results. If you do not hear from us within 7 days, please contact the clinic through [facility] or phone. If you have a critical or abnormal lab result, we will notify you by phone as soon as possible.  Submit refill requests through [facility] or call your pharmacy and they will forward the refill request to us. Please allow 3 business days for your refill to be completed.          Additional Information About Your Visit        [facility] Information     [facility] gives you secure access to your electronic health record. If you see a primary care provider, you can also send messages to your care team and make appointments. If you have questions, please call your primary care clinic.  If you do not have a primary care provider, please call [phone] and they will assist you.      [facility] is an electronic gateway that provides easy, online access to your medical records. With [facility], you can request a clinic appointment, read your test results, renew a prescription or communicate with your care team.     To access your existing account, please contact your [facility] or call [phone] for assistance.        Care EveryWhere ID     This is your Care EveryWhere ID. This could be used by other organizations to access your [place] medical records  [id number]          Blood Pressure from Last 3 Encounters:   08/29/18 161/67   08/10/18 149/68   08/03/18 127/51    Weight from Last 3 Encounters:   08/29/18 185 lb (83.9 kg)   08/10/18 191 lb (86.6 kg)   08/03/18 191 lb (86.6 kg)              We Performed the Following     HEARING AID CHECK/NO CHARGE        Primary Care Provider Office Phone # Fax #    Lia Dumont [phone]       [address] [place] [address] [address] [place] [id number]        Equal Access to Services     [facility]: Hadii aad ku hadasho Soomaali, waaxda luqadaha, qaybta kaalmada adeboazyawaylon, jose miguel lawrence . So [facility] [phone].    ATENCIÓN: Si habla español, tiene a vaughan disposición servicios gratuitos de asistencia lingüística. [facility] [phone].    We comply with applicable federal civil rights laws and [place] laws. We do not discriminate on the basis of race, color, national origin, age, disability, sex, sexual orientation, or gender identity.            Thank you!     Thank you for choosing [facility]  for your care. Our goal is always to provide you with excellent care. Hearing back from our patients is one way we can continue to improve our services. Please take a few minutes to complete the written survey that you may receive in the mail after your visit with us. Thank you!             Your Updated Medication List - Protect others around you: Learn how to safely use, store and throw away your medicines at www.disposemymeds.org.          This list is accurate as of 9/10/18  3:52 PM.  Always use your most recent med list.                   Brand Name Dispense Instructions for use Diagnosis    acetaminophen 325 MG tablet    TYLENOL    100 tablet    Take 2 tablets (650 mg) by mouth every 6 hours as needed for pain    S/P revision of total hip       acetaminophen-codeine 300-30 MG per tablet    TYLENOL w/CODEINE No. 3    30 tablet    Take 1-2 tablets by mouth every 4 hours as needed for mild pain     Pain in joint, pelvic region and thigh, unspecified laterality       amLODIPine 10 MG tablet    NORVASC    90 tablet    Take 1 tablet (10 mg) by mouth daily    Benign essential hypertension       aspirin 81 MG EC tablet     90 tablet    Take 1 tablet (81 mg) by mouth daily    Hyperlipidemia LDL goal <70       atorvastatin 20 MG tablet    LIPITOR    90 tablet    Take 1 tablet (20 mg) by mouth daily    Hyperlipidemia LDL goal <70       benazepril 40 MG tablet    LOTENSIN    90 tablet    Take 1 tablet (40 mg) by mouth daily    Essential hypertension, benign       bisacodyl 10 MG Suppository    DULCOLAX    3 suppository    Place 1 suppository rectally daily as needed.    Chronic constipation       cyclobenzaprine 5 MG tablet    FLEXERIL    30 tablet    Take 1 tablet (5 mg) by mouth 3 times daily as needed for muscle spasms    Back muscle spasm       desonide 0.05 % ointment    DESOWEN    60 g    Apply topically 2 times daily To affected areas of scale and redness on the face and ears as needed until clear.    Dermatitis, seborrheic       docusate sodium 100 MG tablet    COLACE    60 tablet    Take 100 mg by mouth 2 times daily    BPH (benign prostatic hyperplasia)       gentamicin 0.1 % ointment    GARAMYCIN     Apply topically 4 times daily    B-cell lymphoma of intra-abdominal lymph nodes, unspecified B-cell lymphoma type (H)       metoprolol succinate 25 MG 24 hr tablet    TOPROL-XL    45 tablet    Take 0.5 tablets (12.5 mg) by mouth daily    Essential hypertension, benign       MILK OF MAGNESIA PO      Take by mouth At Bedtime        nitroGLYcerin 0.4 MG sublingual tablet    NITROSTAT    30 tablet    Place 1 tablet (0.4 mg) under the tongue every 5 minutes as needed    Coronary artery disease involving native heart without angina pectoris, unspecified vessel or lesion type       NutriSource Fiber packet      Take 1 packet by mouth every morning        psyllium 0.52 g capsule      Take by mouth daily Powder         ranitidine 150 MG tablet    ZANTAC    180 tablet    Take 1 tablet (150 mg) by mouth 2 times daily    Gastroesophageal reflux disease without esophagitis       sildenafil 100 MG tablet    VIAGRA    10 tablet    Take 1 tablet (100 mg) by mouth daily as needed    Erectile dysfunction, unspecified erectile dysfunction type       tamsulosin 0.4 MG capsule    FLOMAX    90 capsule    Take 1 capsule (0.4 mg) by mouth daily    Hypertrophy of prostate without urinary obstruction       tolterodine 2 MG tablet    DETROL    180 tablet    Take 1 tablet (2 mg )twice a day    Hypertonicity of bladder       * triamcinolone 0.025 % cream    KENALOG    45 g    Apply topically 2 times daily    Dermatitis       * triamcinolone 0.1 % ointment    KENALOG    80 g    Apply topically 2 times daily To itchy rashes    Intrinsic atopic dermatitis       VITEYES AREDS FORMULA/LUTEIN Caps      Take by mouth daily        * Notice:  This list has 2 medication(s) that are the same as other medications prescribed for you. Read the directions carefully, and ask your doctor or other care provider to review them with you.

## 2018-09-10 NOTE — PROGRESS NOTES
AUDIOLOGY REPORT      BACKGROUND INFORMATION: Daniel Mcnair was seen in the Audiology Clinic  at [facility] on 9/10/2018 for follow-up.  The patient has been seen previously and results revealed a bilateral sensorineural hearing loss.  The patient was fit with [employer]ia Pure 13 3Nx hearing aids on 6/4/18.  The patient reports that his rightt hearing aid is not working.      TEST RESULTS AND PROCEDURES: The right wax guard was replaced. An electroacoustic hearing aid check was performed. The patient was counseled on insertion and removal as well as maintenance of his hearing aids.       SUMMARY AND RECOMMENDATIONS: A hearing aid check was performed today and the patient reports that all his questions were addressed today.  Adjustments were made as noted above and the patient will return as needed or at least every 4-6 months for cleaning and assessment of hearing aid.  Call this clinic with questions regarding today s visit.      Tashia Chisholm.  Doctor of Audiology  [place] License # [id number]

## 2018-09-11 NOTE — TELEPHONE ENCOUNTER
Spoke with: Daniel    Date Scheduled: 9/26/2018- I offered earlier dates 9/19, 9/21 and 9/24. Those did not work for him.     Provider: Linda    : Yes, wife will drive    F/U Appointment: NA    Patient was educated on pre-op nurse call: Yes

## 2018-09-13 ENCOUNTER — OFFICE VISIT (OUTPATIENT)
Dept: INTERNAL MEDICINE | Facility: CLINIC | Age: 83
End: 2018-09-13
Payer: MEDICARE

## 2018-09-13 VITALS — WEIGHT: 190.2 LBS | SYSTOLIC BLOOD PRESSURE: 119 MMHG | BODY MASS INDEX: 27.29 KG/M2 | DIASTOLIC BLOOD PRESSURE: 55 MMHG

## 2018-09-13 DIAGNOSIS — F43.20 ADJUSTMENT DISORDER, UNSPECIFIED TYPE: ICD-10-CM

## 2018-09-13 DIAGNOSIS — G89.29 CHRONIC LOW BACK PAIN, UNSPECIFIED BACK PAIN LATERALITY, WITH SCIATICA PRESENCE UNSPECIFIED: Primary | ICD-10-CM

## 2018-09-13 DIAGNOSIS — M54.5 CHRONIC LOW BACK PAIN, UNSPECIFIED BACK PAIN LATERALITY, WITH SCIATICA PRESENCE UNSPECIFIED: Primary | ICD-10-CM

## 2018-09-13 DIAGNOSIS — R53.83 FATIGUE, UNSPECIFIED TYPE: ICD-10-CM

## 2018-09-13 DIAGNOSIS — M48.061 SPINAL STENOSIS, LUMBAR REGION, WITHOUT NEUROGENIC CLAUDICATION: ICD-10-CM

## 2018-09-13 ASSESSMENT — PAIN SCALES - GENERAL: PAINLEVEL: MILD PAIN (2)

## 2018-09-13 NOTE — PATIENT INSTRUCTIONS
[facility] Medication Refill Request Information:  * Please contact your pharmacy regarding ANY request for medication refills.  ** [facility] Prescription Fax = [phone]  * Please allow 3 business days for routine medication refills.  * Please allow 5 business days for controlled substance medication refills.     [facility] Test Result notification information:  *You will be notified with in 7-10 days of your appointment day regarding the results of your test.  If you are on MyChart you will be notified as soon as the provider has reviewed the results and signed off on them.    [facility]: [phone]

## 2018-09-13 NOTE — PROGRESS NOTES
[facility]  Primary Care Center   Lia Dumont MD  09/13/2018      Chief Complaint:   Refill Request     History of Present Illness:   Daniel Mcnair is a 88 year old male with a history of CAD, B-cell lymphoma, and hypertension who presents for evaluation of routine care.     Back and hip pain/tightness:  He states that once while cleaning the floor, he had a flare up of pain in his right lower back. He states that since then it is more sensitive, and will hurt will driving. However, he still feels that he tires easily as the pain radiates to his leg and they fatigue easily. Overall, he just feels that he has less energy, and he has been taking an hour and a half nap a few times a week. He states this is not unusual, and that he needs to just focus not trying to over due himself.  However, he feels that the pain will eventually subside.     He says that he will take acetaminophen will help. He is doing his physical therapy exercises 4-5 times a week, and knows that it helps even though sometimes feels that it does not. He states that after the treatment, he has been better and did not need ice or heat to help.  He was hesitant with lumbar radiofrequency ablation therapy as he heard it could cause nerve damage, however he did decide to do it and will have his second treatment soon. He so far feels, that it has helped out. He has not tried acupuncture. He has tried Salonpas, however would be interested in cyclobenzaprine.  We discussed potential side effects of sedation, dizziness and GI upset/constipation.  He will forgo using cyclobenzaprine at this time..     Anxiety/stress: He also states that his back pain and left hip stiffness do cause him some anxiety. He took sertraline for his anxiety and stress that helped somewhat. He is very on the fence about restarting this. He has been practicing mindfulness activities including prayer (as he is a retired ) and deep breathing. He states that these  techniques are not always dramatically helpful, but do help. He would like to follow more of a routine for these practices. He is also part of a support group through his [employer] that helps him talk things through. 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     Other concerns discussed:  - He will have a CT scan in November for a lymph node given his history of asymptomatic nonhodgkin's lymphoma.       Review of Systems:   Pertinent items are noted in HPI, remainder of complete ROS is negative.      Active Medications:      acetaminophen-codeine (TYLENOL W/CODEINE NO. 3) 300-30 MG per tablet, Take 1-2 tablets by mouth every 4 hours as needed for mild pain, Disp: 30 tablet, Rfl: 1     amLODIPine (NORVASC) 10 MG tablet, Take 1 tablet (10 mg) by mouth daily, Disp: 90 tablet, Rfl: 3     aspirin 81 MG EC tablet, Take 1 tablet (81 mg) by mouth daily, Disp: 90 tablet, Rfl: 3     atorvastatin (LIPITOR) 20 MG tablet, Take 1 tablet (20 mg) by mouth daily, Disp: 90 tablet, Rfl: 2     benazepril (LOTENSIN) 40 MG tablet, Take 1 tablet (40 mg) by mouth daily, Disp: 90 tablet, Rfl: 3     bisacodyl (DULCOLAX) 10 MG suppository, Place 1 suppository rectally daily as needed., Disp: 3 suppository, Rfl: 0     cyclobenzaprine (FLEXERIL) 5 MG tablet, Take 1 tablet (5 mg) by mouth 3 times daily as needed for muscle spasms, Disp: 30 tablet, Rfl: 0     desonide (DESOWEN) 0.05 % ointment, Apply topically 2 times daily To affected areas of scale and redness on the face and ears as needed until clear., Disp: 60 g, Rfl: 11     docusate sodium 100 MG tablet, Take 100 mg by mouth 2 times daily, Disp: 60 tablet, Rfl: 1     gentamicin (GARAMYCIN) 0.1 % ointment, Apply topically 4 times daily , Disp: , Rfl: 3     Guar Gum (BENEFIBER) packet, Take 1 packet by mouth every morning , Disp: , Rfl:      Magnesium Hydroxide (MILK OF MAGNESIA PO), Take by mouth At Bedtime,  Disp: , Rfl:      metoprolol succinate (TOPROL-XL) 25 MG 24 hr tablet, Take 0.5 tablets (12.5 mg) by mouth daily, Disp: 45 tablet, Rfl: 3     Multiple Vitamins-Minerals (VITEYES AREDS FORMULA/LUTEIN) CAPS, Take by mouth daily, Disp: , Rfl:      nitroglycerin (NITROSTAT) 0.4 MG sublingual tablet, Place 1 tablet (0.4 mg) under the tongue every 5 minutes as needed, Disp: 30 tablet, Rfl: 2     psyllium 0.52 G capsule, Take by mouth daily Powder, Disp: , Rfl:      ranitidine (ZANTAC) 150 MG tablet, Take 1 tablet (150 mg) by mouth 2 times daily, Disp: 180 tablet, Rfl: 3     sildenafil (VIAGRA) 100 MG tablet, Take 1 tablet (100 mg) by mouth daily as needed, Disp: 10 tablet, Rfl: 2     tamsulosin (FLOMAX) 0.4 MG capsule, Take 1 capsule (0.4 mg) by mouth daily, Disp: 90 capsule, Rfl: 3     tolterodine (DETROL) 2 MG tablet, Take 1 tablet (2 mg )twice a day, Disp: 180 tablet, Rfl: 2     triamcinolone (KENALOG) 0.025 % cream, Apply topically 2 times daily, Disp: 45 g, Rfl: 0     triamcinolone (KENALOG) 0.1 % ointment, Apply topically 2 times daily To itchy rashes, Disp: 80 g, Rfl: 3      Allergies:   Nka [no known allergies]      Past Medical History:  Coronary artery disease   Benign essential hypertension   Hyperlipidemia  Osteoarthritis   Spinal facet arthropathy  Sacroiliitis   Degenerative spondylolisthesis   Prostatic hypertrophy  Intraductal papillary mucinous neoplasm  B-cell lymphoma  Basal cell carcinoma  Idiopathic gynecomastia  Anal stricture   Hemorrhoids   Senile cataract   Vitreous degeneration  Macular degeneration  Choroidal detachment     Past Surgical History:  Arthroplasty hip, left   Arthroplasty hip, right  Arthroplasty revision hip, left  Back surgery  Cardiac surgery, stent x5  Cataract surgery, bilateral  Cystoscopy, transurethral resection, prostate, combined  Extracapsular cataract extration with intraocular lens implant x2  Hernia repair  Mastoid surgery  MOHS micrographic procedure  Optical tracking  system fusion spine posterior lumbar two levels    Family History:   Diabetes - maternal grandfather  Alcohol/drug use - maternal grandfather   Arthritis - maternal grandfather   Obesity - maternal grandfather   Stroke - maternal grandmother   Hypertension - mother       Social History:   This patient was accompanied by: wife  Smoking status: never  Smokeless tobacco: never  Alcohol use: occasionally     Physical Exam:   /55 (BP Location: Right arm, Patient Position: Sitting)  Wt 86.3 kg (190 lb 3.2 oz)  BMI 27.29 kg/m2   Physical Examination:  General:  Pleasant, alert, no acute distress  Psych:  Broad range affect.  Not psychomotor slowed.  No signs of anxiety or agitation.     Recent Imaging:  Exam: XR PELVIS AND HIP LEFT 2 VIEWS, 8/3/2018 10:36 AM  Impression:   1. Postsurgical changes of bilateral total hip arthroplasties, without  complication of right prosthetis.  2. 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   3. No acute osseous abnormality.  JUTTA ELLERMANN, MD     Assessment and Plan:  Chronic low back pain, unspecified back pain laterality, with sciatica presence unspecified  Spinal stenosis, lumbar region, without neurogenic claudication  I reassured him that the potential benefits of radiofrequency ablation treatments will likely outweigh the risks. Continue home PT, acetaminophen, and Salonpas topically.    Fatigue, unspecified type    Adjustment disorder, unspecified type  Right now he is content wtih using his deep breathing and prayer exercises to help with his anxiety.     Follow-up: Return in about 4 months (around 1/13/2019) for Routine Visit.       Total time spent 25 minutes.  More than 50% of the time spent with Mr. Mcnair on counseling / coordinating his care        Scribe Disclosure:   Kina KEE, am serving as a scribe to document services personally performed by Lia Dumont MD at this visit, based upon the provider's  statements to me. All documentation has been reviewed by the aforementioned provider prior to being entered into the official medical record.     Portions of this medical record were completed by a scribe. UPON MY REVIEW AND AUTHENTICATION BY ELECTRONIC SIGNATURE, this confirms (a) I performed the applicable clinical services, and (b) the record is accurate.     Lia Dumont M.D.  Internal Medicine  Primary Care Center   pager [phone]

## 2018-09-13 NOTE — NURSING NOTE
Chief Complaint   Patient presents with     Refill Request     Routine checkup per pt     Radha Bell at 10:08 AM on 9/13/2018.

## 2018-09-13 NOTE — MR AVS SNAPSHOT
After Visit Summary   9/13/2018    Daniel Mcnair    MRN: [id number]           Patient Information     Date Of Birth          10/17/1929        Visit Information        Provider Department      9/13/2018 10:40 AM Lia Dumont MD [facility]        Care Instructions    Primary Care Center Medication Refill Request Information:  * Please contact your pharmacy regarding ANY request for medication refills.  ** [facility] Prescription Fax = [phone]  * Please allow 3 business days for routine medication refills.  * Please allow 5 business days for controlled substance medication refills.     Primary Care Center Test Result notification information:  *You will be notified with in 7-10 days of your appointment day regarding the results of your test.  If you are on MyChart you will be notified as soon as the provider has reviewed the results and signed off on them.    [facility]: [phone]               Follow-ups after your visit        Your next 10 appointments already scheduled     Sep 26, 2018   Procedure with Maame Mckeon MD   [facility] ([facility])    [address] [place] [address]  5th [facility] [id number] [phone]           Located in the Clinics and Surgery Center at [address] [place] [address].   parking is very convenient and highly recommended.  is a $6 flat rate fee.  Both  and self parkers should enter the main arrival plaza from [facility]; parking attendants will direct you based on your parking preference.            Oct 09, 2018 11:30 AM CDT   (Arrive by 11:15 AM)   Return Visit with Santana Martinez MD   [facility] ([facility])    [address] [place] [address]  [address]  [facility] [id number] [phone]            Nov 15, 2018  8:30 AM CST   [facility] Draw with  [facility] DRAW   [facility] Draw (  [facility])    [address]  [address]  [facility] [address]   [phone]            Nov 15, 2018  9:00 AM CST   CT ABDOMEN PELVIS W CONTRAST with UCCT1   [facility] CT ([facility] [place])    [address]  1st Floor  [facility] [address]   [phone]           How do I prepare for my exam? (Food and drink instructions) To prepare: Do not eat or drink for 2 hours before your exam. If you need to take medicine, you may take it with small sips of water. (We may ask you to take liquid medicine as well.)  How do I prepare for my exam? (Other instructions) Please arrive 30 minutes early for your CT.  Once in the department you might be asked to drink water 15-20 minutes prior to your exam.  If indicated you may be asked to drink an oral contrast in advance of your CT.  If this is the case, the imaging team will let you know or be in contact with you prior to your appointment  Patients over 70 or patients with diabetes or kidney problems: If you haven t had a blood test (creatinine test) within the last 30 days, the Cardiologist/Radiologist may require you to get this test prior to your exam.  If you have diabetes:  Continue to take your metformin medication on the day of your exam  What should I wear: Please wear loose clothing, such as a sweat suit or jogging clothes. Avoid snaps, zippers and other metal. We may ask you to undress and put on a hospital gown.  How long does the exam take: Most scans take less than 20 minutes.  What should I bring: Please bring any scans or X-rays taken at other hospitals, if similar tests were done. Also bring a list of your medicines, including vitamins, minerals and over-the-counter drugs. It is safest to leave personal items at home.  Do I need a : No  is needed.  What do I need to tell my doctor? Be sure to tell your doctor: * If you have any allergies. * If there s any chance you are  pregnant. * If you are breastfeeding.  What should I do after the exam: No restrictions, You may resume normal activities.  What is this test: A CT (computed tomography) scan is a series of pictures that allows us to look inside your body. The scanner creates images of the body in cross sections, much like slices of bread. This helps us see any problems more clearly. You may receive contrast (X-ray dye) before or during your scan. You will be asked to drink the contrast.  Who should I call with questions: If you have any questions, please call the Imaging Department where you will have your exam. Directions, parking instructions, and other information is available on our website, [email or web address].            Nov 15, 2018 12:00 PM CST   (Arrive by 11:45 AM)   Return Visit with Tanner Rojas MD   [facility] ([facility])    [address]  [address]  [facility] [id number] [phone]            Jan 29, 2019 10:15 AM CST   RETURN RETINA with Luna Parker MD   Eye Clinic ([facility])    [address]  [facility] [id number] [phone]              Who to contact     Please call your clinic at [phone] to:    Ask questions about your health    Make or cancel appointments    Discuss your medicines    Learn about your test results    Speak to your doctor            Additional Information About Your Visit        [employer] Information     [employer] gives you secure access to your electronic health record. If you see a primary care provider, you can also send messages to your care team and make appointments. If you have questions, please call your primary care clinic.  If you do not have a primary care provider, please call [phone] and they will assist you.      [employer] is an electronic gateway that provides easy, online access to your medical records. With [employer], you can request a  clinic appointment, read your test results, renew a prescription or communicate with your care team.     To access your existing account, please contact your [facility] or call [phone] for assistance.        Care EveryWhere ID     This is your Care EveryWhere ID. This could be used by other organizations to access your [place] medical records  [id number]        Your Vitals Were     BMI (Body Mass Index)                   27.29 kg/m2            Blood Pressure from Last 3 Encounters:   09/13/18 119/55   08/29/18 161/67   08/10/18 149/68    Weight from Last 3 Encounters:   09/13/18 86.3 kg (190 lb 3.2 oz)   08/29/18 83.9 kg (185 lb)   08/10/18 86.6 kg (191 lb)              Today, you had the following     No orders found for display       Primary Care Provider Office Phone # Fax #    Lia Dumont [phone]       [address] [place] [address] [address] [place] [id number]        Equal Access to Services     PARRISH LOPEZ : Hadii aad ku hadasho Soomaali, waaxda luqadaha, qaybta kaalmada adeegyada, waxay lancein haysandhya lawrence . So [facility] [phone].    ATENCIÓN: Si habla español, tiene a vaughan disposición servicios gratuitos de asistencia lingüística. [facility] [phone].    We comply with applicable federal civil rights laws and [place] laws. We do not discriminate on the basis of race, color, national origin, age, disability, sex, sexual orientation, or gender identity.            Thank you!     Thank you for choosing [facility]  for your care. Our goal is always to provide you with excellent care. Hearing back from our patients is one way we can continue to improve our services. Please take a few minutes to complete the written survey that you may receive in the mail after your visit with us. Thank you!             Your Updated Medication List - Protect others around you: Learn how to safely use, store and throw away your medicines at  www.disposemymeds.org.          This list is accurate as of 9/13/18 11:12 AM.  Always use your most recent med list.                   Brand Name Dispense Instructions for use Diagnosis    acetaminophen 325 MG tablet    TYLENOL    100 tablet    Take 2 tablets (650 mg) by mouth every 6 hours as needed for pain    S/P revision of total hip       acetaminophen-codeine 300-30 MG per tablet    TYLENOL w/CODEINE No. 3    30 tablet    Take 1-2 tablets by mouth every 4 hours as needed for mild pain    Pain in joint, pelvic region and thigh, unspecified laterality       amLODIPine 10 MG tablet    NORVASC    90 tablet    Take 1 tablet (10 mg) by mouth daily    Benign essential hypertension       aspirin 81 MG EC tablet     90 tablet    Take 1 tablet (81 mg) by mouth daily    Hyperlipidemia LDL goal <70       atorvastatin 20 MG tablet    LIPITOR    90 tablet    Take 1 tablet (20 mg) by mouth daily    Hyperlipidemia LDL goal <70       benazepril 40 MG tablet    LOTENSIN    90 tablet    Take 1 tablet (40 mg) by mouth daily    Essential hypertension, benign       bisacodyl 10 MG Suppository    DULCOLAX    3 suppository    Place 1 suppository rectally daily as needed.    Chronic constipation       cyclobenzaprine 5 MG tablet    FLEXERIL    30 tablet    Take 1 tablet (5 mg) by mouth 3 times daily as needed for muscle spasms    Back muscle spasm       desonide 0.05 % ointment    DESOWEN    60 g    Apply topically 2 times daily To affected areas of scale and redness on the face and ears as needed until clear.    Dermatitis, seborrheic       docusate sodium 100 MG tablet    COLACE    60 tablet    Take 100 mg by mouth 2 times daily    BPH (benign prostatic hyperplasia)       gentamicin 0.1 % ointment    GARAMYCIN     Apply topically 4 times daily    B-cell lymphoma of intra-abdominal lymph nodes, unspecified B-cell lymphoma type (H)       metoprolol succinate 25 MG 24 hr tablet    TOPROL-XL    45 tablet    Take 0.5 tablets (12.5 mg) by  mouth daily    Essential hypertension, benign       MILK OF MAGNESIA PO      Take by mouth At Bedtime        nitroGLYcerin 0.4 MG sublingual tablet    NITROSTAT    30 tablet    Place 1 tablet (0.4 mg) under the tongue every 5 minutes as needed    Coronary artery disease involving native heart without angina pectoris, unspecified vessel or lesion type       [employer] Fiber packet      Take 1 packet by mouth every morning        psyllium 0.52 g capsule      Take by mouth daily Powder        ranitidine 150 MG tablet    ZANTAC    180 tablet    Take 1 tablet (150 mg) by mouth 2 times daily    Gastroesophageal reflux disease without esophagitis       sildenafil 100 MG tablet    VIAGRA    10 tablet    Take 1 tablet (100 mg) by mouth daily as needed    Erectile dysfunction, unspecified erectile dysfunction type       tamsulosin 0.4 MG capsule    FLOMAX    90 capsule    Take 1 capsule (0.4 mg) by mouth daily    Hypertrophy of prostate without urinary obstruction       tolterodine 2 MG tablet    DETROL    180 tablet    Take 1 tablet (2 mg )twice a day    Hypertonicity of bladder       * triamcinolone 0.025 % cream    KENALOG    45 g    Apply topically 2 times daily    Dermatitis       * triamcinolone 0.1 % ointment    KENALOG    80 g    Apply topically 2 times daily To itchy rashes    Intrinsic atopic dermatitis       VITEYES AREDS FORMULA/LUTEIN Caps      Take by mouth daily        * Notice:  This list has 2 medication(s) that are the same as other medications prescribed for you. Read the directions carefully, and ask your doctor or other care provider to review them with you.

## 2018-09-21 ENCOUNTER — TELEPHONE (OUTPATIENT)
Dept: ANESTHESIOLOGY | Facility: CLINIC | Age: 83
End: 2018-09-21

## 2018-09-21 NOTE — TELEPHONE ENCOUNTER
Reached out and spoke with patients wife to inform patient that Dr. Mckeno is on medical leave and that Dr. Hicks will be doing his injection. Zuleyka stated that would be fine since he didn't know Dr. Mckeon and Zuleyka will update patient. Left phone number of [phone] if patient decides he would like to change appointment.

## 2018-09-26 ENCOUNTER — SURGERY (OUTPATIENT)
Age: 83
End: 2018-09-26

## 2018-09-26 ENCOUNTER — OFFICE VISIT (OUTPATIENT)
Dept: ANESTHESIOLOGY | Facility: CLINIC | Age: 83
End: 2018-09-26
Payer: MEDICARE

## 2018-09-26 ENCOUNTER — HOSPITAL ENCOUNTER (OUTPATIENT)
Facility: AMBULATORY SURGERY CENTER | Age: 83
End: 2018-09-26
Payer: MEDICARE

## 2018-09-26 VITALS
TEMPERATURE: 97.2 F | SYSTOLIC BLOOD PRESSURE: 133 MMHG | OXYGEN SATURATION: 97 % | WEIGHT: 189 LBS | DIASTOLIC BLOOD PRESSURE: 57 MMHG | HEART RATE: 52 BPM | HEIGHT: 70 IN | BODY MASS INDEX: 27.06 KG/M2

## 2018-09-26 DIAGNOSIS — M79.18 MYOFASCIAL PAIN: Primary | ICD-10-CM

## 2018-09-26 NOTE — PROGRESS NOTES
Patient initially presented to the procedure area for a second diagnostic lumbar MBB.  At the time of his presentation he was having no back pain.  The procedure was cancelled as there would be no usable diagnostic data from performing the procedure.  While interviewing the patient, he endorsed multiple painful areas over the left and right lateral hip/thigh that were also painful to palpation.  After discussion with the patient, we proceeded with trigger point injections over these areas, as noted below.  Patient had a good response.  We advised him that if his back pain returns, he can reschedule his second diagnostic MBB, or if he has recurrent myofascial pain of the lateral hips, he can schedule a return visit for trigger point injections.    TRIGGER POINT INJECTION PROCEDURE NOTE     INDICATION FOR PROCEDURE:   Daniel Mcnair  is a 88 year old old patient with myofascial pain resulting in difficulty with activities of daily living and reduced quality of life.   His baseline symptoms have been recalcitrant to oral & transdermal medications and conservative therapy. He is here today for trigger point injections.   GOAL OF PROCEDURE:   The goal of this procedure is to increase active range of motion, improve volitional motor control and enhance functional independence associated with dystonic movements.   TOTAL DOSE ADMINISTERED:   Medication used: methylprednisolone 40 mg and Bupivacaine 0.25%  Total Volume of Diluent Used: 10 ml   EQUIPMENT USED:   10 ml syringe, 0.5 inch 27 gauge needle   SKIN PREPARATION:   Skin preparation was performed using chloroprep.     PROCEDURE DETAILS:   The patient was met in the exam  room, where the patient was identified by name, medical record number and date of birth.  All of the patient s last minute questions were answered. Written informed consent was obtained and saved in the electronic medical record, after the risks, benefits, and alternatives were discussed with the  patient.     A formal time-out procedure was performed, as per protocol, including patient name, title of procedure, and site of procedure, and all in the room concurred.     We  identified 3 trigger points on the LEFT lateral hip region by manual palpation. Solution containing 9 mL of 0.25 percent bupivacaine and 1 mL of methylprednisolone 40 milligrams per mL was injected into these trigger points with 1 mL each.  The procedure was repeated on the RIGHT lateral hip into 2 palpated tender points.    MUSCLE INJECTED  - Tensor fascial ysabel- bilateral  - Iliotibial band - bilateral   - Vastus lateralis - bilateral   - Gluteus maxiumus - LEFT     RESPONSE TO PROCEDURE: Daniel tolerated the procedure well and there were no immediate complications. He was allowed to recover for an appropriate period of time and was discharged home in stable condition.     FOLLOW UP:   Daniel was asked to follow up by phone in 7-14 days  to report his response to this injection.    I saw and examined the patient with the fellow and agree with the findings and the plan of care as documented in the fellow's note.   Ronaldo Hicks IV, MD

## 2018-09-26 NOTE — MR AVS SNAPSHOT
After Visit Summary   9/26/2018    Daniel Mcnair    MRN: [id number]           Patient Information     Date Of Birth          10/17/1929        Visit Information        Provider Department      9/26/2018 11:00 AM Ronaldo Hicks MD [facility]        Today's Diagnoses     Myofascial pain    -  1       Follow-ups after your visit        Your next 10 appointments already scheduled     Oct 04, 2018 10:00 AM CDT   (Arrive by 9:45 AM)   Return Visit with MICHAEL Bentley CNP   [facility] ([facility])    [address] [place] [address] [address]  [address]  [facility] [address]   [phone]            Nov 15, 2018  8:30 AM CST   [facility] with  [facility]   [facility] ([facility])    [address]  [address]  [facility] [address]   [phone]            Nov 15, 2018  9:00 AM CST   CT ABDOMEN PELVIS W CONTRAST with UCCT1   [facility] CT ([facility])    [address]  1st [address]  [facility] [address]   [phone]           How do I prepare for my exam? (Food and drink instructions) To prepare: Do not eat or drink for 2 hours before your exam. If you need to take medicine, you may take it with small sips of water. (We may ask you to take liquid medicine as well.)  How do I prepare for my exam? (Other instructions) Please arrive 30 minutes early for your CT.  Once in the department you might be asked to drink water 15-20 minutes prior to your exam.  If indicated you may be asked to drink an oral contrast in advance of your CT.  If this is the case, the imaging team will let you know or be in contact with you prior to your appointment  Patients over 70 or patients with diabetes or kidney problems: If you haven t had a blood test (creatinine test) within the last 30 days, the Cardiologist/Radiologist may require you to  get this test prior to your exam.  If you have diabetes:  Continue to take your metformin medication on the day of your exam  What should I wear: Please wear loose clothing, such as a sweat suit or jogging clothes. Avoid snaps, zippers and other metal. We may ask you to undress and put on a hospital gown.  How long does the exam take: Most scans take less than 20 minutes.  What should I bring: Please bring any scans or X-rays taken at other hospitals, if similar tests were done. Also bring a list of your medicines, including vitamins, minerals and over-the-counter drugs. It is safest to leave personal items at home.  Do I need a : No  is needed.  What do I need to tell my doctor? Be sure to tell your doctor: * If you have any allergies. * If there s any chance you are pregnant. * If you are breastfeeding.  What should I do after the exam: No restrictions, You may resume normal activities.  What is this test: A CT (computed tomography) scan is a series of pictures that allows us to look inside your body. The scanner creates images of the body in cross sections, much like slices of bread. This helps us see any problems more clearly. You may receive contrast (X-ray dye) before or during your scan. You will be asked to drink the contrast.  Who should I call with questions: If you have any questions, please call the Imaging Department where you will have your exam. Directions, parking instructions, and other information is available on our website, [email or web address].            Nov 15, 2018 12:00 PM CST   (Arrive by 11:45 AM)   Return Visit with Tanner Rojas MD   [facility] ([facility])    [address]  [address]  [facility] [address]   [phone]            Jan 29, 2019 10:15 AM CST   RETURN RETINA with Luna Parker MD   [facility] ([facility])    [address]  [place] [address]   [phone]              Who to contact     Please call your clinic at [phone] to:    Ask questions about your health    Make or cancel appointments    Discuss your medicines    Learn about your test results    Speak to your doctor            Additional Information About Your Visit        MyChart Information     [employer] gives you secure access to your electronic health record. If you see a primary care provider, you can also send messages to your care team and make appointments. If you have questions, please call your primary care clinic.  If you do not have a primary care provider, please call [phone] and they will assist you.      [employer] is an electronic gateway that provides easy, online access to your medical records. With [employer], you can request a clinic appointment, read your test results, renew a prescription or communicate with your care team.     To access your existing account, please contact your [facility] or call [phone] for assistance.        Care EveryWhere ID     This is your Care EveryWhere ID. This could be used by other organizations to access your [facility] medical records  [id number]         Blood Pressure from Last 3 Encounters:   09/26/18 133/57   09/13/18 119/55   08/29/18 161/67    Weight from Last 3 Encounters:   09/26/18 85.7 kg (189 lb)   09/13/18 86.3 kg (190 lb 3.2 oz)   08/29/18 83.9 kg (185 lb)              Today, you had the following     No orders found for display       Primary Care Provider Office Phone # Fax #    Lia Dumont [phone]       [address] [place] [address]  [facility] [address]        Equal Access to Services     [facility]: Hadii aad lida godoy Sotremayne, waaxda luqadaha, qaybta kaalmada jose miguel bingham. So [facility] [phone].    ATENCIÓN: Si habla español, tiene a vaughan disposición servicios gratuitos de asistencia lingüística. Llame al  [phone].    We comply with applicable federal civil rights laws and [place] laws. We do not discriminate on the basis of race, color, national origin, age, disability, sex, sexual orientation, or gender identity.            Thank you!     Thank you for choosing [facility]  for your care. Our goal is always to provide you with excellent care. Hearing back from our patients is one way we can continue to improve our services. Please take a few minutes to complete the written survey that you may receive in the mail after your visit with us. Thank you!             Your Updated Medication List - Protect others around you: Learn how to safely use, store and throw away your medicines at www.disposemymeds.org.          This list is accurate as of 9/26/18 11:59 PM.  Always use your most recent med list.                   Brand Name Dispense Instructions for use Diagnosis    acetaminophen 325 MG tablet    TYLENOL    100 tablet    Take 2 tablets (650 mg) by mouth every 6 hours as needed for pain    S/P revision of total hip       acetaminophen-codeine 300-30 MG per tablet    TYLENOL w/CODEINE No. 3    30 tablet    Take 1-2 tablets by mouth every 4 hours as needed for mild pain    Pain in joint, pelvic region and thigh, unspecified laterality       amLODIPine 10 MG tablet    NORVASC    90 tablet    Take 1 tablet (10 mg) by mouth daily    Benign essential hypertension       aspirin 81 MG EC tablet     90 tablet    Take 1 tablet (81 mg) by mouth daily    Hyperlipidemia LDL goal <70       atorvastatin 20 MG tablet    LIPITOR    90 tablet    Take 1 tablet (20 mg) by mouth daily    Hyperlipidemia LDL goal <70       benazepril 40 MG tablet    LOTENSIN    90 tablet    Take 1 tablet (40 mg) by mouth daily    Essential hypertension, benign       bisacodyl 10 MG Suppository    DULCOLAX    3 suppository    Place 1 suppository rectally daily as needed.    Chronic constipation        cyclobenzaprine 5 MG tablet    FLEXERIL    30 tablet    Take 1 tablet (5 mg) by mouth 3 times daily as needed for muscle spasms    Back muscle spasm       desonide 0.05 % ointment    DESOWEN    60 g    Apply topically 2 times daily To affected areas of scale and redness on the face and ears as needed until clear.    Dermatitis, seborrheic       docusate sodium 100 MG tablet    COLACE    60 tablet    Take 100 mg by mouth 2 times daily    BPH (benign prostatic hyperplasia)       gentamicin 0.1 % ointment    GARAMYCIN     Apply topically 4 times daily    B-cell lymphoma of intra-abdominal lymph nodes, unspecified B-cell lymphoma type (H)       metoprolol succinate 25 MG 24 hr tablet    TOPROL-XL    45 tablet    Take 0.5 tablets (12.5 mg) by mouth daily    Essential hypertension, benign       MILK OF MAGNESIA PO      Take by mouth At Bedtime        nitroGLYcerin 0.4 MG sublingual tablet    NITROSTAT    30 tablet    Place 1 tablet (0.4 mg) under the tongue every 5 minutes as needed    Coronary artery disease involving native heart without angina pectoris, unspecified vessel or lesion type       N[employer]ri[employer] Fiber packet      Take 1 packet by mouth every morning        ranitidine 150 MG tablet    ZANTAC    180 tablet    Take 1 tablet (150 mg) by mouth 2 times daily    Gastroesophageal reflux disease without esophagitis       sildenafil 100 MG tablet    VIAGRA    10 tablet    Take 1 tablet (100 mg) by mouth daily as needed    Erectile dysfunction, unspecified erectile dysfunction type       tamsulosin 0.4 MG capsule    FLOMAX    90 capsule    Take 1 capsule (0.4 mg) by mouth daily    Hypertrophy of prostate without urinary obstruction       tolterodine 2 MG tablet    DETROL    180 tablet    Take 1 tablet (2 mg )twice a day    Hypertonicity of bladder       * triamcinolone 0.025 % cream    KENALOG    45 g    Apply topically 2 times daily    Dermatitis       * triamcinolone 0.1 % ointment    KENALOG    80 g    Apply topically  2 times daily To itchy rashes    Intrinsic atopic dermatitis       VITEYES AREDS FORMULA/LUTEIN Caps      Take by mouth daily        * Notice:  This list has 2 medication(s) that are the same as other medications prescribed for you. Read the directions carefully, and ask your doctor or other care provider to review them with you.

## 2018-09-26 NOTE — LETTER
9/26/2018       RE: Daniel Mcnair  [address]  [place] [id number]     Dear Colleague,    Thank you for referring your patient, Daniel Mcnair, to the [facility] at [facility]. Please see a copy of my visit note below.    Patient initially presented to the procedure area for a second diagnostic lumbar MBB.  At the time of his presentation he was having no back pain.  The procedure was cancelled as there would be no usable diagnostic data from performing the procedure.  While interviewing the patient, he endorsed multiple painful areas over the left and right lateral hip/thigh that were also painful to palpation.  After discussion with the patient, we proceeded with trigger point injections over these areas, as noted below.  Patient had a good response.  We advised him that if his back pain returns, he can reschedule his second diagnostic MBB, or if he has recurrent myofascial pain of the lateral hips, he can schedule a return visit for trigger point injections.    TRIGGER POINT INJECTION PROCEDURE NOTE     INDICATION FOR PROCEDURE:   Daniel Mcnair  is a 88 year old old patient with myofascial pain resulting in difficulty with activities of daily living and reduced quality of life.   His baseline symptoms have been recalcitrant to oral & transdermal medications and conservative therapy. He is here today for trigger point injections.   GOAL OF PROCEDURE:   The goal of this procedure is to increase active range of motion, improve volitional motor control and enhance functional independence associated with dystonic movements.   TOTAL DOSE ADMINISTERED:   Medication used: methylprednisolone 40 mg and Bupivacaine 0.25%  Total Volume of Diluent Used: 10 ml   EQUIPMENT USED:   10 ml syringe, 0.5 inch 27 gauge needle   SKIN PREPARATION:   Skin preparation was performed using chloroprep.     PROCEDURE  DETAILS:   The patient was met in the exam  room, where the patient was identified by name, medical record number and date of birth.  All of the patient s last minute questions were answered. Written informed consent was obtained and saved in the electronic medical record, after the risks, benefits, and alternatives were discussed with the patient.     A formal time-out procedure was performed, as per protocol, including patient name, title of procedure, and site of procedure, and all in the room concurred.     We  identified 3 trigger points on the LEFT lateral hip region by manual palpation. Solution containing 9 mL of 0.25 percent bupivacaine and 1 mL of methylprednisolone 40 milligrams per mL was injected into these trigger points with 1 mL each.  The procedure was repeated on the RIGHT lateral hip into 2 palpated tender points.    MUSCLE INJECTED  - Tensor fascial ysabel- bilateral  - Iliotibial band - bilateral   - Vastus lateralis - bilateral   - Gluteus maxiumus - LEFT     RESPONSE TO PROCEDURE: Daniel tolerated the procedure well and there were no immediate complications. He was allowed to recover for an appropriate period of time and was discharged home in stable condition.     FOLLOW UP:   Daniel was asked to follow up by phone in 7-14 days  to report his response to this injection.    I saw and examined the patient with the fellow and agree with the findings and the plan of care as documented in the fellow's note.   Ronaldo Hicks IV, MD

## 2018-10-01 ASSESSMENT — ENCOUNTER SYMPTOMS
BACK PAIN: 1
ARTHRALGIAS: 1
NECK PAIN: 0
JOINT SWELLING: 0
MUSCLE CRAMPS: 0
STIFFNESS: 1
MYALGIAS: 1
MUSCLE WEAKNESS: 0

## 2018-10-04 ENCOUNTER — OFFICE VISIT (OUTPATIENT)
Dept: CARDIOLOGY | Facility: CLINIC | Age: 83
End: 2018-10-04
Attending: NURSE PRACTITIONER
Payer: MEDICARE

## 2018-10-04 VITALS
HEIGHT: 70 IN | DIASTOLIC BLOOD PRESSURE: 58 MMHG | BODY MASS INDEX: 27.29 KG/M2 | OXYGEN SATURATION: 98 % | HEART RATE: 52 BPM | WEIGHT: 190.6 LBS | SYSTOLIC BLOOD PRESSURE: 124 MMHG

## 2018-10-04 DIAGNOSIS — E78.5 HYPERLIPIDEMIA LDL GOAL <70: ICD-10-CM

## 2018-10-04 DIAGNOSIS — I25.10 CORONARY ARTERY DISEASE INVOLVING NATIVE CORONARY ARTERY OF NATIVE HEART WITHOUT ANGINA PECTORIS: Primary | ICD-10-CM

## 2018-10-04 DIAGNOSIS — I10 BENIGN ESSENTIAL HYPERTENSION: ICD-10-CM

## 2018-10-04 PROCEDURE — G0463 HOSPITAL OUTPT CLINIC VISIT: HCPCS | Mod: 25,ZF

## 2018-10-04 PROCEDURE — 93010 ELECTROCARDIOGRAM REPORT: CPT | Mod: ZP | Performed by: INTERNAL MEDICINE

## 2018-10-04 PROCEDURE — 93005 ELECTROCARDIOGRAM TRACING: CPT | Mod: ZF

## 2018-10-04 PROCEDURE — 99214 OFFICE O/P EST MOD 30 MIN: CPT | Mod: 25 | Performed by: NURSE PRACTITIONER

## 2018-10-04 ASSESSMENT — PAIN SCALES - GENERAL: PAINLEVEL: NO PAIN (0)

## 2018-10-04 NOTE — PATIENT INSTRUCTIONS
Patient Instructions:    It was a pleasure to see you in the cardiology clinic today.    If you have any questions you can reach our nurse triage line at [phone].  Press Option #1 for the [facility], then press Option #3 for nursing or Option #1 for scheduling. We also encourage the use of PeopleAdmin to communicate with your HealthCare Provider    Note new medications: no changes  Stop the following medications: no changes    The results from today include: EKG is unchanged  Please follow up with Cardiology in one year.  Please have your lipids and metabolic profile (bloodwork) checked.    Control your risk of coronary artery disease with these four lifestyle changes:  - Eating a heart healthy diet by following the American Heart Association Recommendations: Reduce saturated fat and trans fat to 5-6 percent of daily calories and minimizing the amount of trans fat you eat by limiting your intake of red meat and dairy products made with whole milk. It also means choosing skim milk, low-fat or fat-free dairy products, limiting fried food, and cooking with healthy oils such as vegetable oil. A healthy diet should include emphasis on fruits, vegetables, whole grains, poultry, fish and nuts, and limiting sugary foods and beverages. We recommend following the DASH (Dietary Approaches to Stop Hypertension) or Mediterranean Diet.  - Regular Exercise: Just 40 minutes of aerobic exercise of moderate to vigorous intensity done 3-4 times per week is enough to lower both cholesterol and high blood pressure. Brisk walking, swimming, bicycling or a dance class are examples.  - Avoiding Tobacco Smoking: Smoking compounds the risk from other risk factors for heart disease including high cholesterol, high blood pressure, and diabetes. Smokers can lower cholesterol, blood pressure, and protect their arteries by quitting. Ask to learn more about quitting smoking.  - Losing Weight: Being overweight or  obese raises your risk of high cholesterol, high blood pressure, and diabetes which are all risk factors for heart disease. Losing excess weight can improve cholesterol levels, blood pressure, and reduce incidence of diabetes and potentially reverse these disease processes.     Get help if you experience any of these heart attack warning signs: Although some heart attacks are sudden and intense, most start slowly, with mild pain or discomfort. Pay attention to your body -- and call 911 if you feel:  - Chest discomfort: Most heart attacks involve discomfort in the center of the chest that lasts more than a few minutes, or that goes away and comes back. It can feel like uncomfortable pressure, squeezing, fullness or pain.  - Discomfort in other areas of the upper body: Symptoms can include pain or discomfort in one or both arms, the back, neck, jaw or stomach.   - Shortness of breath with or without chest discomfort   - Other signs may include breaking out in a cold sweat, nausea or lightheadedness      Sincerely,    Lai Bo, CNP

## 2018-10-04 NOTE — PROGRESS NOTES
Chief Complaint: Annual follow-up CAD    HPI: 88-year-old male with history of coronary artery disease with his last PCI being in 2003 Presents for annual follow-up.  In the interim since he saw his last he said 1 back injection for lumbar radiculopathy.  He has not had any hospitalizations.  Denies any chest pain, chest discomfort, exertional angina, exertional dyspnea, syncope, lightheadedness, or dizziness.  He does report bilateral lower extremity discomfort with walking.  There is no change in his lower extremity edema and he is currently wearing support stockings.    PAST MEDICAL HISTORY:  Past Medical History:   Diagnosis Date     Anal stricture 7/29/2011     Baker's cyst      Basal cell carcinoma      Chronic pain     left hip     Coronary artery disease 9/5/2012     Esophageal reflux 3/20/2013    not been an issue     Hearing loss      Hypertrophy of prostate without urinary obstruction and other lower urinary tract symptoms (LUTS) 7/21/2011     Idiopathic gynecomastia 7/29/2011     Impotence of organic origin 7/21/2011     Lymphoma (H) 1/25/2012     Macular degeneration, dry      Other and unspecified hyperlipidemia 7/21/2011     Snoring      Spinal stenosis, lumbar region, without neurogenic claudication 7/21/2011     Stented coronary artery 2003    x5; two separate times     Unspecified essential hypertension 7/21/2011       CURRENT MEDICATIONS:  Current Outpatient Prescriptions   Medication Sig Dispense Refill     acetaminophen (TYLENOL) 325 MG tablet Take 2 tablets (650 mg) by mouth every 6 hours as needed for pain 100 tablet 0     amLODIPine (NORVASC) 10 MG tablet Take 1 tablet (10 mg) by mouth daily 90 tablet 3     aspirin 81 MG EC tablet Take 1 tablet (81 mg) by mouth daily 90 tablet 3     atorvastatin (LIPITOR) 20 MG tablet Take 1 tablet (20 mg) by mouth daily 90 tablet 2     bisacodyl (DULCOLAX) 10 MG suppository Place 1 suppository rectally daily as needed. 3 suppository 0     docusate sodium 100  MG tablet Take 100 mg by mouth 2 times daily 60 tablet 1     Guar Gum (BENEFIBER) packet Take 1 packet by mouth every morning        Magnesium Hydroxide (MILK OF MAGNESIA PO) Take by mouth At Bedtime       metoprolol succinate (TOPROL-XL) 25 MG 24 hr tablet Take 0.5 tablets (12.5 mg) by mouth daily 45 tablet 3     Multiple Vitamins-Minerals (VITEYES AREDS FORMULA/LUTEIN) CAPS Take by mouth daily       nitroglycerin (NITROSTAT) 0.4 MG sublingual tablet Place 1 tablet (0.4 mg) under the tongue every 5 minutes as needed 30 tablet 2     ranitidine (ZANTAC) 150 MG tablet Take 1 tablet (150 mg) by mouth 2 times daily 180 tablet 3     sildenafil (VIAGRA) 100 MG tablet Take 1 tablet (100 mg) by mouth daily as needed 10 tablet 2     tamsulosin (FLOMAX) 0.4 MG capsule Take 1 capsule (0.4 mg) by mouth daily 90 capsule 3     tolterodine (DETROL) 2 MG tablet Take 1 tablet (2 mg )twice a day 180 tablet 2     benazepril (LOTENSIN) 40 MG tablet Take 1 tablet (40 mg) by mouth daily 90 tablet 3     gentamicin (GARAMYCIN) 0.1 % ointment Apply topically 4 times daily   3       PAST SURGICAL HISTORY:  Past Surgical History:   Procedure Laterality Date     ARTHROPLASTY HIP  10/2008    left     ARTHROPLASTY HIP  1999    right     ARTHROPLASTY REVISION HIP  1/21/2014    Procedure: ARTHROPLASTY REVISION HIP;  Revision Left Total Hip;  Surgeon: Edgar Grewal MD;  Location: [facility]     BACK SURGERY  2006     CARDIAC SURGERY  2001    stent x 5      CARDIAC SURGERY  2002     CATARACT IOL, RT/LT Bilateral      CYSTOSCOPY, TRANSURETHRAL RESECTION (TUR) PROSTATE, COMBINED  11/5/2013    Procedure: COMBINED CYSTOSCOPY, TRANSURETHRAL RESECTION (TUR) PROSTATE;  Transurethral Resection Of Prostate, Bipolar;  Surgeon: Lai Street MD;  [facility]     ESOPHAGOSCOPY, GASTROSCOPY, DUODENOSCOPY (EGD), COMBINED  2/3/2014    Procedure: COMBINED ESOPHAGOSCOPY, GASTROSCOPY, DUODENOSCOPY (EGD);;  Surgeon: Ezra Fiore MD;  [facility]      ESOPHAGOSCOPY, GASTROSCOPY, DUODENOSCOPY (EGD), COMBINED  4/15/2014    Procedure: COMBINED ESOPHAGOSCOPY, GASTROSCOPY, DUODENOSCOPY (EGD), BIOPSY SINGLE OR MULTIPLE;  Surgeon: Ezra Fiore MD;  [facility]     EXTRACAPSULAR CATARACT EXTRATION WITH INTRAOCULAR LENS IMPLANT  2005     EXTRACAPSULAR CATARACT EXTRATION WITH INTRAOCULAR LENS IMPLANT  2010     HERNIA REPAIR       INJECT NERVE BLOCK LUMBAR PARAVERTEBRAL SYMPATHETIC Left 8/29/2018    Procedure: INJECT NERVE BLOCK LUMBAR PARAVERTEBRAL SYMPATHETIC;  Left L3, L4, sacral Ala medial branch block;  Surgeon: Ronaldo Hicks MD;  [facility]     MASTOID SURGERY  1939     MOHS MICROGRAPHIC PROCEDURE       OPTICAL TRACKING SYSTEM FUSION SPINE POSTERIOR LUMBAR TWO LEVELS  7/11/2013    Procedure: OPTICAL TRACKING SYSTEM FUSION SPINE POSTERIOR LUMBAR TWO LEVELS;  Stealth Assisted Lumbar 3-4 Transforaminal Lumbar Interbody Fusion, Lumbar 2-3 Lumbar 3-4 Laminectomy Decompression;  Surgeon: Edgar Lew MD;  Location: [facility]       ALLERGIES     Allergies   Allergen Reactions     Nka [No Known Allergies]        FAMILY HISTORY:  Family History   Problem Relation Age of Onset     Diabetes Maternal Grandfather      Alcohol/Drug Maternal Grandfather      Arthritis Maternal Grandfather      Obesity Maternal Grandfather      Cerebrovascular Disease Maternal Grandmother      Hypertension Mother      willa rand     Glaucoma No family hx of      Macular Degeneration No family hx of      Cancer No family hx of      No known family hx of skin cancer     Skin Cancer No family hx of        SOCIAL HISTORY:  Social History     Social History     Marital status:      Spouse name: N/A     Number of children: N/A     Years of education: N/A     Social History Main Topics     Smoking status: Never Smoker     Smokeless tobacco: Never Used     Alcohol use 1.0 oz/week      Comment: occ     Drug use: No     Sexual activity: Not Asked     Other Topics  "Concern     None     Social History Narrative       ROS:    ROS: ROS all other systems negative, other than the symptoms noted above in the HPI.    EXAM:  /58 (BP Location: Left arm, Patient Position: Chair, Cuff Size: Adult Regular)  Pulse 52  Ht 1.778 m (5' 10\")  Wt 86.5 kg (190 lb 9.6 oz)  SpO2 98%  BMI 27.35 kg/m2  GEN:patient is in no apparent distress.    HEENT: NC/AT.  Sclerae white, no incertus  Neck: No adenopathy.Carotids brisk bilaterally without bruits.  No jugular venous distension.   Heart: RRR. Normal S1, S2. No murmur, rub, click, or gallop.    Lungs: CTA.  No ronchi, wheezes, rales.   Abdomen: Soft, nontender, nondistended.  Extremities: No clubbing, cyanosis.  The pulses are 2+ at the bilateral radial, DP, and PT, 1-2+ bilateral lower extremity edema (with support stockings in place)  Neurologic: Awake and alert, normal speech, gait and affect  Skin: No petechiae, purpura or rash noted    Labs:  LIPID RESULTS:  Lab Results   Component Value Date    CHOL 88 10/03/2017    HDL 45 10/03/2017    LDL 27 10/03/2017    TRIG 78 10/03/2017    CHOLHDLRATIO 2.5 07/08/2015       LIVER ENZYME RESULTS:  Lab Results   Component Value Date    AST 15 11/16/2017    ALT 23 11/16/2017       CBC RESULTS:  Lab Results   Component Value Date    WBC 7.5 11/16/2017    RBC 4.06 (L) 11/16/2017    HGB 13.9 11/16/2017    HCT 38.3 (L) 11/16/2017    MCV 94 11/16/2017    MCH 34.2 (H) 11/16/2017    MCHC 36.3 11/16/2017    RDW 13.2 11/16/2017     (L) 11/16/2017       BMP RESULTS:  Lab Results   Component Value Date     11/16/2017    POTASSIUM 4.4 11/16/2017    CHLORIDE 106 11/16/2017    CO2 22 11/16/2017    ANIONGAP 8 11/16/2017     (H) 11/16/2017    BUN 11 11/16/2017    CR 0.86 11/16/2017    GFRESTIMATED 84 11/16/2017    GFRESTBLACK >90 11/16/2017    LUCIUS 8.8 11/16/2017        A1C RESULTS:  No results found for: A1C    INR RESULTS:  Lab Results   Component Value Date    INR 1.33 (H) 02/03/2014    INR " 1.42 (H) 02/02/2014       Procedures/Diagnostics:  EKG today demonstrates sinus bradycardia with a PAC 54 bpm first-degree AV block    Assessment and Plan:   1.  Coronary artery disease history of multiple PCI (2002, 2003) to the mid LAD and second diagonal branch: He has been doing well over the past 15 years and denies any exertional symptoms.  Continue him on aggressive cardiovascular risk management including statin, aspirin.    2.  Hypertension: Blood pressure is well controlled on his current regimen will be continued.    3.  Bradycardia with first-degree AV block: This was worked up at his visit in 2017 including a 48-hour Holter monitor which did not demonstrate any high degree AV block.  He has not had any concerning symptoms as well.    4.  Exertional lower extremity pain: This is also been worked up in the past and is secondary to his known spinal stenosis.    5.  Venous insufficiency with chronic lower extremity edema: Support stockings in place.  We also discussed sodium avoidance.    6.  Hyperlipidemia: Last LDL from October 2017 was 27; him and update his lipid panel this time we will continue him on his current dose of atorvastatin.     We will update his lipid panel and CMP and follow-up in 1 year.    CC  Patient Care Team:  Lia Dumont MD as PCP - General (Internal Medicine)  Tanner Rojas MD as MD (Hematology & Oncology)  Jaimee Jimenez, RN as Registered Nurse (Urology)  Edgar Lew MD as MD (Specialist)  Edgar Grewal MD as Referring Physician (Orthopedics)  Luna Parker MD as MD (Ophthalmology)  Sangeeta Plaza, RN as Nurse Coordinator (Hematology & Oncology)  Aiden Davidson MD as MD (Dermatology)  Teodora Mooney, SANJU as Nurse Coordinator (Physical Medicine and Rehab)  Deedee Henderson, RN as Nurse Coordinator (Cardiology)  Tanner Rojas MD as MD (Internal Medicine)  Sangeeta Plaza, RN as Nurse Coordinator (Hematology &  Oncology)  Deedee Henderson, RN as Nurse Coordinator (Cardiology)  Carlos Biswas MD as MD (Ophthalmology)  SELF, REFERRED

## 2018-10-04 NOTE — MR AVS SNAPSHOT
After Visit Summary   10/4/2018    Daniel Mcnair    MRN: [id number]           Patient Information     Date Of Birth          10/17/1929        Visit Information        Provider Department      10/4/2018 10:00 AM Lai Bo APRN CNP [facility]        Today's Diagnoses     Coronary artery disease involving native coronary artery of native heart without angina pectoris    -  1    Hyperlipidemia LDL goal <70        Benign essential hypertension          Care Instructions    Patient Instructions:    It was a pleasure to see you in the cardiology clinic today.    If you have any questions you can reach our nurse triage line at [phone].  Press Option #1 for the [facility], then press Option #3 for nursing or Option #1 for scheduling. We also encourage the use of [employer] to communicate with your HealthCare Provider    Note new medications: no changes  Stop the following medications: no changes    The results from today include: EKG is unchanged  Please follow up with Cardiology in one year.  Please have your lipids and metabolic profile (bloodwork) checked.    Control your risk of coronary artery disease with these four lifestyle changes:  - Eating a heart healthy diet by following the American Heart Association Recommendations: Reduce saturated fat and trans fat to 5-6 percent of daily calories and minimizing the amount of trans fat you eat by limiting your intake of red meat and dairy products made with whole milk. It also means choosing skim milk, low-fat or fat-free dairy products, limiting fried food, and cooking with healthy oils such as vegetable oil. A healthy diet should include emphasis on fruits, vegetables, whole grains, poultry, fish and nuts, and limiting sugary foods and beverages. We recommend following the DASH (Dietary Approaches to Stop Hypertension) or Mediterranean Diet.  - Regular Exercise: Just 40 minutes of aerobic exercise  of moderate to vigorous intensity done 3-4 times per week is enough to lower both cholesterol and high blood pressure. Brisk walking, swimming, bicycling or a dance class are examples.  - Avoiding Tobacco Smoking: Smoking compounds the risk from other risk factors for heart disease including high cholesterol, high blood pressure, and diabetes. Smokers can lower cholesterol, blood pressure, and protect their arteries by quitting. Ask to learn more about quitting smoking.  - Losing Weight: Being overweight or obese raises your risk of high cholesterol, high blood pressure, and diabetes which are all risk factors for heart disease. Losing excess weight can improve cholesterol levels, blood pressure, and reduce incidence of diabetes and potentially reverse these disease processes.     Get help if you experience any of these heart attack warning signs: Although some heart attacks are sudden and intense, most start slowly, with mild pain or discomfort. Pay attention to your body -- and call 911 if you feel:  - Chest discomfort: Most heart attacks involve discomfort in the center of the chest that lasts more than a few minutes, or that goes away and comes back. It can feel like uncomfortable pressure, squeezing, fullness or pain.  - Discomfort in other areas of the upper body: Symptoms can include pain or discomfort in one or both arms, the back, neck, jaw or stomach.   - Shortness of breath with or without chest discomfort   - Other signs may include breaking out in a cold sweat, nausea or lightheadedness      Sincerely,    Lai Bo CNP            Follow-ups after your visit        Additional Services     Follow-Up with Cardiologist                 Your next 10 appointments already scheduled     Nov 15, 2018  8:30 AM [facility]   [facility] Draw with  [facility] DRAW   [facility] Draw ([facility])    [address]  [address]  [facility] [place] [address] [phone]             Nov 15, 2018  9:00 AM CST   CT ABDOMEN PELVIS W CONTRAST with [facility]   [facility] CT ([facility])    [address]  1[address]  [facility] [place] [address] [phone]           How do I prepare for my exam? (Food and drink instructions) To prepare: Do not eat or drink for 2 hours before your exam. If you need to take medicine, you may take it with small sips of water. (We may ask you to take liquid medicine as well.)  How do I prepare for my exam? (Other instructions) Please arrive 30 minutes early for your CT.  Once in the department you might be asked to drink water 15-20 minutes prior to your exam.  If indicated you may be asked to drink an oral contrast in advance of your CT.  If this is the case, the imaging team will let you know or be in contact with you prior to your appointment  Patients over 70 or patients with diabetes or kidney problems: If you haven t had a blood test (creatinine test) within the last 30 days, the Cardiologist/Radiologist may require you to get this test prior to your exam.  If you have diabetes:  Continue to take your metformin medication on the day of your exam  What should I wear: Please wear loose clothing, such as a sweat suit or jogging clothes. Avoid snaps, zippers and other metal. We may ask you to undress and put on a hospital gown.  How long does the exam take: Most scans take less than 20 minutes.  What should I bring: Please bring any scans or X-rays taken at other hospitals, if similar tests were done. Also bring a list of your medicines, including vitamins, minerals and over-the-counter drugs. It is safest to leave personal items at home.  Do I need a : No  is needed.  What do I need to tell my doctor? Be sure to tell your doctor: * If you have any allergies. * If there s any chance you are pregnant. * If you are breastfeeding.  What should I do after the exam: No restrictions, You may resume normal activities.   What is this test: A CT (computed tomography) scan is a series of pictures that allows us to look inside your body. The scanner creates images of the body in cross sections, much like slices of bread. This helps us see any problems more clearly. You may receive contrast (X-ray dye) before or during your scan. You will be asked to drink the contrast.  Who should I call with questions: If you have any questions, please call the Imaging Department where you will have your exam. Directions, parking instructions, and other information is available on our website, [email or web address].            Nov 15, 2018 12:00 PM CST   (Arrive by 11:45 AM)   Return Visit with Tanner Rojas MD   [facility] ([facility])    [address]  [address]  [place] [facility] [address] [phone]            Jan 29, 2019 10:15 AM CST   RETURN RETINA with Luna Parker MD   [facility] ([facility])    [address]  [facility] [id number] [phone]              Future tests that were ordered for you today     Open Future Orders        Priority Expected Expires Ordered    Follow-Up with Cardiologist Routine 10/4/2019 1/2/2020 10/4/2018            Who to contact     If you have questions or need follow up information about today's clinic visit or your schedule please contact [facility] directly at [phone].  Normal or non-critical lab and imaging results will be communicated to you by MyChart, letter or phone within 4 business days after the clinic has received the results. If you do not hear from us within 7 days, please contact the clinic through MyChart or phone. If you have a critical or abnormal lab result, we will notify you by phone as soon as possible.  Submit refill requests through appAttach or call your pharmacy and they will forward the refill request to us. Please allow 3 business days for your  "refill to be completed.          Additional Information About Your Visit        Spare [employer] Information     [employer] gives you secure access to your electronic health record. If you see a primary care provider, you can also send messages to your care team and make appointments. If you have questions, please call your primary care clinic.  If you do not have a primary care provider, please call [phone] and they will assist you.        Care EveryWhere ID     This is your Care EveryWhere ID. This could be used by other organizations to access your [place] medical records  [id number]        Your Vitals Were     Pulse Height Pulse Oximetry BMI (Body Mass Index)          52 1.778 m (5' 10\") 98% 27.35 kg/m2         Blood Pressure from Last 3 Encounters:   10/04/18 124/58   09/26/18 133/57   09/13/18 119/55    Weight from Last 3 Encounters:   10/04/18 86.5 kg (190 lb 9.6 oz)   09/26/18 85.7 kg (189 lb)   09/13/18 86.3 kg (190 lb 3.2 oz)              We Performed the Following     Comprehensive metabolic panel     EKG 12-lead, tracing only (Same Day)     Lipid Profile          Today's Medication Changes          These changes are accurate as of 10/4/18 10:30 AM.  If you have any questions, ask your nurse or doctor.               Stop taking these medicines if you haven't already. Please contact your care team if you have questions.     acetaminophen-codeine 300-30 MG per tablet   Commonly known as:  TYLENOL w/CODEINE No. 3   Stopped by:  Lai Bo APRN CNP           cyclobenzaprine 5 MG tablet   Commonly known as:  FLEXERIL   Stopped by:  Lai Bo APRN CNP           desonide 0.05 % ointment   Commonly known as:  DESOWEN   Stopped by:  Lai Bo APRN CNP           triamcinolone 0.025 % cream   Commonly known as:  KENALOG   Stopped by:  Lai Bo APRN CNP           triamcinolone 0.1 % ointment   Commonly known as:  KENALOG   Stopped by:  Lai Bo" MICHAEL Woods CNP                    Primary Care Provider Office Phone # Fax #    Lia AYAN MD Tim [phone]       [address] [place] [address] [address] [place] [address]        Equal Access to Services     PARRISH LOPEZ : Hadcinthia brandy ku keio Soomaali, waaxda luqadaha, qaybta kaalmada adeegyada, waxnoam idiin richardn jacinta weaver laSarahsandhya lopez. So [facility] [phone].    ATENCIÓN: Si habla español, tiene a vaughan disposición servicios gratuitos de asistencia lingüística. Llame al [phone].    We comply with applicable federal civil rights laws and [place] laws. We do not discriminate on the basis of race, color, national origin, age, disability, sex, sexual orientation, or gender identity.            Thank you!     Thank you for choosing [facility]  for your care. Our goal is always to provide you with excellent care. Hearing back from our patients is one way we can continue to improve our services. Please take a few minutes to complete the written survey that you may receive in the mail after your visit with us. Thank you!             Your Updated Medication List - Protect others around you: Learn how to safely use, store and throw away your medicines at www.disposemymeds.org.          This list is accurate as of 10/4/18 10:30 AM.  Always use your most recent med list.                   Brand Name Dispense Instructions for use Diagnosis    acetaminophen 325 MG tablet    TYLENOL    100 tablet    Take 2 tablets (650 mg) by mouth every 6 hours as needed for pain    S/P revision of total hip       amLODIPine 10 MG tablet    NORVASC    90 tablet    Take 1 tablet (10 mg) by mouth daily    Benign essential hypertension       aspirin 81 MG EC tablet     90 tablet    Take 1 tablet (81 mg) by mouth daily    Hyperlipidemia LDL goal <70       atorvastatin 20 MG tablet    LIPITOR    90 tablet    Take 1 tablet (20 mg) by mouth daily    Hyperlipidemia LDL goal <70       benazepril 40 MG tablet    LOTENSIN    90  tablet    Take 1 tablet (40 mg) by mouth daily    Essential hypertension, benign       bisacodyl 10 MG Suppository    DULCOLAX    3 suppository    Place 1 suppository rectally daily as needed.    Chronic constipation       docusate sodium 100 MG tablet    COLACE    60 tablet    Take 100 mg by mouth 2 times daily    BPH (benign prostatic hyperplasia)       gentamicin 0.1 % ointment    GARAMYCIN     Apply topically 4 times daily    B-cell lymphoma of intra-abdominal lymph nodes, unspecified B-cell lymphoma type (H)       metoprolol succinate 25 MG 24 hr tablet    TOPROL-XL    45 tablet    Take 0.5 tablets (12.5 mg) by mouth daily    Essential hypertension, benign       MILK OF MAGNESIA PO      Take by mouth At Bedtime        nitroGLYcerin 0.4 MG sublingual tablet    NITROSTAT    30 tablet    Place 1 tablet (0.4 mg) under the tongue every 5 minutes as needed    Coronary artery disease involving native heart without angina pectoris, unspecified vessel or lesion type       [employer] Fiber packet      Take 1 packet by mouth every morning        ranitidine 150 MG tablet    ZANTAC    180 tablet    Take 1 tablet (150 mg) by mouth 2 times daily    Gastroesophageal reflux disease without esophagitis       sildenafil 100 MG tablet    VIAGRA    10 tablet    Take 1 tablet (100 mg) by mouth daily as needed    Erectile dysfunction, unspecified erectile dysfunction type       tamsulosin 0.4 MG capsule    FLOMAX    90 capsule    Take 1 capsule (0.4 mg) by mouth daily    Hypertrophy of prostate without urinary obstruction       tolterodine 2 MG tablet    DETROL    180 tablet    Take 1 tablet (2 mg )twice a day    Hypertonicity of bladder       VITEYES AREDS FORMULA/LUTEIN Caps      Take by mouth daily

## 2018-10-04 NOTE — LETTER
10/4/2018      RE: Daniel Mcnair  [address] [facility] [address]  [place] [id number]       Dear Colleague,    Thank you for the opportunity to participate in the care of your patient, Daniel Mcnair, at the [facility]. Please see a copy of my visit note below.    Chief Complaint: Annual follow-up CAD    HPI: 88-year-old male with history of coronary artery disease with his last PCI being in 2003 Presents for annual follow-up.  In the interim since he saw his last he said 1 back injection for lumbar radiculopathy.  He has not had any hospitalizations.  Denies any chest pain, chest discomfort, exertional angina, exertional dyspnea, syncope, lightheadedness, or dizziness.  He does report bilateral lower extremity discomfort with walking.  There is no change in his lower extremity edema and he is currently wearing support stockings.    PAST MEDICAL HISTORY:  Past Medical History:   Diagnosis Date     Anal stricture 7/29/2011     Baker's cyst      Basal cell carcinoma      Chronic pain     left hip     Coronary artery disease 9/5/2012     Esophageal reflux 3/20/2013    not been an issue     Hearing loss      Hypertrophy of prostate without urinary obstruction and other lower urinary tract symptoms (LUTS) 7/21/2011     Idiopathic gynecomastia 7/29/2011     Impotence of organic origin 7/21/2011     Lymphoma (H) 1/25/2012     Macular degeneration, dry      Other and unspecified hyperlipidemia 7/21/2011     Snoring      Spinal stenosis, lumbar region, without neurogenic claudication 7/21/2011     Stented coronary artery 2003    x5; two separate times     Unspecified essential hypertension 7/21/2011       CURRENT MEDICATIONS:  Current Outpatient Prescriptions   Medication Sig Dispense Refill     acetaminophen (TYLENOL) 325 MG tablet Take 2 tablets (650 mg) by mouth every 6 hours as needed for pain 100 tablet 0     amLODIPine (NORVASC) 10 MG  tablet Take 1 tablet (10 mg) by mouth daily 90 tablet 3     aspirin 81 MG EC tablet Take 1 tablet (81 mg) by mouth daily 90 tablet 3     atorvastatin (LIPITOR) 20 MG tablet Take 1 tablet (20 mg) by mouth daily 90 tablet 2     bisacodyl (DULCOLAX) 10 MG suppository Place 1 suppository rectally daily as needed. 3 suppository 0     docusate sodium 100 MG tablet Take 100 mg by mouth 2 times daily 60 tablet 1     Guar Gum (BENEFIBER) packet Take 1 packet by mouth every morning        Magnesium Hydroxide (MILK OF MAGNESIA PO) Take by mouth At Bedtime       metoprolol succinate (TOPROL-XL) 25 MG 24 hr tablet Take 0.5 tablets (12.5 mg) by mouth daily 45 tablet 3     Multiple Vitamins-Minerals (VITEYES AREDS FORMULA/LUTEIN) CAPS Take by mouth daily       nitroglycerin (NITROSTAT) 0.4 MG sublingual tablet Place 1 tablet (0.4 mg) under the tongue every 5 minutes as needed 30 tablet 2     ranitidine (ZANTAC) 150 MG tablet Take 1 tablet (150 mg) by mouth 2 times daily 180 tablet 3     sildenafil (VIAGRA) 100 MG tablet Take 1 tablet (100 mg) by mouth daily as needed 10 tablet 2     tamsulosin (FLOMAX) 0.4 MG capsule Take 1 capsule (0.4 mg) by mouth daily 90 capsule 3     tolterodine (DETROL) 2 MG tablet Take 1 tablet (2 mg )twice a day 180 tablet 2     benazepril (LOTENSIN) 40 MG tablet Take 1 tablet (40 mg) by mouth daily 90 tablet 3     gentamicin (GARAMYCIN) 0.1 % ointment Apply topically 4 times daily   3       PAST SURGICAL HISTORY:  Past Surgical History:   Procedure Laterality Date     ARTHROPLASTY HIP  10/2008    left     ARTHROPLASTY HIP  1999    right     ARTHROPLASTY REVISION HIP  1/21/2014    Procedure: ARTHROPLASTY REVISION HIP;  Revision Left Total Hip;  Surgeon: Edgar Grewal MD;  Location: [facility] OR     BACK SURGERY  2006     CARDIAC SURGERY  2001    stent x 5      CARDIAC SURGERY  2002     CATARACT IOL, RT/LT Bilateral      CYSTOSCOPY, TRANSURETHRAL RESECTION (TUR) PROSTATE, COMBINED  11/5/2013    Procedure:  COMBINED CYSTOSCOPY, TRANSURETHRAL RESECTION (TUR) PROSTATE;  Transurethral Resection Of Prostate, Bipolar;  Surgeon: Lai Street MD;  Location: [facility] OR     ESOPHAGOSCOPY, GASTROSCOPY, DUODENOSCOPY (EGD), COMBINED  2/3/2014    Procedure: COMBINED ESOPHAGOSCOPY, GASTROSCOPY, DUODENOSCOPY (EGD);;  Surgeon: Ezra Fiore MD;  Location: [facility] GI     ESOPHAGOSCOPY, GASTROSCOPY, DUODENOSCOPY (EGD), COMBINED  4/15/2014    Procedure: COMBINED ESOPHAGOSCOPY, GASTROSCOPY, DUODENOSCOPY (EGD), BIOPSY SINGLE OR MULTIPLE;  Surgeon: Ezra Fiore MD;  Location: [facility] GI     EXTRACAPSULAR CATARACT EXTRATION WITH INTRAOCULAR LENS IMPLANT  2005     EXTRACAPSULAR CATARACT EXTRATION WITH INTRAOCULAR LENS IMPLANT  2010     HERNIA REPAIR       INJECT NERVE BLOCK LUMBAR PARAVERTEBRAL SYMPATHETIC Left 8/29/2018    Procedure: INJECT NERVE BLOCK LUMBAR PARAVERTEBRAL SYMPATHETIC;  Left L3, L4, sacral Ala medial branch block;  Surgeon: Ronaldo Hicks MD;  Location: [facility] OR     MASTOID SURGERY  1939     MOHS MICROGRAPHIC PROCEDURE       OPTICAL TRACKING SYSTEM FUSION SPINE POSTERIOR LUMBAR TWO LEVELS  7/11/2013    Procedure: OPTICAL TRACKING SYSTEM FUSION SPINE POSTERIOR LUMBAR TWO LEVELS;  Stealth Assisted Lumbar 3-4 Transforaminal Lumbar Interbody Fusion, Lumbar 2-3 Lumbar 3-4 Laminectomy Decompression;  Surgeon: Edgar Lew MD;  Location: [facility] OR       ALLERGIES     Allergies   Allergen Reactions     Nka [No Known Allergies]        FAMILY HISTORY:  Family History   Problem Relation Age of Onset     Diabetes Maternal Grandfather      Alcohol/Drug Maternal Grandfather      Arthritis Maternal Grandfather      Obesity Maternal Grandfather      Cerebrovascular Disease Maternal Grandmother      Hypertension Mother      willa rand     Glaucoma No family hx of      Macular Degeneration No family hx of      Cancer No family hx of      No known family hx of skin cancer     Skin Cancer No family hx of        SOCIAL  "HISTORY:  Social History     Social History     Marital status:      Spouse name: N/A     Number of children: N/A     Years of education: N/A     Social History Main Topics     Smoking status: Never Smoker     Smokeless tobacco: Never Used     Alcohol use 1.0 oz/week      Comment: occ     Drug use: No     Sexual activity: Not Asked     Other Topics Concern     None     Social History Narrative       ROS:    ROS: ROS all other systems negative, other than the symptoms noted above in the HPI.    EXAM:  /58 (BP Location: Left arm, Patient Position: Chair, Cuff Size: Adult Regular)  Pulse 52  Ht 1.778 m (5' 10\")  Wt 86.5 kg (190 lb 9.6 oz)  SpO2 98%  BMI 27.35 kg/m2  GEN:patient is in no apparent distress.    HEENT: NC/AT.  Sclerae white, no incertus  Neck: No adenopathy.Carotids brisk bilaterally without bruits.  No jugular venous distension.   Heart: RRR. Normal S1, S2. No murmur, rub, click, or gallop.    Lungs: CTA.  No ronchi, wheezes, rales.   Abdomen: Soft, nontender, nondistended.  Extremities: No clubbing, cyanosis.  The pulses are 2+ at the bilateral radial, DP, and PT, 1-2+ bilateral lower extremity edema (with support stockings in place)  Neurologic: Awake and alert, normal speech, gait and affect  Skin: No petechiae, purpura or rash noted    Labs:  LIPID RESULTS:  Lab Results   Component Value Date    CHOL 88 10/03/2017    HDL 45 10/03/2017    LDL 27 10/03/2017    TRIG 78 10/03/2017    CHOLHDLRATIO 2.5 07/08/2015       LIVER ENZYME RESULTS:  Lab Results   Component Value Date    AST 15 11/16/2017    ALT 23 11/16/2017       CBC RESULTS:  Lab Results   Component Value Date    WBC 7.5 11/16/2017    RBC 4.06 (L) 11/16/2017    HGB 13.9 11/16/2017    HCT 38.3 (L) 11/16/2017    MCV 94 11/16/2017    MCH 34.2 (H) 11/16/2017    MCHC 36.3 11/16/2017    RDW 13.2 11/16/2017     (L) 11/16/2017       BMP RESULTS:  Lab Results   Component Value Date     11/16/2017    POTASSIUM 4.4 11/16/2017 "    CHLORIDE 106 11/16/2017    CO2 22 11/16/2017    ANIONGAP 8 11/16/2017     (H) 11/16/2017    BUN 11 11/16/2017    CR 0.86 11/16/2017    GFRESTIMATED 84 11/16/2017    GFRESTBLACK >90 11/16/2017    LUCIUS 8.8 11/16/2017        A1C RESULTS:  No results found for: A1C    INR RESULTS:  Lab Results   Component Value Date    INR 1.33 (H) 02/03/2014    INR 1.42 (H) 02/02/2014       Procedures/Diagnostics:  EKG today demonstrates sinus bradycardia with a PAC 54 bpm first-degree AV block    Assessment and Plan:   1.  Coronary artery disease history of multiple PCI (2002, 2003) to the mid LAD and second diagonal branch: He has been doing well over the past 15 years and denies any exertional symptoms.  Continue him on aggressive cardiovascular risk management including statin, aspirin.    2.  Hypertension: Blood pressure is well controlled on his current regimen will be continued.    3.  Bradycardia with first-degree AV block: This was worked up at his visit in 2017 including a 48-hour Holter monitor which did not demonstrate any high degree AV block.  He has not had any concerning symptoms as well.    4.  Exertional lower extremity pain: This is also been worked up in the past and is secondary to his known spinal stenosis.    5.  Venous insufficiency with chronic lower extremity edema: Support stockings in place.  We also discussed sodium avoidance.    6.  Hyperlipidemia: Last LDL from October 2017 was 27; him and update his lipid panel this time we will continue him on his current dose of atorvastatin.     We will update his lipid panel and CMP and follow-up in 1 year.    CC  Patient Care Team:  Lia Dumont MD as PCP - General (Internal Medicine)  Tanner Rojas MD as MD (Hematology & Oncology)  Jaimee Jimenez RN as Registered Nurse (Urology)  Edgar Lew MD as MD (Specialist)  Edgar Grewal MD as Referring Physician (Orthopedics)  Luna Parker MD as MD  (Ophthalmology)  Sangeeta Plaza, RN as Nurse Coordinator (Hematology & Oncology)  Aiden Davidson MD as MD (Dermatology)  Teodora Mooney RN as Nurse Coordinator (Physical Medicine and Rehab)  Deedee Henderson, RN as Nurse Coordinator (Cardiology)  Tanner Rojas MD as MD (Internal Medicine)  Sangeeta Plaza, RN as Nurse Coordinator (Hematology & Oncology)  Deedee Henderson, RN as Nurse Coordinator (Cardiology)  Carlos Biswas MD as MD (Ophthalmology)  SELF, REFERRED

## 2018-10-04 NOTE — NURSING NOTE
Chief Complaint   Patient presents with     Follow Up For     annual return, pt dr. garrison, appt per pt     Vitals were taken and medications were reconciled. EKG was performed.    Daija Esquivel MA    9:59 AM

## 2018-10-08 LAB — INTERPRETATION ECG - MUSE: NORMAL

## 2018-11-12 ASSESSMENT — ENCOUNTER SYMPTOMS
MUSCLE WEAKNESS: 0
BACK PAIN: 0
EYE WATERING: 1
EYE IRRITATION: 1
MUSCLE CRAMPS: 0
MYALGIAS: 1
DOUBLE VISION: 0
ARTHRALGIAS: 1
STIFFNESS: 1
EYE PAIN: 0
NECK PAIN: 0
EYE REDNESS: 0
JOINT SWELLING: 0

## 2018-11-15 ENCOUNTER — ONCOLOGY VISIT (OUTPATIENT)
Dept: ONCOLOGY | Facility: CLINIC | Age: 83
End: 2018-11-15
Attending: INTERNAL MEDICINE
Payer: MEDICARE

## 2018-11-15 ENCOUNTER — TRANSFERRED RECORDS (OUTPATIENT)
Dept: HEALTH INFORMATION MANAGEMENT | Facility: CLINIC | Age: 83
End: 2018-11-15

## 2018-11-15 ENCOUNTER — RADIANT APPOINTMENT (OUTPATIENT)
Dept: CT IMAGING | Facility: CLINIC | Age: 83
End: 2018-11-15
Payer: MEDICARE

## 2018-11-15 ENCOUNTER — APPOINTMENT (OUTPATIENT)
Dept: LAB | Facility: CLINIC | Age: 83
End: 2018-11-15
Attending: INTERNAL MEDICINE
Payer: MEDICARE

## 2018-11-15 VITALS
OXYGEN SATURATION: 98 % | BODY MASS INDEX: 27.72 KG/M2 | WEIGHT: 193.6 LBS | RESPIRATION RATE: 18 BRPM | HEIGHT: 70 IN | HEART RATE: 50 BPM | TEMPERATURE: 97.7 F | DIASTOLIC BLOOD PRESSURE: 62 MMHG | SYSTOLIC BLOOD PRESSURE: 141 MMHG

## 2018-11-15 DIAGNOSIS — C85.13 B-CELL LYMPHOMA OF INTRA-ABDOMINAL LYMPH NODES, UNSPECIFIED B-CELL LYMPHOMA TYPE (H): ICD-10-CM

## 2018-11-15 DIAGNOSIS — D49.0 IPMN (INTRADUCTAL PAPILLARY MUCINOUS NEOPLASM): ICD-10-CM

## 2018-11-15 LAB
ALBUMIN SERPL-MCNC: 3.6 G/DL (ref 3.4–5)
ALP SERPL-CCNC: 67 U/L (ref 40–150)
ALT SERPL W P-5'-P-CCNC: 29 U/L (ref 0–70)
ANION GAP SERPL CALCULATED.3IONS-SCNC: 5 MMOL/L (ref 3–14)
AST SERPL W P-5'-P-CCNC: 19 U/L (ref 0–45)
BASOPHILS # BLD AUTO: 0 10E9/L (ref 0–0.2)
BASOPHILS NFR BLD AUTO: 0.6 %
BILIRUB SERPL-MCNC: 0.8 MG/DL (ref 0.2–1.3)
BUN SERPL-MCNC: 13 MG/DL (ref 7–30)
CALCIUM SERPL-MCNC: 8.8 MG/DL (ref 8.5–10.1)
CHLORIDE SERPL-SCNC: 111 MMOL/L (ref 94–109)
CO2 SERPL-SCNC: 23 MMOL/L (ref 20–32)
CREAT BLD-MCNC: 0.8 MG/DL (ref 0.66–1.25)
CREAT SERPL-MCNC: 0.86 MG/DL (ref 0.66–1.25)
DIFFERENTIAL METHOD BLD: ABNORMAL
EOSINOPHIL # BLD AUTO: 0.2 10E9/L (ref 0–0.7)
EOSINOPHIL NFR BLD AUTO: 2.7 %
ERYTHROCYTE [DISTWIDTH] IN BLOOD BY AUTOMATED COUNT: 13.2 % (ref 10–15)
GFR SERPL CREATININE-BSD FRML MDRD: 84 ML/MIN/1.7M2
GFR SERPL CREATININE-BSD FRML MDRD: >90 ML/MIN/1.7M2
GLUCOSE SERPL-MCNC: 91 MG/DL (ref 70–99)
HCT VFR BLD AUTO: 39.3 % (ref 40–53)
HGB BLD-MCNC: 14.4 G/DL (ref 13.3–17.7)
IMM GRANULOCYTES # BLD: 0 10E9/L (ref 0–0.4)
IMM GRANULOCYTES NFR BLD: 0.3 %
LDH SERPL L TO P-CCNC: 175 U/L (ref 85–227)
LYMPHOCYTES # BLD AUTO: 1.5 10E9/L (ref 0.8–5.3)
LYMPHOCYTES NFR BLD AUTO: 22.5 %
MCH RBC QN AUTO: 34.6 PG (ref 26.5–33)
MCHC RBC AUTO-ENTMCNC: 36.6 G/DL (ref 31.5–36.5)
MCV RBC AUTO: 95 FL (ref 78–100)
MONOCYTES # BLD AUTO: 0.7 10E9/L (ref 0–1.3)
MONOCYTES NFR BLD AUTO: 10.8 %
NEUTROPHILS # BLD AUTO: 4.2 10E9/L (ref 1.6–8.3)
NEUTROPHILS NFR BLD AUTO: 63.1 %
NRBC # BLD AUTO: 0 10*3/UL
NRBC BLD AUTO-RTO: 0 /100
PLATELET # BLD AUTO: 123 10E9/L (ref 150–450)
POTASSIUM SERPL-SCNC: 4.3 MMOL/L (ref 3.4–5.3)
PROT SERPL-MCNC: 6.6 G/DL (ref 6.8–8.8)
RBC # BLD AUTO: 4.16 10E12/L (ref 4.4–5.9)
SODIUM SERPL-SCNC: 139 MMOL/L (ref 133–144)
WBC # BLD AUTO: 6.6 10E9/L (ref 4–11)

## 2018-11-15 PROCEDURE — 83615 LACTATE (LD) (LDH) ENZYME: CPT | Performed by: STUDENT IN AN ORGANIZED HEALTH CARE EDUCATION/TRAINING PROGRAM

## 2018-11-15 PROCEDURE — G0463 HOSPITAL OUTPT CLINIC VISIT: HCPCS | Mod: ZF

## 2018-11-15 PROCEDURE — 80053 COMPREHEN METABOLIC PANEL: CPT | Performed by: STUDENT IN AN ORGANIZED HEALTH CARE EDUCATION/TRAINING PROGRAM

## 2018-11-15 PROCEDURE — 36415 COLL VENOUS BLD VENIPUNCTURE: CPT

## 2018-11-15 PROCEDURE — 99214 OFFICE O/P EST MOD 30 MIN: CPT | Mod: ZP | Performed by: INTERNAL MEDICINE

## 2018-11-15 PROCEDURE — 85025 COMPLETE CBC W/AUTO DIFF WBC: CPT | Performed by: STUDENT IN AN ORGANIZED HEALTH CARE EDUCATION/TRAINING PROGRAM

## 2018-11-15 RX ORDER — IOPAMIDOL 755 MG/ML
119 INJECTION, SOLUTION INTRAVASCULAR ONCE
Status: COMPLETED | OUTPATIENT
Start: 2018-11-15 | End: 2018-11-15

## 2018-11-15 RX ADMIN — IOPAMIDOL 119 ML: 755 INJECTION, SOLUTION INTRAVASCULAR at 09:36

## 2018-11-15 ASSESSMENT — PAIN SCALES - GENERAL: PAINLEVEL: NO PAIN (0)

## 2018-11-15 NOTE — NURSING NOTE
Chief Complaint   Patient presents with     Blood Draw     Labs drawn via . VS taken.      Tierney Rose RN

## 2018-11-15 NOTE — LETTER
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  The lymphoma was diagnosed when retroperitoneal lymphadenopathy was incidentally identified on a CT scan obtained for other purposes in 2012. Diagnosis was established by needle biopsy.  The specimen was too small for histologic subtyping, but in view of the histology and modest adenopathy, it was felt that this was likely a low-grade lymphoma. He has been monitored without therapeutic intervention. Subsequent surveillance CT scans have documented overall stable adenopathy without significant progression.      Rev. Mcnair also had pancreatic hypodensities identified on a surveillance CT scan in 2017.  These were new since  andcompatible with IPMNs.  The patient has had no related complaints.  It was recommended these be followed by CT. The first follow-up CT was 2017 and was unchanged. He had a CT scan in advance of today's clinic to reassess these, as well as his lymphoma.      INTERVAL HISTORY:  He was last seen on 2017. Rev. Mcnair states that his major problem at present is anxiety.  He has discussed this with his primary physician, Dr. Dumont, and is addressing it with mindfulness, deep breathing and prayer.  These approaches are helping.      Notes no infections of significance over the course of the past year.  He has received the influenza vaccine.  No fevers, night sweats or weight loss. Denies abdominal fullness, awareness of any adenopathy.      He has had some fatigue.  However, within the past 1-2 weeks, he drove to a family  with his wife, a one-way distance of 160 miles, and returned the next day.  Travel went well, but he also notes that he is having difficulty with his night vision and has an appointment with   Elena at the end of 01/2019.      Major chronic problem is that of back and hip pain. He is exercising, which has helped his back to some extent.  The hip remains a problem that is being addressed.     Has not fallen.     REVIEW OF SYSTEMS:  Otherwise unchanged or negative.      MEDICATIONS:   1.  Acetaminophen.   2.  Amlodipine.   3.  Aspirin 81 mg.   4.  Atorvastatin.   5.  Lotensin.   6.  Dulcolax p.r.n.   7.  Docusate.     8.  Benefiber.     9.  Milk of Magnesia.   10.  Metoprolol.   11.  Multivitamins.   12.  Ranitidine.   13.  Sildenafil p.r.n.   14.  Tamsulosin.   15.  Detrol.   16.  Nitroglycerin (reports not taking).      ALLERGIES:  None reported.      PHYSICAL EXAMINATION:     VITAL SIGNS:  Weight 87.8 kg (stable), blood pressure 141/62, pulse 50 and regular, respirations 18, temperature 97.7.  O2 saturation:  98% on room air.   GENERAL:  Alert, engaged, appropriate.  Moves slowly with some instability.   HEENT:  No icterus or conjunctival injection.  EOM:  Intact.  Oropharynx:  No masses.  Mucosa:  Moist without lesion.   NECK:  No cervical or supraclavicular adenopathy.     CHEST:  Clear to auscultation.     AXILLAE:  No nodes.   HEART:  Regular rhythm without murmur, gallop.   ABDOMEN:  Soft and nontender.  The liver is at the right costal margin.  Spleen is not palpable or percussible.  No masses.  No inguinal/femoral nodes.   EXTREMITIES:  Trace lower extremity edema on the left, 1+ on the right.   SKIN:  No rashes.      LABORATORY:     Hemoglobin 14.4 g% with 6600 WBCs (63% neutrophils, 22% lymphocytes) and 123,000 platelets.   Electrolytes, calcium, creatinine (0.86):  Normal.   Liver enzymes, including serum LDH:  Normal.     Glucose 91.      Chest and abdominal CT scan:   1. No significant change in retroperitoneal lymphadenopathy. (Largest nodes measure <2.5 cm.)  2. Multiple cystic lesions in the pancreas measuring up to 1 cm are unchanged since 5/18/2017. Attention on follow-up studies.     3. 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  4. Extensive left colonic diverticulosis. No associated CT findings of acute diverticulitis.     ASSESSMENT AND PLAN:  Low-grade B-cell lymphoma, clinical stage IIA (multiple intraabdominal lymph nodes <2.5 cm).  Bone marrow not obtained.  Radiologist's measurements indicated that the lymph nodes are essentially unchanged.  In fact, yolanda has been very little to no change since he was diagnosed in 01/2012.  No requirement for surveillance CT scans related to the lymphoma in the future, but should be obtained for new questions or concerns.      Possible IPMNs:  As recorded on the CT scan report, there has been no change in these lesions since 05/2017.  Recommendation from Radiology is to followup in 2-3 years with CT scan. Discussed with Reverend Mcnair.  He will consider a CT scan in 2 years.  Appropriate to wait until then to determine if necessary.      Discussed with Reverend Mcnair and his wife his view of continued followup in the Oncology Clinic.  At this time, it is unlikely that treatment is going to be warranted, unless there is a substantial change, which we have not seen.  For this reason, discharge from clinic seems appropriate with followup in his Primary Care clinic.  Certainly, if there are new findings or concerns in the future, he could return to the clinic.  The patient appreciates this approach, which would limit the number of physicians and appointments.     - Return to Oncology Clinic prn.   - Consider repeat CT abdomen in about 2 years to monitor possible IPMN's.    Tanner Rojas MD  Professor of Medicine  Oncology  [facility]  Office: [phone]  Clinic Fax: [phone]       cc:   MD Tanner Saunders MD

## 2018-11-15 NOTE — MR AVS SNAPSHOT
After Visit Summary   11/15/2018    Daniel Mcnair    MRN: [id number]           Patient Information     Date Of Birth          10/17/1929        Visit Information        Provider Department      11/15/2018 12:00 PM Tanner Rojas MD [facility]        Today's Diagnoses     B-cell lymphoma of intra-abdominal lymph nodes, unspecified B-cell lymphoma type (H)        IPMN (intraductal papillary mucinous neoplasm)           Follow-ups after your visit        Follow-up notes from your care team     Return if new problems, otherwise follow-up with PCP.      Your next 10 appointments already scheduled     Jan 29, 2019 10:10 AM CST   RETURN RETINA with Luna Parker MD   Eye Clinic ([facility])    [address]  9th Fl Clin [address] [place] [id number] [phone]              Who to contact     If you have questions or need follow up information about today's clinic visit or your schedule please contact [facility] directly at [phone].  Normal or non-critical lab and imaging results will be communicated to you by Timecroshart, letter or phone within 4 business days after the clinic has received the results. If you do not hear from us within 7 days, please contact the clinic through MyChart or phone. If you have a critical or abnormal lab result, we will notify you by phone as soon as possible.  Submit refill requests through [employer] or call your pharmacy and they will forward the refill request to us. Please allow 3 business days for your refill to be completed.          Additional Information About Your Visit        MyChart Information     [employer] gives you secure access to your electronic health record. If you see a primary care provider, you can also send messages to your care team and make appointments. If you have questions, please call your primary care clinic.  If you do not have a primary care provider,  "please call [phone] and they will assist you.        Care EveryWhere ID     This is your Care EveryWhere ID. This could be used by other organizations to access your [facility] medical records  [id number]        Your Vitals Were     Pulse Temperature Respirations Height Pulse Oximetry BMI (Body Mass Index)    50 97.7  F (36.5  C) (Oral) 18 1.778 m (5' 10\") 98% 27.78 kg/m2       Blood Pressure from Last 3 Encounters:   11/15/18 141/62   10/04/18 124/58   09/26/18 133/57    Weight from Last 3 Encounters:   11/15/18 87.8 kg (193 lb 9.6 oz)   10/04/18 86.5 kg (190 lb 9.6 oz)   09/26/18 85.7 kg (189 lb)              We Performed the Following     *CBC with platelets differential     Comprehensive metabolic panel     Lactate Dehydrogenase        Primary Care Provider Office Phone # Fax #    Lia Dumont [phone]       [address] [place] [address] [address] [place] [address]        Equal Access to Services     [facility]: Hadii aad ku hadasho Sotremayne, waaxda luqadaha, qaybta kaalmada adeboazyawaylon, jose miguel lawrence . So [facility] [phone].    ATENCIÓN: Si habla español, tiene a vaughan disposición servicios gratuitos de asistencia lingüística. Llame al [phone].    We comply with applicable federal civil rights laws and [place] laws. We do not discriminate on the basis of race, color, national origin, age, disability, sex, sexual orientation, or gender identity.            Thank you!     Thank you for choosing [facility]  for your care. Our goal is always to provide you with excellent care. Hearing back from our patients is one way we can continue to improve our services. Please take a few minutes to complete the written survey that you may receive in the mail after your visit with us. Thank you!             Your Updated Medication List - Protect others around you: Learn how to safely use, store and throw away your medicines at www.disposemymeds.org.        "   This list is accurate as of 11/15/18 11:59 PM.  Always use your most recent med list.                   Brand Name Dispense Instructions for use Diagnosis    acetaminophen 325 MG tablet    TYLENOL    100 tablet    Take 2 tablets (650 mg) by mouth every 6 hours as needed for pain    S/P revision of total hip       amLODIPine 10 MG tablet    NORVASC    90 tablet    Take 1 tablet (10 mg) by mouth daily    Benign essential hypertension       aspirin 81 MG EC tablet     90 tablet    Take 1 tablet (81 mg) by mouth daily    Hyperlipidemia LDL goal <70       atorvastatin 20 MG tablet    LIPITOR    90 tablet    Take 1 tablet (20 mg) by mouth daily    Hyperlipidemia LDL goal <70       benazepril 40 MG tablet    LOTENSIN    90 tablet    Take 1 tablet (40 mg) by mouth daily    Essential hypertension, benign       bisacodyl 10 MG Suppository    DULCOLAX    3 suppository    Place 1 suppository rectally daily as needed.    Chronic constipation       docusate sodium 100 MG tablet    COLACE    60 tablet    Take 100 mg by mouth 2 times daily    BPH (benign prostatic hyperplasia)       gentamicin 0.1 % ointment    GARAMYCIN     Apply topically 4 times daily    B-cell lymphoma of intra-abdominal lymph nodes, unspecified B-cell lymphoma type (H)       metoprolol succinate 25 MG 24 hr tablet    TOPROL-XL    45 tablet    Take 0.5 tablets (12.5 mg) by mouth daily    Essential hypertension, benign       MILK OF MAGNESIA PO      Take by mouth At Bedtime        nitroGLYcerin 0.4 MG sublingual tablet    NITROSTAT    30 tablet    Place 1 tablet (0.4 mg) under the tongue every 5 minutes as needed    Coronary artery disease involving native heart without angina pectoris, unspecified vessel or lesion type       Nutri[employer] Fiber packet      Take 1 packet by mouth every morning        ranitidine 150 MG tablet    ZANTAC    180 tablet    Take 1 tablet (150 mg) by mouth 2 times daily    Gastroesophageal reflux disease without esophagitis        sildenafil 100 MG tablet    VIAGRA    10 tablet    Take 1 tablet (100 mg) by mouth daily as needed    Erectile dysfunction, unspecified erectile dysfunction type       tamsulosin 0.4 MG capsule    FLOMAX    90 capsule    Take 1 capsule (0.4 mg) by mouth daily    Hypertrophy of prostate without urinary obstruction       tolterodine 2 MG tablet    DETROL    180 tablet    Take 1 tablet (2 mg )twice a day    Hypertonicity of bladder       VITEYES AREDS FORMULA/LUTEIN Caps      Take by mouth daily

## 2018-11-15 NOTE — PROGRESS NOTES
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  The lymphoma was diagnosed when retroperitoneal lymphadenopathy was incidentally identified on a CT scan obtained for other purposes in 2012. Diagnosis was established by needle biopsy.  The specimen was too small for histologic subtyping, but in view of the histology and modest adenopathy, it was felt that this was likely a low-grade lymphoma. He has been monitored without therapeutic intervention. Subsequent surveillance CT scans have documented overall stable adenopathy without significant progression.      Rev. Mcnair also had pancreatic hypodensities identified on a surveillance CT scan in 2017.  These were new since  andcompatible with IPMNs.  The patient has had no related complaints.  It was recommended these be followed by CT. The first follow-up CT was 2017 and was unchanged. He had a CT scan in advance of today's clinic to reassess these, as well as his lymphoma.      INTERVAL HISTORY:  He was last seen on 2017. Rev. Mcnair states that his major problem at present is anxiety.  He has discussed this with his primary physician, Dr. Dumont, and is addressing it with mindfulness, deep breathing and prayer.  These approaches are helping.      Notes no infections of significance over the course of the past year.  He has received the influenza vaccine.  No fevers, night sweats or weight loss. Denies abdominal fullness, awareness of any adenopathy.      He has had some fatigue.  However, within the past 1-2 weeks, he drove to a family  with his wife, a one-way distance of 160 miles, and returned the next day.  Travel went well, but he also notes that he is having difficulty with his night vision and has an appointment with Dr. Parker at the end of 2019.      Major chronic problem is that of back and hip pain. He is exercising, which has  helped his back to some extent.  The hip remains a problem that is being addressed.     Has not fallen.     REVIEW OF SYSTEMS:  Otherwise unchanged or negative.      MEDICATIONS:   1.  Acetaminophen.   2.  Amlodipine.   3.  Aspirin 81 mg.   4.  Atorvastatin.   5.  Lotensin.   6.  Dulcolax p.r.n.   7.  Docusate.     8.  Benefiber.     9.  Milk of Magnesia.   10.  Metoprolol.   11.  Multivitamins.   12.  Ranitidine.   13.  Sildenafil p.r.n.   14.  Tamsulosin.   15.  Detrol.   16.  Nitroglycerin (reports not taking).      ALLERGIES:  None reported.      PHYSICAL EXAMINATION:     VITAL SIGNS:  Weight 87.8 kg (stable), blood pressure 141/62, pulse 50 and regular, respirations 18, temperature 97.7.  O2 saturation:  98% on room air.   GENERAL:  Alert, engaged, appropriate.  Moves slowly with some instability.   HEENT:  No icterus or conjunctival injection.  EOM:  Intact.  Oropharynx:  No masses.  Mucosa:  Moist without lesion.   NECK:  No cervical or supraclavicular adenopathy.     CHEST:  Clear to auscultation.     AXILLAE:  No nodes.   HEART:  Regular rhythm without murmur, gallop.   ABDOMEN:  Soft and nontender.  The liver is at the right costal margin.  Spleen is not palpable or percussible.  No masses.  No inguinal/femoral nodes.   EXTREMITIES:  Trace lower extremity edema on the left, 1+ on the right.   SKIN:  No rashes.      LABORATORY:     Hemoglobin 14.4 g% with 6600 WBCs (63% neutrophils, 22% lymphocytes) and 123,000 platelets.   Electrolytes, calcium, creatinine (0.86):  Normal.   Liver enzymes, including serum LDH:  Normal.     Glucose 91.      Chest and abdominal CT scan:   1. No significant change in retroperitoneal lymphadenopathy. (Largest nodes measure <2.5 cm.)  2. Multiple cystic lesions in the pancreas measuring up to 1 cm are unchanged since 5/18/2017. Attention on follow-up studies.    3. 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  4. Extensive left colonic  diverticulosis. No associated CT findings of acute diverticulitis.     ASSESSMENT AND PLAN:  Low-grade B-cell lymphoma, clinical stage IIA (multiple intraabdominal lymph nodes <2.5 cm).  Bone marrow not obtained.  Radiologist's measurements indicated that the lymph nodes are essentially unchanged.  In fact, yolanda has been very little to no change since he was diagnosed in 01/2012.  No requirement for surveillance CT scans related to the lymphoma in the future, but should be obtained for new questions or concerns.      Possible IPMNs:  As recorded on the CT scan report, there has been no change in these lesions since 05/2017.  Recommendation from Radiology is to followup in 2-3 years with CT scan. Discussed with Reverend Mcnair.  He will consider a CT scan in 2 years.  Appropriate to wait until then to determine if necessary.      Discussed with Reverend Mcnair and his wife his view of continued followup in the Oncology Clinic.  At this time, it is unlikely that treatment is going to be warranted, unless there is a substantial change, which we have not seen.  For this reason, discharge from clinic seems appropriate with followup in his Primary Care clinic.  Certainly, if there are new findings or concerns in the future, he could return to the clinic.  The patient appreciates this approach, which would limit the number of physicians and appointments.     - Return to Oncology Clinic prn.   - Consider repeat CT abdomen in about 2 years to monitor possible IPMN's.    Tanner Rojas MD  Professor of Medicine  Oncology  [facility]  Office: [phone]  Clinic Fax: [phone]       cc:   Lia Dumont MD

## 2018-11-15 NOTE — NURSING NOTE
"Oncology Rooming Note    November 15, 2018 11:54 AM   Daniel Mcnair is a 89 year old male who presents for:    Chief Complaint   Patient presents with     Blood Draw     Labs drawn via . VS taken.      Oncology Clinic Visit     Return visit related to Lymphoma     Initial Vitals: /62 (BP Location: Right arm, Patient Position: Sitting, Cuff Size: Adult Regular)  Pulse 50  Temp 97.7  F (36.5  C) (Oral)  Resp 18  Ht 1.778 m (5' 10\")  Wt 87.8 kg (193 lb 9.6 oz)  SpO2 98%  BMI 27.78 kg/m2 Estimated body mass index is 27.78 kg/(m^2) as calculated from the following:    Height as of this encounter: 1.778 m (5' 10\").    Weight as of this encounter: 87.8 kg (193 lb 9.6 oz). Body surface area is 2.08 meters squared.  No Pain (0) Comment: Data Unavailable   No LMP for male patient.  Allergies reviewed: Yes  Medications reviewed: Yes    Medications: Medication refills not needed today.  Pharmacy name entered into EPIC:    [facility] - [facility]  [facility] - [address] [facility] [address] [place], [place] [id number]  [facility] [address] [place], [place] [id number]    Clinical concerns: No new concerns. Provider was notified.    10 minutes for nursing intake (face to face time)     Nickie Espinoza LPN            "

## 2018-11-15 NOTE — DISCHARGE INSTRUCTIONS

## 2018-12-04 DIAGNOSIS — I10 ESSENTIAL HYPERTENSION, BENIGN: ICD-10-CM

## 2018-12-06 RX ORDER — BENAZEPRIL HYDROCHLORIDE 40 MG/1
40 TABLET ORAL DAILY
Qty: 90 TABLET | Refills: 3 | Status: SHIPPED | OUTPATIENT
Start: 2018-12-06 | End: 2019-11-25

## 2019-01-11 ENCOUNTER — OFFICE VISIT (OUTPATIENT)
Dept: INTERNAL MEDICINE | Facility: CLINIC | Age: 84
End: 2019-01-11
Payer: COMMERCIAL

## 2019-01-11 VITALS
DIASTOLIC BLOOD PRESSURE: 70 MMHG | HEART RATE: 60 BPM | BODY MASS INDEX: 26.89 KG/M2 | WEIGHT: 187.4 LBS | TEMPERATURE: 97.7 F | SYSTOLIC BLOOD PRESSURE: 129 MMHG

## 2019-01-11 DIAGNOSIS — F41.9 ANXIETY: Primary | ICD-10-CM

## 2019-01-11 DIAGNOSIS — N40.0 HYPERTROPHY OF PROSTATE WITHOUT URINARY OBSTRUCTION: ICD-10-CM

## 2019-01-11 DIAGNOSIS — H61.23 BILATERAL IMPACTED CERUMEN: ICD-10-CM

## 2019-01-11 RX ORDER — LORAZEPAM 0.5 MG/1
0.5 TABLET ORAL EVERY 6 HOURS PRN
Qty: 5 TABLET | Refills: 0 | Status: SHIPPED | OUTPATIENT
Start: 2019-01-11 | End: 2019-02-12

## 2019-01-11 RX ORDER — TAMSULOSIN HYDROCHLORIDE 0.4 MG/1
0.4 CAPSULE ORAL DAILY
Qty: 90 CAPSULE | Refills: 3 | Status: SHIPPED | OUTPATIENT
Start: 2019-01-11 | End: 2020-02-17

## 2019-01-11 ASSESSMENT — PATIENT HEALTH QUESTIONNAIRE - PHQ9: 5. POOR APPETITE OR OVEREATING: NOT AT ALL

## 2019-01-11 ASSESSMENT — ANXIETY QUESTIONNAIRES
1. FEELING NERVOUS, ANXIOUS, OR ON EDGE: MORE THAN HALF THE DAYS
GAD7 TOTAL SCORE: 6
7. FEELING AFRAID AS IF SOMETHING AWFUL MIGHT HAPPEN: SEVERAL DAYS
5. BEING SO RESTLESS THAT IT IS HARD TO SIT STILL: NOT AT ALL
IF YOU CHECKED OFF ANY PROBLEMS ON THIS QUESTIONNAIRE, HOW DIFFICULT HAVE THESE PROBLEMS MADE IT FOR YOU TO DO YOUR WORK, TAKE CARE OF THINGS AT HOME, OR GET ALONG WITH OTHER PEOPLE: SOMEWHAT DIFFICULT
6. BECOMING EASILY ANNOYED OR IRRITABLE: SEVERAL DAYS
3. WORRYING TOO MUCH ABOUT DIFFERENT THINGS: SEVERAL DAYS
2. NOT BEING ABLE TO STOP OR CONTROL WORRYING: SEVERAL DAYS

## 2019-01-11 ASSESSMENT — PAIN SCALES - GENERAL: PAINLEVEL: NO PAIN (0)

## 2019-01-11 NOTE — NURSING NOTE
Chief Complaint   Patient presents with     Eye Problem     Patient is here for vision deterioration and fatigue from standing too long due to hip joint issues,      Refill Request     Also tamsulosin medication refill     Anxiety     Polyuria       Wilfrido Oakley CMA ([facility]) at 7:55 AM on 1/11/2019

## 2019-01-11 NOTE — PROGRESS NOTES
"CC:    Eye Problem Patient is here for vision deterioration and fatigue from standing too long due to hip joint issues,      Refill Request Also tamsulosin medication refill    Anxiety     Polyuria      HPI:  C/O the above  Saw oncolocy 11/15/18, not ereviewed (hx low grade B cell lymphoma, pancreatic hypodensities c/w IPMNs, fatigue, night vision problems, chronic back and hip pain,regarding anxiety, improved with mindfulness, deep breathing, prayerdischarged to my care with f/u prn, consider repeat CT abd in about 2 years to monitor possible IPMN's)      Saw CNP/APRN Lai Bo 10/4/18 (annual f/u CAD, PCI 2003, regarding back pain had 1 injection for lumbar radiculopathy, o/w from cardiology standpoint ok, chronic LE edema stable, continue current rx regimen, hx bradycardia with first degree AVB, stable, continue rx for spinal stenosis, f/u one year)    Saw me last 9/13/18 (chronic back and hip pain, fatigue, increased naps, anxiety)    Today Daniel brought in a list of concerns:  Vish hernandez, called ophthalmogy, has appt in 3 days, gradual loss, small print and distant vision and night vision all affected, progressed over the last 3 months so, able to read with brighter light, has reading magnifier and new sunglasses from [facility] [employer], s/p bilateral cataract surgery, has hx dry eyes and uses drops periodically, no eye pain, photophobia, has some feeling of sand of the eyes  each eye , hx macular degeneration, no eye redness or periorbital swelling, no discharge, no flashing). We discussed \"red flags\" when it comes to vision loss and I let him know that it would be fine to wait to see the ophthalmologist until next week.    Hx spinal stenosis, tired with walking, can stand for only brief periods of time, doing exercises for back and hip, does not feel like he needs to see specialist at this time.  I encouraged him to walk as much as possible.  Tylenol 500 mg x 2 bid sufficient for pain " control.      Anxiety is an ongoing issue, using gratitude journal and other meditating strategies.  He reports that most recent flare in anxiety is mainly over his vision problems (see above).  He was prescribed lorazepam in the past (or similar) which was helpful for his anxiety.  His daughter suggested flexeril which he has also taken in the past, makes him sleepy.      Frequent urination chronic, problems emptying bladder, nocturia x 4, hx TURP's?/surgeries), tamsulosin may/may not being helpful, also on detrol, requests refill of tamsulosin.    Wondering if daytime naps plus eleven hours at night are excessive,   Leg tightness an issue, using ball to release tension along thigh mm's, finds PT exercise valuable, taking acetaminphen 500 bid helpful, doing breathing and balance class, discussed symptoms of spinal stenosis and strategies to reduce pain)    Hearing aids x 6 months bilateral, ear wax build up left more than right, wondering if needs wash    Patient Active Problem List   Diagnosis     Actinic keratosis     Stenosis of rectum and anus     Hypertrophy of prostate without urinary obstruction     Breast lump     Choroidal detachment     Exudative senile macular degeneration of retina (H)     Conductive hearing loss, tympanic membrane     Nonexudative senile macular degeneration of retina     Essential hypertension     Hemorrhoids     Hypertonicity of bladder     Hyperlipidemia     Spinal stenosis, lumbar region, without neurogenic claudication     Neoplasm of uncertain behavior     Senile nuclear sclerosis     Impotence of organic origin     Osteoarthritis     Hematoma complicating a procedure     Vitreous degeneration     Lens replaced by other means     Other seborrheic keratosis     Senile cataract     Anal stricture     Idiopathic gynecomastia     H. 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)     Lymphoma of lymph nodes in abdomen (H)     Coronary artery disease      Esophageal reflux     Sebaceous cyst     Degenerative spondylolisthesis     GI bleed     Anemia due to blood loss, acute     Demand ischemia of myocardium (H)     Hemorrhage of gastrointestinal tract     Sacroiliitis (H)     Abdominal pain, unspecified abdominal location     Facet arthropathy of spine     Malignant lymphomas of lymph nodes of multiple sites (H)     Benign essential hypertension     Coronary artery disease involving native coronary artery of native heart without angina pectoris     Advance Care Planning     Osteoarthritis of multiple joints, unspecified osteoarthritis type     Hyperlipidemia, unspecified hyperlipidemia type     Other fatigue     B-cell lymphoma, unspecified B-cell lymphoma type, unspecified body region (H)     IPMN (intraductal papillary mucinous neoplasm)     Sensorineural hearing loss (SNHL) of both ears     Past Surgical History:   Procedure Laterality Date     ARTHROPLASTY HIP  10/2008    left     ARTHROPLASTY HIP  1999    right     ARTHROPLASTY REVISION HIP  1/21/2014    Procedure: ARTHROPLASTY REVISION HIP;  Revision Left Total Hip;  Surgeon: Edgar Grewal MD;  Location: [facility] OR     BACK SURGERY  2006     CARDIAC SURGERY  2001    stent x 5      CARDIAC SURGERY  2002     CATARACT IOL, RT/LT Bilateral      CYSTOSCOPY, TRANSURETHRAL RESECTION (TUR) PROSTATE, COMBINED  11/5/2013    Procedure: COMBINED CYSTOSCOPY, TRANSURETHRAL RESECTION (TUR) PROSTATE;  Transurethral Resection Of Prostate, Bipolar;  Surgeon: Lai Street MD;  Location: [facility]     ESOPHAGOSCOPY, GASTROSCOPY, DUODENOSCOPY (EGD), COMBINED  2/3/2014    Procedure: COMBINED ESOPHAGOSCOPY, GASTROSCOPY, DUODENOSCOPY (EGD);;  Surgeon: Ezra Fiore MD;  [facility]     ESOPHAGOSCOPY, GASTROSCOPY, DUODENOSCOPY (EGD), COMBINED  4/15/2014    Procedure: COMBINED ESOPHAGOSCOPY, GASTROSCOPY, DUODENOSCOPY (EGD), BIOPSY SINGLE OR MULTIPLE;  Surgeon: Ezra Fiore MD;  Location: [facility]     EXTRACAPSULAR CATARACT  EXTRATION WITH INTRAOCULAR LENS IMPLANT  2005     EXTRACAPSULAR CATARACT EXTRATION WITH INTRAOCULAR LENS IMPLANT  2010     HERNIA REPAIR       INJECT NERVE BLOCK LUMBAR PARAVERTEBRAL SYMPATHETIC Left 8/29/2018    Procedure: INJECT NERVE BLOCK LUMBAR PARAVERTEBRAL SYMPATHETIC;  Left L3, L4, sacral Ala medial branch block;  Surgeon: Ronaldo Hicks MD;  Location: [facility] OR     MASTOID SURGERY  1939     MOHS MICROGRAPHIC PROCEDURE       OPTICAL TRACKING SYSTEM FUSION SPINE POSTERIOR LUMBAR TWO LEVELS  7/11/2013    Procedure: OPTICAL TRACKING SYSTEM FUSION SPINE POSTERIOR LUMBAR TWO LEVELS;  Stealth Assisted Lumbar 3-4 Transforaminal Lumbar Interbody Fusion, Lumbar 2-3 Lumbar 3-4 Laminectomy Decompression;  Surgeon: Edgar Lwe MD;  Location: [facility] OR     Current Outpatient Medications   Medication     acetaminophen (TYLENOL) 325 MG tablet     amLODIPine (NORVASC) 10 MG tablet     aspirin 81 MG EC tablet     atorvastatin (LIPITOR) 20 MG tablet     benazepril (LOTENSIN) 40 MG tablet     bisacodyl (DULCOLAX) 10 MG suppository     docusate sodium 100 MG tablet     gentamicin (GARAMYCIN) 0.1 % ointment     Guar Gum (BENEFIBER) packet     Magnesium Hydroxide (MILK OF MAGNESIA PO)     metoprolol succinate (TOPROL-XL) 25 MG 24 hr tablet     Multiple Vitamins-Minerals (VITEYES AREDS FORMULA/LUTEIN) CAPS     ranitidine (ZANTAC) 150 MG tablet     sildenafil (VIAGRA) 100 MG tablet     tamsulosin (FLOMAX) 0.4 MG capsule     tolterodine (DETROL) 2 MG tablet     nitroglycerin (NITROSTAT) 0.4 MG sublingual tablet     No current facility-administered medications for this visit.      Allergies   Allergen Reactions     Nka [No Known Allergies]      /70   Pulse 60   Temp 97.7  F (36.5  C) (Oral)   Wt 85 kg (187 lb 6.4 oz)   BMI 26.89 kg/m    Wt Readings from Last 5 Encounters:   01/11/19 85 kg (187 lb 6.4 oz)   11/15/18 87.8 kg (193 lb 9.6 oz)   10/04/18 86.5 kg (190 lb 9.6 oz)   09/26/18 85.7 kg (189 lb)    09/13/18 86.3 kg (190 lb 3.2 oz)       Daniel was seen today for eye problem, refill request, anxiety and polyuria.    Diagnoses and all orders for this visit:    Anxiety  -     LORazepam (ATIVAN) 0.5 MG tablet; Take 1 tablet (0.5 mg) by mouth every 6 hours as needed for severe anxiety.  #5 tablets, no refills. Discussed potential side effects including sedation, dizziness, falls.  Advised no driving or activities requiring alertness when he takes the rx.  No combining with other sedating pills, alcohol.  Otherwise he will continue non-pharmaceutical strategies for anxiety.    Hypertrophy of prostate without urinary obstruction  -     Refill tamsulosin (FLOMAX) 0.4 MG capsule; Take 1 capsule (0.4 mg) by mouth daily    Bilateral impacted cerumen  -     REMOVE IMPACTED CERUMEN    Spinal stenosis, continue current cares    See HPI for additional details.    F/U 3 months.    Total time spent 40 minutes.  More than 50% of the time spent with Mr. Mcnair on counseling / coordinating his care      Lia Dumont M.D.  Internal Medicine  [facility]   pager [phone]

## 2019-01-11 NOTE — NURSING NOTE
EAR WASH    An ear wash was performed by myself while the patient was in clinic.   The patient tolerated this procedure well and had no complications.    A small amount of impacted cerumen was removed from bilateral ear/ears.    Tympanic membrane was visible.     Radha Bell at 9:18 AM on 1/11/2019.

## 2019-01-12 ASSESSMENT — ANXIETY QUESTIONNAIRES: GAD7 TOTAL SCORE: 6

## 2019-01-14 ENCOUNTER — OFFICE VISIT (OUTPATIENT)
Dept: OPHTHALMOLOGY | Facility: CLINIC | Age: 84
End: 2019-01-14
Attending: OPHTHALMOLOGY
Payer: COMMERCIAL

## 2019-01-14 DIAGNOSIS — H43.813 POSTERIOR VITREOUS DETACHMENT, BILATERAL: ICD-10-CM

## 2019-01-14 DIAGNOSIS — H53.2 MONOCULAR DIPLOPIA, RIGHT EYE: ICD-10-CM

## 2019-01-14 DIAGNOSIS — H35.3122 NONEXUDATIVE AGE-RELATED MACULAR DEGENERATION, LEFT EYE, INTERMEDIATE DRY STAGE: Primary | ICD-10-CM

## 2019-01-14 DIAGNOSIS — H35.3112 NONEXUDATIVE AGE-RELATED MACULAR DEGENERATION, RIGHT EYE, INTERMEDIATE DRY STAGE: ICD-10-CM

## 2019-01-14 PROCEDURE — 92134 CPTRZ OPH DX IMG PST SGM RTA: CPT | Mod: ZF | Performed by: OPHTHALMOLOGY

## 2019-01-14 PROCEDURE — G0463 HOSPITAL OUTPT CLINIC VISIT: HCPCS | Mod: ZF

## 2019-01-14 ASSESSMENT — VISUAL ACUITY
OD_PH_CC+: -2
OS_CC: J5
OD_CC: 20/40
OD_PH_CC: 20/30
METHOD: SNELLEN - LINEAR
OD_CC: J5
OS_CC+: -2
OS_CC: 20/30
CORRECTION_TYPE: GLASSES

## 2019-01-14 ASSESSMENT — REFRACTION_WEARINGRX
OS_SPHERE: -2.50
OD_SPHERE: -0.50
OD_CYLINDER: +0.25
OS_AXIS: 175
OD_ADD: +3.00
OD_AXIS: 040
OS_CYLINDER: +1.75
OS_ADD: +3.00

## 2019-01-14 ASSESSMENT — CUP TO DISC RATIO
OS_RATIO: 0.3
OD_RATIO: 0.3

## 2019-01-14 ASSESSMENT — TONOMETRY
OD_IOP_MMHG: 20
OS_IOP_MMHG: 20
IOP_METHOD: TONOPEN

## 2019-01-14 ASSESSMENT — SLIT LAMP EXAM - LIDS
COMMENTS: UPPER LID DERMATOCHALASIS
COMMENTS: UPPER LID DERMATOCHALASIS

## 2019-01-14 ASSESSMENT — EXTERNAL EXAM - RIGHT EYE: OD_EXAM: NORMAL

## 2019-01-14 ASSESSMENT — CONF VISUAL FIELD
OD_NORMAL: 1
OS_NORMAL: 1
METHOD: COUNTING FINGERS

## 2019-01-14 ASSESSMENT — EXTERNAL EXAM - LEFT EYE: OS_EXAM: NORMAL

## 2019-01-14 NOTE — PATIENT INSTRUCTIONS
-----------------------------------------  DRY EYE INSTRUCTIONS    You have dry eyes.   Artificial tears may be helpful.  Please see the list of brands below.  You may use preserved artifical tears (in a muti-use bottle) 2-4 times per day.  Do not use preserved tears more than 4 times per day.  If you wish to use artifical tears more than 4 times per day, you should use preservative-free artifical tears.  These come in single-use vials.  You can open a new vial each day to use throughout the day, but it should be discarded at the end of the day.  Thicker tears, gels, and ointments are more effective, but they may blur your vision.  Some patients prefer to use those only at bedtime.    You may also benefit from washing your eyelashes (not your eye lids) gently using your finger once or twice per day with tap water.    You may also benefit from warm compresses once or twice per day to your eyelids.  Use a clean washcloth soaked in warm water, and place it over your eyes for 5 minutes.    Avoid medicated drops for red eyes which contain vasoconstrictors.  These should not be used for more than a few days in a row, as the medication can cause serious problems if used for too many days in a row.      Example Recommended Artificial Tear Brands:    Dry Eye Drops    Optive  Systane Ultra  TheraTears  Genteal  Refresh  Blink Tears  Soothe      Gels    Refresh Liquigel  Genteal Gel  TheraTears Gel  Celluvisc  Blink Gel  Systane Gel      Ointments    Refresh PM  Lacrilube      Contact Lens Compatible    Blink Contacts  Blink Tears  Refresh Contacts  Systane Ultra  Complete Lubricating and Rewetting  Complete Blink and Clean Lens Drops      --------------------------------------------------------------------  Ask to talk to Kristen for help scheduling

## 2019-01-22 NOTE — PROGRESS NOTES
CC -   Dry AMD    INTERVAL HISTORY -    Feels [facility] declining, trouble reading small print.  ROBINA uses artificial tears.  Still has monocular diplopia OD  Using AREDS, occasional Amsler    HPI - 88 yo male with hx of dry ARMD, dbh right eye noted in 3/2015, CE/IOL OU.   Now taking AREDS II, never smoked in the past and using Amsler not noticing diplopia      PAST OCULAR SURGERY  CE/IOL both eyes ~ 2005    RETINAL IMAGING  OCT  12-22-17  right eye- 2+ drusen , drusenoid pigment epithelial detachments, no fluid, PVD  left eye - 2+ drusen, drusenoid pigment epithelial detachments, no fluid, PVD      ASSESSMENT & PLAN  1.  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  Diplopia   - monocular on testing in past, although reports binocular symptoms occasionally   - ortho at near and distance on strabismus exam in past      3. Posterior vitreous detachment (PVD) both eyes    - advised S/Sx RD    4.  H/O dot blot hemorrhage right eye   - seen previously 3/2015 visit, resolved today, no new heme    - ?etiolgy   - h/o HTN, doubt Choroidal neovascular membrane, but in far temporal macula   - continue observation    5.  Pseudophakia both eyes   - Observe      6. Dry Eyes, bilateral   -increase At to PFATs q2-3 hours & gel qhs   -start warm compress    7. Dermatochalasis    return to clinic:6 monhts, OCT OU,       ATTESTATION     Attending Physician Attestation:      Complete documentation of historical and exam elements from today's encounter can be found in the full encounter summary report (not reduplicated in this progress note).  I personally obtained the chief complaint(s) and history of present illness.  I confirmed and edited as necessary the review of systems, past medical/surgical history, family history, social history, and examination findings as documented by others; and I  examined the patient myself.  I personally reviewed the relevant tests, images, and reports as documented above.  I formulated and edited as necessary the assessment and plan and discussed the findings and management plan with the patient and family    Luna Parker MD, PhD  , Vitreoretinal Surgery  Department of Ophthalmology  [facility]       normal...

## 2019-01-23 DIAGNOSIS — E78.5 HYPERLIPIDEMIA LDL GOAL <70: ICD-10-CM

## 2019-01-23 RX ORDER — ATORVASTATIN CALCIUM 20 MG/1
TABLET, FILM COATED ORAL
Qty: 90 TABLET | Refills: 0 | Status: SHIPPED | OUTPATIENT
Start: 2019-01-23 | End: 2019-05-03

## 2019-01-23 NOTE — TELEPHONE ENCOUNTER
atorvastatin (LIPITOR) 20 MG tablet  Last Clinic Visit: 1/11/2019 [facility]  Failed protocol: No LDL in last 12 months will alert clinic CMA, and authorize 90 day cassie.

## 2019-03-18 ENCOUNTER — OFFICE VISIT (OUTPATIENT)
Dept: OPHTHALMOLOGY | Facility: CLINIC | Age: 84
End: 2019-03-18
Attending: OPHTHALMOLOGY
Payer: COMMERCIAL

## 2019-03-18 DIAGNOSIS — H11.823 CONJUNCTIVOCHALASIS OF BOTH EYES: Primary | ICD-10-CM

## 2019-03-18 PROCEDURE — G0463 HOSPITAL OUTPT CLINIC VISIT: HCPCS | Mod: ZF

## 2019-03-18 ASSESSMENT — VISUAL ACUITY
OD_PH_CC: 20/30
OD_CC: 20/40
OD_CC+: -2
METHOD: SNELLEN - LINEAR
OS_CC+: +2
OD_PH_CC+: -2
OS_CC: 20/30

## 2019-03-18 ASSESSMENT — CONF VISUAL FIELD
OS_NORMAL: 1
OD_NORMAL: 1

## 2019-03-18 ASSESSMENT — REFRACTION_WEARINGRX
OS_SPHERE: -2.50
OD_AXIS: 040
OS_CYLINDER: +1.75
OD_CYLINDER: +0.25
OD_SPHERE: -0.50
OS_ADD: +3.00
OD_ADD: +3.00
OS_AXIS: 175

## 2019-03-18 ASSESSMENT — TONOMETRY
OD_IOP_MMHG: 08
OS_IOP_MMHG: 17
IOP_METHOD: ICARE

## 2019-03-18 ASSESSMENT — EXTERNAL EXAM - RIGHT EYE: OD_EXAM: NORMAL

## 2019-03-18 ASSESSMENT — SLIT LAMP EXAM - LIDS
COMMENTS: UPPER LID DERMATOCHALASIS
COMMENTS: UPPER LID DERMATOCHALASIS

## 2019-03-18 ASSESSMENT — EXTERNAL EXAM - LEFT EYE: OS_EXAM: NORMAL

## 2019-03-18 NOTE — PATIENT INSTRUCTIONS
"Eye Drop Instructions:    1. START Warm compresses - Rest a washcloth with warm water over both eyes for about 5 minutes every morning and every evening. Then apply Andrea & Andrea baby shampoo to the wash cloth and gently scrub the eyelids and eyelashes. Rinse the eyes was warm water to clear off any debris.    2. CONTINUE Refresh preservative-free lubricating eye drops - Okay to use as frequently at every 1-2 hours, or as needed for dryness/irritation. At a minimum, use approximately 4 times per day in both eyes.     3. CONTINUE Lubricating ointment - apply 1/4\" to both eyes prior to naps and at bedtime for additional lubrication.    4. START Omega-3 fatty acid/fish oil supplementation - Take 2 grams/daily.    5. Ok to use an eye mask over both eyes while sleeping (preferrably with a strap to hold in position while sleeping).      "

## 2019-03-18 NOTE — PROGRESS NOTES
CC:  Dry eye eval    HPI:  89 y/o  male with POH dry AMD (followed by [facility]), s/p CE/IOL each eye, here for new cornea eval regarding dry eye disease.      Describes having a scratching and burning each eye, comes and goes. Lubricating drops seem to help. Irritation bothers the patient like he wants to scratch the eyes, but he tries to avoid that. Feels like his eyes become easily fatigued with reading, even with an additional light.     POH:  Last eye exam: Dr. Parker - 1/14/19    Prior eye surgery/laser:   -s/p CE/IOL each eye ~2005  -dry AMD each eye on AREDS, Amsler - follows with [facility] q6 months    CTL wearer: none  Glasses: yes, bifocal     Fam hx of eye disease: none     Gtts:  Preservative-free tears - Refresh - a few times during the day   Lubricating ointment before naps and at bedtime each eye     Occasional wipes with a washcloth - no scrubs or warm water.   Sleeps with sock over the eyes - to darken his surroundings  No sleep apnea or face mask   Sleeping mostly on left side, not on stomach     All:  NKDA    A/P:  1. Dry eye disease, each eye  -few PEE each eye, symptoms already improving since starting topical lubricating drops  -continue starting preservative-free lubricating tears every 3-4 hours each eye  -continue lubricating ointment pre-nap and at bedtime OU  -start PO omega-3 fatty acid supplementation and/or flax seed oil  -start warm compresses and lid scrubs with baby shampoo  -continue frequent breaks while reading  -ok to use eye mask overnight; would stop using a sock over the eyes, as it may scratch/irritate while sleeping    2. Conjunctivochalasis, each eye  -lubrication as above  -recommend conjunctivochalasis excision, R/B/A discussed - patient wishes to proceed    3. Pseudophakia, each eye  -stable IOLs each eye  -monitor    4. Dry AMD, each eye  -follows with [facility] q6 months  -continue AREDs, Amsler grid  -reviewed return precautions    5. Dermatochalasis, each  eye  -bilateral upper lids  -consider oculoplastics eval      F/u 3 months, sooner PRN    Clary Medina MD  PGY-5, Cornea Fellow  Ophthalmology    Attending Physician Attestation:  Complete documentation of historical and exam elements from today's encounter can be found in the full encounter summary report (not reduplicated in this progress note).  I personally obtained the chief complaint(s) and history of present illness.  I confirmed and edited as necessary the review of systems, past medical/surgical history, family history, social history, and examination findings as documented by others; and I examined the patient myself.  I personally reviewed the relevant tests, images, and reports as documented above.  I formulated and edited as necessary the assessment and plan and discussed the findings and management plan with the patient and family. - Jhoan Owens MD    I personally spent great than 40min with the patient, of which >50% of the time was spent face to face with the patient, counseling and coordinating care with the patient. We discussed the complexity of his diagnosis, the need for further information prior to proceeding with yet another surgery, and the unknown prognosis for the patient at this time.    Jhoan Owens MD

## 2019-03-18 NOTE — LETTER
3/18/2019       RE: Daniel Mcnair  [address]  [place] [address]     Dear Colleague,    Thank you for referring your patient, Daniel Mcnair, to the EYE CLINIC at [facility]. Please see a copy of my visit note below.    CC:  Dry eye eval    HPI:  89 y/o  male with POH dry AMD (followed by [facility]), s/p CE/IOL each eye, here for new cornea eval regarding dry eye disease.      Describes having a scratching and burning each eye, comes and goes. Lubricating drops seem to help. Irritation bothers the patient like he wants to scratch the eyes, but he tries to avoid that. Feels like his eyes become easily fatigued with reading, even with an additional light.     POH:  Last eye exam: Dr. Parker - 1/14/19    Prior eye surgery/laser:   -s/p CE/IOL each eye ~2005  -dry AMD each eye on AREDS, Amsler - follows with [facility] q6 months    CTL wearer: none  Glasses: yes, bifocal     Fam hx of eye disease: none     Gtts:  Preservative-free tears - Refresh - a few times during the day   Lubricating ointment before naps and at bedtime each eye     Occasional wipes with a washcloth - no scrubs or warm water.   Sleeps with sock over the eyes - to darken his surroundings  No sleep apnea or face mask   Sleeping mostly on left side, not on stomach     All:  NKDA    A/P:  1. Dry eye disease, each eye  -few PEE each eye, symptoms already improving since starting topical lubricating drops  -continue starting preservative-free lubricating tears every 3-4 hours each eye  -continue lubricating ointment pre-nap and at bedtime OU  -start PO omega-3 fatty acid supplementation and/or flax seed oil  -start warm compresses and lid scrubs with baby shampoo  -continue frequent breaks while reading  -ok to use eye mask overnight; would stop using a sock over the eyes, as it may scratch/irritate while sleeping    2. Conjunctivochalasis, each eye  -lubrication  as above  -recommend conjunctivochalasis excision, R/B/A discussed - patient wishes to proceed    3. Pseudophakia, each eye  -stable IOLs each eye  -monitor    4. Dry AMD, each eye  -follows with [facility] q6 months  -continue AREDs, Amsler grid  -reviewed return precautions    5. Dermatochalasis, each eye  -bilateral upper lids  -consider oculoplastics eval      F/u 3 months, sooner PRN        Again, thank you for allowing me to participate in the care of your patient.      Sincerely,    Jhoan Owens MD

## 2019-03-21 ENCOUNTER — TELEPHONE (OUTPATIENT)
Dept: OPHTHALMOLOGY | Facility: CLINIC | Age: 84
End: 2019-03-21

## 2019-03-21 ENCOUNTER — PRE VISIT (OUTPATIENT)
Dept: OPHTHALMOLOGY | Facility: CLINIC | Age: 84
End: 2019-03-21

## 2019-03-21 ENCOUNTER — OFFICE VISIT (OUTPATIENT)
Dept: OPHTHALMOLOGY | Facility: CLINIC | Age: 84
End: 2019-03-21
Payer: COMMERCIAL

## 2019-03-21 ENCOUNTER — HOSPITAL ENCOUNTER (OUTPATIENT)
Facility: CLINIC | Age: 84
End: 2019-03-21
Attending: OPHTHALMOLOGY | Admitting: OPHTHALMOLOGY
Payer: COMMERCIAL

## 2019-03-21 VITALS — WEIGHT: 187 LBS | BODY MASS INDEX: 26.77 KG/M2 | HEIGHT: 70 IN

## 2019-03-21 DIAGNOSIS — H02.402 INVOLUTIONAL PTOSIS, ACQUIRED, LEFT: ICD-10-CM

## 2019-03-21 DIAGNOSIS — H02.831 DERMATOCHALASIS OF BOTH UPPER EYELIDS: Primary | ICD-10-CM

## 2019-03-21 DIAGNOSIS — H02.401 INVOLUTIONAL PTOSIS, ACQUIRED, RIGHT: ICD-10-CM

## 2019-03-21 DIAGNOSIS — H02.834 DERMATOCHALASIS OF BOTH UPPER EYELIDS: Primary | ICD-10-CM

## 2019-03-21 DIAGNOSIS — Z96.1 PSEUDOPHAKIA OF BOTH EYES: ICD-10-CM

## 2019-03-21 ASSESSMENT — CONF VISUAL FIELD
OD_NORMAL: 1
OS_NORMAL: 1
METHOD: COUNTING FINGERS

## 2019-03-21 ASSESSMENT — EXTERNAL EXAM - RIGHT EYE: OD_EXAM: BROW PTOSIS, WITH FRONTALIS RELAXED, BROW IS BELOW SUPERIOR ORBITAL RIM AND LATERALLY INLINE WITH UPPER LASHES

## 2019-03-21 ASSESSMENT — REFRACTION_WEARINGRX
OS_SPHERE: -2.50
OS_CYLINDER: +1.75
OD_AXIS: 040
OD_ADD: +3.00
OD_SPHERE: -0.50
OS_ADD: +3.00
OD_CYLINDER: +0.25
OS_AXIS: 175

## 2019-03-21 ASSESSMENT — VISUAL ACUITY
METHOD: SNELLEN - LINEAR
OD_CC: 20/40
OS_CC: 20/40
OD_CC+: -1
OS_CC+: -2

## 2019-03-21 ASSESSMENT — MIFFLIN-ST. JEOR: SCORE: 1519.48

## 2019-03-21 ASSESSMENT — EXTERNAL EXAM - LEFT EYE: OS_EXAM: BROW PTOSIS, WITH FRONTALIS RELAXED, BROW IS BELOW SUPERIOR ORBITAL RIM AND LATERALLY INLINE WITH UPPER LASHES

## 2019-03-21 ASSESSMENT — TONOMETRY
IOP_METHOD: ICARE
OS_IOP_MMHG: 13
OD_IOP_MMHG: 11

## 2019-03-21 ASSESSMENT — SLIT LAMP EXAM - LIDS
COMMENTS: HEAVY DERMATOCHALASIS RESTING ON LASHES, TRUE PTOSIS
COMMENTS: HEAVY DERMATOCHALASIS RESTING ON LASHES, TRUE PTOSIS

## 2019-03-21 NOTE — TELEPHONE ENCOUNTER
FUTURE VISIT INFORMATION      FUTURE VISIT INFORMATION:    Date: 3/21/19    Time: 10:00am    [place] [facility] [place] INFORMATION:    Referring provider:  Roman    Referring providers clinic:  [facility]    Reason for visit/diagnosis  Dermatochalasis, each eye    RECORDS REQUESTED FROM:       Clinic name Comments Records Status Imaging Status   [facility] OV with Dr. Owens 3/18/19  OV emiliano Parker 1/14/19  OV with Phu Wilson 7/30/18 Paramjit

## 2019-03-21 NOTE — TELEPHONE ENCOUNTER
Called patient to schedule procedure with Dr. Owens. Patient is scheduled on 4/19/19 with Dr. Cary and will like to schedule with Dr. Owens  In May or June 2019. Advised my direct line [phone] for any questions.

## 2019-03-21 NOTE — PROGRESS NOTES
"Oculoplastic Clinic New Patient    Patient: Daniel Mcnair MRN# [id number]   YOB: 1929 Age: 89 year old   Date of Visit: Mar 21, 2019    CC: Droopy eyelids obstructing vision.              HPI:     Chief Complaint(s) and History of Present Illness(es)     Dermatochalasis Evaluation     Laterality: right upper lid and left upper lid    Severity: moderate    Course: gradually worsening    Associated signs and symptoms: blurred vision    Response to treatment: no improvement         Comments    Pt states has dry eyes and was referred for possible eye lid surgery. Pt states the \"wrinkles\" of his eyelids are drooping.     Jovan Brizuela COT 9:53 AM March 21, 2019      Daniel Mcnair is a 89 year old male who has noted gradual onset of droopy eyelids over the past years. The droopy eyelid is interfering with activities of daily living including driving, and reading. The patient denies double vision, variability of the eyelid position. He also has significant dry eyes and Age related macular degeneration. To try to tease out what is causing decreased vision, raising his lids helps.     EXAM:     MRD1: 0 both eyes   Dermatochalasis with excess skin touching eyelashes   Brow ptosis with brow resting below superior orbital rim     VISUAL FIELD (tangent field):  Right eye untaped:6 degrees Right eye taped:40 degrees  Left eye untaped:6 degrees Left eye taped:40 degrees    Assessment & Plan     Daniel Mcnair is a 89 year old male with the following diagnoses:   1. Dermatochalasis of both upper eyelids    2. Involutional ptosis, acquired, right    3. Involutional ptosis, acquired, left    4. Pseudophakia of both eyes         Both upper eyelid blepharoplasty (Skin, NF&CF)    Will not address involutional ptosis given his dry eye symptoms.       ANTICOAGULATION:  Aspirin 81    Can hold prior to surgery         PHOTOS DEMONSTRATE:    Significant dermatochalasis with lids resting on eyelashes and obstructing " visual axis  Blepharoptosis  Brow ptosis with thicker brow skin and hairs below the lateral superior orbital rim    Attending Physician Attestation:  Complete documentation of historical and exam elements from today's encounter can be found in the full encounter summary report (not reduplicated in this progress note).  I personally obtained the chief complaint(s) and history of present illness.  I confirmed and edited as necessary the review of systems, past medical/surgical history, family history, social history, and examination findings as documented by others; and I examined the patient myself.  I personally reviewed the relevant tests, images, and reports as documented above.  I formulated and edited as necessary the assessment and plan and discussed the findings and management plan with the patient and family. - America Melo MD        Today with Daniel Mcnair, I reviewed the indications, risks, benefits, and alternatives of the proposed surgical procedure including, but not limited to, failure obtain the desired result  and need for additional surgery, bleeding, infection, loss of vision, loss of the eye, and the remote possibility of permanent damage to any organ system or death with the use of anesthesia.  I provided multiple opportunities for the questions, answered all questions to the best of my ability, and confirmed that my answers and my discussion were understood.

## 2019-03-21 NOTE — TELEPHONE ENCOUNTER
Met with patient to schedule surgery with Dr. America Melo.    Surgery was scheduled on 04/19 at [facility] due to insurance  Patient will have H&P at [facility]  Post-Op care appointment was scheduled on 04/30  Patient is aware a / is needed day of surgery.   Patient received surgery packet has my direct contact information for any further questions.

## 2019-03-21 NOTE — LETTER
" 3/21/2019         RE:  :  MRN: Daniel Mcnair  10/17/1929  [id number]     Dear Dr. Owens,    Thank you for asking me to see your patient, Daniel Mcnair, for an oculoplastic   consultation.  My assessment and plan are below.  For further details, please see my attached clinic note.                HPI:     Chief Complaint(s) and History of Present Illness(es)     Dermatochalasis Evaluation     Laterality: right upper lid and left upper lid    Severity: moderate    Course: gradually worsening    Associated signs and symptoms: blurred vision    Response to treatment: no improvement         Comments    Pt states has dry eyes and was referred for possible eye lid surgery. Pt states the \"wrinkles\" of his eyelids are drooping.     Jovan Brizuela COT 9:53 AM 2019      Daniel Mcnair is a 89 year old male who has noted gradual onset of droopy eyelids over the past years. The droopy eyelid is interfering with activities of daily living including driving, and reading. The patient denies double vision, variability of the eyelid position. He also has significant dry eyes and Age related macular degeneration. To try to tease out what is causing decreased vision, raising his lids helps.     EXAM:     MRD1: 0 both eyes   Dermatochalasis with excess skin touching eyelashes   Brow ptosis with brow resting below superior orbital rim     VISUAL FIELD (tangent field):  Right eye untaped:6 degrees Right eye taped:40 degrees  Left eye untaped:6 degrees Left eye taped:40 degrees    Assessment & Plan     Daniel Mcnair is a 89 year old male with the following diagnoses:   1. Dermatochalasis of both upper eyelids    2. Involutional ptosis, acquired, right    3. Involutional ptosis, acquired, left    4. Pseudophakia of both eyes         Both upper eyelid blepharoplasty (Skin, NF&CF)    Will not address involutional ptosis given his dry eye symptoms.       ANTICOAGULATION:  Aspirin 81    Can hold prior to " surgery        Again, thank you for allowing me to participate in the care of your patient.      Sincerely,    America Melo MD  Department of Ophthalmology and Visual Neurosciences  [facility]    CC: Jhoan Owens MD  [address] [place] [id number]  [facility] [id number]  VIA In Basket

## 2019-03-21 NOTE — NURSING NOTE
"Chief Complaints and History of Present Illnesses   Patient presents with     Dermatochalasis Evaluation     Chief Complaint(s) and History of Present Illness(es)     Dermatochalasis Evaluation     Laterality: right upper lid and left upper lid    Severity: moderate    Course: gradually worsening    Associated signs and symptoms: blurred vision    Response to treatment: no improvement              Comments     Pt states has dry eyes and was referred for possible eye lid surgery. Pt states the \"wrinkles\" of his eyelids are drooping.     Jovan Brizuela COT 9:53 AM March 21, 2019                   "

## 2019-03-27 DIAGNOSIS — K21.9 GASTROESOPHAGEAL REFLUX DISEASE WITHOUT ESOPHAGITIS: ICD-10-CM

## 2019-03-29 ENCOUNTER — TELEPHONE (OUTPATIENT)
Dept: INTERNAL MEDICINE | Facility: CLINIC | Age: 84
End: 2019-03-29

## 2019-03-29 NOTE — TELEPHONE ENCOUNTER
No answered. Left message with clinic number to call back to schedule 3 month follow up appointment with Dr. Dumont as last office visit was in January.

## 2019-03-29 NOTE — TELEPHONE ENCOUNTER
----- Message from Kathleen M Doege, RN sent at 3/27/2019  7:28 PM CDT -----  Regarding: pt needs appointment  Please call to schedule.    Patient is overdue for annual clinic visit - unless otherwise indicated patient needs to be scheduled with 1st available.    Patient may need labs and or imaging - please check with RN care coordinator.    Thanks    I do not need any follow up on the scheduling of this appointment unless the patient will no longer be receiving care in our clinic.

## 2019-04-09 ENCOUNTER — PRE VISIT (OUTPATIENT)
Dept: SURGERY | Facility: CLINIC | Age: 84
End: 2019-04-09

## 2019-04-09 ENCOUNTER — TRANSFERRED RECORDS (OUTPATIENT)
Dept: HEALTH INFORMATION MANAGEMENT | Facility: CLINIC | Age: 84
End: 2019-04-09

## 2019-04-09 DIAGNOSIS — N31.8 HYPERTONICITY OF BLADDER: ICD-10-CM

## 2019-04-09 NOTE — TELEPHONE ENCOUNTER
FUTURE VISIT INFORMATION      SURGERY INFORMATION:    Date: 4/19/19    Location:   or    Surgeon:  America Melo    Anesthesia Type:  Combined MAC with Local    RECORDS REQUESTED FROM:       [facility]    Pertinent Medical History: Hypertension, Coronary artery disease, Anemia    Most recent EKG+ Tracing: 10/4/18

## 2019-04-10 NOTE — TELEPHONE ENCOUNTER
Patient called to let us know he received a letter stating coverage was denied.   Surgery was cancelled  He will fax us the letter and requested we appeal it.

## 2019-04-11 ENCOUNTER — TELEPHONE (OUTPATIENT)
Dept: OPHTHALMOLOGY | Facility: CLINIC | Age: 84
End: 2019-04-11

## 2019-04-11 RX ORDER — TOLTERODINE TARTRATE 2 MG/1
2 TABLET, EXTENDED RELEASE ORAL 2 TIMES DAILY
Qty: 180 TABLET | Refills: 1 | Status: SHIPPED | OUTPATIENT
Start: 2019-04-11 | End: 2019-09-14

## 2019-04-11 NOTE — TELEPHONE ENCOUNTER
Note to Surgery scheduler/RN  Zheng Moon RN 3:54 PM 04/11/19        [facility] Call Center    Phone Message    May a detailed message be left on voicemail: yes    Reason for Call: Other: per pt- is wondering if you recieved the letter of denial from his insurance for his procedure, please call pt thanks!!     Action Taken: Message routed to:  [facility]

## 2019-04-15 NOTE — TELEPHONE ENCOUNTER
Called patient to inform him we did receive the e-mail and [employer] would be doing the appeal.  Patient stated he received a call on Saturday and was told he was approved for surgery until May 28th.    Spoke with patient to schedule surgery with Dr. America Melo.    Surgery was scheduled on 05/17 at [facility]  Patient will have H&P at [facility] on 05/03   Post-Op visit was scheduled on 05/28  Patient is aware a / is needed day of surgery.   Surgery packet was mailed, patient has my direct contact information for any further questions.

## 2019-04-29 DIAGNOSIS — E78.5 HYPERLIPIDEMIA LDL GOAL <70: ICD-10-CM

## 2019-04-30 NOTE — TELEPHONE ENCOUNTER
atorvastatin (LIPITOR) 20 MG    Last Written Prescription Date:  1/23/19  Last Fill Quantity: 90,   # refills: 0  Last Office Visit : 1/11/19  Future Office visit:5/3/19    Routing refill request to provider for review/approval because: 30 DAY RF PENDING  ( LAST RF # 90 )   OVERDUE LDL 2017

## 2019-05-03 ENCOUNTER — OFFICE VISIT (OUTPATIENT)
Dept: INTERNAL MEDICINE | Facility: CLINIC | Age: 84
End: 2019-05-03
Payer: COMMERCIAL

## 2019-05-03 ENCOUNTER — TELEPHONE (OUTPATIENT)
Dept: PHYSICAL MEDICINE AND REHAB | Facility: CLINIC | Age: 84
End: 2019-05-03

## 2019-05-03 VITALS
HEART RATE: 54 BPM | SYSTOLIC BLOOD PRESSURE: 126 MMHG | DIASTOLIC BLOOD PRESSURE: 58 MMHG | OXYGEN SATURATION: 98 % | HEIGHT: 70 IN | WEIGHT: 188 LBS | BODY MASS INDEX: 26.92 KG/M2

## 2019-05-03 DIAGNOSIS — M48.062 SPINAL STENOSIS OF LUMBAR REGION WITH NEUROGENIC CLAUDICATION: ICD-10-CM

## 2019-05-03 DIAGNOSIS — I10 ESSENTIAL HYPERTENSION: ICD-10-CM

## 2019-05-03 DIAGNOSIS — I25.10 CORONARY ARTERY DISEASE INVOLVING NATIVE CORONARY ARTERY OF NATIVE HEART WITHOUT ANGINA PECTORIS: ICD-10-CM

## 2019-05-03 DIAGNOSIS — Z01.810 PRE-OPERATIVE CARDIOVASCULAR EXAMINATION: Primary | ICD-10-CM

## 2019-05-03 DIAGNOSIS — H02.403 PTOSIS OF BOTH EYELIDS: ICD-10-CM

## 2019-05-03 DIAGNOSIS — E78.5 HYPERLIPIDEMIA LDL GOAL <70: ICD-10-CM

## 2019-05-03 RX ORDER — ATORVASTATIN CALCIUM 20 MG/1
20 TABLET, FILM COATED ORAL DAILY
Qty: 90 TABLET | Refills: 3 | Status: SHIPPED | OUTPATIENT
Start: 2019-05-03 | End: 2019-05-16

## 2019-05-03 ASSESSMENT — PAIN SCALES - GENERAL: PAINLEVEL: MILD PAIN (2)

## 2019-05-03 ASSESSMENT — MIFFLIN-ST. JEOR: SCORE: 1524.01

## 2019-05-03 NOTE — PATIENT INSTRUCTIONS
[facility] Medication Refill Request Information:  * Please contact your pharmacy regarding ANY request for medication refills.  ** [facility] Prescription Fax = [phone]  * Please allow 3 business days for routine medication refills.  * Please allow 5 business days for controlled substance medication refills.     [facility] Test Result notification information:  *You will be notified with in 7-10 days of your appointment day regarding the results of your test.  If you are on MyChart you will be notified as soon as the provider has reviewed the results and signed off on them.    [facility]: [phone]     Take your blood pressure medications on the morning of surgery  Do not take other medications/supplements on the morning of surgery  Do not take aspirin starting one week prior to surgery (resume the day following surgery).  I will put a referral in for you to see [facility] Medicine [facility] Rehabilitation.  I am told Dr. Pena is no longer here.  However, there are other doctors to consult with.  You may schedule that appointment today  Go to the lab at your convenience to check your cholesterol. Fast for 8 to 12 hours before having your blood drawn.  I sent a refill for atorvastatin to your pharmacy  See me in about 4 months for routine follow up.  Dr. Dumont

## 2019-05-03 NOTE — PROGRESS NOTES
America Melo MD Primary    Procedure Laterality Anesthesia   BLEPHAROPLASTY BILATERAL UPPER LIDS Bilateral Combined MAC with Local        HPI:  I am seeing Daniel Mcnair today at the request of Dr. America Melo for preoperative cardiovascular risk assessment. Daniel is here with his son, Matteo.  Complaining of vison diminishing, droopy eyelids x years, gradual loss, small print and distant vision and night vision all affected, progressed over the last 3 months so, able to read with brighter light, has reading magnifier and new sunglasses from services from the blind, s/p bilateral cataract surgery, has hx dry eyes and uses drops periodically, no eye pain, photophobia, has some feeling of sand of the eyes  each eye , hx macular degeneration, no eye redness or periorbital swelling, no discharge, no flashing).     Cardiac risks: No cardiac symptoms presently, has prior hx CAD, no CHF  Pulmonary risks: None  Exercise tolerance: limited by spinal stenosis, but able to walk at gradual pace w/cane and sometimes walker  Personal and/or family history of bleeding/clotting disorders: None  Personal and/or family history of adverse anesthesia reactions: None  Prior unanticipated surgical complications: None    Has the following diagnoses  1. Dermatochalasis of both upper eyelids    2. Involutional ptosis, acquired, right    3. Involutional ptosis, acquired, left    4. Pseudophakia of both eyes       Surgery plannedL  Both upper eyelid blepharoplasty (Skin, NF&CF)    ROS:  Answered a few questions about his chronic medical condition including difference between neurogenic claudication and claudication from PAD.  Has DOYLE/US's which were negative.  Has some rectal bleeding which is chronic and intermittent from anal stenosis, worse with constipation.  Not anything out of the usual for him.  I advised him to let me know if it is worse and that he would need to return to see colorectal.  Completed handicap parking sticker  form today.  Needed referral to go back to [facility].  Has only used 2 lorazepam since I last saw him.  Has been on sertraline in the past, but is not interested in it at this time.    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)     Benign essential hypertension     Coronary artery disease involving native coronary artery of native heart without angina pectoris     Advance Care Planning     Osteoarthritis of multiple joints, unspecified osteoarthritis type     Hyperlipidemia, unspecified hyperlipidemia type     Other fatigue     B-cell lymphoma, unspecified B-cell lymphoma type, unspecified body region (H)     IPMN (intraductal papillary mucinous  neoplasm)     Sensorineural hearing loss (SNHL) of both ears     Past Medical History:   Diagnosis Date     Anal stricture 7/29/2011     Baker's cyst      Basal cell carcinoma      Chronic pain     left hip     Coronary artery disease 9/5/2012     Dermatochalasis      Esophageal reflux 3/20/2013    not been an issue     Hearing loss      Hypertrophy of prostate without urinary obstruction and other lower urinary tract symptoms (LUTS) 7/21/2011     Idiopathic gynecomastia 7/29/2011     Impotence of organic origin 7/21/2011     Lymphoma (H) 1/25/2012     Macular degeneration, dry      Other and unspecified hyperlipidemia 7/21/2011     Snoring      Spinal stenosis, lumbar region, without neurogenic claudication 7/21/2011     Stented coronary artery 2003    x5; two separate times     Unspecified essential hypertension 7/21/2011     Past Surgical History:   Procedure Laterality Date     ARTHROPLASTY HIP  10/2008    left     ARTHROPLASTY HIP  1999    right     ARTHROPLASTY REVISION HIP  1/21/2014    Procedure: ARTHROPLASTY REVISION HIP;  Revision Left Total Hip;  Surgeon: Edgar Grewal MD;  Location: [facility]     BACK SURGERY  2006     CARDIAC SURGERY  2001    stent x 5      CARDIAC SURGERY  2002     CATARACT IOL, RT/LT Bilateral      CYSTOSCOPY, TRANSURETHRAL RESECTION (TUR) PROSTATE, COMBINED  11/5/2013    Procedure: COMBINED CYSTOSCOPY, TRANSURETHRAL RESECTION (TUR) PROSTATE;  Transurethral Resection Of Prostate, Bipolar;  Surgeon: Lai Street MD;  Location: [facility]     ESOPHAGOSCOPY, GASTROSCOPY, DUODENOSCOPY (EGD), COMBINED  2/3/2014    Procedure: COMBINED ESOPHAGOSCOPY, GASTROSCOPY, DUODENOSCOPY (EGD);;  Surgeon: Ezra Fiore MD;  Location: [facility]     ESOPHAGOSCOPY, GASTROSCOPY, DUODENOSCOPY (EGD), COMBINED  4/15/2014    Procedure: COMBINED ESOPHAGOSCOPY, GASTROSCOPY, DUODENOSCOPY (EGD), BIOPSY SINGLE OR MULTIPLE;  Surgeon: Ezra Fiore MD;  Location: [facility]     EXTRACAPSULAR CATARACT EXTRATION WITH  INTRAOCULAR LENS IMPLANT  2005     EXTRACAPSULAR CATARACT EXTRATION WITH INTRAOCULAR LENS IMPLANT  2010     HERNIA REPAIR       INJECT NERVE BLOCK LUMBAR PARAVERTEBRAL SYMPATHETIC Left 8/29/2018    Procedure: INJECT NERVE BLOCK LUMBAR PARAVERTEBRAL SYMPATHETIC;  Left L3, L4, sacral Ala medial branch block;  Surgeon: Ronaldo Hicks MD;  Location: [facility]     MASTOID SURGERY  1939     MOHS MICROGRAPHIC PROCEDURE       OPTICAL TRACKING SYSTEM FUSION SPINE POSTERIOR LUMBAR TWO LEVELS  7/11/2013    Procedure: OPTICAL TRACKING SYSTEM FUSION SPINE POSTERIOR LUMBAR TWO LEVELS;  Stealth Assisted Lumbar 3-4 Transforaminal Lumbar Interbody Fusion, Lumbar 2-3 Lumbar 3-4 Laminectomy Decompression;  Surgeon: Edgar Lew MD;  Location: [facility]     Family History   Problem Relation Age of Onset     Diabetes Maternal Grandfather      Alcohol/Drug Maternal Grandfather      Arthritis Maternal Grandfather      Obesity Maternal Grandfather      Cerebrovascular Disease Maternal Grandmother      Hypertension Mother         willa rand     Glaucoma No family hx of      Macular Degeneration No family hx of      Cancer No family hx of         No known family hx of skin cancer     Skin Cancer No family hx of      Social History     Socioeconomic History     Marital status:      Spouse name: Not on file     Number of children: Not on file     Years of education: Not on file     Highest education level: Not on file   Occupational History     Not on file   Social Needs     Financial resource strain: Not on file     Food insecurity:     Worry: Not on file     Inability: Not on file     Transportation needs:     Medical: Not on file     Non-medical: Not on file   Tobacco Use     Smoking status: Never Smoker     Smokeless tobacco: Never Used   Substance and Sexual Activity     Alcohol use: Yes     Alcohol/week: 1.0 oz     Comment: occ     Drug use: No     Sexual activity: Not on file   Lifestyle     Physical activity:      Days per week: Not on file     Minutes per session: Not on file     Stress: Not on file   Relationships     Social connections:     Talks on phone: Not on file     Gets together: Not on file     Attends Islam service: Not on file     Active member of club or organization: Not on file     Attends meetings of clubs or organizations: Not on file     Relationship status: Not on file     Intimate partner violence:     Fear of current or ex partner: Not on file     Emotionally abused: Not on file     Physically abused: Not on file     Forced sexual activity: Not on file   Other Topics Concern     Parent/sibling w/ CABG, MI or angioplasty before 65F 55M? Not Asked   Social History Narrative     Not on file     Current Outpatient Medications   Medication Sig Dispense Refill     acetaminophen (TYLENOL) 325 MG tablet Take 2 tablets (650 mg) by mouth every 6 hours as needed for pain 100 tablet 0     amLODIPine (NORVASC) 10 MG tablet Take 1 tablet (10 mg) by mouth daily 90 tablet 3     aspirin 81 MG EC tablet Take 1 tablet (81 mg) by mouth daily 90 tablet 3     atorvastatin (LIPITOR) 20 MG tablet TAKE ONE TABLET BY MOUTH ONE TIME DAILY  90 tablet 0     benazepril (LOTENSIN) 40 MG tablet Take 1 tablet (40 mg) by mouth daily 90 tablet 3     bisacodyl (DULCOLAX) 10 MG suppository Place 1 suppository rectally daily as needed. 3 suppository 0     docusate sodium 100 MG tablet Take 100 mg by mouth 2 times daily 60 tablet 1     gentamicin (GARAMYCIN) 0.1 % ointment Apply topically 4 times daily   3     Guar Gum (BENEFIBER) packet Take 1 packet by mouth every morning        Magnesium Hydroxide (MILK OF MAGNESIA PO) Take by mouth At Bedtime       metoprolol succinate (TOPROL-XL) 25 MG 24 hr tablet Take 0.5 tablets (12.5 mg) by mouth daily 45 tablet 3     Multiple Vitamins-Minerals (VITEYES AREDS FORMULA/LUTEIN) CAPS Take by mouth daily       nitroglycerin (NITROSTAT) 0.4 MG sublingual tablet Place 1 tablet (0.4 mg) under the tongue  every 5 minutes as needed 30 tablet 2     ranitidine (ZANTAC) 150 MG tablet Take 1 tablet (150 mg) by mouth 2 times daily 180 tablet 0     sildenafil (VIAGRA) 100 MG tablet Take 1 tablet (100 mg) by mouth daily as needed 10 tablet 2     tamsulosin (FLOMAX) 0.4 MG capsule Take 1 capsule (0.4 mg) by mouth daily 90 capsule 3     tolterodine (DETROL) 2 MG tablet Take 1 tablet (2 mg) by mouth 2 times daily 180 tablet 1     Allergies   Allergen Reactions     Nka [No Known Allergies]      Physical Examination:    General:  Conversant, generally healthy appearing, no acute distress  Head: atraumatic  Eyes:  Bilateral ptosis noted.  Pupils 2-3 mm, sclera white, EOM's full, conjunctiva moist, no blepharitis today  Ears:  Hearing aids removed for exam. TM's normal, EAC's patent, clear of cerumen  Nose:  Nasal passages clear, turbinates not swollen  Throat/Mouth:  No pharyngeal erythema, exudate, ulcers, oral mucosa and tongue moist, normal hard and soft palate  Neck:  Trachea midline, Full AROM, supple, thyroid smooth, symmetric, not enlarged, no nodules, no neck lymphadenopathy  Lungs:  Clear to auscultation throughout, no wheezes, rhonchi or rales. Normal respiratory effort and no intercostal retractions.  C/V:  Regular rate and rhythm, no murmurs, rubs or gallops.  No JVD, normal carotid upstroke and amplitude, no carotid bruits.  Abdomen:  Not distended.  Bowel sounds active.  No tenderness, no hepatosplenomegaly or masses.  No CVA tenderness or masses.  Lymph:  No cervical lymph nodes.  Neuro: Alert and oriented, face symmetric. No tremor.  Able to get on/off table and sit up/lie down w/o assistance.  Skin:   Normal temperature., turgor and texture. No jaundice  Extremities:  Trace pitting edema, right greater than left peripheral edema, no digital cyanosis  Psych:  Struggles a bit with memory, Appropriate affect.  Not psychomotor slowed.  No signs of anxiety or agitation.    Daniel was seen today for pre-op  exam.    Diagnoses and all orders for this visit:    Pre-operative cardiovascular examination    Ptosis of both eyelids  Low cardiovascular and pulmonary risk for this low risk surgery.  Okay to procede  Medication instructions given to the patient     Essential hypertension, conrolled    Coronary artery disease involving native coronary artery of native heart without angina pectoris, Asymptomatic.    Spinal stenosis of lumbar region with neurogenic claudication   needs referral for return visit to [facility]  -     PHYSIATRY (PM&R) REFERRAL    Hyperlipidemia LDL goal <70  -     Refill atorvastatin (LIPITOR) 20 MG tablet; Take 1 tablet (20 mg) by mouth daily  -     I reminded patient to schedule lab tests that are already in the chart.    F/U 4 months, routine.    See ROS.  Total time spent 40 minutes.  More than 50% of the time spent with  Tabby on counseling / coordinating his care        Lia Dumont M.D.  Internal Medicine  [facility]   pager [phone]

## 2019-05-03 NOTE — TELEPHONE ENCOUNTER
[facility] Call Center    Phone Message    May a detailed message be left on voicemail: yes    Reason for Call: Other: PMR referral to Dr. Pena from Dr. Dumont for Spinal lumbar.     Action Taken: Message routed to:  [facility] ([facility] clinic coor neurosci

## 2019-05-03 NOTE — LETTER
5/3/2019      RE: Daniel Mcnair  [id number]  [facility] [address]       Surgeon: America Melo MD  Fax number for Pre Op Evaluation: N/A  Location of the surgery:  OR  Date of the surgery: 5/17/2019  Procedure: BLEPHAROPLASTY BILATERAL UPPER LIDS  History of reaction to anesthesia? : NO      America Melo MD Primary    Procedure Laterality Anesthesia   BLEPHAROPLASTY BILATERAL UPPER LIDS Bilateral Combined MAC with Local        HPI:  I am seeing Daniel Mcnair today at the request of Dr. America Melo for preoperative cardiovascular risk assessment. Daniel is here with his son, Matteo.  Complaining of vison diminishing, droopy eyelids x years, gradual loss, small print and distant vision and night vision all affected, progressed over the last 3 months so, able to read with brighter light, has reading magnifier and new sunglasses from services from the blind, s/p bilateral cataract surgery, has hx dry eyes and uses drops periodically, no eye pain, photophobia, has some feeling of sand of the eyes  each eye , hx macular degeneration, no eye redness or periorbital swelling, no discharge, no flashing).     Cardiac risks: No cardiac symptoms presently, has prior hx CAD, no CHF  Pulmonary risks: None  Exercise tolerance: limited by spinal stenosis, but able to walk at gradual pace w/cane and sometimes walker  Personal and/or family history of bleeding/clotting disorders: None  Personal and/or family history of adverse anesthesia reactions: None  Prior unanticipated surgical complications: None    Has the following diagnoses  1. Dermatochalasis of both upper eyelids    2. Involutional ptosis, acquired, right    3. Involutional ptosis, acquired, left    4. Pseudophakia of both eyes       Surgery plannedL  Both upper eyelid blepharoplasty (Skin, NF&CF)    ROS:  Answered a few questions about his chronic medical condition including difference between neurogenic claudication  and claudication from PAD.  Has DOYLE/US's which were negative.  Has some rectal bleeding which is chronic and intermittent from anal stenosis, worse with constipation.  Not anything out of the usual for him.  I advised him to let me know if it is worse and that he would need to return to see colorectal.  Completed handicap parking sticker form today.  Needed referral to go back to [facility].  Has only used 2 lorazepam since I last saw him.  Has been on sertraline in the past, but is not interested in it at this time.    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)     Benign essential hypertension     Coronary artery disease involving native coronary  artery of native heart without angina pectoris     Advance Care Planning     Osteoarthritis of multiple joints, unspecified osteoarthritis type     Hyperlipidemia, unspecified hyperlipidemia type     Other fatigue     B-cell lymphoma, unspecified B-cell lymphoma type, unspecified body region (H)     IPMN (intraductal papillary mucinous neoplasm)     Sensorineural hearing loss (SNHL) of both ears     Past Medical History:   Diagnosis Date     Anal stricture 7/29/2011     Baker's cyst      Basal cell carcinoma      Chronic pain     left hip     Coronary artery disease 9/5/2012     Dermatochalasis      Esophageal reflux 3/20/2013    not been an issue     Hearing loss      Hypertrophy of prostate without urinary obstruction and other lower urinary tract symptoms (LUTS) 7/21/2011     Idiopathic gynecomastia 7/29/2011     Impotence of organic origin 7/21/2011     Lymphoma (H) 1/25/2012     Macular degeneration, dry      Other and unspecified hyperlipidemia 7/21/2011     Snoring      Spinal stenosis, lumbar region, without neurogenic claudication 7/21/2011     Stented coronary artery 2003    x5; two separate times     Unspecified essential hypertension 7/21/2011     Past Surgical History:   Procedure Laterality Date     ARTHROPLASTY HIP  10/2008    left     ARTHROPLASTY HIP  1999    right     ARTHROPLASTY REVISION HIP  1/21/2014    Procedure: ARTHROPLASTY REVISION HIP;  Revision Left Total Hip;  Surgeon: Edgar Grewla MD;  Location: [facility]     BACK SURGERY  2006     CARDIAC SURGERY  2001    stent x 5      CARDIAC SURGERY  2002     CATARACT IOL, RT/LT Bilateral      CYSTOSCOPY, TRANSURETHRAL RESECTION (TUR) PROSTATE, COMBINED  11/5/2013    Procedure: COMBINED CYSTOSCOPY, TRANSURETHRAL RESECTION (TUR) PROSTATE;  Transurethral Resection Of Prostate, Bipolar;  Surgeon: Lai Street MD;  Location: [facility] OR     ESOPHAGOSCOPY, GASTROSCOPY, DUODENOSCOPY (EGD), COMBINED  2/3/2014    Procedure: COMBINED ESOPHAGOSCOPY,  GASTROSCOPY, DUODENOSCOPY (EGD);;  Surgeon: Ezra Fiore MD;  Location: [facility]     ESOPHAGOSCOPY, GASTROSCOPY, DUODENOSCOPY (EGD), COMBINED  4/15/2014    Procedure: COMBINED ESOPHAGOSCOPY, GASTROSCOPY, DUODENOSCOPY (EGD), BIOPSY SINGLE OR MULTIPLE;  Surgeon: Ezra Fiore MD;  [facility]     EXTRACAPSULAR CATARACT EXTRATION WITH INTRAOCULAR LENS IMPLANT  2005     EXTRACAPSULAR CATARACT EXTRATION WITH INTRAOCULAR LENS IMPLANT  2010     HERNIA REPAIR       INJECT NERVE BLOCK LUMBAR PARAVERTEBRAL SYMPATHETIC Left 8/29/2018    Procedure: INJECT NERVE BLOCK LUMBAR PARAVERTEBRAL SYMPATHETIC;  Left L3, L4, sacral Ala medial branch block;  Surgeon: Ronaldo Hicks MD;  Location: [facility]     MASTOID SURGERY  1939     MOHS MICROGRAPHIC PROCEDURE       OPTICAL TRACKING SYSTEM FUSION SPINE POSTERIOR LUMBAR TWO LEVELS  7/11/2013    Procedure: OPTICAL TRACKING SYSTEM FUSION SPINE POSTERIOR LUMBAR TWO LEVELS;  Stealth Assisted Lumbar 3-4 Transforaminal Lumbar Interbody Fusion, Lumbar 2-3 Lumbar 3-4 Laminectomy Decompression;  Surgeon: Edgar Lew MD;  Location: [facility] OR     Family History   Problem Relation Age of Onset     Diabetes Maternal Grandfather      Alcohol/Drug Maternal Grandfather      Arthritis Maternal Grandfather      Obesity Maternal Grandfather      Cerebrovascular Disease Maternal Grandmother      Hypertension Mother         willa rand     Glaucoma No family hx of      Macular Degeneration No family hx of      Cancer No family hx of         No known family hx of skin cancer     Skin Cancer No family hx of      Social History     Socioeconomic History     Marital status:      Spouse name: Not on file     Number of children: Not on file     Years of education: Not on file     Highest education level: Not on file   Occupational History     Not on file   Social Needs     Financial resource strain: Not on file     Food insecurity:     Worry: Not on file     Inability: Not on file      Transportation needs:     Medical: Not on file     Non-medical: Not on file   Tobacco Use     Smoking status: Never Smoker     Smokeless tobacco: Never Used   Substance and Sexual Activity     Alcohol use: Yes     Alcohol/week: 1.0 oz     Comment: occ     Drug use: No     Sexual activity: Not on file   Lifestyle     Physical activity:     Days per week: Not on file     Minutes per session: Not on file     Stress: Not on file   Relationships     Social connections:     Talks on phone: Not on file     Gets together: Not on file     Attends [employer] service: Not on file     Active member of club or organization: Not on file     Attends meetings of clubs or organizations: Not on file     Relationship status: Not on file     Intimate partner violence:     Fear of current or ex partner: Not on file     Emotionally abused: Not on file     Physically abused: Not on file     Forced sexual activity: Not on file   Other Topics Concern     Parent/sibling w/ CABG, MI or angioplasty before 65F 55M? Not Asked   Social History Narrative     Not on file     Current Outpatient Medications   Medication Sig Dispense Refill     acetaminophen (TYLENOL) 325 MG tablet Take 2 tablets (650 mg) by mouth every 6 hours as needed for pain 100 tablet 0     amLODIPine (NORVASC) 10 MG tablet Take 1 tablet (10 mg) by mouth daily 90 tablet 3     aspirin 81 MG EC tablet Take 1 tablet (81 mg) by mouth daily 90 tablet 3     atorvastatin (LIPITOR) 20 MG tablet TAKE ONE TABLET BY MOUTH ONE TIME DAILY  90 tablet 0     benazepril (LOTENSIN) 40 MG tablet Take 1 tablet (40 mg) by mouth daily 90 tablet 3     bisacodyl (DULCOLAX) 10 MG suppository Place 1 suppository rectally daily as needed. 3 suppository 0     docusate sodium 100 MG tablet Take 100 mg by mouth 2 times daily 60 tablet 1     gentamicin (GARAMYCIN) 0.1 % ointment Apply topically 4 times daily   3     Guar Gum (BENEFIBER) packet Take 1 packet by mouth every morning        Magnesium Hydroxide  (MILK OF MAGNESIA PO) Take by mouth At Bedtime       metoprolol succinate (TOPROL-XL) 25 MG 24 hr tablet Take 0.5 tablets (12.5 mg) by mouth daily 45 tablet 3     Multiple Vitamins-Minerals (VITEYES AREDS FORMULA/LUTEIN) CAPS Take by mouth daily       nitroglycerin (NITROSTAT) 0.4 MG sublingual tablet Place 1 tablet (0.4 mg) under the tongue every 5 minutes as needed 30 tablet 2     ranitidine (ZANTAC) 150 MG tablet Take 1 tablet (150 mg) by mouth 2 times daily 180 tablet 0     sildenafil (VIAGRA) 100 MG tablet Take 1 tablet (100 mg) by mouth daily as needed 10 tablet 2     tamsulosin (FLOMAX) 0.4 MG capsule Take 1 capsule (0.4 mg) by mouth daily 90 capsule 3     tolterodine (DETROL) 2 MG tablet Take 1 tablet (2 mg) by mouth 2 times daily 180 tablet 1     Allergies   Allergen Reactions     Nka [No Known Allergies]      Physical Examination:    General:  Conversant, generally healthy appearing, no acute distress  Head: atraumatic  Eyes:  Bilateral ptosis noted.  Pupils 2-3 mm, sclera white, EOM's full, conjunctiva moist, no blepharitis today  Ears:  Hearing aids removed for exam. TM's normal, EAC's patent, clear of cerumen  Nose:  Nasal passages clear, turbinates not swollen  Throat/Mouth:  No pharyngeal erythema, exudate, ulcers, oral mucosa and tongue moist, normal hard and soft palate  Neck:  Trachea midline, Full AROM, supple, thyroid smooth, symmetric, not enlarged, no nodules, no neck lymphadenopathy  Lungs:  Clear to auscultation throughout, no wheezes, rhonchi or rales. Normal respiratory effort and no intercostal retractions.  C/V:  Regular rate and rhythm, no murmurs, rubs or gallops.  No JVD, normal carotid upstroke and amplitude, no carotid bruits.  Abdomen:  Not distended.  Bowel sounds active.  No tenderness, no hepatosplenomegaly or masses.  No CVA tenderness or masses.  Lymph:  No cervical lymph nodes.  Neuro: Alert and oriented, face symmetric. No tremor.  Able to get on/off table and sit up/lie down  w/o assistance.  Skin:   Normal temperature., turgor and texture. No jaundice  Extremities:  Trace pitting edema, right greater than left peripheral edema, no digital cyanosis  Psych:  Struggles a bit with memory, Appropriate affect.  Not psychomotor slowed.  No signs of anxiety or agitation.    Daniel was seen today for pre-op exam.    Diagnoses and all orders for this visit:    Pre-operative cardiovascular examination    Ptosis of both eyelids  Low cardiovascular and pulmonary risk for this low risk surgery.  Okay to procede  Medication instructions given to the patient     Essential hypertension, conrolled    Coronary artery disease involving native coronary artery of native heart without angina pectoris, Asymptomatic.    Spinal stenosis of lumbar region with neurogenic claudication   needs referral for return visit to [facility]  -     PHYSIATRY (PM&R) REFERRAL    Hyperlipidemia LDL goal <70  -     Refill atorvastatin (LIPITOR) 20 MG tablet; Take 1 tablet (20 mg) by mouth daily  -     I reminded patient to schedule lab tests that are already in the chart.    F/U 4 months, routine.    See ROS.  Total time spent 40 minutes.  More than 50% of the time spent with Mr. Mcnair on counseling / coordinating his care        Lia Dumont M.D.  Internal Medicine  [facility]   pager [phone]                             Lia Dumont MD

## 2019-05-03 NOTE — PROGRESS NOTES
Surgeon: America Melo MD  Fax number for Pre Op Evaluation: N/A  Location of the surgery: [facility]  Date of the surgery: 5/17/2019  Procedure: BLEPHAROPLASTY BILATERAL UPPER LIDS  History of reaction to anesthesia? : NO

## 2019-05-03 NOTE — NURSING NOTE
Chief Complaint   Patient presents with     Pre-Op Exam     Patient is here for a pre op for eyelid surgery        KAREN Florentino at 10:07 AM on 5/3/2019.

## 2019-05-16 DIAGNOSIS — E78.5 HYPERLIPIDEMIA LDL GOAL <70: ICD-10-CM

## 2019-05-16 DIAGNOSIS — I10 ESSENTIAL HYPERTENSION, BENIGN: ICD-10-CM

## 2019-05-16 RX ORDER — ATORVASTATIN CALCIUM 20 MG/1
20 TABLET, FILM COATED ORAL DAILY
Qty: 90 TABLET | Refills: 3 | Status: SHIPPED | OUTPATIENT
Start: 2019-05-16 | End: 2019-07-31

## 2019-05-16 RX ORDER — ATORVASTATIN CALCIUM 20 MG/1
TABLET, FILM COATED ORAL
Qty: 30 TABLET | Refills: 0 | Status: SHIPPED | OUTPATIENT
Start: 2019-05-16 | End: 2020-02-17

## 2019-05-16 NOTE — TELEPHONE ENCOUNTER
Pt called and he will have his labs drawn at his post op appt with eye.  Alka Kellogg RN 8:35 AM on 5/16/2019.

## 2019-05-17 ENCOUNTER — HOSPITAL ENCOUNTER (OUTPATIENT)
Facility: CLINIC | Age: 84
Discharge: HOME | End: 2019-05-17
Attending: OPHTHALMOLOGY | Admitting: OPHTHALMOLOGY
Payer: COMMERCIAL

## 2019-05-17 ENCOUNTER — ANESTHESIA EVENT (OUTPATIENT)
Dept: SURGERY | Facility: CLINIC | Age: 84
End: 2019-05-17
Payer: COMMERCIAL

## 2019-05-17 ENCOUNTER — ANESTHESIA (OUTPATIENT)
Dept: SURGERY | Facility: CLINIC | Age: 84
End: 2019-05-17
Payer: COMMERCIAL

## 2019-05-17 VITALS
WEIGHT: 186.2 LBS | RESPIRATION RATE: 16 BRPM | OXYGEN SATURATION: 97 % | SYSTOLIC BLOOD PRESSURE: 137 MMHG | HEART RATE: 49 BPM | TEMPERATURE: 98.7 F | DIASTOLIC BLOOD PRESSURE: 59 MMHG | HEIGHT: 70 IN | BODY MASS INDEX: 26.66 KG/M2

## 2019-05-17 DIAGNOSIS — Z98.890 POSTOPERATIVE EYE STATE: Primary | ICD-10-CM

## 2019-05-17 PROCEDURE — 37000008 ZZH ANESTHESIA TECHNICAL FEE, 1ST 30 MIN: Performed by: OPHTHALMOLOGY

## 2019-05-17 PROCEDURE — 36000058 ZZH SURGERY LEVEL 3 EA 15 ADDTL MIN: Performed by: OPHTHALMOLOGY

## 2019-05-17 PROCEDURE — 25000125 ZZHC RX 250: Performed by: NURSE ANESTHETIST, CERTIFIED REGISTERED

## 2019-05-17 PROCEDURE — 25000128 H RX IP 250 OP 636: Performed by: NURSE ANESTHETIST, CERTIFIED REGISTERED

## 2019-05-17 PROCEDURE — 40000170 ZZH STATISTIC PRE-PROCEDURE ASSESSMENT II: Performed by: OPHTHALMOLOGY

## 2019-05-17 PROCEDURE — 27210794 ZZH OR GENERAL SUPPLY STERILE: Performed by: OPHTHALMOLOGY

## 2019-05-17 PROCEDURE — 71000012 ZZH RECOVERY PHASE 1 LEVEL 1 FIRST HR: Performed by: OPHTHALMOLOGY

## 2019-05-17 PROCEDURE — 25800030 ZZH RX IP 258 OP 636: Performed by: NURSE ANESTHETIST, CERTIFIED REGISTERED

## 2019-05-17 PROCEDURE — 25000128 H RX IP 250 OP 636: Performed by: OPHTHALMOLOGY

## 2019-05-17 PROCEDURE — 36000056 ZZH SURGERY LEVEL 3 1ST 30 MIN: Performed by: OPHTHALMOLOGY

## 2019-05-17 PROCEDURE — 71000027 ZZH RECOVERY PHASE 2 EACH 15 MINS: Performed by: OPHTHALMOLOGY

## 2019-05-17 PROCEDURE — 25000125 ZZHC RX 250: Performed by: OPHTHALMOLOGY

## 2019-05-17 PROCEDURE — 37000009 ZZH ANESTHESIA TECHNICAL FEE, EACH ADDTL 15 MIN: Performed by: OPHTHALMOLOGY

## 2019-05-17 RX ORDER — ONDANSETRON 2 MG/ML
4 INJECTION INTRAMUSCULAR; INTRAVENOUS EVERY 30 MIN PRN
Status: DISCONTINUED | OUTPATIENT
Start: 2019-05-17 | End: 2019-05-17 | Stop reason: HOSPADM

## 2019-05-17 RX ORDER — FENTANYL CITRATE 50 UG/ML
25-50 INJECTION, SOLUTION INTRAMUSCULAR; INTRAVENOUS EVERY 5 MIN PRN
Status: DISCONTINUED | OUTPATIENT
Start: 2019-05-17 | End: 2019-05-17 | Stop reason: HOSPADM

## 2019-05-17 RX ORDER — SODIUM CHLORIDE, SODIUM LACTATE, POTASSIUM CHLORIDE, CALCIUM CHLORIDE 600; 310; 30; 20 MG/100ML; MG/100ML; MG/100ML; MG/100ML
INJECTION, SOLUTION INTRAVENOUS CONTINUOUS
Status: DISCONTINUED | OUTPATIENT
Start: 2019-05-17 | End: 2019-05-17 | Stop reason: HOSPADM

## 2019-05-17 RX ORDER — LIDOCAINE HYDROCHLORIDE 20 MG/ML
INJECTION, SOLUTION INFILTRATION; PERINEURAL PRN
Status: DISCONTINUED | OUTPATIENT
Start: 2019-05-17 | End: 2019-05-17

## 2019-05-17 RX ORDER — PROPOFOL 10 MG/ML
INJECTION, EMULSION INTRAVENOUS PRN
Status: DISCONTINUED | OUTPATIENT
Start: 2019-05-17 | End: 2019-05-17

## 2019-05-17 RX ORDER — TETRACAINE HYDROCHLORIDE 5 MG/ML
SOLUTION OPHTHALMIC PRN
Status: DISCONTINUED | OUTPATIENT
Start: 2019-05-17 | End: 2019-05-17 | Stop reason: HOSPADM

## 2019-05-17 RX ORDER — METOPROLOL SUCCINATE 25 MG/1
TABLET, EXTENDED RELEASE ORAL
Qty: 45 TABLET | Refills: 2 | Status: SHIPPED | OUTPATIENT
Start: 2019-05-17 | End: 2020-01-20

## 2019-05-17 RX ORDER — FENTANYL CITRATE 50 UG/ML
INJECTION, SOLUTION INTRAMUSCULAR; INTRAVENOUS PRN
Status: DISCONTINUED | OUTPATIENT
Start: 2019-05-17 | End: 2019-05-17

## 2019-05-17 RX ORDER — ERYTHROMYCIN 5 MG/G
OINTMENT OPHTHALMIC PRN
Status: DISCONTINUED | OUTPATIENT
Start: 2019-05-17 | End: 2019-05-17 | Stop reason: HOSPADM

## 2019-05-17 RX ORDER — ONDANSETRON 4 MG/1
4 TABLET, ORALLY DISINTEGRATING ORAL EVERY 30 MIN PRN
Status: DISCONTINUED | OUTPATIENT
Start: 2019-05-17 | End: 2019-05-17 | Stop reason: HOSPADM

## 2019-05-17 RX ORDER — ONDANSETRON 2 MG/ML
INJECTION INTRAMUSCULAR; INTRAVENOUS PRN
Status: DISCONTINUED | OUTPATIENT
Start: 2019-05-17 | End: 2019-05-17

## 2019-05-17 RX ORDER — NALOXONE HYDROCHLORIDE 0.4 MG/ML
.1-.4 INJECTION, SOLUTION INTRAMUSCULAR; INTRAVENOUS; SUBCUTANEOUS
Status: DISCONTINUED | OUTPATIENT
Start: 2019-05-17 | End: 2019-05-17 | Stop reason: HOSPADM

## 2019-05-17 RX ORDER — ERYTHROMYCIN 5 MG/G
OINTMENT OPHTHALMIC
Qty: 3.5 G | Refills: 0 | Status: SHIPPED | OUTPATIENT
Start: 2019-05-17 | End: 2019-07-26

## 2019-05-17 RX ORDER — SODIUM CHLORIDE, SODIUM LACTATE, POTASSIUM CHLORIDE, CALCIUM CHLORIDE 600; 310; 30; 20 MG/100ML; MG/100ML; MG/100ML; MG/100ML
INJECTION, SOLUTION INTRAVENOUS CONTINUOUS PRN
Status: DISCONTINUED | OUTPATIENT
Start: 2019-05-17 | End: 2019-05-17

## 2019-05-17 RX ADMIN — FENTANYL CITRATE 100 MCG: 50 INJECTION, SOLUTION INTRAMUSCULAR; INTRAVENOUS at 11:36

## 2019-05-17 RX ADMIN — LIDOCAINE HYDROCHLORIDE 100 MG: 20 INJECTION, SOLUTION INFILTRATION; PERINEURAL at 11:37

## 2019-05-17 RX ADMIN — SODIUM CHLORIDE, POTASSIUM CHLORIDE, SODIUM LACTATE AND CALCIUM CHLORIDE: 600; 310; 30; 20 INJECTION, SOLUTION INTRAVENOUS at 11:34

## 2019-05-17 RX ADMIN — PROPOFOL 40 MG: 10 INJECTION, EMULSION INTRAVENOUS at 11:37

## 2019-05-17 RX ADMIN — DEXMEDETOMIDINE HYDROCHLORIDE 8 MCG: 100 INJECTION, SOLUTION INTRAVENOUS at 11:41

## 2019-05-17 RX ADMIN — ONDANSETRON 4 MG: 2 INJECTION INTRAMUSCULAR; INTRAVENOUS at 11:49

## 2019-05-17 ASSESSMENT — LIFESTYLE VARIABLES: TOBACCO_USE: 0

## 2019-05-17 ASSESSMENT — MIFFLIN-ST. JEOR
SCORE: 1515.85
SCORE: 1515.85

## 2019-05-17 ASSESSMENT — COPD QUESTIONNAIRES: COPD: 0

## 2019-05-17 NOTE — OP NOTE
PREOPERATIVE DIAGNOSIS: Bilateral upper eyelid dermatochalasis.   POSTOPERATIVE DIAGNOSIS: Bilateral upper eyelid dermatochalasis.   PROCEDURE: Bilateral upper blepharoplasty.   SURGEON: America Melo MD   ASSISTANT: Santhosh Amin MD and Bassem Barnard MD  ANESTHESIA: Monitored with local infiltration of a 50/50 mixture of 2% lidocaine with epinephrine and 0.5% Marcaine.   COMPLICATIONS: None.   ESTIMATED BLOOD LOSS: Less than 5 mL.   HISTORY: Daniel Mcnair  presented with upper lid dermatochalasis leading to mechanical ptosis of the upper lids, blocking the superior visual field and interfering with  activities of daily living. After the risks, benefits and alternatives to the proposed procedure were explained, informed consent was obtained.   DESCRIPTION OF PROCEDURE: Daniel Mcnair was brought to the operating room and placed supine on the operating table. IV sedation was given. The upper lid crease and excess upper eyelid skin was marked with marking pen and infiltrated with local anesthetic. The area was prepped and draped in the typical fashion. Attention was directed to the right side. Skin was incised following marked lines. Skin flap was excised with a high temperature cautery. A row of cautery was placed in the orbicularis along the inferior incision line. Orbicularis oculi and orbital septum were opened horizontally and the nasal and central fat pads were conservatively debulked. Hemostasis was obtained and the skin closed with running 6-0 plain gut suture. Attention was directed to the left side where the same procedure was performed.  Ophthalmic antibiotic ointment was applied to the eyelids and into the eyes. Daniel Mcnair tolerated the procedure well and left the operating room in stable condition.   America Melo

## 2019-05-17 NOTE — ANESTHESIA CARE TRANSFER NOTE
Patient: Daniel Mcnair    Procedure(s):  BLEPHAROPLASTY BILATERAL UPPER LIDS    Diagnosis: BILATERAL PTOSIS, BILATERAL UPPER LID DERMATOCHALASIS  Diagnosis Additional Information: No value filed.    Anesthesia Type:   MAC     Note:  Airway :Room Air  Patient transferred to:PACU  Comments: A&O x 3.  Denies pain, N&V.  Report to RN.      Vitals: (Last set prior to Anesthesia Care Transfer)    CRNA VITALS  5/17/2019 1141 - 5/17/2019 1218      5/17/2019             Resp Rate (set):  10                Electronically Signed By: Sarina Gonzales  May 17, 2019  12:18 PM

## 2019-05-17 NOTE — DISCHARGE INSTRUCTIONS
Same Day Surgery Discharge Instructions for  Sedation and General Anesthesia       It's not unusual to feel dizzy, light-headed or faint for up to 24 hours after surgery or while taking pain medication.  If you have these symptoms: sit for a few minutes before standing and have someone assist you when you get up to walk or use the bathroom.      You should rest and relax for the next 24 hours. We recommend you make arrangements to have an adult stay with you for at least 24 hours after your discharge.  Avoid hazardous and strenuous activity.      DO NOT DRIVE any vehicle or operate mechanical equipment for 24 hours following the end of your surgery.  Even though you may feel normal, your reactions may be affected by the medication you have received.      Do not drink alcoholic beverages for 24 hours following surgery.       Slowly progress to your regular diet as you feel able. It's not unusual to feel nauseated and/or vomit after receiving anesthesia.  If you develop these symptoms, drink clear liquids (apple juice, ginger ale, broth, 7-up, etc. ) until you feel better.  If your nausea and vomiting persists for 24 hours, please notify your surgeon.        All narcotic pain medications, along with inactivity and anesthesia, can cause constipation. Drinking plenty of liquids and increasing fiber intake will help.      For any questions of a medical nature, call your surgeon.      Do not make important decisions for 24 hours.      If you had general anesthesia, you may have a sore throat for a couple of days related to the breathing tube used during surgery.  You may use Cepacol lozenges to help with this discomfort.  If it worsens or if you develop a fever, contact your surgeon.       If you feel your pain is not well managed with the pain medications prescribed by your surgeon, please contact your surgeon's office to let them know so they can address your concerns.           Post-operative Instructions  Ophthalmic  Plastic and Reconstructive Surgery    America Melo M.D.     All instructions apply to the operated eye(s) or eyelid(s).    Wound care and personal care  ? If a patch or bandage has been placed, please leave this in place until seen by your physician. Ensure that the bandage does not get wet when you take a shower.  ? Apply ice compresses 15 minutes of every hour off while awake for 2 days. As long as there is no further bleeding, after two days, switch to warm water compresses for five minutes, four times a day until seen by your physician. Instead of warm water compresses, you can use a cup of dry uncooked rice in a clean cotton sock. Place sock in microwave for 30 seconds to one minute. Next place the warm sock in a plastic bag, and place it over a clean warm wet washcloth over operated eye.    ? You may shower or wash your hair the day after surgery. Do not submerge your head while bathing or go swimming for 2 weeks to prevent contamination of your wounds.  ? Do not apply make-up to the eyes or eyelids for 2 weeks after surgery.  ? Expect some swelling, bruising, black eye (even into the lower eyelids and cheeks). Also expect serum caking, crusting and discharge from the eye and/or incisions. A small amount of surface bleeding, and depending on the type of surgery, bleeding from the inside of the eyelid, is normal for the first 48 hours.  ? Avoid straining, bending at the waist, or lifting more than 15 pounds for 10 days. Activities that raise your blood pressure can worsen swelling, cause bleeding, and breaking of sutures. Like wise, sleeping with your head slightly elevated for the first several days can help swelling resolve more quickly.   ? Do continue to ambulate (walk) as you normally would - being sedentary after surgery can cause blood clots.   ? Your eye(s) and eyelid(s) may be painful and tender. This is normal after surgery.      Contact information and follow-up  ? Return to the Eye Clinic for a  follow-up appointment with your physician as scheduled. If no appointment has been scheduled:   - [facility] eye clinic: [phone] for an appointment with Dr. Melo within 1 to 2 weeks from your date of surgery.   -  [facility] clinic: [phone] for an appointment with Dr. Melo within 1 to 2 weeks from your date of surgery.     ? For severe pain, bleeding, or loss of vision, call the [facility] at [phone] or [facility] at [phone].     An on call person can be reached after hours for concerns. After hours or on weekends and holidays, call [phone] and ask to speak with the ophthalmologist on call.     Activity restrictions and driving  ? Avoid heavy lifting, bending, exercise or strenuous activity for 1 week after surgery.  You may resume other activities and return to work as tolerated.  ? You may not resume driving if you are using narcotic pain medications (such as Norco, Percocet, Tylenol #3).    Medications  ? Restart all regular home medications and eye drops. If you take Plavix or  Aspirin on a regular basis, wait for 72 hours after your surgery before restarting these in order to decrease the risk of bleeding complications.  ? Avoid aspirin and aspirin-like medications (Motrin, Aleve, Ibuprofen, Anastasia-Castor etc) for 72 hours to reduce the risk of bleeding. You may take Tylenol (acetaminophen) for pain.  ? In addition to your home medications, take the following post-operative medications as prescribed by your physician.    ? Apply antibiotic ointment to all sutures three times a day, and into the operated eye(s) at night.  ? In many cases, postoperative discomfort can be managed with Tylenol and cold compresses alone.          **If you have questions or concerns about your procedure,   call Dr. Melo at [phone]**

## 2019-05-17 NOTE — ANESTHESIA PREPROCEDURE EVALUATION
Anesthesia Pre-Procedure Evaluation    Patient: Daniel Mcnair   MRN: [id number] : 10/17/1929          Preoperative Diagnosis: BILATERAL PTOSIS, BILATERAL UPPER LID DERMATOCHALASIS    Procedure(s):  BLEPHAROPLASTY BILATERAL UPPER LIDS    Past Medical History:   Diagnosis Date     Anal stricture 2011     Baker's cyst      Basal cell carcinoma      Chronic pain     left hip     Coronary artery disease 2012     Dermatochalasis      Esophageal reflux 3/20/2013    not been an issue     Hearing loss      Hypertrophy of prostate without urinary obstruction and other lower urinary tract symptoms (LUTS) 2011     Idiopathic gynecomastia 2011     Impotence of organic origin 2011     Lymphoma (H) 2012     Macular degeneration, dry      Other and unspecified hyperlipidemia 2011     Snoring      Spinal stenosis, lumbar region, without neurogenic claudication 2011     Stented coronary artery 2003    x5; two separate times     Unspecified essential hypertension 2011     Past Surgical History:   Procedure Laterality Date     ARTHROPLASTY HIP  10/2008    left     ARTHROPLASTY HIP      right     ARTHROPLASTY REVISION HIP  2014    Procedure: ARTHROPLASTY REVISION HIP;  Revision Left Total Hip;  Surgeon: Edgar Grewal MD;  Location: [facility]     BACK SURGERY  2006     CARDIAC SURGERY  2001    stent x 5      CARDIAC SURGERY       CATARACT IOL, RT/LT Bilateral      CYSTOSCOPY, TRANSURETHRAL RESECTION (TUR) PROSTATE, COMBINED  2013    Procedure: COMBINED CYSTOSCOPY, TRANSURETHRAL RESECTION (TUR) PROSTATE;  Transurethral Resection Of Prostate, Bipolar;  Surgeon: Lai Street MD;  Location:  OR     ESOPHAGOSCOPY, GASTROSCOPY, DUODENOSCOPY (EGD), COMBINED  2/3/2014    Procedure: COMBINED ESOPHAGOSCOPY, GASTROSCOPY, DUODENOSCOPY (EGD);;  Surgeon: Ezra Fiore MD;  Location: [facility]     ESOPHAGOSCOPY, GASTROSCOPY, DUODENOSCOPY (EGD), COMBINED  4/15/2014     Procedure: COMBINED ESOPHAGOSCOPY, GASTROSCOPY, DUODENOSCOPY (EGD), BIOPSY SINGLE OR MULTIPLE;  Surgeon: Ezra Fiore MD;  [facility]     EXTRACAPSULAR CATARACT EXTRATION WITH INTRAOCULAR LENS IMPLANT  2005     EXTRACAPSULAR CATARACT EXTRATION WITH INTRAOCULAR LENS IMPLANT  2010     HERNIA REPAIR       INJECT NERVE BLOCK LUMBAR PARAVERTEBRAL SYMPATHETIC Left 8/29/2018    Procedure: INJECT NERVE BLOCK LUMBAR PARAVERTEBRAL SYMPATHETIC;  Left L3, L4, sacral Ala medial branch block;  Surgeon: Ronaldo Hicks MD;  Location: [facility]     MASTOID SURGERY  1939     MOHS MICROGRAPHIC PROCEDURE       OPTICAL TRACKING SYSTEM FUSION SPINE POSTERIOR LUMBAR TWO LEVELS  7/11/2013    Procedure: OPTICAL TRACKING SYSTEM FUSION SPINE POSTERIOR LUMBAR TWO LEVELS;  Stealth Assisted Lumbar 3-4 Transforaminal Lumbar Interbody Fusion, Lumbar 2-3 Lumbar 3-4 Laminectomy Decompression;  Surgeon: Edgar Lew MD;  Location: [facility]       Anesthesia Evaluation     . Pt has had prior anesthetic. Type: General    No history of anesthetic complications          ROS/MED HX    ENT/Pulmonary:      (-) tobacco use, asthma and COPD   Neurologic:     (+)neuropathy - r foot numbness,    (-) CVA and TIA   Cardiovascular:     (+) hypertension--CAD (stable), --stent,. : . . . :. .      (-) angina, irregular heartbeat/palpitations and angina   METS/Exercise Tolerance:  1 - Eating, dressing   Hematologic:        (-) anemia   Musculoskeletal:         GI/Hepatic:        (-) GERD and liver disease   Renal/Genitourinary:      (-) renal disease   Endo:      (-) Type I DM, Type II DM and thyroid disease   Psychiatric:         Infectious Disease:  - neg infectious disease ROS       Malignancy:   (+) Malignancy History of Lymphoma/Leukemia          Other:                          Physical Exam  Normal systems: cardiovascular and pulmonary    Airway   Mallampati: II    Dental   (+) partials    Cardiovascular       Pulmonary             Lab  "Results   Component Value Date    WBC 6.6 11/15/2018    HGB 14.4 11/15/2018    HCT 39.3 (L) 11/15/2018     (L) 11/15/2018    CRP 7.8 02/12/2014    SED 13 02/12/2014     11/15/2018    POTASSIUM 4.3 11/15/2018    CHLORIDE 111 (H) 11/15/2018    CO2 23 11/15/2018    BUN 13 11/15/2018    CR 0.86 11/15/2018    GLC 91 11/15/2018    LUCIUS 8.8 11/15/2018    PHOS 2.8 10/04/2008    MAG 2.4 (H) 02/03/2014    ALBUMIN 3.6 11/15/2018    PROTTOTAL 6.6 (L) 11/15/2018    ALT 29 11/15/2018    AST 19 11/15/2018    ALKPHOS 67 11/15/2018    BILITOTAL 0.8 11/15/2018    PTT 34 01/21/2014    INR 1.33 (H) 02/03/2014    FIBR 245 02/02/2014    TSH 1.39 10/03/2017       Preop Vitals  BP Readings from Last 3 Encounters:   05/17/19 139/57   05/03/19 126/58   01/11/19 129/70    Pulse Readings from Last 3 Encounters:   05/17/19 53   05/03/19 54   01/11/19 60      Resp Readings from Last 3 Encounters:   05/17/19 18   11/15/18 18   08/29/18 15    SpO2 Readings from Last 3 Encounters:   05/17/19 96%   05/03/19 98%   11/15/18 98%      Temp Readings from Last 1 Encounters:   05/17/19 35.8  C (96.4  F) (Temporal)    Ht Readings from Last 1 Encounters:   05/17/19 1.778 m (5' 10\")      Wt Readings from Last 1 Encounters:   05/17/19 84.5 kg (186 lb 3.2 oz)    Estimated body mass index is 26.72 kg/m  as calculated from the following:    Height as of this encounter: 1.778 m (5' 10\").    Weight as of this encounter: 84.5 kg (186 lb 3.2 oz).       Anesthesia Plan      History & Physical Review  History and physical reviewed and following examination; no interval change.    ASA Status:  3 .    NPO Status:  > 6 hours    Plan for MAC (with GA backup) with Intravenous induction. Maintenance will be TIVA.          No versed      Postoperative Care  Postoperative pain management:  Oral pain medications.      Consents  Anesthetic plan, risks, benefits and alternatives discussed with:  Patient (Including possibility of intraoperative awareness or recall.)..  "                Jaime Mccoy MD

## 2019-05-17 NOTE — ANESTHESIA POSTPROCEDURE EVALUATION
Patient: Daniel Mcnair    Procedure(s):  BLEPHAROPLASTY BILATERAL UPPER LIDS    Diagnosis:BILATERAL PTOSIS, BILATERAL UPPER LID DERMATOCHALASIS  Diagnosis Additional Information: No value filed.    Anesthesia Type:  MAC    Note:  Anesthesia Post Evaluation    Patient location during evaluation: PACU  Patient participation: Able to fully participate in evaluation  Level of consciousness: awake and alert  Pain management: adequate  Airway patency: patent  Cardiovascular status: acceptable  Respiratory status: acceptable  Hydration status: acceptable  PONV: none     Anesthetic complications: None          Last vitals:  Vitals:    05/17/19 1230 05/17/19 1245 05/17/19 1334   BP: 120/52 114/58 137/59   Pulse: (!) 46 (!) 49    Resp: 14 16 16   Temp:      SpO2: 96% 96% 97%         Electronically Signed By: Jaime Mccoy MD  May 17, 2019  3:17 PM

## 2019-05-23 ENCOUNTER — TELEPHONE (OUTPATIENT)
Dept: INTERNAL MEDICINE | Facility: CLINIC | Age: 84
End: 2019-05-23

## 2019-05-23 NOTE — TELEPHONE ENCOUNTER
[facility] Call [facility]    Phone Message    May a detailed message be left on voicemail: yes    Reason for Call: Form or Letter   Type or form/letter needing completion: Disability Form   Provider: Lia Dumont  Date form needed: ASAP  Once completed: Patient will  at . Patient will be in office with is wife, she is also a patient but he wants to know if they could drop off the form at [facility] to have Dr Dumont fill it out so they could pick it up after his appointments are done?    He would like a call back.    Action Taken: Message routed to:  [facility] ([facility]

## 2019-05-23 NOTE — TELEPHONE ENCOUNTER
Husbands wife needs her DMV disability placard paperwork done by P[facility]. They will mail it here. They wish for it to be mailed back when completed. Shruti Guerrero on 5/23/2019 at 3:53 PM

## 2019-05-28 ENCOUNTER — OFFICE VISIT (OUTPATIENT)
Dept: OPHTHALMOLOGY | Facility: CLINIC | Age: 84
End: 2019-05-28
Payer: COMMERCIAL

## 2019-05-28 DIAGNOSIS — H02.831 DERMATOCHALASIS OF BOTH UPPER EYELIDS: Primary | ICD-10-CM

## 2019-05-28 DIAGNOSIS — E78.5 HYPERLIPIDEMIA LDL GOAL <70: ICD-10-CM

## 2019-05-28 DIAGNOSIS — I10 BENIGN ESSENTIAL HYPERTENSION: ICD-10-CM

## 2019-05-28 DIAGNOSIS — H02.834 DERMATOCHALASIS OF BOTH UPPER EYELIDS: Primary | ICD-10-CM

## 2019-05-28 LAB
ALBUMIN SERPL-MCNC: 3.8 G/DL (ref 3.4–5)
ALP SERPL-CCNC: 67 U/L (ref 40–150)
ALT SERPL W P-5'-P-CCNC: 28 U/L (ref 0–70)
ANION GAP SERPL CALCULATED.3IONS-SCNC: 5 MMOL/L (ref 3–14)
AST SERPL W P-5'-P-CCNC: 18 U/L (ref 0–45)
BILIRUB SERPL-MCNC: 0.8 MG/DL (ref 0.2–1.3)
BUN SERPL-MCNC: 13 MG/DL (ref 7–30)
CALCIUM SERPL-MCNC: 9.5 MG/DL (ref 8.5–10.1)
CHLORIDE SERPL-SCNC: 109 MMOL/L (ref 94–109)
CHOLEST SERPL-MCNC: 100 MG/DL
CO2 SERPL-SCNC: 24 MMOL/L (ref 20–32)
CREAT SERPL-MCNC: 0.85 MG/DL (ref 0.66–1.25)
GFR SERPL CREATININE-BSD FRML MDRD: 77 ML/MIN/{1.73_M2}
GLUCOSE SERPL-MCNC: 82 MG/DL (ref 70–99)
HDLC SERPL-MCNC: 49 MG/DL
LDLC SERPL CALC-MCNC: 41 MG/DL
NONHDLC SERPL-MCNC: 52 MG/DL
POTASSIUM SERPL-SCNC: 4.8 MMOL/L (ref 3.4–5.3)
PROT SERPL-MCNC: 6.5 G/DL (ref 6.8–8.8)
SODIUM SERPL-SCNC: 138 MMOL/L (ref 133–144)
TRIGL SERPL-MCNC: 52 MG/DL

## 2019-05-28 ASSESSMENT — TONOMETRY
OD_IOP_MMHG: 12
IOP_METHOD: ICARE
OS_IOP_MMHG: 13

## 2019-05-28 ASSESSMENT — VISUAL ACUITY
OD_CC+: -2
OS_CC: 20/30
METHOD: SNELLEN - LINEAR
OD_CC: 20/25
OS_CC+: -2

## 2019-05-28 NOTE — PROGRESS NOTES
Chief Complaint(s) and History of Present Illness(es)     Post Op (Ophthalmology) Both Eyes     Laterality: both eyes    Course: stable    Treatments tried: no treatments    Pain scale: 0/10              Comments     S/p Bilateral upper blepharoplasty on 5/17/2019. Pt states no pain. Pt   has noticed an increase in blurred vision, more trouble at night.   Difficult to read.     Jovan Brizuela COT 10:13 AM May 28, 2019                 Daniel Mcnair is status post both upper eyelid blepharoplasty .  Incision(s) healing well.  The lid(s)  are  in excellent position.    I have recommended:  * Continue antibiotic ointment or bland lubricating ointment (eg vaseline or aquaphor) to the incision site at bedtime.   * Can massage along the incision 2-3 x daily.   * Warm soaks 3-4x daily until all edema and ecchymoses resolve  * Return to clinic in 2 months     Attending Physician Attestation:  Complete documentation of historical and exam elements from today's encounter can be found in the full encounter summary report (not reduplicated in this progress note).  I personally obtained the chief complaint(s) and history of present illness.  I confirmed and edited as necessary the review of systems, past medical/surgical history, family history, social history, and examination findings as documented by others; and I examined the patient myself.  I personally reviewed the relevant tests, images, and reports as documented above.  I formulated and edited as necessary the assessment and plan and discussed the findings and management plan with the patient and family. - America Melo MD

## 2019-05-28 NOTE — NURSING NOTE
Chief Complaints and History of Present Illnesses   Patient presents with     Post Op (Ophthalmology) Both Eyes     Chief Complaint(s) and History of Present Illness(es)     Post Op (Ophthalmology) Both Eyes     Laterality: both eyes    Course: stable    Treatments tried: no treatments    Pain scale: 0/10              Comments     S/p Bilateral upper blepharoplasty on 5/17/2019. Pt states no pain. Pt has noticed an increase in blurred vision, more trouble at night. Difficult to read.     Jovan BARRETT 10:13 AM May 28, 2019

## 2019-06-17 ENCOUNTER — OFFICE VISIT (OUTPATIENT)
Dept: OPHTHALMOLOGY | Facility: CLINIC | Age: 84
End: 2019-06-17
Attending: OPHTHALMOLOGY
Payer: COMMERCIAL

## 2019-06-17 DIAGNOSIS — Z96.1 PSEUDOPHAKIA OF BOTH EYES: Primary | ICD-10-CM

## 2019-06-17 DIAGNOSIS — H02.831 DERMATOCHALASIS OF BOTH UPPER EYELIDS: ICD-10-CM

## 2019-06-17 DIAGNOSIS — H02.834 DERMATOCHALASIS OF BOTH UPPER EYELIDS: ICD-10-CM

## 2019-06-17 DIAGNOSIS — H04.123 DRY EYES, BILATERAL: ICD-10-CM

## 2019-06-17 PROCEDURE — G0463 HOSPITAL OUTPT CLINIC VISIT: HCPCS | Mod: ZF

## 2019-06-17 ASSESSMENT — VISUAL ACUITY
OS_PH_CC+: -1
OD_CC+: -2
OS_CC: 20/40
OD_PH_CC+: -2
CORRECTION_TYPE: GLASSES
OS_PH_CC: 20/25
OD_CC: 20/40
OD_PH_CC: 20/30
METHOD: SNELLEN - LINEAR

## 2019-06-17 ASSESSMENT — REFRACTION_WEARINGRX
OD_AXIS: 040
OD_SPHERE: -0.50
OS_ADD: +3.00
OD_CYLINDER: +0.25
OD_ADD: +3.00
OS_SPHERE: -2.50
OS_AXIS: 175
OS_CYLINDER: +1.75

## 2019-06-17 ASSESSMENT — TONOMETRY
IOP_METHOD: ICARE
OS_IOP_MMHG: 11
OD_IOP_MMHG: 10

## 2019-06-17 ASSESSMENT — EXTERNAL EXAM - RIGHT EYE: OD_EXAM: BROW PTOSIS, WITH FRONTALIS RELAXED, BROW IS BELOW SUPERIOR ORBITAL RIM AND LATERALLY INLINE WITH UPPER LASHES

## 2019-06-17 ASSESSMENT — CONF VISUAL FIELD
OS_NORMAL: 1
OD_NORMAL: 1
METHOD: COUNTING FINGERS

## 2019-06-17 ASSESSMENT — EXTERNAL EXAM - LEFT EYE: OS_EXAM: BROW PTOSIS, WITH FRONTALIS RELAXED, BROW IS BELOW SUPERIOR ORBITAL RIM AND LATERALLY INLINE WITH UPPER LASHES

## 2019-06-17 NOTE — PROGRESS NOTES
CC:  Dry eye eval    HPI:  88 y/o  male with POH dry AMD (followed by [facility]), s/p CE/IOL each eye, here for new cornea eval regarding dry eye disease.      Describes having a scratching and burning each eye, comes and goes. Lubricating drops seem to help. Irritation bothers the patient like he wants to scratch the eyes, but he tries to avoid that. Feels like his eyes become easily fatigued with reading, even with an additional light.     Interval History:  Had eyelid surgery with Dr. Melo. Still having stable vision, but would wish to improve.     POH:  Last eye exam: Dr. Parker - 1/14/19    Prior eye surgery/laser:   -s/p CE/IOL each eye ~2005  -dry AMD each eye on AREDS, [place]msler - follows with [facility] q6 months  -BUL blepharoplasty    CTL wearer: none  Glasses: yes, bifocal     Fam hx of eye disease: none     Gtts:  Preservative-free tears - Refresh - a few times during the day   Lubricating ointment before naps and at bedtime each eye   WC/LH - with baby shampoo  Omega-3 fish oil    Occasional wipes with a washcloth - no scrubs or warm water.   Sleeps with sock over the eyes - to darken his surroundings  No sleep apnea or face mask   Sleeping mostly on left side, not on stomach     All:  NKDA    A/P:  1. Dry eye disease, each eye  -surfaces look reasonable, Continue topical lubricating drops  -continue starting preservative-free lubricating tears every 3-4 hours each eye  -continue lubricating ointment pre-nap and at bedtime OU  -Continue PO omega-3 fatty acid supplementation and/or flax seed oil  -Continue warm compresses, re-start lid scrubs with baby shampoo  -continue frequent breaks while reading  -continue night mask    2. Conjunctivochalasis, each eye  -lubrication as above  -have discussed conjunctivochalasis excision in the past, but not very symptomatic  -may be contributing to tearing - monitor for now  -would check patency of lacrimal drainage at follow-up - if open, consider  conjunctivochalasis excision     3. Pseudophakia, each eye  -stable IOLs each eye  -monitor    4. Dry AMD, each eye  -follows with [facility] q6 months  -continue AREDs, Amsler grid  -reviewed return precautions    5. Dermatochalasis, each eye  -bilateral upper lids  -consider oculoplastics eval      F/u 3 months, sooner PRMARISA Sears MD  PGY-3 Ophthalmology    Attending Physician Attestation:  Complete documentation of historical and exam elements from today's encounter can be found in the full encounter summary report (not reduplicated in this progress note).  I personally obtained the chief complaint(s) and history of present illness.  I confirmed and edited as necessary the review of systems, past medical/surgical history, family history, social history, and examination findings as documented by others; and I examined the patient myself.  I personally reviewed the relevant tests, images, and reports as documented above.  I formulated and edited as necessary the assessment and plan and discussed the findings and management plan with the patient and family. - Jhoan Owens MD    I personally spent great than 40min with the patient, of which >50% of the time was spent face to face with the patient, counseling and coordinating care with the patient. We discussed the complexity of his diagnosis, the need for further information prior to proceeding with yet another surgery, and the unknown prognosis for the patient at this time.    Jhoan Owens MD

## 2019-06-20 DIAGNOSIS — K21.9 GASTROESOPHAGEAL REFLUX DISEASE WITHOUT ESOPHAGITIS: ICD-10-CM

## 2019-06-20 DIAGNOSIS — I10 BENIGN ESSENTIAL HYPERTENSION: ICD-10-CM

## 2019-06-24 RX ORDER — AMLODIPINE BESYLATE 10 MG/1
TABLET ORAL
Qty: 90 TABLET | Refills: 3 | Status: SHIPPED | OUTPATIENT
Start: 2019-06-24 | End: 2020-01-01

## 2019-06-27 ENCOUNTER — TELEPHONE (OUTPATIENT)
Dept: INTERNAL MEDICINE | Facility: CLINIC | Age: 84
End: 2019-06-27

## 2019-06-27 NOTE — TELEPHONE ENCOUNTER
[facility] Call Center    Phone Message    May a detailed message be left on voicemail: yes, Pt is wanting to get a call back when medication has been sent to the pharmacy.     Reason for Call: Medication Refill Request    Has the patient contacted the pharmacy for the refill? Yes   Name of medication being requested:   -ranitidine (ZANTAC) 150 MG tablet,  -amLODIPine (NORVASC) 10 MG tablet  Provider who prescribed the medication: VALERIANO THAKKAR  Pharmacy: [facility] - [address]  Date medication is needed: 7/1/2019         Action Taken: Message routed to:  [facility]

## 2019-06-27 NOTE — TELEPHONE ENCOUNTER
Orders were both sent on 6-24-19, patient was called with this information and he will follow up with [facility]

## 2019-06-28 ENCOUNTER — OFFICE VISIT (OUTPATIENT)
Dept: PHYSICAL MEDICINE AND REHAB | Facility: CLINIC | Age: 84
End: 2019-06-28
Payer: COMMERCIAL

## 2019-06-28 VITALS
HEIGHT: 70 IN | SYSTOLIC BLOOD PRESSURE: 125 MMHG | BODY MASS INDEX: 26.88 KG/M2 | OXYGEN SATURATION: 98 % | WEIGHT: 187.8 LBS | HEART RATE: 57 BPM | DIASTOLIC BLOOD PRESSURE: 64 MMHG

## 2019-06-28 DIAGNOSIS — M54.50 CHRONIC LEFT-SIDED LOW BACK PAIN WITHOUT SCIATICA: Primary | ICD-10-CM

## 2019-06-28 DIAGNOSIS — M47.816 LUMBAR FACET ARTHROPATHY: ICD-10-CM

## 2019-06-28 DIAGNOSIS — G89.29 CHRONIC LEFT-SIDED LOW BACK PAIN WITHOUT SCIATICA: Primary | ICD-10-CM

## 2019-06-28 ASSESSMENT — PAIN SCALES - GENERAL: PAINLEVEL: NO PAIN (0)

## 2019-06-28 ASSESSMENT — MIFFLIN-ST. JEOR: SCORE: 1523.11

## 2019-06-28 NOTE — NURSING NOTE
RX from Dr. Sanon for physical  therapy was faxed to [facility]  [phone] . Pt was given a copy of the orders and will call to schedule therapy.

## 2019-06-28 NOTE — NURSING NOTE
Chief Complaint   Patient presents with     New Patient     [facility] NEW SPINAL STENOSIS OF LUMBAR SPINE       Pepper Licea, EMT

## 2019-06-28 NOTE — LETTER
2019       RE: Daniel Mcnair  [id number] [id number]     Dear Colleague,    Thank you for referring your patient, Daniel Mcnair, to the [facility] PHYSICAL MEDICINE AND REHABILITATION at [facility]. Please see a copy of my visit note below.           PM&R Clinic Note     Patient Name: Daniel Mcnair : 10/17/1929 Medical Record: [id number]            History of Present Illness:     Daniel Mcnair is a 89 year old male who presented to clinic for follow up regarding his back pain. He was followed by Dr. Meir Pena and was last seen by him on 8/10/2018.    His pain started in ; he has had several surgeries on his back and hips as listed below.  1. 2013 L3-L4 TLIF with L2-L3 and L3-L4 laminectomy and removal of hardware L4-L5 with Dr. Lew.   2. 2006 L4-L5 decompression and posterior instrumented spinal fusion.     3.  right total hip arthroplasty by Dr. Cam, [facility].   4. 10/2008 left total hip arthroplasty by Dr. Grewal.   5. Revision left total hip arthroplasty 2014 for aseptic loosening of femoral stem by Dr. Grewal.   He was receiving  injections (right-sided L5-S1 facet injections and TF-ESIs) with good results; most recent injections were done on 18 (LEFT L3 and L4 medial branch nerve and L5 dorsal ramus nerve blocks) and on 18 (trigger point injections for myofascial pain - methylprednisolone 40 mg and Bupivacaine 0.25%, total Volume of 10 ml  at bilateral tensor fascial ysabel, bilateral Iliotibial band, bilateral  vastus lateralis  and left gluteus maxiumus).   Pain is located at right hip, left low back area and bilateral thighs. It's an intermittent achy pain at right hip and low back area but reports mostly fatigue, and tightness at bilateral thighs. Pain and fatigue are worse with walking and prolonged standing; better with sitting. He has chronic numbness at right toes and  calf area which has been stable for 30 years. Also has right foot drop which is unchanged as well. Reports some swelling in both ankles chronically as well. He has had 2 falls recently with no major injuries but those definitely exacerbated his pain.   He has h/o Non-Hodkin lymphoma which is well controlled. Denies any new paresthesia or weakness. No issues with bladder function. He has some chronic tightness at his rectal/anal area due to hemorrhoidectomy as a teenager and uses a dilator daily. Pain doesn't affect his sleep.   He had PT last fall and continues his HEP daily. He also uses a tennis ball to rub on his thighs with some benefits. Takes tylenol x2-3/day; had more than 50% relief from last MBB which lasted for 2-3 months. Had also had good results with trigger point injections in Sep. Had chiropractic treatment 25 years ago but no acupuncture, chiropractic treatment or massage recently.          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/2011     Lymphoma (H) 1/25/2012     Macular degeneration, dry      Other and unspecified hyperlipidemia 7/21/2011     Snoring      Spinal stenosis, lumbar region, without neurogenic claudication 7/21/2011     Stented coronary artery 2003    x5; two separate times     Unspecified essential hypertension 7/21/2011     Past Surgical History:   Procedure Laterality Date     ARTHROPLASTY HIP  10/2008    left     ARTHROPLASTY HIP  1999    right     ARTHROPLASTY REVISION HIP  1/21/2014    Procedure: ARTHROPLASTY REVISION HIP;   Revision Left Total Hip;  Surgeon: Edgar Grewal MD;  Location: [facility]     BACK SURGERY  2006     BLEPHAROPLASTY BILATERAL Bilateral 5/17/2019    Procedure: BLEPHAROPLASTY BILATERAL UPPER LIDS;  Surgeon: America Melo MD;  Location: [facility] OR     CARDIAC SURGERY  2001    stent x 5      CARDIAC SURGERY  2002     CATARACT IOL, RT/LT Bilateral      CYSTOSCOPY, TRANSURETHRAL RESECTION (TUR) PROSTATE, COMBINED  11/5/2013    Procedure: COMBINED CYSTOSCOPY, TRANSURETHRAL RESECTION (TUR) PROSTATE;  Transurethral Resection Of Prostate, Bipolar;  Surgeon: Lai Street MD;  Location: [facility]     ESOPHAGOSCOPY, GASTROSCOPY, DUODENOSCOPY (EGD), COMBINED  2/3/2014    Procedure: COMBINED ESOPHAGOSCOPY, GASTROSCOPY, DUODENOSCOPY (EGD);;  Surgeon: Ezra Fiore MD;  Location: [facility]     ESOPHAGOSCOPY, GASTROSCOPY, DUODENOSCOPY (EGD), COMBINED  4/15/2014    Procedure: COMBINED ESOPHAGOSCOPY, GASTROSCOPY, DUODENOSCOPY (EGD), BIOPSY SINGLE OR MULTIPLE;  Surgeon: Ezra Fiore MD;  Location: [facility]     EXTRACAPSULAR CATARACT EXTRATION WITH INTRAOCULAR LENS IMPLANT  2005     EXTRACAPSULAR CATARACT EXTRATION WITH INTRAOCULAR LENS IMPLANT  2010     HERNIA REPAIR       INJECT NERVE BLOCK LUMBAR PARAVERTEBRAL SYMPATHETIC Left 8/29/2018    Procedure: INJECT NERVE BLOCK LUMBAR PARAVERTEBRAL SYMPATHETIC;  Left L3, L4, sacral Ala medial branch block;  Surgeon: Ronaldo Hicks MD;  Location: [facility]     MASTOID SURGERY  1939     MOHS MICROGRAPHIC PROCEDURE       OPTICAL TRACKING SYSTEM FUSION SPINE POSTERIOR LUMBAR TWO LEVELS  7/11/2013    Procedure: OPTICAL TRACKING SYSTEM FUSION SPINE POSTERIOR LUMBAR TWO LEVELS;  Stealth Assisted Lumbar 3-4 Transforaminal Lumbar Interbody Fusion, Lumbar 2-3 Lumbar 3-4 Laminectomy Decompression;  Surgeon: Edgar Lew MD;  Location: [facility] OR            Social History:     Social History     Tobacco Use     Smoking status: Never Smoker     Smokeless tobacco: Never Used   Substance  Use Topics     Alcohol use: Yes     Alcohol/week: 1.0 oz     Comment: occ       Marital Status:    Living situation: lives with his wife in a senior living apartment   Vocational History: retired   Tobacco use: never   Alcohol use: occasionally   Recreational drug use: didn't ask         Functional history:     Daniel Mcnair is modified independent with all aspects of his life. Uses a 4WW for long walks so he can sit down and cane/no cane when at home.          Family History:     Family History   Problem Relation Age of Onset     Diabetes Maternal Grandfather      Alcohol/Drug Maternal Grandfather      Arthritis Maternal Grandfather      Obesity Maternal Grandfather      Cerebrovascular Disease Maternal Grandmother      Hypertension Mother         willa mcnair     Glaucoma No family hx of      Macular Degeneration No family hx of      Cancer No family hx of         No known family hx of skin cancer     Skin Cancer No family hx of             Medications:     Current Outpatient Medications   Medication Sig Dispense Refill     acetaminophen (TYLENOL) 325 MG tablet Take 2 tablets (650 mg) by mouth every 6 hours as needed for pain 100 tablet 0     amLODIPine (NORVASC) 10 MG tablet TAKE 1 TABLET DAILY 90 tablet 3     aspirin 81 MG EC tablet Take 1 tablet (81 mg) by mouth daily 90 tablet 3     atorvastatin (LIPITOR) 20 MG tablet TAKE ONE TABLET BY MOUTH ONE TIME DAILY 30 tablet 0     atorvastatin (LIPITOR) 20 MG tablet Take 1 tablet (20 mg) by mouth daily 90 tablet 3     benazepril (LOTENSIN) 40 MG tablet Take 1 tablet (40 mg) by mouth daily 90 tablet 3     bisacodyl (DULCOLAX) 10 MG suppository Place 1 suppository rectally daily as needed. 3 suppository 0     docusate sodium 100 MG tablet Take 100 mg by mouth 2 times daily 60 tablet 1     erythromycin (ROMYCIN) 5 MG/GM ophthalmic ointment Apply small amount to incision sites 3 times daily and into the eyes at bedtime until tube is empty. 3.5 g 0     FISH  "OIL-KRILL OIL PO Take 2,000 mg by mouth daily       gentamicin (GARAMYCIN) 0.1 % ointment Apply topically 4 times daily   3     Guar Gum (BENEFIBER) packet Take 1 packet by mouth every morning        Magnesium Hydroxide (MILK OF MAGNESIA PO) Take by mouth At Bedtime       metoprolol succinate ER (TOPROL-XL) 25 MG 24 hr tablet TAKE ONE HALF TABLET BY MOUTH EVERY DAY 45 tablet 2     Multiple Vitamins-Minerals (VITEYES AREDS FORMULA/LUTEIN) CAPS Take by mouth daily       ranitidine (ZANTAC) 150 MG tablet TAKE 1 TABLET TWICE A  tablet 3     sildenafil (VIAGRA) 100 MG tablet Take 1 tablet (100 mg) by mouth daily as needed 10 tablet 2     tamsulosin (FLOMAX) 0.4 MG capsule Take 1 capsule (0.4 mg) by mouth daily 90 capsule 3     tolterodine (DETROL) 2 MG tablet Take 1 tablet (2 mg) by mouth 2 times daily 180 tablet 1     nitroglycerin (NITROSTAT) 0.4 MG sublingual tablet Place 1 tablet (0.4 mg) under the tongue every 5 minutes as needed (Patient not taking: Reported on 6/28/2019) 30 tablet 2            Allergies:     Allergies   Allergen Reactions     Nka [No Known Allergies]           ROS:     A focused ROS is negative other than the symptoms noted above in the HPI.         Physical Examiniation:     VITAL SIGNS: /64   Pulse 57   Ht 1.778 m (5' 10\")   Wt 85.2 kg (187 lb 12.8 oz)   SpO2 98%   BMI 26.95 kg/m     BMI: Estimated body mass index is 26.95 kg/m  as calculated from the following:    Height as of this encounter: 1.778 m (5' 10\").    Weight as of this encounter: 85.2 kg (187 lb 12.8 oz).    Gen: NAD, pleasant and cooperative   Cardio: regular pulse  Pulm: non-labored breathing in room air  Abd: benign  Ext: WWP, + edema in bilateral lower extremities with compression socks in place, no tenderness in calves  Neuro/MSK:   Inspection: healed surgical scar - no deformities of BUE/BLE; no evidence of scoliosis, symmetric positioning of bilateral shoulders and iliac crests     Palpation: tenderness to " palpation at left lumbar paraspinal muscles and facets. No tenderness to palpation at bilateral ASISes, PSISes, GTs, ITs, and piriformis muscles.     ROM: careful passive ROM of both hips given his h/o CIRO; overall functional painless ROM of bilateral hips, knees, and ankles. L spine ROM was limited with ext > flex.     Strength: R HF 4+/5, KE/KF 5/5, DF 4-/5. L HF 4+/5, and distally 5/5 at LLE.     Sensation: diminished to light touch at right toes and right lateral calf area    Special tests: negative: bilateral SLR, hip scour, FADIR, and LIZZETTE. Had some non-specific pain but not diagnostic.          Laboratory/Imaging:     L hip and pelvic xray 8/2018  Impression:   1. Postsurgical changes of bilateral total hip arthroplasties, without  complication of right prosthetis.  2. 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   3. No acute osseous abnormality.    US DOYLE doppler of bilateral lower extremities 11/2017  Impression:   Right leg: Resting DOYLE is 1.36, Normal (No observed evidence of  increased cardiovascular risk or peripheral arterial disease.)     Left leg: Resting DOYLE is 1.13, Normal (No observed evidence of  increased cardiovascular risk or peripheral arterial disease.)     L spine xray 5/2016  IMPRESSION:  1. Postsurgical changes of instrumented posterior fusion and interbody  fusion device at L3-L4 are stable.  2. Grade 1 anterolisthesis of L3 on L2 is again noted.  3. Stable right total hip arthroplasty.         Assessment/Plan:       Left low back pain    Right hip pain     Fatigue and tightness at bilateral  Thighs    Chronic right foot drop and sensory loss at RLE       H/o back/hip surgeries:   1. 07/2013 L3-L4 TLIF with L2-L3 and L3-L4 laminectomy and removal of hardware L4-L5 with Dr. Lew.   2. 02/2006 L4-L5 decompression and posterior instrumented spinal fusion.     3. 1999 right total hip arthro[facility] by Dr. Cam, [facility].   4. 10/2008 left total  "hip arthroplasty by Dr. Grewal.   5. Revision left total hip arthroplasty 01/2014 for aseptic loosening of femoral stem by Dr. Grewal.    His pain is most likely due to facet arthropathy based on his clinical picture and previous response to MBB injections. No clinical s/s of new radiculopathy or myelopathy at this point. Pain at his thighs could be secondary to chronic L2/3 radiculopathy vs myofascial again given his response to trigger point injections.      1. Patient education: In depth discussion and education was provided about the assessment and implications of each of the below recommendations for management. Patient indicated readiness to learn, all questions were answered and understanding of material presented was confirmed.  2. Work-up: none today; consider NCS/EMG in the future pending his symptoms.   3. Therapy/equipment/braces: sent referral to [facility] to review his HEP and also to work on his pain, core and gait. Consider to try TENS unit and therapeutic US as well.   4. Medications: ok to continue tylenol as needed for pain; can also try OTC topical agents.   5. Interventions: sent referral for \"LEFT L3 and L4 medial branch nerve and L5 dorsal ramus nerve blocks\" at pain clinic. Consider trigger point injections at next visit.   6. Referral / follow up with other providers: none today.   7. Follow up: in 3-4 months     Mary Sanon MD  Physical Medicine & Rehabilitation    I spent a total of 60 minutes face-to-face with Daniel Mcnair during today's office visit. Over 50% of this time was spent counseling the patient and/or coordinating care. See note for details.         "

## 2019-06-28 NOTE — PROGRESS NOTES
PM&R Clinic Note     Patient Name: Daniel Mcnair : 10/17/1929 Medical Record: [id number]            History of Present Illness:     Daniel Mcnair is a 89 year old male who presented to clinic for follow up regarding his back pain. He was followed by Dr. Meir Pena and was last seen by him on 8/10/2018.    His pain started in ; he has had several surgeries on his back and hips as listed below.  1. 2013 L3-L4 TLIF with L2-L3 and L3-L4 laminectomy and removal of hardware L4-L5 with Dr. Lew.   2. 2006 L4-L5 decompression and posterior instrumented spinal fusion.     3.  right total hip arthroplasty by Dr. Cam, [facility].   4. 10/2008 left total hip arthroplasty by Dr. Grewal.   5. Revision left total hip arthroplasty 2014 for aseptic loosening of femoral stem by Dr. Grewal.     He was receiving  injections (right-sided L5-S1 facet injections and TF-ESIs) with good results; most recent injections were done on 18 (LEFT L3 and L4 medial branch nerve and L5 dorsal ramus nerve blocks) and on 18 (trigger point injections for myofascial pain - methylprednisolone 40 mg and Bupivacaine 0.25%, total Volume of 10 ml at bilateral tensor fascial ysabel, bilateral Iliotibial band, bilateral  vastus lateralis and left gluteus maxiumus).   Pain is located at right hip, left low back area and bilateral thighs. It's an intermittent achy pain at right hip and low back area but reports mostly fatigue, and tightness at bilateral thighs. Pain and fatigue are worse with walking and prolonged standing; better with sitting. He has chronic numbness at right toes and calf area which has been stable for 30 years. Also has right foot drop which is unchanged as well. Reports some swelling in both ankles chronically as well. He has had 2 falls recently with no major injuries but those definitely exacerbated his pain.   He has h/o Non-Hodkin lymphoma which is well controlled. Denies any new paresthesia or  weakness. No issues with bladder function. He has some chronic tightness at his rectal/anal area due to hemorrhoidectomy as a teenager and uses a dilator daily. Pain doesn't affect his sleep.     He had PT last fall and continues his HEP daily. He also uses a tennis ball to rub on his thighs with some benefits. Takes tylenol x2-3/day; had more than 50% relief from last MBB which lasted for 2-3 months. Had also had good results with trigger point injections in Sep. Had chiropractic treatment 25 years ago but no acupuncture, chiropractic treatment or massage recently.            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/2011     Lymphoma (H) 1/25/2012     Macular degeneration, dry      Other and unspecified hyperlipidemia 7/21/2011     Snoring      Spinal stenosis, lumbar region, without neurogenic claudication 7/21/2011     Stented coronary artery 2003    x5; two separate times     Unspecified essential hypertension 7/21/2011     Past Surgical History:   Procedure Laterality Date     ARTHROPLASTY HIP  10/2008    left     ARTHROPLASTY HIP  1999    right     ARTHROPLASTY REVISION HIP  1/21/2014    Procedure: ARTHROPLASTY REVISION HIP;  Revision Left Total Hip;  Surgeon: Edgar Grewal MD;  Location: [facility] OR     BACK SURGERY  2006     BLEPHAROPLASTY BILATERAL Bilateral 5/17/2019    Procedure: BLEPHAROPLASTY BILATERAL UPPER LIDS;  Surgeon: America Melo MD;  Location: [facility] OR     CARDIAC SURGERY  2001    stent x 5      CARDIAC SURGERY  2002     CATARACT IOL, RT/LT  Bilateral      CYSTOSCOPY, TRANSURETHRAL RESECTION (TUR) PROSTATE, COMBINED  11/5/2013    Procedure: COMBINED CYSTOSCOPY, TRANSURETHRAL RESECTION (TUR) PROSTATE;  Transurethral Resection Of Prostate, Bipolar;  Surgeon: Lai Street MD;  Location: [facility] OR     ESOPHAGOSCOPY, GASTROSCOPY, DUODENOSCOPY (EGD), COMBINED  2/3/2014    Procedure: COMBINED ESOPHAGOSCOPY, GASTROSCOPY, DUODENOSCOPY (EGD);;  Surgeon: Ezra Fiore MD;  Location: [facility] GI     ESOPHAGOSCOPY, GASTROSCOPY, DUODENOSCOPY (EGD), COMBINED  4/15/2014    Procedure: COMBINED ESOPHAGOSCOPY, GASTROSCOPY, DUODENOSCOPY (EGD), BIOPSY SINGLE OR MULTIPLE;  Surgeon: Ezra Fiore MD;  Location: [facility] GI     EXTRACAPSULAR CATARACT EXTRATION WITH INTRAOCULAR LENS IMPLANT  2005     EXTRACAPSULAR CATARACT EXTRATION WITH INTRAOCULAR LENS IMPLANT  2010     HERNIA REPAIR       INJECT NERVE BLOCK LUMBAR PARAVERTEBRAL SYMPATHETIC Left 8/29/2018    Procedure: INJECT NERVE BLOCK LUMBAR PARAVERTEBRAL SYMPATHETIC;  Left L3, L4, sacral Ala medial branch block;  Surgeon: Ronaldo Hicks MD;  Location: [facility] OR     MASTOID SURGERY  1939     MOHS MICROGRAPHIC PROCEDURE       OPTICAL TRACKING SYSTEM FUSION SPINE POSTERIOR LUMBAR TWO LEVELS  7/11/2013    Procedure: OPTICAL TRACKING SYSTEM FUSION SPINE POSTERIOR LUMBAR TWO LEVELS;  Stealth Assisted Lumbar 3-4 Transforaminal Lumbar Interbody Fusion, Lumbar 2-3 Lumbar 3-4 Laminectomy Decompression;  Surgeon: Edgar Lew MD;  Location:  OR            Social History:     Social History     Tobacco Use     Smoking status: Never Smoker     Smokeless tobacco: Never Used   Substance Use Topics     Alcohol use: Yes     Alcohol/week: 1.0 oz     Comment: occ       Marital Status:    Living situation: lives with his wife in a senior living apartment   Vocational History: retired   Tobacco use: never   Alcohol use: occasionally   Recreational drug use: didn't ask         Functional history:     Daniel Gamino  Tabby is modified independent with all aspects of his life. Uses a 4WW for long walks so he can sit down and cane/no cane when at home.              Family History:     Family History   Problem Relation Age of Onset     Diabetes Maternal Grandfather      Alcohol/Drug Maternal Grandfather      Arthritis Maternal Grandfather      Obesity Maternal Grandfather      Cerebrovascular Disease Maternal Grandmother      Hypertension Mother         willa zelaya     Glaucoma No family hx of      Macular Degeneration No family hx of      Cancer No family hx of         No known family hx of skin cancer     Skin Cancer No family hx of             Medications:     Current Outpatient Medications   Medication Sig Dispense Refill     acetaminophen (TYLENOL) 325 MG tablet Take 2 tablets (650 mg) by mouth every 6 hours as needed for pain 100 tablet 0     amLODIPine (NORVASC) 10 MG tablet TAKE 1 TABLET DAILY 90 tablet 3     aspirin 81 MG EC tablet Take 1 tablet (81 mg) by mouth daily 90 tablet 3     atorvastatin (LIPITOR) 20 MG tablet TAKE ONE TABLET BY MOUTH ONE TIME DAILY 30 tablet 0     atorvastatin (LIPITOR) 20 MG tablet Take 1 tablet (20 mg) by mouth daily 90 tablet 3     benazepril (LOTENSIN) 40 MG tablet Take 1 tablet (40 mg) by mouth daily 90 tablet 3     bisacodyl (DULCOLAX) 10 MG suppository Place 1 suppository rectally daily as needed. 3 suppository 0     docusate sodium 100 MG tablet Take 100 mg by mouth 2 times daily 60 tablet 1     erythromycin (ROMYCIN) 5 MG/GM ophthalmic ointment Apply small amount to incision sites 3 times daily and into the eyes at bedtime until tube is empty. 3.5 g 0     FISH OIL-KRILL OIL PO Take 2,000 mg by mouth daily       gentamicin (GARAMYCIN) 0.1 % ointment Apply topically 4 times daily   3     Guar Gum (BENEFIBER) packet Take 1 packet by mouth every morning        Magnesium Hydroxide (MILK OF MAGNESIA PO) Take by mouth At Bedtime       metoprolol succinate ER (TOPROL-XL) 25 MG 24 hr tablet TAKE  "ONE HALF TABLET BY MOUTH EVERY DAY 45 tablet 2     Multiple Vitamins-Minerals (VITEYES AREDS FORMULA/LUTEIN) CAPS Take by mouth daily       ranitidine (ZANTAC) 150 MG tablet TAKE 1 TABLET TWICE A  tablet 3     sildenafil (VIAGRA) 100 MG tablet Take 1 tablet (100 mg) by mouth daily as needed 10 tablet 2     tamsulosin (FLOMAX) 0.4 MG capsule Take 1 capsule (0.4 mg) by mouth daily 90 capsule 3     tolterodine (DETROL) 2 MG tablet Take 1 tablet (2 mg) by mouth 2 times daily 180 tablet 1     nitroglycerin (NITROSTAT) 0.4 MG sublingual tablet Place 1 tablet (0.4 mg) under the tongue every 5 minutes as needed (Patient not taking: Reported on 6/28/2019) 30 tablet 2            Allergies:     Allergies   Allergen Reactions     Nka [No Known Allergies]               ROS:     A focused ROS is negative other than the symptoms noted above in the HPI.           Physical Examiniation:     VITAL SIGNS: /64   Pulse 57   Ht 1.778 m (5' 10\")   Wt 85.2 kg (187 lb 12.8 oz)   SpO2 98%   BMI 26.95 kg/m    BMI: Estimated body mass index is 26.95 kg/m  as calculated from the following:    Height as of this encounter: 1.778 m (5' 10\").    Weight as of this encounter: 85.2 kg (187 lb 12.8 oz).    Gen: NAD, pleasant and cooperative   Cardio: regular pulse  Pulm: non-labored breathing in room air  Abd: benign  Ext: WWP, + edema in bilateral lower extremities with compression socks in place, no tenderness in calves  Neuro/MSK:   Inspection: healed surgical scar - no deformities of BUE/BLE; no evidence of scoliosis, symmetric positioning of bilateral shoulders and iliac crests     Palpation: tenderness to palpation at left lumbar paraspinal muscles and facets. No tenderness to palpation at bilateral ASISes, PSISes, GTs, ITs, and piriformis muscles.     ROM: careful passive ROM of both hips given his h/o CIRO; overall functional painless ROM of bilateral hips, knees, and ankles. L spine ROM was limited with ext > flex. "     Strength: R HF 4+/5, KE/KF 5/5, DF 4-/5. L HF 4+/5, and distally 5/5 at LLE.     Sensation: diminished to light touch at right toes and right lateral calf area    Special tests: negative: bilateral SLR, hip scour, FADIR, and LIZZETTE. Had some non-specific pain but not diagnostic.              Laboratory/Imaging:     L hip and pelvic xray 8/2018  Impression:   1. Postsurgical changes of bilateral total hip arthroplasties, without  complication of right prosthetis.  2. 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   3. No acute osseous abnormality.      US DOYLE doppler of bilateral lower extremities 11/2017  Impression:   Right leg: Resting DOYLE is 1.36, Normal (No observed evidence of  increased cardiovascular risk or peripheral arterial disease.)     Left leg: Resting DOYLE is 1.13, Normal (No observed evidence of  increased cardiovascular risk or peripheral arterial disease.)     L spine xray 5/2016  IMPRESSION:  1. Postsurgical changes of instrumented posterior fusion and interbody  fusion device at L3-L4 are stable.  2. Grade 1 anterolisthesis of L3 on L2 is again noted.  3. Stable right total hip arthroplasty.               Assessment/Plan:       Left low back pain    Right hip pain     Fatigue and tightness at bilateral  Thighs    Chronic right foot drop and sensory loss at RLE       H/o back/hip surgeries:   1. 07/2013 L3-L4 TLIF with L2-L3 and L3-L4 laminectomy and removal of hardware L4-L5 with Dr. Lew.   2. 02/2006 L4-L5 decompression and posterior instrumented spinal fusion.     3. 1999 right total hip arthroplasty by Dr. Cam, [facility].   4. 10/2008 left total hip arthroplasty by Dr. Grewal.   5. Revision left total hip arthroplasty 01/2014 for aseptic loosening of femoral stem by Dr. Grewal.    His pain is most likely due to facet arthropathy based on his clinical picture and previous response to MBB injections. No clinical s/s of new radiculopathy or  "myelopathy at this point. Pain at his thighs could be secondary to chronic L2/3 radiculopathy vs myofascial again given his response to trigger point injections.        1. Patient education: In depth discussion and education was provided about the assessment and implications of each of the below recommendations for management. Patient indicated readiness to learn, all questions were answered and understanding of material presented was confirmed.  2. Work-up: none today; consider NCS/EMG in the future pending his symptoms.   3. Therapy/equipment/braces: sent referral to [facility] to review his HEP and also to work on his pain, core and gait. Consider to try TENS unit and therapeutic US as well.   4. Medications: ok to continue tylenol as needed for pain; can also try OTC topical agents.   5. Interventions: sent referral for \"LEFT L3 and L4 medial branch nerve and L5 dorsal ramus nerve blocks\" at pain clinic. Consider trigger point injections at next visit.   6. Referral / follow up with other providers: none today.   7. Follow up: in 3-4 months     Mary Sanon MD  Physical Medicine & Rehabilitation    I spent a total of 60 minutes face-to-face with Daniel Mcnair during today's office visit. Over 50% of this time was spent counseling the patient and/or coordinating care. See note for details.               "

## 2019-07-02 ENCOUNTER — CARE COORDINATION (OUTPATIENT)
Dept: ANESTHESIOLOGY | Facility: CLINIC | Age: 84
End: 2019-07-02

## 2019-07-02 ENCOUNTER — TELEPHONE (OUTPATIENT)
Dept: AUDIOLOGY | Facility: CLINIC | Age: 84
End: 2019-07-02

## 2019-07-02 DIAGNOSIS — M47.816 LUMBAR FACET ARTHROPATHY: Primary | ICD-10-CM

## 2019-07-02 NOTE — TELEPHONE ENCOUNTER
Daniel Mcnair was seen at the [facility] on 7/2/19 for hearing aid services.  He reported his right hearing aid had stopped working.  Examination revealed wax occlusion in the  filter.  The filter and dome (10mm closed) were replaced and the device returned to normal function.  Mr. Mcnair reported good sound quality and will return to the clinic as needed.

## 2019-07-03 ENCOUNTER — TELEPHONE (OUTPATIENT)
Dept: ANESTHESIOLOGY | Facility: CLINIC | Age: 84
End: 2019-07-03

## 2019-07-03 NOTE — TELEPHONE ENCOUNTER
Spoke with: Daniel     Date Scheduled: 7/31/19     Provider: Dr. Hicks     : yes     F/U Appointment: n/a     Patient was educated on pre-op nurse call: yes    Direct procedure: no

## 2019-07-11 ENCOUNTER — MEDICAL CORRESPONDENCE (OUTPATIENT)
Dept: HEALTH INFORMATION MANAGEMENT | Facility: CLINIC | Age: 84
End: 2019-07-11

## 2019-07-16 ENCOUNTER — OFFICE VISIT (OUTPATIENT)
Dept: OPHTHALMOLOGY | Facility: CLINIC | Age: 84
End: 2019-07-16
Attending: OPHTHALMOLOGY
Payer: COMMERCIAL

## 2019-07-16 DIAGNOSIS — Z96.1 PSEUDOPHAKIA OF BOTH EYES: ICD-10-CM

## 2019-07-16 DIAGNOSIS — H43.813 POSTERIOR VITREOUS DETACHMENT, BILATERAL: ICD-10-CM

## 2019-07-16 DIAGNOSIS — H35.3132 INTERMEDIATE STAGE NONEXUDATIVE AGE-RELATED MACULAR DEGENERATION OF BOTH EYES: Primary | ICD-10-CM

## 2019-07-16 PROCEDURE — G0463 HOSPITAL OUTPT CLINIC VISIT: HCPCS | Mod: ZF

## 2019-07-16 PROCEDURE — 92134 CPTRZ OPH DX IMG PST SGM RTA: CPT | Mod: ZF | Performed by: OPHTHALMOLOGY

## 2019-07-16 RX ORDER — CARBOXYMETHYLCELLULOSE SODIUM 5 MG/ML
1 SOLUTION/ DROPS OPHTHALMIC 3 TIMES DAILY PRN
COMMUNITY

## 2019-07-16 RX ORDER — CARBOXYMETHYLCELLULOSE SODIUM 10 MG/ML
GEL OPHTHALMIC PRN
COMMUNITY

## 2019-07-16 ASSESSMENT — VISUAL ACUITY
OD_CC+: +3
METHOD: SNELLEN - LINEAR
OS_CC: 20/40
CORRECTION_TYPE: GLASSES
OD_PH_CC: 20/30
OS_CC+: +2
OD_CC: 20/50

## 2019-07-16 ASSESSMENT — EXTERNAL EXAM - LEFT EYE: OS_EXAM: MILD BROW PTOSIS

## 2019-07-16 ASSESSMENT — REFRACTION_WEARINGRX
OS_ADD: +3.00
OD_AXIS: 040
OD_ADD: +3.00
OS_SPHERE: -2.50
OD_CYLINDER: +0.25
OS_AXIS: 175
OD_SPHERE: -0.50
OS_CYLINDER: +1.75

## 2019-07-16 ASSESSMENT — CONF VISUAL FIELD
OS_NORMAL: 1
OD_NORMAL: 1
METHOD: COUNTING FINGERS

## 2019-07-16 ASSESSMENT — TONOMETRY
OS_IOP_MMHG: 17
OD_IOP_MMHG: 15
IOP_METHOD: TONOPEN

## 2019-07-16 ASSESSMENT — CUP TO DISC RATIO
OS_RATIO: 0.3
OD_RATIO: 0.3

## 2019-07-16 ASSESSMENT — EXTERNAL EXAM - RIGHT EYE: OD_EXAM: MILD BROW PTOSIS

## 2019-07-16 NOTE — NURSING NOTE
Chief Complaints and History of Present Illnesses   Patient presents with     Macular Degeneration Follow Up     Chief Complaint(s) and History of Present Illness(es)     Macular Degeneration Follow Up               Comments     Six month follow up for dry AMD.    The patient is treating his dry eyes with NP AT's and the gel.  He is using warm compresses and lid scrubs.  The patient notes that he uses a magnifier for reading.  The patient is unhappy with the reading glasses. He is interested in seeing Eboni Pimentel and Dr. Feng.  Milli Patel, LAUREN 7:22 AM 07/16/2019

## 2019-07-16 NOTE — PROGRESS NOTES
CC -   Dry AMD    INTERVAL HISTORY -    Feels VA is stable. Harder time reading. He is not requiring as much light to read since his blepharoplasty w/ Dr. Melo 5/17/19.  ROBINA uses artificial tears 2-3x/day, AT gel BID, warm compresses ~daily. ROBINA syx improved. Still has monocular diplopia resolved.  Using AREDS II BID, occasional Amsler.    HPI - 88 yo male with hx of dry ARMD, dbh right eye noted in 3/2015, CE/IOL OU.   Now taking AREDS II, never smoked in the past and using Amsler not noticing diplopia.      PAST OCULAR SURGERY  CE/IOL both eyes ~ 2005    RETINAL IMAGING  OCT  7-16-19  right eye- 2+ drusen , sl increase in size of drusenoid pigment epithelial detachments, no fluid, PVD  left eye - 2+ drusen, minimal increase in size of drusenoid pigment epithelial detachments, no fluid, PVD      ASSESSMENT & PLAN  1.  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 Feng   - observe return to clinic 6 months, sooner as needed     2.  Diplopia   - monocular on testing in past, although reports binocular symptoms occasionally   - ortho at near and distance on strabismus exam in past   - has been experiencing this less often - describes occasional shadowing    3. Posterior vitreous detachment (PVD) both eyes    - advised S/Sx RD 7/2019    4.  H/O dot blot hemorrhage right eye   - seen previously 3/2015 visit, resolved today, no new heme    - ?etiology   - h/o HTN, doubt Choroidal neovascular membrane, but in far temporal macula   - continue observation      6. Dry Eyes, bilateral   -sees Roman   - on artificial tears & compresses      return to clinic:6 monhts, OCT each eye  Pt requests to see Eboni Pimentel/Dr. Feng in the interim     Mahesh Willard MD - PGY 3  Ophthalmology resident    ATTESTATION     Attending Physician Attestation:      Complete documentation of historical and exam elements  from today's encounter can be found in the full encounter summary report (not reduplicated in this progress note).  I personally obtained the chief complaint(s) and history of present illness.  I confirmed and edited as necessary the review of systems, past medical/surgical history, family history, social history, and examination findings as documented by others; and I examined the patient myself.  I personally reviewed the relevant tests, images, and reports as documented above.  I personally reviewed the ophthalmic test(s) associated with this encounter, agree with the interpretation(s) as documented by the resident/fellow, and have edited the corresponding report(s) as necessary.   I formulated and edited as necessary the assessment and plan and discussed the findings and management plan with the patient and family    Luna Parker MD, PhD  , Vitreoretinal Surgery  Department of Ophthalmology  [facility]

## 2019-07-24 ENCOUNTER — TELEPHONE (OUTPATIENT)
Dept: ANESTHESIOLOGY | Facility: CLINIC | Age: 84
End: 2019-07-24

## 2019-07-24 NOTE — TELEPHONE ENCOUNTER
I called and spoke with the pt's wife Zuleyka and informed her that his procedure time on 07/31/2019 is 12:25PM and an arrival time of 11:25AM.    Zuleyka stated verbal understanding and had no further questions or concerns. You can call [phone] if you have any questions.    Azul Stokes, CMA

## 2019-07-25 ENCOUNTER — OFFICE VISIT (OUTPATIENT)
Dept: OPTOMETRY | Facility: CLINIC | Age: 84
End: 2019-07-25
Payer: COMMERCIAL

## 2019-07-25 DIAGNOSIS — H35.3190 DRY AGE-RELATED MACULAR DEGENERATION: ICD-10-CM

## 2019-07-25 DIAGNOSIS — Z96.1 PSEUDOPHAKIA: Primary | ICD-10-CM

## 2019-07-25 DIAGNOSIS — H52.4 PRESBYOPIA: ICD-10-CM

## 2019-07-25 ASSESSMENT — VISUAL ACUITY
OD_CC: 20/50
OS_CC+: -2
CORRECTION_TYPE: GLASSES
OD_CC+: +1
OS_CC: 20/30
METHOD: SNELLEN - LINEAR

## 2019-07-25 ASSESSMENT — TONOMETRY
OS_IOP_MMHG: 16
OD_IOP_MMHG: 12
IOP_METHOD: ICARE

## 2019-07-25 ASSESSMENT — REFRACTION_WEARINGRX
OD_CYLINDER: +0.50
OS_SPHERE: -2.25
OD_SPHERE: -0.75
OD_AXIS: 040
OS_CYLINDER: +1.75
OS_AXIS: 175
OS_ADD: +2.75
OD_ADD: +2.75
SPECS_TYPE: BIFOCAL

## 2019-07-25 ASSESSMENT — REFRACTION_MANIFEST
OD_AXIS: 010
OS_AXIS: 127
OS_CYLINDER: +1.75
OS_ADD: +2.75
OD_SPHERE: -1.50
OD_ADD: +2.75
OS_SPHERE: -2.25
OD_CYLINDER: +1.25

## 2019-07-25 ASSESSMENT — EXTERNAL EXAM - LEFT EYE: OS_EXAM: MILD BROW PTOSIS

## 2019-07-25 ASSESSMENT — CONF VISUAL FIELD
OD_NORMAL: 1
OS_NORMAL: 1
METHOD: COUNTING FINGERS

## 2019-07-25 ASSESSMENT — EXTERNAL EXAM - RIGHT EYE: OD_EXAM: MILD BROW PTOSIS

## 2019-07-25 NOTE — PROGRESS NOTES
History  HPI     Decreased Vision Evaluation     In both eyes.  Onset was gradual.  This started 8 months ago.  Charactertized as  blurred vision.  Severity is moderate.  It is worse when reading.  Context:  reading and driving.  Since onset it is gradually worsening.  Treatments tried include glasses.  Response to treatment was moderate improvement.  Pain was noted as 0/10.              Comments     Difficulty reading small print, still driving but is concerned about safety, would like to see if updated glasses would improve vision at all distance  Diagnosed with dry eyes which cause fluctuating vision - uses PFATs and gel as needed              Last edited by Adalberto Feng, OD on 7/25/2019  9:42 AM. (History)          Assessment/Plan  (Z96.1) Pseudophakia  (primary encounter diagnosis)  Comment: Significant improvement with new prescription at distance and near, best corrected to 20/30 right eye and 20/20/ left eye   Plan: Educated patient on condition and clinical findings. Dispensed spectacle prescription for full time wear. Educated patient on possibility of adaptation period, if symptoms do not improve return to clinic for further testing.    (H35.4213) Dry age-related macular degeneration  Plan: Continue care with Dr. Parker and  of [place] [facility]. Patient should return to clinic with sudden vision changes such as new flashes/floaters/curtain over vision or with worsening distortions. Recommended patient to keep appointment with Eboni Pimentel on 8/5/19.    (H52.4) Presbyopia  Plan: See above plan.     Return to clinic as needed.   Aniya Beck, Optometry Student    I agree with the attached exam findings. I have reviewed and agree with the plan stated above.     Complete documentation of historical and exam elements from today's encounter can  be found in the full encounter summary report (not reduplicated in this progress  note). I personally obtained the chief complaint(s) and history of present  illness. I  confirmed and edited as necessary the review of systems, past medical/surgical  history, family history, social history, and examination findings as documented by  others; and I examined the patient myself. I personally reviewed the relevant tests,  images, and reports as documented above. I formulated and edited as necessary the  assessment and plan and discussed the findings and management plan with the  patient and family.     Adalberto Feng OD

## 2019-07-31 ENCOUNTER — HOSPITAL ENCOUNTER (OUTPATIENT)
Facility: AMBULATORY SURGERY CENTER | Age: 84
End: 2019-07-31
Payer: COMMERCIAL

## 2019-07-31 VITALS
DIASTOLIC BLOOD PRESSURE: 74 MMHG | SYSTOLIC BLOOD PRESSURE: 155 MMHG | OXYGEN SATURATION: 96 % | HEART RATE: 58 BPM | HEIGHT: 70 IN | TEMPERATURE: 98.5 F | WEIGHT: 185 LBS | BODY MASS INDEX: 26.48 KG/M2 | RESPIRATION RATE: 18 BRPM

## 2019-07-31 ASSESSMENT — MIFFLIN-ST. JEOR: SCORE: 1510.4

## 2019-07-31 NOTE — DISCHARGE INSTRUCTIONS
Home Care Instructions after a Medial Branch Block      In a medial branch block, a local anesthetic (numbing medicine) is injected near the medial branch nerve. This stops the transmission of pain signals from the facet joint. If this reduces your pain and improves your mobility, it may tell the doctor which facet joint is causing the pain. This procedure is a diagnostic procedure and is typically short lasting. With this injection, a steroid to increase the longevity of the blocks effect may or may not be used.    Activity  -You may resume most normal activity levels with the exception of strenuous activity. It is important for us to know if your pain with normal activity is relieved after this injection.  -DO NOT shower for 24 hours  -DO NOT remove bandaid for 24 hours    Pain  -You may experience soreness at the injection site for one or two days  -You may use an ice pack for 20 minutes every 2 hours for the first 24 hours  -You may use a heating pad after the first 24 hours  -You may use Tylenol (acetaminophen) every 4 hours or other pain medicines as     directed by your physician    You may experience numbness radiating into your legs or arms (depending on the procedure location). This numbness may last several hours. Until sensation returns to normal; please use caution in walking, climbing stairs, and stepping out of your vehicle, etc.    DID YOU RECEIVE SEDATION TODAY?  No    If you received sedation please follow these additional safety measures.  Sedation medicine, if given, may remain active for many hours. It is important for the next 24 hours that you do not:  -Drive a car  -Operate machines or power tools  -Consume alcohol, including beer  -Sign any important papers or legal documents    DID YOU RECEIVE STEROIDS TODAY?  Yes    Common side effects of steroids:  Not everyone will experience corticosteroid side effects. If side effects are experienced, they will gradually subside in the 7-10 day period  following an injection. Most common side effects include:  -Flushed face and/or chest  -Feeling of warmth, particularly in the face but could be an overall feeling of warmth  -Increased blood sugar in diabetic patients  -Menstrual irregularities my occur. If taking hormone-based birth control an alternate method of birth control is recommended  -Sleep disturbances and/or mood swings are possible  -Leg cramps      PLEASE KEEP TRACK OF YOUR SYMPTOMS AND NOTE YOUR IMPROVEMENT FOR YOUR DOCTOR.     Please contact us if you have:  -Severe pain  -Fever more than 101.5 degrees Fahrenheit  -Signs of infection at the injection site (redness, swelling, or drainage)    If you have questions, please contact our office at [phone] between the hours of 7:00 am and 3:00 pm Monday through Friday. After office hours you can contact the on call provider by dialing [phone]. If you need immediate attention, we recommend that you go to a hospital emergency room or dial [phone].

## 2019-08-05 ENCOUNTER — HOSPITAL ENCOUNTER (OUTPATIENT)
Dept: OCCUPATIONAL THERAPY | Facility: CLINIC | Age: 84
Discharge: HOME | End: 2019-08-05
Attending: OCCUPATIONAL THERAPIST | Admitting: OCCUPATIONAL THERAPIST
Payer: COMMERCIAL

## 2019-08-05 DIAGNOSIS — H35.3132 INTERMEDIATE STAGE NONEXUDATIVE AGE-RELATED MACULAR DEGENERATION OF BOTH EYES: ICD-10-CM

## 2019-08-05 DIAGNOSIS — H43.813 POSTERIOR VITREOUS DETACHMENT, BILATERAL: ICD-10-CM

## 2019-08-05 DIAGNOSIS — Z96.1 PSEUDOPHAKIA OF BOTH EYES: ICD-10-CM

## 2019-08-05 PROCEDURE — 97535 SELF CARE MNGMENT TRAINING: CPT | Mod: GO | Performed by: OCCUPATIONAL THERAPIST

## 2019-08-05 PROCEDURE — 97165 OT EVAL LOW COMPLEX 30 MIN: CPT | Mod: GO | Performed by: OCCUPATIONAL THERAPIST

## 2019-08-05 ASSESSMENT — VISUAL ACUITY
OS: 20/25
OD: 20/30

## 2019-08-05 ASSESSMENT — ACTIVITIES OF DAILY LIVING (ADL): PRIOR_ADL/IADL_STATUS: INDEPENDENT PRIOR TO ONSET

## 2019-08-05 NOTE — DISCHARGE INSTRUCTIONS
Visual Rehabilitation Summary  08/05/2019  Clinical Findings:  1. Visual Acuity:  20/30 in the right eye, 20/20 in the left eye  2. Contrast Sensitivity: (ability to see as things become more faint) 25/25 - within normal limits  3. Reading Acuity:  Last print size read without slowing - 0.8M - one size smaller than newsprint  4. Attention to visual field measured by the Dynavision (light board) Score of 45.   safe  score  is 52+   Recommendations:  1. To improve visual endurance:  a. Use the moisture eye drops as recommended (4X daily)  b. Consider a full spectrum task light (examples on pages 48 and 49 of catalog)  c. Consider large print books for ease of reading  d. Do not hesitate to use your magnifier  e. Consider a lap desk to position reading material when using the magnifier (page 74 in the catalog)  f. Use yellow fit over sunglasses (page 46 in the catalog)  The wraparound sunglasses will block wind and other irritation that exacerbates your dry eye.   Eboni Pimentel, OTR/L  [phone]

## 2019-08-05 NOTE — PROGRESS NOTES
08/05/19 1000   Visit Type   Type of Visit Initial       Present No   General Information   Start Of Care Date 08/05/19   Referring Physician MD Elena   Orders Evaluate And Treat As Indicated   Date of Order 07/16/19   Medical Diagnosis AMD intermediate stage, dry eye   Onset Of Illness/injury Or Date Of Surgery 07/16/2019   Surgical/Medical history reviewed   (AMD, hearing loss, CIRO X3, spinal stenosis surgeries)   Precautions/Limitations hip and back pain, 5 heart stents, non-hodgkins, cannot walk far secondary to thigh and hip issues   Prior ADL/IADL status Independent prior to onset   Patient/family Goals Statement dry eye,reading - visual endurance, occassional shadowing, TV print, safety in navigation -    General information comments reports dry eye significant contributor to decreased function.    Social History/Home Environment   Living Environment Apartment/condo  (cleaning assist, meals provided)   Current Community Support   (son in [facility] (retired RN), MD Son in [place])   Patient Role/employment History    (semi retired [employer]  )   Avocational photos, computer -photos and email    Pain Assessment   Pain Reported No   Fall Risk Screen   Fall screen completed by OT   Have you fallen 2 or more times in the past year? Yes   Have you fallen and had an injury in the past year? No   Is patient a fall risk? Yes   Fall screen comments is involved in Pt episode currently   Cognitive/Behavioral   Communication Intact   Cognitive Status Intact for evaluation process   Behavior Appropriate   Patient/family aware of diagnosis Yes   How well do you understand your eye condition? Not well   Vision related restrictions on visiting with family/friends Mild   Adjustment to disability Good   Physical Status/Equipment   Physical Status No problems observed/reported   Mobility equipment used Support cane;Walker   Visual Report   Functional Complaints   (management of light and glare  "for reading endurance)   Visual Complaints Scotoma Hindrance right eye;Scotoma Hindrance left eye  (identfiies that amsler grid lines \"double\" in the center )   Fly Bonnet Symptoms? No   Magnifier (strength and type) has handheld at home   Reading glasses Bifocal   Technology Computer  (apple computer, smart phone)   Lighting and Glare   Is your lighting adequate? No/ at home   Is glare a problem? Yes/ outdoors;No/ indoors   Are you satisfied with your sunglasses?   (currently without sunglassses - \"broke\")   Visual Acuity   Acuity right eye 20/30   Acuity left eye 20/25   Contrast Sensitivity   Contrast sensitivity (score/25) 25/25   MN Read   Smallest print size read 0.5M   Critical print size 0.8M   Words per minute at critical print size 150 - pt reports limited reading endurance is barrier to independnece.  \"I have stopped reading books\"   Dynavision: Evaluation of visual skills/search of extra personal space via 5X4 foot computerized light board with 64 stimuli.  The user reacts as quickly as possible by striking the lights as they turn on in random succession.   Dynavision Cascade Dynavision Mode A (single stimulus attention, 1 minute   Dynavision Mode A (single stimulus attention, 1 minute)   Patient Score (Mode A, 1 min) 45   Education   Learner Patient   Readiness Eager;Acceptance   Method Booklet/handout;Explanation;Demonstration   Response Verbalizes understanding;Demonstrates understanding   Clinical Impression, OT Eval   Criteria for Skilled Therapeutic Interventions Met yes;treatment indicated   Therapy  Diagnosis: Impaired ADL/IADL with deficits in Reading based ADL  (management of light/glare)   Assessment of Occupational Performance 1-3 Performance Deficits   Identified Performance Deficits as above   Clinical Decision Making (Complexity) Low complexity   OT Visual Rehabilitation Evaluation Plan   Therapy Plan Occupational therapy intervention   Planned Interventions Low vision compensatory " training for reading;Instruction in environmental adaptations for glare;Instruction in environmental adaptations for lighting;Optical device/ADL device instruction and training;Instruction in community resources   Frequency / Duration 2 tx visits over 8 weeks. 1X every 4 weeks for 8 weeks   Risks and Benefits of Treatment have been explained. Yes   Patient, Family in agreement with plan of care Yes   GOALS   Goals Near Vision;Environmental Modification;Resource Education   Goals Addressed this Session All goals addressed   Goal 1 - Near Vision   Goal Description: Near Vision Patient will verbalize awareness of visual field Loss and demonstrate improved use of visual skills/adaptive equipment for increased independence in reading-based activities of daily living, written communication and detail ADL tasks.   Target Date 09/30/19   Goal 3 - Environmental modification   Goal Description: Environment modification Patient will demonstrate understanding of the impact of lighting, contrast and glare on ADL activities, in conjunction with environmentally-based ADL modifications   Target Date 09/30/19   Goal 4 - Resource education   Goal Description: Resource education Patient will verbalize awareness of community resources for the following:;Access to large print materials;Access to low vision devices   Target Date 09/30/19   Total Evaluation Time   OT Eval, Low Complexity Minutes (57171) 45   Therapy Certification   Certification date from 08/05/19   Certification date to 09/30/19   Medical Diagnosis AMD - intermediate stage, dry eye

## 2019-08-20 ENCOUNTER — OFFICE VISIT (OUTPATIENT)
Dept: INTERNAL MEDICINE | Facility: CLINIC | Age: 84
End: 2019-08-20
Payer: COMMERCIAL

## 2019-08-20 VITALS
DIASTOLIC BLOOD PRESSURE: 57 MMHG | TEMPERATURE: 99 F | OXYGEN SATURATION: 99 % | BODY MASS INDEX: 26.83 KG/M2 | HEART RATE: 51 BPM | WEIGHT: 187 LBS | SYSTOLIC BLOOD PRESSURE: 126 MMHG

## 2019-08-20 DIAGNOSIS — M54.42 CHRONIC MIDLINE LOW BACK PAIN WITH BILATERAL SCIATICA: ICD-10-CM

## 2019-08-20 DIAGNOSIS — H61.20 IMPACTED CERUMEN, UNSPECIFIED LATERALITY: ICD-10-CM

## 2019-08-20 DIAGNOSIS — M54.41 CHRONIC MIDLINE LOW BACK PAIN WITH BILATERAL SCIATICA: ICD-10-CM

## 2019-08-20 DIAGNOSIS — R35.1 NOCTURIA: ICD-10-CM

## 2019-08-20 DIAGNOSIS — R22.1 NECK MASS: Primary | ICD-10-CM

## 2019-08-20 DIAGNOSIS — G89.29 CHRONIC MIDLINE LOW BACK PAIN WITH BILATERAL SCIATICA: ICD-10-CM

## 2019-08-20 RX ORDER — ERYTHROMYCIN 5 MG/G
OINTMENT OPHTHALMIC
Status: ON HOLD | COMMUNITY
Start: 2019-05-17 | End: 2019-09-20

## 2019-08-20 ASSESSMENT — PAIN SCALES - GENERAL: PAINLEVEL: MILD PAIN (2)

## 2019-08-20 NOTE — NURSING NOTE
Bilateral ear lavage completed in clinic today for impacted cerumen.  small amount of cerumen removed from both ear(s).  Tympanic membrane visible.  Patient tolerated well, no redness noted. Milady Javier CMA at 2:31 PM on 8/20/2019

## 2019-08-20 NOTE — PATIENT INSTRUCTIONS
[facility] Medication Refill Request Information:  * Please contact your pharmacy regarding ANY request for medication refills.  ** [facility] Prescription Fax = [phone]  * Please allow 3 business days for routine medication refills.  * Please allow 5 business days for controlled substance medication refills.     [facility] Test Result notification information:  *You will be notified with in 7-10 days of your appointment day regarding the results of your test.  If you are on MyChart you will be notified as soon as the provider has reviewed the results and signed off on them.    [facility]: [phone]

## 2019-08-20 NOTE — PROGRESS NOTES
[facility]    CC: 4 month follow up    HPI:  Daniel Mcnair is an 89 year old man with a history of low grade B-cell lymphoma, CAD, HTN, HLD, chronic low back pain, anemia, macular degenration, and hearing loss who presents for general follow up.    Daniel was last seen 5/3/19 for eyelid surgery pre-op. Also discussed continued low back pain due to spinal stenosis and given referral to PM&R.   Since that time he has had his blepharoplasty without complication,     Main Problem: Left hip, right lower back, thighs  Was in to see PM&R with plans for lumbar nerve block. Initial block was scheduled on the incorrect site and was rescheduled for September. Also doing PT exercises 2x/week, exercises 4-5 days a week.   Tylenol is able to pull him through in the meantime but the chronic ache is exhausting. 9-10 hours of sleep/night plus 1 hour nap in the day.     Also discussed:  - Would like earwax cleaning today  - PT recommended pool exercise  - Dry eye routine going well  - Urination: goes to bathroom 5-6x per night, Had TURP in 2013, on Flomax, Detrol       ROS: 10 point ROS neg other than the symptoms noted above in the HPI.    Current Outpatient Medications   Medication     acetaminophen (TYLENOL) 325 MG tablet     amLODIPine (NORVASC) 10 MG tablet     aspirin 81 MG EC tablet     atorvastatin (LIPITOR) 20 MG tablet     benazepril (LOTENSIN) 40 MG tablet     bisacodyl (DULCOLAX) 10 MG suppository     carboxymethylcellulose PF (REFRESH PLUS) 0.5 % SOLN ophthalmic solution     Carboxymethylcellulose Sodium 1 % GEL     docusate sodium 100 MG tablet     FISH OIL-KRILL OIL PO     gentamicin (GARAMYCIN) 0.1 % ointment     Guar Gum (BENEFIBER) packet     Magnesium Hydroxide (MILK OF MAGNESIA PO)     metoprolol succinate ER (TOPROL-XL) 25 MG 24 hr tablet     Multiple Vitamins-Minerals (VITEYES AREDS FORMULA/LUTEIN) CAPS     nitroglycerin (NITROSTAT) 0.4 MG sublingual tablet     ranitidine (ZANTAC) 150 MG tablet      sildenafil (VIAGRA) 100 MG tablet     tamsulosin (FLOMAX) 0.4 MG capsule     tolterodine (DETROL) 2 MG tablet     No current facility-administered medications for this visit.      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)     Benign essential hypertension     Coronary artery disease involving native coronary artery of native heart without angina pectoris     Advance Care Planning     Osteoarthritis of multiple joints, unspecified osteoarthritis type     Hyperlipidemia, unspecified hyperlipidemia type     Other fatigue     B-cell lymphoma, unspecified B-cell lymphoma type, unspecified body region (H)     IPMN (intraductal papillary mucinous  neoplasm)     Sensorineural hearing loss (SNHL) of both ears     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/2011     Lymphoma (H) 1/25/2012     Macular degeneration, dry      Other and unspecified hyperlipidemia 7/21/2011     Snoring      Spinal stenosis, lumbar region, without neurogenic claudication 7/21/2011     Stented coronary artery 2003    x5; two separate times     Unspecified essential hypertension 7/21/2011     Past Surgical History:   Procedure Laterality Date     ARTHROPLASTY HIP  10/2008    left     ARTHROPLASTY HIP  1999    right     ARTHROPLASTY REVISION HIP  1/21/2014    Procedure: ARTHROPLASTY REVISION HIP;  Revision Left Total Hip;  Surgeon: Edgar Grewal MD;  Location: [facility]     BACK SURGERY  2006     BLEPHAROPLASTY BILATERAL Bilateral 5/17/2019    Procedure: BLEPHAROPLASTY BILATERAL UPPER LIDS;  Surgeon: America Melo MD;  Location: [facility] OR     CARDIAC SURGERY  2001    stent x 5      CARDIAC SURGERY  2002     CATARACT IOL, RT/LT Bilateral      CYSTOSCOPY, TRANSURETHRAL RESECTION (TUR) PROSTATE, COMBINED  11/5/2013    Procedure: COMBINED CYSTOSCOPY, TRANSURETHRAL RESECTION (TUR) PROSTATE;  Transurethral Resection Of Prostate, Bipolar;  Surgeon: Lai Street MD;  [facility]     ESOPHAGOSCOPY, GASTROSCOPY, DUODENOSCOPY (EGD), COMBINED  2/3/2014    Procedure: COMBINED ESOPHAGOSCOPY, GASTROSCOPY, DUODENOSCOPY (EGD);;  Surgeon: Ezra Fiore MD;  [facility]     ESOPHAGOSCOPY, GASTROSCOPY, DUODENOSCOPY  (EGD), COMBINED  4/15/2014    Procedure: COMBINED ESOPHAGOSCOPY, GASTROSCOPY, DUODENOSCOPY (EGD), BIOPSY SINGLE OR MULTIPLE;  Surgeon: Ezra Fiore MD;  [facility]: [facility]     EXTRACAPSULAR CATARACT EXTRATION WITH INTRAOCULAR LENS IMPLANT  2005     EXTRACAPSULAR CATARACT EXTRATION WITH INTRAOCULAR LENS IMPLANT  2010     HERNIA REPAIR       INJECT NERVE BLOCK LUMBAR PARAVERTEBRAL SYMPATHETIC Left 8/29/2018    Procedure: INJECT NERVE BLOCK LUMBAR PARAVERTEBRAL SYMPATHETIC;  Left L3, L4, sacral Ala medial branch block;  Surgeon: Ronaldo Hicks MD;  [facility]     MASTOID SURGERY  1939     MOHS MICROGRAPHIC PROCEDURE       OPTICAL TRACKING SYSTEM FUSION SPINE POSTERIOR LUMBAR TWO LEVELS  7/11/2013    Procedure: OPTICAL TRACKING SYSTEM FUSION SPINE POSTERIOR LUMBAR TWO LEVELS;  Stealth Assisted Lumbar 3-4 Transforaminal Lumbar Interbody Fusion, Lumbar 2-3 Lumbar 3-4 Laminectomy Decompression;  Surgeon: Edgar Lew MD;  Location: [facility]     psoris Bilateral 2019     Family History   Problem Relation Age of Onset     Diabetes Maternal Grandfather      Alcohol/Drug Maternal Grandfather      Arthritis Maternal Grandfather      Obesity Maternal Grandfather      Cerebrovascular Disease Maternal Grandmother      Hypertension Mother         willa rand     Glaucoma No family hx of      Macular Degeneration No family hx of      Cancer No family hx of         No known family hx of skin cancer     Skin Cancer No family hx of      Social History     Socioeconomic History     Marital status:      Spouse name: Not on file     Number of children: Not on file     Years of education: Not on file     Highest education level: Not on file   Occupational History     Not on file   Social Needs     Financial resource strain: Not on file     Food insecurity:     Worry: Not on file     Inability: Not on file     Transportation needs:     Medical: Not on file     Non-medical: Not on file   Tobacco Use     Smoking  status: Never Smoker     Smokeless tobacco: Never Used   Substance and Sexual Activity     Alcohol use: Yes     Alcohol/week: 1.0 oz     Comment: occ     Drug use: No     Sexual activity: Not on file   Lifestyle     Physical activity:     Days per week: Not on file     Minutes per session: Not on file     Stress: Not on file   Relationships     Social connections:     Talks on phone: Not on file     Gets together: Not on file     Attends Scientologist service: Not on file     Active member of club or organization: Not on file     Attends meetings of clubs or organizations: Not on file     Relationship status: Not on file     Intimate partner violence:     Fear of current or ex partner: Not on file     Emotionally abused: Not on file     Physically abused: Not on file     Forced sexual activity: Not on file   Other Topics Concern     Parent/sibling w/ CABG, MI or angioplasty before 65F 55M? Not Asked   Social History Narrative     Not on file     Objective  Physical Examination:  /57 (BP Location: Right arm, Patient Position: Sitting, Cuff Size: Adult Regular)   Pulse 51   Temp 99  F (37.2  C) (Oral)   Wt 84.8 kg (187 lb)   SpO2 99%   BMI 26.83 kg/m      General:  Conversant, generally healthy appearing, no acute distress  Head: atraumatic  Eyes:  Pupils 2-3 mm, sclera white, EOM's full, conjunctiva moist, no lid lag    Ears:  75% cerumen in both ears, unable to visualize TMs  Nose:  Nasal passages clear, turbinates not swollen  Throat/Mouth:  No pharyngeal erythema, exudate, ulcers, oral mucosa and tongue moist, normal hard and soft palate  Neck:  Trachea midline, Full AROM, supple, thyroid smooth, symmetric, not enlarged, no nodules, palpated submandibular mass (Node vs gland) on left side, mobile  Lungs:  Clear to auscultation throughout, no wheezes, rhonchi or rales. Normal respiratory effort and no intercostal retractions.  C/V:  Regular rate and rhythm, no murmurs, rubs or gallops.    Abdomen:  Not  distended.  Bowel sounds active.  No tenderness, no hepatosplenomegaly or masses.  No CVA tenderness or masses.  Neuro: Alert and oriented, face symmetric. No tremor.  Gait steady.   Skin:   Normal temperature., turgor and texture. No rashes, suspicious lesions,  jaundice or ulcers    Psych:  Alert and oriented to person, place and time. Appropriate affect.  Not psychomotor slowed.  No signs of anxiety or agitation.      Assessment/Plan  Daniel Mcnair is an 89 year old man with a history of low grade B-cell lymphoma, CAD, HTN, HLD, chronic low back pain, anemia, macular degenration, and hearing loss who presents for general follow up.    Leg and back pain - Discussed pain and current plan for eval/management per PM&R and Pain Clinic. Patient is happy with current plan.    Neck mass - Asymmetric enlarged submandibular/cervical node vs gland, L>R. Mass is mobile, nontender. As patient has history of lymphoma will opt to image at this time.  - Neck US    Urination - Increased night time urination with history of BPH s/p TURP. Most likely continued increase in prostatic size. Will continue with medical management and consider urology referral if symptoms persist/worsen.  - Increase Detrol to 2 tabs at night, one in morning    Cerumen impaction  - Wash out      RTC 6mo      Edgardo Frias, MS  08/20/19    Total time spent 40 minutes.  More than 50% of the time spent with Mr. Mcnair on counseling / coordinating his care        Teaching Physician  Disclosure:    I was present with the medical student who participated in the service and in the documentation of this note.  I have verified the history and personally performed the physical exam and medical decision making, and have verified the content of the note, which accurately reflects my assessment of the patient and the plan of care    Lia Dumont M.D.  Internal Medicine   pager [phone]

## 2019-08-23 ENCOUNTER — ANCILLARY PROCEDURE (OUTPATIENT)
Dept: ULTRASOUND IMAGING | Facility: CLINIC | Age: 84
End: 2019-08-23
Attending: INTERNAL MEDICINE
Payer: COMMERCIAL

## 2019-08-23 DIAGNOSIS — R22.1 NECK MASS: ICD-10-CM

## 2019-08-24 DIAGNOSIS — R22.1 NECK MASS: Primary | ICD-10-CM

## 2019-08-27 ENCOUNTER — TELEPHONE (OUTPATIENT)
Dept: OTOLARYNGOLOGY | Facility: CLINIC | Age: 84
End: 2019-08-27

## 2019-08-27 NOTE — TELEPHONE ENCOUNTER
[facility] Call Center    Phone Message    May a detailed message be left on voicemail: yes    Reason for Call: Other: New ENT // Neck Mass // Refd by PCP // order and recs are in epic // call patient to schedule asap     Action Taken: Message routed to:  [facility] ([facility] ENT

## 2019-08-27 NOTE — TELEPHONE ENCOUNTER
Spoke to pts wife to schedule appt with provider. Advised he would be scheduled to see  on 9/11/19 at 3:20 pm. Advised to arrive 15 min prior to appt. Call back number was given to wife in case time and date did not work for patient or if there were any additional questions or concerns. She verbalized understanding

## 2019-08-28 NOTE — TELEPHONE ENCOUNTER
FUTURE VISIT INFORMATION      FUTURE VISIT INFORMATION:    Date: 9/11/19    Time: 3:20PM    Location: [facility]  REFERRAL INFORMATION:    Referring provider:  Lia Dumont MD    Referring providers clinic:  [facility]     Reason for visit/diagnosis : Neck mass     RECORDS REQUESTED FROM:       Clinic name Comments Records Status Imaging Status   [facility]  8/24/19 referral  8/20/19 notes with Dr Dumont EPIC    [facility] 9/3/19 CT Neck (scheduled)  8/23/19 [facility] Neck  Epic PACS   [facility]  11/15/18 notes with Dr Tanner Rojas EPIC

## 2019-09-03 ENCOUNTER — ANCILLARY PROCEDURE (OUTPATIENT)
Dept: CT IMAGING | Facility: CLINIC | Age: 84
End: 2019-09-03
Attending: INTERNAL MEDICINE
Payer: COMMERCIAL

## 2019-09-03 DIAGNOSIS — R22.1 NECK MASS: ICD-10-CM

## 2019-09-03 LAB
CREAT BLD-MCNC: 0.9 MG/DL (ref 0.66–1.25)
GFR SERPL CREATININE-BSD FRML MDRD: 79 ML/MIN/{1.73_M2}

## 2019-09-03 RX ORDER — IOPAMIDOL 755 MG/ML
100 INJECTION, SOLUTION INTRAVASCULAR ONCE
Status: COMPLETED | OUTPATIENT
Start: 2019-09-03 | End: 2019-09-03

## 2019-09-03 RX ADMIN — IOPAMIDOL 100 ML: 755 INJECTION, SOLUTION INTRAVASCULAR at 13:11

## 2019-09-05 DIAGNOSIS — R59.0 ENLARGED LYMPH NODE IN NECK: Primary | ICD-10-CM

## 2019-09-07 NOTE — PROGRESS NOTES
Dear Dr. Dumont:    I had the pleasure of meeting Daniel Mcnair in consultation today at the [facility] at your request.     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 It was thought based on limited information from a needle biopsy that he had a low grade lymphoma. He has been managed by surveillance using CT scans without progressive disease. He last saw medical oncology in November 2018 with no need for further imaging and he was discharged from oncology clinic. He saw his PCP In August 2019 at which time he was noted to have palpable left sided lymph node. An U/S was obtained which showed a prominent level II node on the left measuring 1.7 x 1.1 x 1.7 cm. A CT scan was then obtained which showed a 1.3 x 2.1 cm left level IB node, concerning for recurrent lymphoma. No biopsy has been performed. He has not noticed any masses. He has no pain associated with it. He denies any fevers, chills, nightsweats, weight changes.       Past medical history: low grade B cell lymphoma, CAD, HTN, hyperlipidemia, anemia, macular degeneration, BPH    Past surgical history: back surgery, hip surgery, mastoid surgery, hernia x 2, pilonidal cyst    Social history: No smoking or chewing tobacco use, occasional alcohol. He is a retired .    Family history: negative        MEDICATIONS:     Current Outpatient Medications   Medication Sig Dispense Refill     acetaminophen (TYLENOL) 325 MG tablet Take 2 tablets (650 mg) by mouth every 6 hours as needed for pain 100 tablet 0     amLODIPine (NORVASC) 10 MG tablet TAKE 1 TABLET DAILY 90 tablet 3     aspirin 81 MG EC tablet Take 1 tablet (81 mg) by mouth daily 90 tablet 3     atorvastatin (LIPITOR) 20 MG tablet TAKE ONE TABLET BY MOUTH ONE TIME DAILY 30 tablet 0     benazepril (LOTENSIN) 40 MG tablet  Take 1 tablet (40 mg) by mouth daily 90 tablet 3     bisacodyl (DULCOLAX) 10 MG suppository Place 1 suppository rectally daily as needed. 3 suppository 0     carboxymethylcellulose PF (REFRESH PLUS) 0.5 % SOLN ophthalmic solution 1 drop 3 times daily as needed       Carboxymethylcellulose Sodium 1 % GEL Apply to eye as needed       docusate sodium 100 MG tablet Take 100 mg by mouth 2 times daily 60 tablet 1     erythromycin (ROMYCIN) 5 MG/GM ophthalmic ointment        FISH OIL-KRILL OIL PO Take 2,000 mg by mouth daily       gentamicin (GARAMYCIN) 0.1 % ointment Apply topically 4 times daily   3     Guar Gum (BENEFIBER) packet Take 1 packet by mouth every morning        Magnesium Hydroxide (MILK OF MAGNESIA PO) Take by mouth At Bedtime       metoprolol succinate ER (TOPROL-XL) 25 MG 24 hr tablet TAKE ONE HALF TABLET BY MOUTH EVERY DAY 45 tablet 2     Multiple Vitamins-Minerals (VITEYES AREDS FORMULA/LUTEIN) CAPS Take by mouth daily       nitroglycerin (NITROSTAT) 0.4 MG sublingual tablet Place 1 tablet (0.4 mg) under the tongue every 5 minutes as needed 30 tablet 2     ranitidine (ZANTAC) 150 MG tablet TAKE 1 TABLET TWICE A  tablet 3     sildenafil (VIAGRA) 100 MG tablet Take 1 tablet (100 mg) by mouth daily as needed 10 tablet 2     tamsulosin (FLOMAX) 0.4 MG capsule Take 1 capsule (0.4 mg) by mouth daily 90 capsule 3     tolterodine (DETROL) 2 MG tablet Take 1 tablet (2 mg) by mouth 2 times daily 180 tablet 3       ALLERGIES:    Allergies   Allergen Reactions     Nka [No Known Allergies]        HABITS/SOCIAL HISTORY:   No smoking or chewing tobacco use, occasional alcohol.   He is a retired .    Social History     Socioeconomic History     Marital status:      Spouse name: Not on file     Number of children: Not on file     Years of education: Not on file     Highest education level: Not on file   Occupational History     Not on file   Social Needs     Financial resource strain: Not on file      Food insecurity:     Worry: Not on file     Inability: Not on file     Transportation needs:     Medical: Not on file     Non-medical: Not on file   Tobacco Use     Smoking status: Never Smoker     Smokeless tobacco: Never Used   Substance and Sexual Activity     Alcohol use: Yes     Alcohol/week: 1.0 oz     Comment: occ     Drug use: No     Sexual activity: Not on file   Lifestyle     Physical activity:     Days per week: Not on file     Minutes per session: Not on file     Stress: Not on file   Relationships     Social connections:     Talks on phone: Not on file     Gets together: Not on file     Attends [employer] service: Not on file     Active member of club or organization: Not on file     Attends meetings of clubs or organizations: Not on file     Relationship status: Not on file     Intimate partner violence:     Fear of current or ex partner: Not on file     Emotionally abused: Not on file     Physically abused: Not on file     Forced sexual activity: Not on file   Other Topics Concern     Parent/sibling w/ CABG, MI or angioplasty before 65F 55M? Not Asked   Social History Narrative     Not on file       PAST MEDICAL HISTORY:   Past Medical History:   Diagnosis Date     Anal stricture 7/29/2011     Baker's cyst      Basal cell carcinoma      Chronic pain     left hip     Coronary artery disease 9/5/2012     Dermatochalasis      Esophageal reflux 3/20/2013    not been an issue     Hearing loss      Hypertrophy of prostate without urinary obstruction and other lower urinary tract symptoms (LUTS) 7/21/2011     Idiopathic gynecomastia 7/29/2011     Impotence of organic origin 7/21/2011     Lymphoma (H) 1/25/2012     Macular degeneration, dry      Other and unspecified hyperlipidemia 7/21/2011     Snoring      Spinal stenosis, lumbar region, without neurogenic claudication 7/21/2011     Stented coronary artery 2003    x5; two separate times     Unspecified essential hypertension 7/21/2011        PAST  SURGICAL HISTORY:   Past Surgical History:   Procedure Laterality Date     ARTHROPLASTY HIP  10/2008    left     ARTHROPLASTY HIP  1999    right     ARTHROPLASTY REVISION HIP  1/21/2014    Procedure: ARTHROPLASTY REVISION HIP;  Revision Left Total Hip;  Surgeon: Edgar Grewal MD;  Location: [facility]     BACK SURGERY  2006     BLEPHAROPLASTY BILATERAL Bilateral 5/17/2019    Procedure: BLEPHAROPLASTY BILATERAL UPPER LIDS;  Surgeon: America Melo MD;  Location: [facility] OR     CARDIAC SURGERY  2001    stent x 5      CARDIAC SURGERY  2002     CATARACT IOL, RT/LT Bilateral      CYSTOSCOPY, TRANSURETHRAL RESECTION (TUR) PROSTATE, COMBINED  11/5/2013    Procedure: COMBINED CYSTOSCOPY, TRANSURETHRAL RESECTION (TUR) PROSTATE;  Transurethral Resection Of Prostate, Bipolar;  Surgeon: Lai Street MD;  Location: [facility]     ESOPHAGOSCOPY, GASTROSCOPY, DUODENOSCOPY (EGD), COMBINED  2/3/2014    Procedure: COMBINED ESOPHAGOSCOPY, GASTROSCOPY, DUODENOSCOPY (EGD);;  Surgeon: Ezra Fiore MD;  Location: [facility]     ESOPHAGOSCOPY, GASTROSCOPY, DUODENOSCOPY (EGD), COMBINED  4/15/2014    Procedure: COMBINED ESOPHAGOSCOPY, GASTROSCOPY, DUODENOSCOPY (EGD), BIOPSY SINGLE OR MULTIPLE;  Surgeon: Ezra Fiore MD;  Location: [facility]     EXTRACAPSULAR CATARACT EXTRATION WITH INTRAOCULAR LENS IMPLANT  2005     EXTRACAPSULAR CATARACT EXTRATION WITH INTRAOCULAR LENS IMPLANT  2010     HERNIA REPAIR       INJECT NERVE BLOCK LUMBAR PARAVERTEBRAL SYMPATHETIC Left 8/29/2018    Procedure: INJECT NERVE BLOCK LUMBAR PARAVERTEBRAL SYMPATHETIC;  Left L3, L4, sacral Ala medial branch block;  Surgeon: Ronaldo Hicks MD;  Location: [facility]     MASTOID SURGERY  1939     MOHS MICROGRAPHIC PROCEDURE       OPTICAL TRACKING SYSTEM FUSION SPINE POSTERIOR LUMBAR TWO LEVELS  7/11/2013    Procedure: OPTICAL TRACKING SYSTEM FUSION SPINE POSTERIOR LUMBAR TWO LEVELS;  Stealth Assisted Lumbar 3-4 Transforaminal Lumbar Interbody Fusion, Lumbar 2-3 Lumbar 3-4  "Laminectomy Decompression;  Surgeon: Edgar Lew MD;  Location: [facility]     psoris Bilateral 2019       FAMILY HISTORY:    Family History   Problem Relation Age of Onset     Diabetes Maternal Grandfather      Alcohol/Drug Maternal Grandfather      Arthritis Maternal Grandfather      Obesity Maternal Grandfather      Cerebrovascular Disease Maternal Grandmother      Hypertension Mother         willa zelaya     Glaucoma No family hx of      Macular Degeneration No family hx of      Cancer No family hx of         No known family hx of skin cancer     Skin Cancer No family hx of        REVIEW OF SYSTEMS:  12 point ROS was negative other than the symptoms noted above in the HPI.  Patient Supplied Answers to Review of Systems  [facility] ROS 9/11/2019   Psychology Frequently feeling anxious   Ears, Nose, Throat -   Musculoskeletal Swollen legs/feet   Hematologic Lymph node swelling   Endocrine Thirst, Frequent urination         PHYSICAL EXAMINATION:   BP (!) 158/66 (BP Location: Right arm, Patient Position: Sitting, Cuff Size: Adult Regular)   Pulse 53   Resp 15   Ht 1.778 m (5' 10\")   Wt 83.9 kg (185 lb)   BMI 26.54 kg/m    Appearance:   normal; NAD, age-appropriate appearance, well-developed, normal habitus   Communication:   normal; communicates verbally, normal voice quality   Head/Face:   inspection -  Normal; no scars or visible lesions   Salivary glands -  Normal size, no tenderness, swelling, or palpable masses   Facial strength -  Normal and symmetric bilateral; H/B I/VI   Skin:  normal, no rash   Ocular Motility:  normal occular movements   Ears:  auricle (AD) -  normal  EAC (AD) -  normal  TM (AD) -  Normal, no effusion  auricle (AS) -  Normal, well healed mastoidectomy postauricular scar  EAC (AS) -  normal  TM (AS) -  Normal, no effusion  Normal clinical speech reception   Nose:  Ext. inspection -  Normal   Oral Cavity:  lips -  Normal mucosa, oral competence, and stoma size   Age-appropriate " dentition, healthy gingival mucosa   Hard palate, buccal, floor of mouth mucosa normal   Tongue - normal movement, no lesions   Oropharynx:  mucosa -  Normal, no lesions  soft palate -  Normal, no lesions, no asymmetry, normal elevation  tonsils -  Normal, no exudates, no abnormal lesions, symmetric   Neck: No visible mass or asymmetry   Normal palpation, no tenderness, no tracheal deviation  Normal range of motion   Lymphatic:  palpable left level IB node, subtle, most noticeable when palpating along floor of mouth and along neck simultaneously   Cardiovascular:  warm, pink, well-perfused extremities without swelling, tenderness, or edema   Respiratory:  Normal respiratory effort, no stridor   Neuro/Psych.:  mood/affect -  normal  mental status -  normal  cranial nerves -  normal          RESULTS REVIEWED:   I reviewed the note from Dr Rojas and Dr Dumont which are summarized above    I independently reviewed the CT scan images which show a level IB node on the left, anterior to the submandibular gland, inferior to the mandibular body, no other concerning nodes    IMPRESSION AND PLAN:   Daniel Mcnair is an 89 year old man who was referred for evaluation of a neck mass in the setting of previous low grade lymphoma without previous treatment. After discussion with the patient he was amenable to an attempt at an FNA by pathology, despite our discussing that there was a risk of this not being successful given the subtle exam. Unfortunately there was minimal material obtained by the pathologist and on discussion with them they did not feel that it was adequate to make a definitive diagnosis. I discussed with the patient that we can try an U/S core needle biopsy vs an open biopsy to obtain a definitive diagnosis. He is amenable to attempting the U/S guided needle biopsy. We will send the tissue for flow given the concern for recurrent lymphoma. We discussed that he could certainly still need an open procedure which  would be performed under general anesthesia in the operating room. The big risk for this procedure would be to the marginal mandibular nerve given the location of the node. We would plan on facial nerve monitoring if an open biopsy was performed.     Thank you very much for the opportunity to participate in the care of your patient.      Karon Crane MD, M.D.  Otolaryngology- Head & Neck Surgery      This note was dictated with voice recognition software and then edited. Please excuse any unintentional errors.         CC:  Lia Dumont MD  [address] [facility] [address]  [facility] [address]      Tanner Rojas MD  [address] [facility] [address]  [facility] [address]

## 2019-09-09 ENCOUNTER — TELEPHONE (OUTPATIENT)
Dept: INTERNAL MEDICINE | Facility: CLINIC | Age: 84
End: 2019-09-09

## 2019-09-09 NOTE — TELEPHONE ENCOUNTER
----- Message from Lia Dumont MD sent at 9/5/2019  5:06 PM CDT -----  Your CT scan shows a prominent lymph node.  I would like you to see an ENT specialist in order to consider biopsy.  I will place an order.  Scheduling will be contacting you soon.  Dr. Dumont    Pt called and he has an appt on the 09/11/2019 in ENT.  Alka Kellogg RN 9:49 AM on 9/9/2019.

## 2019-09-11 ENCOUNTER — PRE VISIT (OUTPATIENT)
Dept: OTOLARYNGOLOGY | Facility: CLINIC | Age: 84
End: 2019-09-11

## 2019-09-11 ENCOUNTER — OFFICE VISIT (OUTPATIENT)
Dept: OTOLARYNGOLOGY | Facility: CLINIC | Age: 84
End: 2019-09-11
Payer: COMMERCIAL

## 2019-09-11 VITALS
HEIGHT: 70 IN | RESPIRATION RATE: 15 BRPM | HEART RATE: 53 BPM | WEIGHT: 185 LBS | BODY MASS INDEX: 26.48 KG/M2 | DIASTOLIC BLOOD PRESSURE: 66 MMHG | SYSTOLIC BLOOD PRESSURE: 158 MMHG

## 2019-09-11 DIAGNOSIS — R59.1 LYMPHADENOPATHY: ICD-10-CM

## 2019-09-11 DIAGNOSIS — C85.10 B-CELL LYMPHOMA, UNSPECIFIED B-CELL LYMPHOMA TYPE, UNSPECIFIED BODY REGION (H): Primary | ICD-10-CM

## 2019-09-11 DIAGNOSIS — R22.1 NECK MASS: ICD-10-CM

## 2019-09-11 PROCEDURE — 88173 CYTOPATH EVAL FNA REPORT: CPT | Performed by: OTOLARYNGOLOGY

## 2019-09-11 PROCEDURE — 88305 TISSUE EXAM BY PATHOLOGIST: CPT | Performed by: OTOLARYNGOLOGY

## 2019-09-11 PROCEDURE — 88172 CYTP DX EVAL FNA 1ST EA SITE: CPT | Performed by: OTOLARYNGOLOGY

## 2019-09-11 PROCEDURE — 00000155 ZZHCL STATISTIC H-CELL BLOCK W/STAIN: Performed by: OTOLARYNGOLOGY

## 2019-09-11 ASSESSMENT — MIFFLIN-ST. JEOR: SCORE: 1510.4

## 2019-09-11 ASSESSMENT — PAIN SCALES - GENERAL: PAINLEVEL: MILD PAIN (2)

## 2019-09-11 NOTE — LETTER
9/11/2019       RE: Daniel Mcnair  [address] [facility] [address]  [place] [id number]     Dear Colleague,    Thank you for referring your patient, Daniel Mcnair, to the [facility] EAR NOSE AND THROAT at [facility]. Please see a copy of my visit note below.    Dear Dr. Dumont:    I had the pleasure of meeting Daniel Mcnair in consultation today at the [facility] at your request.     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 It was thought based on limited information from a needle biopsy that he had a low grade lymphoma. He has been managed by surveillance using CT scans without progressive disease. He last saw medical oncology in November 2018 with no need for further imaging and he was discharged from oncology clinic. He saw his PCP In August 2019 at which time he was noted to have palpable left sided lymph node. An U/S was obtained which showed a prominent level II node on the left measuring 1.7 x 1.1 x 1.7 cm. A CT scan was then obtained which showed a 1.3 x 2.1 cm left level IB node, concerning for recurrent lymphoma. No biopsy has been performed. He has not noticed any masses. He has no pain associated with it. He denies any fevers, chills, nightsweats, weight changes.       Past medical history: low grade B cell lymphoma, CAD, HTN, hyperlipidemia, anemia, macular degeneration, BPH    Past surgical history: back surgery, hip surgery, mastoid surgery, hernia x 2, pilonidal cyst    Social history: No smoking or chewing tobacco use, occasional alcohol. He is a retired .    Family history: negative        MEDICATIONS:     Current Outpatient Medications   Medication Sig Dispense Refill     acetaminophen (TYLENOL) 325 MG tablet Take 2 tablets (650 mg) by mouth  every 6 hours as needed for pain 100 tablet 0     amLODIPine (NORVASC) 10 MG tablet TAKE 1 TABLET DAILY 90 tablet 3     aspirin 81 MG EC tablet Take 1 tablet (81 mg) by mouth daily 90 tablet 3     atorvastatin (LIPITOR) 20 MG tablet TAKE ONE TABLET BY MOUTH ONE TIME DAILY 30 tablet 0     benazepril (LOTENSIN) 40 MG tablet Take 1 tablet (40 mg) by mouth daily 90 tablet 3     bisacodyl (DULCOLAX) 10 MG suppository Place 1 suppository rectally daily as needed. 3 suppository 0     carboxymethylcellulose PF (REFRESH PLUS) 0.5 % SOLN ophthalmic solution 1 drop 3 times daily as needed       Carboxymethylcellulose Sodium 1 % GEL Apply to eye as needed       docusate sodium 100 MG tablet Take 100 mg by mouth 2 times daily 60 tablet 1     erythromycin (ROMYCIN) 5 MG/GM ophthalmic ointment        FISH OIL-KRILL OIL PO Take 2,000 mg by mouth daily       gentamicin (GARAMYCIN) 0.1 % ointment Apply topically 4 times daily   3     Guar Gum (BENEFIBER) packet Take 1 packet by mouth every morning        Magnesium Hydroxide (MILK OF MAGNESIA PO) Take by mouth At Bedtime       metoprolol succinate ER (TOPROL-XL) 25 MG 24 hr tablet TAKE ONE HALF TABLET BY MOUTH EVERY DAY 45 tablet 2     Multiple Vitamins-Minerals (VITEYES AREDS FORMULA/LUTEIN) CAPS Take by mouth daily       nitroglycerin (NITROSTAT) 0.4 MG sublingual tablet Place 1 tablet (0.4 mg) under the tongue every 5 minutes as needed 30 tablet 2     ranitidine (ZANTAC) 150 MG tablet TAKE 1 TABLET TWICE A  tablet 3     sildenafil (VIAGRA) 100 MG tablet Take 1 tablet (100 mg) by mouth daily as needed 10 tablet 2     tamsulosin (FLOMAX) 0.4 MG capsule Take 1 capsule (0.4 mg) by mouth daily 90 capsule 3     tolterodine (DETROL) 2 MG tablet Take 1 tablet (2 mg) by mouth 2 times daily 180 tablet 3       ALLERGIES:    Allergies   Allergen Reactions     Nka [No Known Allergies]        HABITS/SOCIAL HISTORY:   No smoking or chewing tobacco use, occasional alcohol.   He is a retired  .    Social History     Socioeconomic History     Marital status:      Spouse name: Not on file     Number of children: Not on file     Years of education: Not on file     Highest education level: Not on file   Occupational History     Not on file   Social Needs     Financial resource strain: Not on file     Food insecurity:     Worry: Not on file     Inability: Not on file     Transportation needs:     Medical: Not on file     Non-medical: Not on file   Tobacco Use     Smoking status: Never Smoker     Smokeless tobacco: Never Used   Substance and Sexual Activity     Alcohol use: Yes     Alcohol/week: 1.0 oz     Comment: occ     Drug use: No     Sexual activity: Not on file   Lifestyle     Physical activity:     Days per week: Not on file     Minutes per session: Not on file     Stress: Not on file   Relationships     Social connections:     Talks on phone: Not on file     Gets together: Not on file     Attends Confucianist service: Not on file     Active member of club or organization: Not on file     Attends meetings of clubs or organizations: Not on file     Relationship status: Not on file     Intimate partner violence:     Fear of current or ex partner: Not on file     Emotionally abused: Not on file     Physically abused: Not on file     Forced sexual activity: Not on file   Other Topics Concern     Parent/sibling w/ CABG, MI or angioplasty before 65F 55M? Not Asked   Social History Narrative     Not on file       PAST MEDICAL HISTORY:   Past Medical History:   Diagnosis Date     Anal stricture 7/29/2011     Baker's cyst      Basal cell carcinoma      Chronic pain     left hip     Coronary artery disease 9/5/2012     Dermatochalasis      Esophageal reflux 3/20/2013    not been an issue     Hearing loss      Hypertrophy of prostate without urinary obstruction and other lower urinary tract symptoms (LUTS) 7/21/2011     Idiopathic gynecomastia 7/29/2011     Impotence of organic origin 7/21/2011      Lymphoma (H) 1/25/2012     Macular degeneration, dry      Other and unspecified hyperlipidemia 7/21/2011     Snoring      Spinal stenosis, lumbar region, without neurogenic claudication 7/21/2011     Stented coronary artery 2003    x5; two separate times     Unspecified essential hypertension 7/21/2011        PAST SURGICAL HISTORY:   Past Surgical History:   Procedure Laterality Date     ARTHROPLASTY HIP  10/2008    left     ARTHROPLASTY HIP  1999    right     ARTHROPLASTY REVISION HIP  1/21/2014    Procedure: ARTHROPLASTY REVISION HIP;  Revision Left Total Hip;  Surgeon: Edgar Grewal MD;  Location: [facility] OR     BACK SURGERY  2006     BLEPHAROPLASTY BILATERAL Bilateral 5/17/2019    Procedure: BLEPHAROPLASTY BILATERAL UPPER LIDS;  Surgeon: America Melo MD;  Location: [facility] OR     CARDIAC SURGERY  2001    stent x 5      CARDIAC SURGERY  2002     CATARACT IOL, RT/LT Bilateral      CYSTOSCOPY, TRANSURETHRAL RESECTION (TUR) PROSTATE, COMBINED  11/5/2013    Procedure: COMBINED CYSTOSCOPY, TRANSURETHRAL RESECTION (TUR) PROSTATE;  Transurethral Resection Of Prostate, Bipolar;  Surgeon: Lai Street MD;  Location:  OR     ESOPHAGOSCOPY, GASTROSCOPY, DUODENOSCOPY (EGD), COMBINED  2/3/2014    Procedure: COMBINED ESOPHAGOSCOPY, GASTROSCOPY, DUODENOSCOPY (EGD);;  Surgeon: Ezra Fiore MD;  [facility]     ESOPHAGOSCOPY, GASTROSCOPY, DUODENOSCOPY (EGD), COMBINED  4/15/2014    Procedure: COMBINED ESOPHAGOSCOPY, GASTROSCOPY, DUODENOSCOPY (EGD), BIOPSY SINGLE OR MULTIPLE;  Surgeon: Ezra Fiore MD;  Location:  GI     EXTRACAPSULAR CATARACT EXTRATION WITH INTRAOCULAR LENS IMPLANT  2005     EXTRACAPSULAR CATARACT EXTRATION WITH INTRAOCULAR LENS IMPLANT  2010     HERNIA REPAIR       INJECT NERVE BLOCK LUMBAR PARAVERTEBRAL SYMPATHETIC Left 8/29/2018    Procedure: INJECT NERVE BLOCK LUMBAR PARAVERTEBRAL SYMPATHETIC;  Left L3, L4, sacral Ala medial branch block;  Surgeon: Ronaldo Hicks MD;  Location:   "OR     MASTOID SURGERY  1939     MOHS MICROGRAPHIC PROCEDURE       OPTICAL TRACKING SYSTEM FUSION SPINE POSTERIOR LUMBAR TWO LEVELS  7/11/2013    Procedure: OPTICAL TRACKING SYSTEM FUSION SPINE POSTERIOR LUMBAR TWO LEVELS;  Stealth Assisted Lumbar 3-4 Transforaminal Lumbar Interbody Fusion, Lumbar 2-3 Lumbar 3-4 Laminectomy Decompression;  Surgeon: Edgar Lew MD;  Location: [facility] OR     psoris Bilateral 2019       FAMILY HISTORY:    Family History   Problem Relation Age of Onset     Diabetes Maternal Grandfather      Alcohol/Drug Maternal Grandfather      Arthritis Maternal Grandfather      Obesity Maternal Grandfather      Cerebrovascular Disease Maternal Grandmother      Hypertension Mother         willa rand     Glaucoma No family hx of      Macular Degeneration No family hx of      Cancer No family hx of         No known family hx of skin cancer     Skin Cancer No family hx of        REVIEW OF SYSTEMS:  12 point ROS was negative other than the symptoms noted above in the HPI.  Patient Supplied Answers to Review of Systems  [facility]NT ROS 9/11/2019   Psychology Frequently feeling anxious   Ears, Nose, Throat -   Musculoskeletal Swollen legs/feet   Hematologic Lymph node swelling   Endocrine Thirst, Frequent urination         PHYSICAL EXAMINATION:   BP (!) 158/66 (BP Location: Right arm, Patient Position: Sitting, Cuff Size: Adult Regular)   Pulse 53   Resp 15   Ht 1.778 m (5' 10\")   Wt 83.9 kg (185 lb)   BMI 26.54 kg/m     Appearance:   normal; NAD, age-appropriate appearance, well-developed, normal habitus   Communication:   normal; communicates verbally, normal voice quality   Head/Face:   inspection -  Normal; no scars or visible lesions   Salivary glands -  Normal size, no tenderness, swelling, or palpable masses   Facial strength -  Normal and symmetric bilateral; H/B I/VI   Skin:  normal, no rash   Ocular Motility:  normal occular movements   Ears:  auricle (AD) -  normal  EAC (AD) -  " normal  TM (AD) -  Normal, no effusion  auricle (AS) -  Normal, well healed mastoidectomy postauricular scar  EAC (AS) -  normal  TM (AS) -  Normal, no effusion  Normal clinical speech reception   Nose:  Ext. inspection -  Normal   Oral Cavity:  lips -  Normal mucosa, oral competence, and stoma size   Age-appropriate dentition, healthy gingival mucosa   Hard palate, buccal, floor of mouth mucosa normal   Tongue - normal movement, no lesions   Oropharynx:  mucosa -  Normal, no lesions  soft palate -  Normal, no lesions, no asymmetry, normal elevation  tonsils -  Normal, no exudates, no abnormal lesions, symmetric   Neck: No visible mass or asymmetry   Normal palpation, no tenderness, no tracheal deviation  Normal range of motion   Lymphatic:  palpable left level IB node, subtle, most noticeable when palpating along floor of mouth and along neck simultaneously   Cardiovascular:  warm, pink, well-perfused extremities without swelling, tenderness, or edema   Respiratory:  Normal respiratory effort, no stridor   Neuro/Psych.:  mood/affect -  normal  mental status -  normal  cranial nerves -  normal          RESULTS REVIEWED:   I reviewed the note from Dr Rojas and Dr Dumont which are summarized above    I independently reviewed the CT scan images which show a level IB node on the left, anterior to the submandibular gland, inferior to the mandibular body, no other concerning nodes    IMPRESSION AND PLAN:   Daniel Mcnair is an 89 year old man who was referred for evaluation of a neck mass in the setting of previous low grade lymphoma without previous treatment. After discussion with the patient he was amenable to an attempt at an FNA by pathology, despite our discussing that there was a risk of this not being successful given the subtle exam. Unfortunately there was minimal material obtained by the pathologist and on discussion with them they did not feel that it was adequate to make a definitive diagnosis. I discussed  with the patient that we can try an U/S core needle biopsy vs an open biopsy to obtain a definitive diagnosis. He is amenable to attempting the U/S guided needle biopsy. We will send the tissue for flow given the concern for recurrent lymphoma. We discussed that he could certainly still need an open procedure which would be performed under general anesthesia in the operating room. The big risk for this procedure would be to the marginal mandibular nerve given the location of the node. We would plan on facial nerve monitoring if an open biopsy was performed.     Thank you very much for the opportunity to participate in the care of your patient.      Karon Crane MD, M.D.  Otolaryngology- Head & Neck Surgery      This note was dictated with voice recognition software and then edited. Please excuse any unintentional errors.     CC:  Lia Dumont MD  [address] [facility] [address]  [facility] [address]       Tanner Rojas MD  [address]   [facility] [address]

## 2019-09-11 NOTE — NURSING NOTE
"Chief Complaint   Patient presents with     Consult      Neck mass      BP (!) 158/66 (BP Location: Right arm, Patient Position: Sitting, Cuff Size: Adult Regular)   Pulse 53   Resp 15   Ht 1.778 m (5' 10\")   Wt 83.9 kg (185 lb)   BMI 26.54 kg/m      Rosy Cam CMA    "

## 2019-09-12 ENCOUNTER — TELEPHONE (OUTPATIENT)
Dept: OTOLARYNGOLOGY | Facility: CLINIC | Age: 84
End: 2019-09-12

## 2019-09-12 NOTE — PATIENT INSTRUCTIONS
1. We will contact you to arrange an IR guided biopsy once this is approved.   2. Please call the ENT clinic with any questions,concerns, new or worsening symptoms.    -Clinic number is [phone]   - [facility] direct line (Dr. Crane's nurse) [phone]

## 2019-09-13 ENCOUNTER — PATIENT OUTREACH (OUTPATIENT)
Dept: OTOLARYNGOLOGY | Facility: CLINIC | Age: 84
End: 2019-09-13

## 2019-09-13 LAB — COPATH REPORT: NORMAL

## 2019-09-13 NOTE — PROGRESS NOTES
Called patient to let him know that he is scheduled for an IR guided CORE biopsy on Friday 9/20. He is to check in at 8:00am. He will receive a call from the IR nurse to review specific prep information. Patient agreeable to this plan and will call with any further questions or concerns. They have direct line for return call if needed.     Ammy Mcgrath, RN, BSN

## 2019-09-14 DIAGNOSIS — N31.8 HYPERTONICITY OF BLADDER: Primary | ICD-10-CM

## 2019-09-16 ENCOUNTER — PATIENT OUTREACH (OUTPATIENT)
Dept: OTOLARYNGOLOGY | Facility: CLINIC | Age: 84
End: 2019-09-16

## 2019-09-16 NOTE — PROGRESS NOTES
Called patient with the following FNA results:    CYTOLOGIC INTERPRETATION:     Lymph node, left submandibular level 1, palpation guided fine needle   aspiration:   - Nondiagnostic specimen.   Specimen Adequacy: Unsatisfactory for evaluation.  Specimen processed and   evaluated, but unsatisfactory for   evaluation of epithelial cell abnormality due to:   -scant cellularity.       Patient will proceed with IR CORE biopsy scheduled on Friday 9/20. He was encouraged to sooner with any further questions or concerns.     Ammy Mcgrath, RN, BSN

## 2019-09-17 RX ORDER — TOLTERODINE TARTRATE 2 MG/1
2 TABLET, EXTENDED RELEASE ORAL 2 TIMES DAILY
Qty: 180 TABLET | Refills: 3 | Status: SHIPPED | OUTPATIENT
Start: 2019-09-17 | End: 2020-02-17

## 2019-09-18 ENCOUNTER — TELEPHONE (OUTPATIENT)
Dept: INTERVENTIONAL RADIOLOGY/VASCULAR | Facility: CLINIC | Age: 84
End: 2019-09-18

## 2019-09-20 ENCOUNTER — HOSPITAL ENCOUNTER (OUTPATIENT)
Facility: CLINIC | Age: 84
Discharge: HOME | End: 2019-09-20
Attending: OTOLARYNGOLOGY | Admitting: OTOLARYNGOLOGY
Payer: COMMERCIAL

## 2019-09-20 ENCOUNTER — APPOINTMENT (OUTPATIENT)
Dept: INTERVENTIONAL RADIOLOGY/VASCULAR | Facility: CLINIC | Age: 84
End: 2019-09-20
Attending: OTOLARYNGOLOGY
Payer: COMMERCIAL

## 2019-09-20 ENCOUNTER — APPOINTMENT (OUTPATIENT)
Dept: MEDSURG UNIT | Facility: CLINIC | Age: 84
End: 2019-09-20
Attending: OTOLARYNGOLOGY
Payer: COMMERCIAL

## 2019-09-20 VITALS
RESPIRATION RATE: 18 BRPM | OXYGEN SATURATION: 96 % | TEMPERATURE: 97.7 F | SYSTOLIC BLOOD PRESSURE: 158 MMHG | DIASTOLIC BLOOD PRESSURE: 85 MMHG | HEART RATE: 59 BPM

## 2019-09-20 DIAGNOSIS — C85.10 B-CELL LYMPHOMA, UNSPECIFIED B-CELL LYMPHOMA TYPE, UNSPECIFIED BODY REGION (H): ICD-10-CM

## 2019-09-20 DIAGNOSIS — R22.1 NECK MASS: ICD-10-CM

## 2019-09-20 LAB
BASOPHILS # BLD AUTO: 0.1 10E9/L (ref 0–0.2)
BASOPHILS NFR BLD AUTO: 0.6 %
DIFFERENTIAL METHOD BLD: ABNORMAL
EOSINOPHIL # BLD AUTO: 0.2 10E9/L (ref 0–0.7)
EOSINOPHIL NFR BLD AUTO: 2.4 %
ERYTHROCYTE [DISTWIDTH] IN BLOOD BY AUTOMATED COUNT: 13.2 % (ref 10–15)
HCT VFR BLD AUTO: 40.2 % (ref 40–53)
HGB BLD-MCNC: 14.1 G/DL (ref 13.3–17.7)
IMM GRANULOCYTES # BLD: 0 10E9/L (ref 0–0.4)
IMM GRANULOCYTES NFR BLD: 0.4 %
INR PPP: 1.13 (ref 0.86–1.14)
LYMPHOCYTES # BLD AUTO: 1.5 10E9/L (ref 0.8–5.3)
LYMPHOCYTES NFR BLD AUTO: 18.2 %
MCH RBC QN AUTO: 34.4 PG (ref 26.5–33)
MCHC RBC AUTO-ENTMCNC: 35.1 G/DL (ref 31.5–36.5)
MCV RBC AUTO: 98 FL (ref 78–100)
MONOCYTES # BLD AUTO: 0.9 10E9/L (ref 0–1.3)
MONOCYTES NFR BLD AUTO: 10.4 %
NEUTROPHILS # BLD AUTO: 5.7 10E9/L (ref 1.6–8.3)
NEUTROPHILS NFR BLD AUTO: 68 %
NRBC # BLD AUTO: 0 10*3/UL
NRBC BLD AUTO-RTO: 0 /100
PLATELET # BLD AUTO: 152 10E9/L (ref 150–450)
RBC # BLD AUTO: 4.1 10E12/L (ref 4.4–5.9)
WBC # BLD AUTO: 8.3 10E9/L (ref 4–11)

## 2019-09-20 PROCEDURE — 25000125 ZZHC RX 250: Performed by: STUDENT IN AN ORGANIZED HEALTH CARE EDUCATION/TRAINING PROGRAM

## 2019-09-20 PROCEDURE — 88185 FLOWCYTOMETRY/TC ADD-ON: CPT | Performed by: OTOLARYNGOLOGY

## 2019-09-20 PROCEDURE — 99152 MOD SED SAME PHYS/QHP 5/>YRS: CPT

## 2019-09-20 PROCEDURE — 88305 TISSUE EXAM BY PATHOLOGIST: CPT | Performed by: OTOLARYNGOLOGY

## 2019-09-20 PROCEDURE — 88342 IMHCHEM/IMCYTCHM 1ST ANTB: CPT | Performed by: OTOLARYNGOLOGY

## 2019-09-20 PROCEDURE — 40000166 ZZH STATISTIC PP CARE STAGE 1

## 2019-09-20 PROCEDURE — 88333 PATH CONSLTJ SURG CYTO XM 1: CPT | Performed by: OTOLARYNGOLOGY

## 2019-09-20 PROCEDURE — 85610 PROTHROMBIN TIME: CPT | Performed by: RADIOLOGY

## 2019-09-20 PROCEDURE — 25800030 ZZH RX IP 258 OP 636: Performed by: RADIOLOGY

## 2019-09-20 PROCEDURE — 40001004 ZZHCL STATISTIC FLOW INT 9-15 ABY TC 88188: Performed by: OTOLARYNGOLOGY

## 2019-09-20 PROCEDURE — 88184 FLOWCYTOMETRY/ TC 1 MARKER: CPT | Performed by: OTOLARYNGOLOGY

## 2019-09-20 PROCEDURE — 99153 MOD SED SAME PHYS/QHP EA: CPT

## 2019-09-20 PROCEDURE — 88341 IMHCHEM/IMCYTCHM EA ADD ANTB: CPT | Performed by: OTOLARYNGOLOGY

## 2019-09-20 PROCEDURE — 85025 COMPLETE CBC W/AUTO DIFF WBC: CPT | Performed by: RADIOLOGY

## 2019-09-20 PROCEDURE — 25000128 H RX IP 250 OP 636: Performed by: STUDENT IN AN ORGANIZED HEALTH CARE EDUCATION/TRAINING PROGRAM

## 2019-09-20 PROCEDURE — 76942 ECHO GUIDE FOR BIOPSY: CPT

## 2019-09-20 RX ORDER — FENTANYL CITRATE 50 UG/ML
25-50 INJECTION, SOLUTION INTRAMUSCULAR; INTRAVENOUS EVERY 5 MIN PRN
Status: DISCONTINUED | OUTPATIENT
Start: 2019-09-20 | End: 2019-09-20 | Stop reason: HOSPADM

## 2019-09-20 RX ORDER — DEXTROSE MONOHYDRATE 25 G/50ML
25-50 INJECTION, SOLUTION INTRAVENOUS
Status: DISCONTINUED | OUTPATIENT
Start: 2019-09-20 | End: 2019-09-20 | Stop reason: HOSPADM

## 2019-09-20 RX ORDER — FLUMAZENIL 0.1 MG/ML
0.2 INJECTION, SOLUTION INTRAVENOUS
Status: DISCONTINUED | OUTPATIENT
Start: 2019-09-20 | End: 2019-09-20 | Stop reason: HOSPADM

## 2019-09-20 RX ORDER — NALOXONE HYDROCHLORIDE 0.4 MG/ML
.1-.4 INJECTION, SOLUTION INTRAMUSCULAR; INTRAVENOUS; SUBCUTANEOUS
Status: DISCONTINUED | OUTPATIENT
Start: 2019-09-20 | End: 2019-09-20 | Stop reason: HOSPADM

## 2019-09-20 RX ORDER — LIDOCAINE 40 MG/G
CREAM TOPICAL
Status: DISCONTINUED | OUTPATIENT
Start: 2019-09-20 | End: 2019-09-20 | Stop reason: HOSPADM

## 2019-09-20 RX ORDER — NICOTINE POLACRILEX 4 MG
15-30 LOZENGE BUCCAL
Status: DISCONTINUED | OUTPATIENT
Start: 2019-09-20 | End: 2019-09-20 | Stop reason: HOSPADM

## 2019-09-20 RX ORDER — SODIUM CHLORIDE 9 MG/ML
INJECTION, SOLUTION INTRAVENOUS CONTINUOUS
Status: DISCONTINUED | OUTPATIENT
Start: 2019-09-20 | End: 2019-09-20 | Stop reason: HOSPADM

## 2019-09-20 RX ADMIN — FENTANYL CITRATE 25 MCG: 50 INJECTION, SOLUTION INTRAMUSCULAR; INTRAVENOUS at 10:57

## 2019-09-20 RX ADMIN — LIDOCAINE HYDROCHLORIDE 5 ML: 10 INJECTION, SOLUTION EPIDURAL; INFILTRATION; INTRACAUDAL; PERINEURAL at 10:50

## 2019-09-20 RX ADMIN — SODIUM CHLORIDE: 9 INJECTION, SOLUTION INTRAVENOUS at 08:54

## 2019-09-20 RX ADMIN — MIDAZOLAM 0.5 MG: 1 INJECTION INTRAMUSCULAR; INTRAVENOUS at 10:57

## 2019-09-20 NOTE — PRE-PROCEDURE
Interventional Radiology Pre-Procedure Sedation Assessment   Time of Assessment: 9:40 AM    Expected Level: Moderate Sedation    Indication: Sedation is required for the following type of Procedure: Biopsy    Sedation and procedural consent: Risks, benefits and alternatives were discussed with Patient    PO Intake: Appropriately NPO for procedure    ASA Class: Class 2 - MILD SYSTEMIC DISEASE, NO ACUTE PROBLEMS, NO FUNCTIONAL LIMITATIONS.    Mallampati: Grade 2:  Soft palate, base of uvula, tonsillar pillars, and portion of posterior pharyngeal wall visible    Lungs: Lungs Clear with good breath sounds bilaterally    Heart: Normal heart sounds and rate    History and physical reviewed and no updates needed. I have reviewed the lab findings, diagnostic data, medications, and the plan for sedation. I have determined this patient to be an appropriate candidate for the planned sedation and procedure and have reassessed the patient IMMEDIATELY PRIOR to sedation and procedure.    Mahesh Davis MD

## 2019-09-20 NOTE — PROGRESS NOTES
Patient Name: Daniel Mcnair  Medical Record Number: [id number]  Today's Date: 9/20/2019    Procedure: lymph node biopsy, left neck  Proceduralist: Dr. Long    Versed 0.5 mg, fentanyl 25 mcg    Procedure start time: 1025  Puncture time: 1028  Procedure end time: 1120    Report given to:   : n/a    Dressing to left neck, CDI    Other Notes: Pt arrived to IR room 7 from . Consent reviewed. Pt denies any questions or concerns regarding procedure. Pt positioned right sided and monitored per protocol. Pt tolerated procedure without any noted complications. Pt transferred back to .

## 2019-09-20 NOTE — DISCHARGE INSTRUCTIONS
[facility]    Interventional Radiology  Patient Instructions Following Lymph Node Biopsy    AFTER YOU GO HOME  ? If you were given sedation DO NOT drive or operate machinery at home or at work for at least 24 hours  ? DO relax and take it easy for 48 hours, no strenuous activity for 24 hours  ? DO drink plenty of fluids  ? DO resume your regular diet, unless otherwise instructed by your Primary Physician  ? Keep the dressing dry and in place for 24 hours.  ? DO NOT SMOKE FOR AT LEAST 24 HOURS, if you have been given any medications that were to help you relax or sedate you during your procedure  ? DO NOT drink alcoholic beverages the day of your procedure  ? DO NOT do any strenuous exercise or lifting (> 10 lbs) for at least 3 days following your procedure  ? DO NOT take a bath or shower for at least 12 hours following your procedure  ? Remove dressing after shower the next day. Replace with Band aid for 2 days.  Never leave a wet dressing in place.  ? DO NOT make any important or legal decisions for 24 hours following your procedure  ? There should be minimum drainage from the biopsy site    CALL THE PHYSICIAN IF:  ? You start bleeding from the procedure site.  If you do start to bleed from that site,  hold pressure on the site for a minimum of 10 minutes.  Your physician will tell you if you need to return to the hospital  ? You develop nausea or vomiting  ? You have excessive swelling, redness, or tenderness at the site  ? You have drainage that looks like it is infected.  ? You experience severe pain  ? You develop hives or a rash or unexplained itching  ? You develop shortness of breath  ? You develop a temperature of 101 degrees F or greater    ADDITIONAL INSTRUCTIONS: None    [facility] INTERVENTIONAL RADIOLOGY DEPARTMENT  Procedure Physician:         Dr. Davis        Date of procedure: September 20, 2019  Telephone Numbers: [phone] Monday-Friday 8:00 am to 4:30 pm  [phone] After  4:30 pm Monday-Friday, Weekends & Holidays.   Ask for the Interventional Radiologist on call.  Someone is on call 24 hrs/day  [facility] toll free number: [phone] Monday-Friday 8:00 am to 4:30 pm  [facility] Emergency Dept: [phone]

## 2019-09-20 NOTE — IP AVS SNAPSHOT
MRN:[id number]                      After Visit Summary   9/20/2019    Daniel Mcnair    MRN: [id number]           Visit Information        Department      9/20/2019  8:05 AM [address] [facility]          Review of your medicines      UNREVIEWED medicines. 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      Dose:  650 mg  Take 2 tablets (650 mg) by mouth every 6 hours as needed for pain  Quantity:  100 tablet  Refills:  0     amLODIPine 10 MG tablet  Commonly known as:  NORVASC  Used for:  Benign essential hypertension      TAKE 1 TABLET DAILY  Quantity:  90 tablet  Refills:  3     atorvastatin 20 MG tablet  Commonly known as:  LIPITOR  Used for:  Hyperlipidemia LDL goal <70      TAKE ONE TABLET BY MOUTH ONE TIME DAILY  Quantity:  30 tablet  Refills:  0     benazepril 40 MG tablet  Commonly known as:  LOTENSIN  Used for:  Essential hypertension, benign      Dose:  40 mg  Take 1 tablet (40 mg) by mouth daily  Quantity:  90 tablet  Refills:  3     bisacodyl 10 MG suppository  Commonly known as:  DULCOLAX  Used for:  Chronic constipation      Dose:  10 mg  Place 1 suppository rectally daily as needed.  Quantity:  3 suppository  Refills:  0     * Carboxymethylcellulose Sodium 1 % Gel      Apply to eye as needed  Refills:  0     * carboxymethylcellulose PF 0.5 % ophthalmic solution  Commonly known as:  REFRESH PLUS      Dose:  1 drop  1 drop 3 times daily as needed  Refills:  0     docusate sodium 100 MG tablet  Commonly known as:  COLACE  Used for:  BPH (benign prostatic hyperplasia)      Dose:  100 mg  Take 100 mg by mouth 2 times daily  Quantity:  60 tablet  Refills:  1     FISH OIL-KRILL OIL PO      Dose:  2000 mg  Take 2,000 mg by mouth daily  Refills:  0     metoprolol succinate ER 25 MG 24 hr tablet  Commonly known as:  TOPROL-XL  Used for:  Essential hypertension, benign      TAKE  ONE HALF TABLET BY MOUTH EVERY DAY  Quantity:  45 tablet  Refills:  2     MILK OF MAGNESIA PO      Take by mouth At Bedtime  Refills:  0     nitroGLYcerin 0.4 MG sublingual tablet  Commonly known as:  NITROSTAT  Used for:  Coronary artery disease involving native heart without angina pectoris, unspecified vessel or lesion type      Dose:  0.4 mg  Place 1 tablet (0.4 mg) under the tongue every 5 minutes as needed  Quantity:  30 tablet  Refills:  2     NutriSource Fiber packet      Dose:  1 packet  Take 1 packet by mouth every morning  Refills:  0     ranitidine 150 MG tablet  Commonly known as:  ZANTAC  Used for:  Gastroesophageal reflux disease without esophagitis      TAKE 1 TABLET TWICE A DAY  Quantity:  180 tablet  Refills:  3     sildenafil 100 MG tablet  Commonly known as:  VIAGRA  Used for:  Erectile dysfunction, unspecified erectile dysfunction type      Dose:  100 mg  Take 1 tablet (100 mg) by mouth daily as needed  Quantity:  10 tablet  Refills:  2     tamsulosin 0.4 MG capsule  Commonly known as:  FLOMAX  Used for:  Hypertrophy of prostate without urinary obstruction      Dose:  0.4 mg  Take 1 capsule (0.4 mg) by mouth daily  Quantity:  90 capsule  Refills:  3     tolterodine 2 MG tablet  Commonly known as:  DETROL  Used for:  Hypertonicity of bladder      Dose:  2 mg  Take 1 tablet (2 mg) by mouth 2 times daily  Quantity:  180 tablet  Refills:  3     VITEYES AREDS FORMULA/LUTEIN Caps      Take by mouth daily  Refills:  0         * This list has 2 medication(s) that are the same as other medications prescribed for you. Read the directions carefully, and ask your doctor or other care provider to review them with you.                  Protect others around you: Learn how to safely use, store and throw away your medicines at www.disposemymeds.org.       Follow-ups after your visit       Your next 10 appointments already scheduled    Sep 23, 2019 11:30 AM CDT  (Arrive by 11:15 AM)  Return Visit with Ronaldo  MD Fabiola  [facility] ([facility]) [address]  5th Floor  [facility] [address]  [phone]      Sep 30, 2019  1:30 PM CDT  RETURN CORNEA with Jhoan Owens MD  Eye Clinic ([facility]) [place] [address] [facility]  9th Fl Clin [address]A  [facility] [address]  [phone]      Oct 04, 2019 11:15 AM CDT  (Arrive by 11:00 AM)  Return Visit with Yovana Camarena MD  [facility] Dermatology ([facility]) [address]  3rd Floor  [facility] [address]  [phone]      Oct 22, 2019 12:00 PM CDT  (Arrive by 11:45 AM)  Return Visit with Santana Martinez MD  [facility] ([facility]) [address]  [address]  [facility] [address]  [phone]      Jan 17, 2020  9:40 AM CST  RETURN RETINA with Luna Parker MD  Eye Clinic ([facility]) [place] [address] [facility]  [address] [address]  [phone]      Feb 17, 2020  2:00 PM CST  (Arrive by 1:45 PM)  Return Visit with Lia Dumont MD  [facility] ([facility]) [address]  4th Floor  [facility] [address]  [phone]   If you are coming in for evaluation of pain: Please note that you may not be prescribed a controlled substance such as pain medication on your first visit.  Your provider will ask for previous medical records, and determine if medications are appropriate.        Care Instructions       Further instructions from your care team       [facility]    Interventional Radiology  Patient Instructions Following Lymph Node Biopsy    AFTER YOU GO HOME  ? If you were given sedation DO NOT drive or operate machinery at home or at work for at least 24 hours  ? DO relax and take it easy for 48 hours, no strenuous activity for 24 hours  ? DO  drink plenty of fluids  ? DO resume your regular diet, unless otherwise instructed by your Primary Physician  ? Keep the dressing dry and in place for 24 hours.  ? DO NOT SMOKE FOR AT LEAST 24 HOURS, if you have been given any medications that were to help you relax or sedate you during your procedure  ? DO NOT drink alcoholic beverages the day of your procedure  ? DO NOT do any strenuous exercise or lifting (> 10 lbs) for at least 3 days following your procedure  ? DO NOT take a bath or shower for at least 12 hours following your procedure  ? Remove dressing after shower the next day. Replace with Band aid for 2 days.  Never leave a wet dressing in place.  ? DO NOT make any important or legal decisions for 24 hours following your procedure  ? There should be minimum drainage from the biopsy site    CALL THE PHYSICIAN IF:  ? You start bleeding from the procedure site.  If you do start to bleed from that site,  hold pressure on the site for a minimum of 10 minutes.  Your physician will tell you if you need to return to the hospital  ? You develop nausea or vomiting  ? You have excessive swelling, redness, or tenderness at the site  ? You have drainage that looks like it is infected.  ? You experience severe pain  ? You develop hives or a rash or unexplained itching  ? You develop shortness of breath  ? You develop a temperature of 101 degrees F or greater    ADDITIONAL INSTRUCTIONS: None    [facility] INTERVENTIONAL RADIOLOGY DEPARTMENT  Procedure Physician:         Dr. Davis        Date of procedure: September 20, 2019  Telephone Numbers: [phone] Monday-Friday 8:00 am to 4:30 pm  [phone] After 4:30 pm Monday-Friday, Weekends & Holidays.   Ask for the Interventional Radiologist on call.  Someone is on call 24 hrs/day  [facility] toll free number: [phone] Monday-Friday 8:00 am to 4:30 pm  [facility] Emergency Dept: [phone]          Additional Information About Your Visit       MyChart Information    [employer]  gives you secure access to your electronic health record. If you see a primary care provider, you can also send messages to your care team and make appointments. If you have questions, please call your primary care clinic.  If you do not have a primary care provider, please call [phone] and they will assist you.       Care EveryWhere ID    This is your Care EveryWhere ID. This could be used by other organizations to access your [facility] medical records  [id number]       Your Vitals Were     Blood Pressure   133/77    Pulse   59    Temperature   97.7  F (36.5  C) (Oral)    Respirations   18    Pulse Oximetry   96%          Primary Care Provider Office Phone # Fax #    Lia Dumont [phone]      Equal Access to Services    PARRISH LOPEZ : Hadii brandy hilton hadrojaso Sotremayne, waaxda luqadaha, qaybta kaalmada adeegyada, jose miguel lawrence . So [facility] [phone].    ATENCIÓN: Si habla español, tiene a vaughan disposición servicios gratuitos de asistencia lingüística. [facility] [phone].    We comply with applicable federal civil rights laws and [place] laws. We do not discriminate on the basis of race, color, national origin, age, disability, sex, sexual orientation, or gender identity.           Thank you!    Thank you for choosing [facility] for your care. Our goal is always to provide you with excellent care. Hearing back from our patients is one way we can continue to improve our services. Please take a few minutes to complete the written survey that you may receive in the mail after you visit with us. Thank you!            Medication List      ASK your doctor about these medications          Morning Afternoon Evening Bedtime As Needed    acetaminophen 325 MG tablet  Also known as:  TYLENOL  INSTRUCTIONS:  Take 2 tablets (650 mg) by mouth every 6 hours as needed for pain                     amLODIPine 10 MG tablet  Also known as:  NORVASC  INSTRUCTIONS:  TAKE 1 TABLET  DAILY                     atorvastatin 20 MG tablet  Also known as:  LIPITOR  INSTRUCTIONS:  TAKE ONE TABLET BY MOUTH ONE TIME DAILY                     benazepril 40 MG tablet  Also known as:  LOTENSIN  INSTRUCTIONS:  Take 1 tablet (40 mg) by mouth daily                     bisacodyl 10 MG suppository  Also known as:  DULCOLAX  INSTRUCTIONS:  Place 1 suppository rectally daily as needed.  Doctor's comments:  Use as needed for distention, no passage of gas or no BM for 3 days                     * Carboxymethylcellulose Sodium 1 % Gel  INSTRUCTIONS:  Apply to eye as needed                     * carboxymethylcellulose PF 0.5 % ophthalmic solution  Also known as:  REFRESH PLUS  INSTRUCTIONS:  1 drop 3 times daily as needed                     docusate sodium 100 MG tablet  Also known as:  COLACE  INSTRUCTIONS:  Take 100 mg by mouth 2 times daily  Doctor's comments:  Hold if loose stool                     FISH OIL-KRILL OIL PO  INSTRUCTIONS:  Take 2,000 mg by mouth daily                     metoprolol succinate ER 25 MG 24 hr tablet  Also known as:  TOPROL-XL  INSTRUCTIONS:  TAKE ONE HALF TABLET BY MOUTH EVERY DAY                     MILK OF MAGNESIA PO  INSTRUCTIONS:  Take by mouth At Bedtime                     nitroGLYcerin 0.4 MG sublingual tablet  Also known as:  NITROSTAT  INSTRUCTIONS:  Place 1 tablet (0.4 mg) under the tongue every 5 minutes as needed                     NutriSource Fiber packet  INSTRUCTIONS:  Take 1 packet by mouth every morning                     ranitidine 150 MG tablet  Also known as:  ZANTAC  INSTRUCTIONS:  TAKE 1 TABLET TWICE A DAY                     sildenafil 100 MG tablet  Also known as:  VIAGRA  INSTRUCTIONS:  Take 1 tablet (100 mg) by mouth daily as needed                     tamsulosin 0.4 MG capsule  Also known as:  FLOMAX  INSTRUCTIONS:  Take 1 capsule (0.4 mg) by mouth daily                     tolterodine 2 MG tablet  Also known as:  DETROL  INSTRUCTIONS:  Take 1 tablet (2  mg) by mouth 2 times daily                     VITEYES AREDS FORMULA/LUTEIN Caps  INSTRUCTIONS:  Take by mouth daily                        * This list has 2 medication(s) that are the same as other medications prescribed for you. Read the directions carefully, and ask your doctor or other care provider to review them with you.

## 2019-09-20 NOTE — BRIEF OP NOTE
[facility], [place]    Brief Operative Note    Pre-operative diagnosis: History of lymphoma with enlarged neck lymph node   Post-operative diagnosis History of lymphoma with enlarged neck lymph node     Procedure:   Surgeon: Dr. Gatica   Anesthesia: Mild sedation   Estimated blood loss: Minimal  Drains: None  Specimens: 6 core biopsy   Findings:   Using ulttrasound guidance 6 core biopsies were taken fo the   Complications: None.

## 2019-09-20 NOTE — PROGRESS NOTES
1230 Pt tolerating oral intake. Discharge instructions reviewed, copy given to pt. Pt tolerated ambulation. Voided. PIV dc'd. 0730 Pt discharged home accompanied by wife.

## 2019-09-20 NOTE — PROGRESS NOTES
Pt  arrived back from IR via liter at 1125 accompanied by nurse.Left neck C/D/I and non tender and no hematoma.VSS.Tolerating food and fluids.Wife notified that pt back.

## 2019-09-20 NOTE — IP AVS SNAPSHOT
[address] [address]                                    After Visit Summary   9/20/2019    Daniel Mcnair    MRN: [id number]           After Visit Summary Signature Page    I have received my discharge instructions, and my questions have been answered. I have discussed any challenges I see with this plan with the nurse or doctor.    ..........................................................................................................................................  Patient/Patient Representative Signature      ..........................................................................................................................................  Patient Representative Print Name and Relationship to Patient    ..................................................               ................................................  Date                                   Time    ..........................................................................................................................................  Reviewed by Signature/Title    ...................................................              ..............................................  Date                                               Time          22EPIC Rev 08/18

## 2019-09-20 NOTE — PROGRESS NOTES
0900 Pt on 2a prepped for lymph node biopsy. PIV placed and labs sent. Family at bedside. Pt ready for consent.

## 2019-09-23 ENCOUNTER — OFFICE VISIT (OUTPATIENT)
Dept: ANESTHESIOLOGY | Facility: CLINIC | Age: 84
End: 2019-09-23
Payer: COMMERCIAL

## 2019-09-23 VITALS
HEIGHT: 70 IN | SYSTOLIC BLOOD PRESSURE: 156 MMHG | HEART RATE: 60 BPM | WEIGHT: 186 LBS | DIASTOLIC BLOOD PRESSURE: 66 MMHG | RESPIRATION RATE: 16 BRPM | BODY MASS INDEX: 26.63 KG/M2

## 2019-09-23 DIAGNOSIS — M47.818 ARTHROPATHY OF RIGHT SACROILIAC JOINT: Primary | ICD-10-CM

## 2019-09-23 LAB — COPATH REPORT: NORMAL

## 2019-09-23 ASSESSMENT — MIFFLIN-ST. JEOR: SCORE: 1514.94

## 2019-09-23 ASSESSMENT — PAIN SCALES - GENERAL: PAINLEVEL: MILD PAIN (2)

## 2019-09-23 NOTE — PATIENT INSTRUCTIONS
1. Call to schedule your procedure.     When calling to schedule your procedure appointment, also make your follow up clinic appointment 6-8 weeks after the procedure.    Please call [phone] to schedule, reschedule, or cancel your procedure appointment.   Phones are answered Monday - Friday from 7:30 - 4:00pm.  Leave a voicemail with your name, birth date, and phone number if no one is available to take your call.     Your procedure: Right Sided SI Joint Injection.     On the day of the procedure  1. Arrive 1 hour earlier than your scheduled time, to the [facility]  Address: [address] [place] [address]  2. Check in on the 5th floor for your procedure    If you must reschedule your procedure more than two times, you must follow up in clinic before rescheduling again.    Preparing for your procedure    CAUTION - FAILURE TO FOLLOW THESE PRE-PROCEDURE INSTRUCTIONS WILL RESULT IN YOUR PROCEDURE BEING RESCHEDULED.            You must have a  take you home after your procedure. Transportation by taxi or para-transit is okay as long as you have a responsible adult accompany you. You must provide your 's full name and contact number at time of check in.     Fasting Protocol You may have NOTHING SOLID TO EAT 8 HOURS prior to arrival at the procedure area.     You may have CLEAR LIQUIDS UP TO 2 HOURS prior to arrival.    Broth and candy are considered solid food and require an eight hour fast.     Clear liquids include water, clear fruit juice (no pulp), carbonated beverages, ice, black coffee, black tea, clear jello. No alcohol containing beverages.   Medications If you take any medications, DO NOT STOP. Take your medications as usual the day of your procedure with a sip of water AT LEAST 2 HOURS PRIOR TO ARRIVAL.    Antibiotics If you are currently taking antibiotics, you must complete the entire dose 7 days prior to your scheduled procedure. You must be clear of any signs  or symptoms of infection. If you begin antibiotics, please contact our clinic for instructions.     Fever, Chills, or Rash If you experience a fever of higher than 100 degrees, chills, rash, or open wounds during the one week before your procedure, please call the clinic to see if you may proceed with your procedure.      Medication Hold List  **Patients under Cardiology/Neurology care should consult their provider prior to the pain procedure to verify pre-procedure medication instructions. The information below contains general guidelines.**    Blood Thinners If you are taking daily ASPIRIN, PLAVIX, OR OTHER BLOOD THINNERS SUCH AS COUMADIN/WARFARIN, we will need your prescribing doctor to sign a release permitting you to stop these medications. Once approved by your prescribing doctor - STOP ALL BLOOD THINNERS BASED ON THE TIME TABLE BELOW PRIOR TO YOUR PROCEDURE. If you have been instructed to stop WARFARIN(COUMADIN), you must have an INR lab drawn the day before your procedure. . Your INR must be within normal limits before we can perform your injection. MEDICATIONS CAN BE RESTARTED AFTER YOUR PROCEDURE.    14 DAY HOLD  Ticlid (ticlopidine)    10 DAY HOLD  Effient (Prasugel)    3 DAY HOLD  Xarelto (rivaroxaban) 7 DAY HOLD  Anacin, Bufferin, Ecotrin, Excedrin, Aggrenox (Aspirin)  Brilinta (ticagrelor)  Coumadin (Warfarin)  Pradexa (Dabigatran)  Elmiron (Pentosan)  Plavix (Clopidogrel Bisulfate)  Pletal (Cilostazol)    24 HOUR HOLD  Lovenox (enoxaparin)  Agrylin (Anagrelide)        Non-steroidal Anti-inflammatories (NSAIDs) DO NOT TAKE any non-steroidal anti-inflammatory medications (NSAIDs) listed on the table below. MEDICATIONS CAN BE RESTARTED AFTER YOUR PROCEDURE. Celebrex is OK to take and does not need to be discontinued.     Medications to stop:  3 DAY HOLD  Advil, Motrin (Ibuprofen)  Arthrotec (diciofenac sodium/misoprostol)  Clinoril (Sulindac)  Indocin (Indomethacin)  Lodine (Etodolac)  Toradol  (Ketorolac)  Vicoprofen (Hydrocodone and Ibuprofen)  Voltaren (Diclotenac)    14 DAY HOLD  Daypro (Oxaprozin)  Feldene (Piroxicam)   7 DAY HOLD  Aleve (Naproxen sodium)  Darvon compound (contains aspirin)  Naprosyn (Naproxen)  Norgesic Forte (contains aspirin)  Mobic (Meloxicam)  Oruvall (Ketoprofen)  Percodan (contains aspirin)  Relafen (Nabumetone)  Salsalate  Trilisate  Vitamin E (more than 400 mg per day)  Any medication containing aspirin                To speak with a nurse, schedule/reschedule/cancel a clinic appointment, or request a medication refill call: [phone]     You can also reach us by [employer]: [email or web address]

## 2019-09-23 NOTE — LETTER
9/23/2019       RE: Daniel Mcnair  [address] [address]     Dear Colleague,    Thank you for referring your patient, Daniel Mcnair, to the [facility] CLINIC FOR COMPREHENSIVE PAIN MANAGEMENT at [facility]. Please see a copy of my visit note below.    Patient is an 89-year-old gentleman with a chief complaint of right lower back pain.  The patient states that the pain is approximately 2-3 out of 10 in severity and that it keeps him from doing most of the things he needs to do.  He has had physical therapy in the past this is seem to help with this pain.  He has been seen in this clinic previously, specifically in the procedure center for diagnostic medial branch blocks.  At the time of his proposed procedure, the patient did not have pain and his procedure was canceled.  Review of his chart reveals that he has received intra-articular facet injections in the past.  He notes that his pain is worse with prolonged sitting and prolonged standing.  He notes that his pain is alleviated by sitting position.  He presents today for reevaluation and follow-up.  Current Outpatient Medications   Medication     acetaminophen (TYLENOL) 325 MG tablet     amLODIPine (NORVASC) 10 MG tablet     atorvastatin (LIPITOR) 20 MG tablet     benazepril (LOTENSIN) 40 MG tablet     bisacodyl (DULCOLAX) 10 MG suppository     carboxymethylcellulose PF (REFRESH PLUS) 0.5 % SOLN ophthalmic solution     Carboxymethylcellulose Sodium 1 % GEL     docusate sodium 100 MG tablet     FISH OIL-KRILL OIL PO     Guar Gum (BENEFIBER) packet     Magnesium Hydroxide (MILK OF MAGNESIA PO)     metoprolol succinate ER (TOPROL-XL) 25 MG 24 hr tablet     Multiple Vitamins-Minerals (VITEYES AREDS FORMULA/LUTEIN) CAPS     nitroglycerin (NITROSTAT) 0.4 MG sublingual tablet     sildenafil (VIAGRA) 100 MG tablet     tamsulosin (FLOMAX) 0.4 MG capsule     tolterodine (DETROL) 2 MG  tablet     famotidine (PEPCID) 20 MG tablet     No current facility-administered medications for this visit.      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/2011     Lymphoma (H) 1/25/2012     Macular degeneration, dry      Other and unspecified hyperlipidemia 7/21/2011     Snoring      Spinal stenosis, lumbar region, without neurogenic claudication 7/21/2011     Stented coronary artery 2003    x5; two separate times     Unspecified essential hypertension 7/21/2011     Past Surgical History:   Procedure Laterality Date     ARTHROPLASTY HIP  10/2008    left     ARTHROPLASTY HIP  1999    right     ARTHROPLASTY REVISION HIP  1/21/2014    Procedure: ARTHROPLASTY REVISION HIP;  Revision Left Total Hip;  Surgeon: Edgar Grewal MD;  Location: [facility]     BACK SURGERY  2006     BLEPHAROPLASTY BILATERAL Bilateral 5/17/2019    Procedure: BLEPHAROPLASTY BILATERAL UPPER LIDS;  Surgeon: America Melo MD;  Location: [facility] OR     CARDIAC SURGERY  2001    stent x 5      CARDIAC SURGERY  2002     CATARACT IOL, RT/LT Bilateral      CYSTOSCOPY, TRANSURETHRAL RESECTION (TUR) PROSTATE, COMBINED  11/5/2013    Procedure: COMBINED CYSTOSCOPY, TRANSURETHRAL RESECTION (TUR) PROSTATE;  Transurethral Resection Of Prostate, Bipolar;  Surgeon: Lai Street MD;  [facility]     ESOPHAGOSCOPY, GASTROSCOPY, DUODENOSCOPY (EGD), COMBINED  2/3/2014    Procedure: COMBINED ESOPHAGOSCOPY, GASTROSCOPY, DUODENOSCOPY (EGD);;  Surgeon: Ezra Fiore MD;  Location:  [facility]     ESOPHAGOSCOPY, GASTROSCOPY, DUODENOSCOPY (EGD), COMBINED  4/15/2014    Procedure: COMBINED ESOPHAGOSCOPY, GASTROSCOPY, DUODENOSCOPY (EGD), BIOPSY SINGLE OR MULTIPLE;  Surgeon: Ezra Fiore MD;  Location: [facility]     EXTRACAPSULAR CATARACT EXTRATION WITH INTRAOCULAR LENS IMPLANT  2005     EXTRACAPSULAR CATARACT EXTRATION WITH INTRAOCULAR LENS IMPLANT  2010     HERNIA REPAIR       INJECT NERVE BLOCK LUMBAR PARAVERTEBRAL SYMPATHETIC Left 8/29/2018    Procedure: INJECT NERVE BLOCK LUMBAR PARAVERTEBRAL SYMPATHETIC;  Left L3, L4, sacral Ala medial branch block;  Surgeon: Ronaldo Hicks MD;  [facility]     INJECT SACROILIAC JOINT Right 10/8/2019    Procedure: INJECTION, SACROILIAC JOINT;  Surgeon: Ronaldo Hicks MD;  [facility]     IR LYMPH NODE BIOPSY  9/20/2019     MASTOID SURGERY  1939     MOHS MICROGRAPHIC PROCEDURE       OPTICAL TRACKING SYSTEM FUSION SPINE POSTERIOR LUMBAR TWO LEVELS  7/11/2013    Procedure: OPTICAL TRACKING SYSTEM FUSION SPINE POSTERIOR LUMBAR TWO LEVELS;  Stealth Assisted Lumbar 3-4 Transforaminal Lumbar Interbody Fusion, Lumbar 2-3 Lumbar 3-4 Laminectomy Decompression;  Surgeon: Edgar Lew MD;  Location: [facility] OR     psoris Bilateral 2019     Family History   Problem Relation Age of Onset     Diabetes Maternal Grandfather      Alcohol/Drug Maternal Grandfather      Arthritis Maternal Grandfather      Obesity Maternal Grandfather      Cerebrovascular Disease Maternal Grandmother      Hypertension Mother         willa rand     Glaucoma No family hx of      Macular Degeneration No family hx of      Cancer No family hx of         No known family hx of skin cancer     Skin Cancer No family hx of      Social History     Socioeconomic History     Marital status:      Spouse name: Not on file     Number of children: Not on file     Years of education: Not on file     Highest education level: Not on file   Occupational History     Not on file   Social  Needs     Financial resource strain: Not on file     Food insecurity:     Worry: Not on file     Inability: Not on file     Transportation needs:     Medical: Not on file     Non-medical: Not on file   Tobacco Use     Smoking status: Never Smoker     Smokeless tobacco: Never Used   Substance and Sexual Activity     Alcohol use: Yes     Alcohol/week: 1.7 standard drinks     Comment: occ     Drug use: No     Sexual activity: Not on file   Lifestyle     Physical activity:     Days per week: Not on file     Minutes per session: Not on file     Stress: Not on file   Relationships     Social connections:     Talks on phone: Not on file     Gets together: Not on file     Attends Anabaptist service: Not on file     Active member of club or organization: Not on file     Attends meetings of clubs or organizations: Not on file     Relationship status: Not on file     Intimate partner violence:     Fear of current or ex partner: Not on file     Emotionally abused: Not on file     Physically abused: Not on file     Forced sexual activity: Not on file   Other Topics Concern     Parent/sibling w/ CABG, MI or angioplasty before 65F 55M? Not Asked   Social History Narrative     Not on file      ROS: 10 point ROS neg other than the symptoms noted above in the HPI.  Physical Exam  Vitals signs and nursing note reviewed.   Constitutional:       Appearance: Normal appearance. He is normal weight.   HENT:      Head: Normocephalic and atraumatic.      Nose: Nose normal.      Mouth/Throat:      Mouth: Mucous membranes are moist.   Eyes:      Extraocular Movements: Extraocular movements intact.      Pupils: Pupils are equal, round, and reactive to light.   Neck:      Musculoskeletal: Normal range of motion and neck supple.   Cardiovascular:      Rate and Rhythm: Normal rate and regular rhythm.   Pulmonary:      Effort: Pulmonary effort is normal.      Breath sounds: Normal breath sounds.   Abdominal:      General: Abdomen is flat.    Musculoskeletal:         General: Tenderness present. No swelling, deformity or signs of injury.      Lumbar back: He exhibits decreased range of motion, tenderness, bony tenderness and pain. He exhibits no swelling, no edema, no deformity, no laceration, no spasm and normal pulse.      Right lower leg: No edema.      Left lower leg: No edema.      Comments: The patient has a positive Gaenslen's sign, the patient also has right sacroiliac tenderness.     Neurological:      Mental Status: He is alert.       A/P: The patient is an 89-year-old gentleman with a chief complaint of lower back pain on the right greater than the left.  The patient states that the pain is worse with prolonged sitting and standing.  Physical examination and history seem to corroborate or suggest that this patient has a degree of sacroiliac arthropathy.  The patient notes that physical therapy assist in the amelioration of his pain.  In addition to the physical therapy I recommend scheduling a right sacroiliac joint injection for diagnostic and therapeutic purposes.  No medication management will be indicated at this time.  The patient verbalizes an understanding of our plan.  The patient will return to clinic 4 weeks following his procedure.    Again, thank you for allowing me to participate in the care of your patient.      Sincerely,    Ronaldo Hicks MD

## 2019-09-24 ENCOUNTER — TELEPHONE (OUTPATIENT)
Dept: ANESTHESIOLOGY | Facility: CLINIC | Age: 84
End: 2019-09-24

## 2019-09-24 PROBLEM — M47.818 ARTHROPATHY OF RIGHT SACROILIAC JOINT: Status: ACTIVE | Noted: 2019-09-24

## 2019-09-24 NOTE — TELEPHONE ENCOUNTER
Spoke with: Daniel     Date Scheduled: 9/30/19    Procedure Time: 12:35    Arrival Time:  11:35     Provider:  Dr. Hicks     : yes     F/U Appointment: n/a     Patient was educated on pre-op nurse call: yes      Direct procedure:  No

## 2019-09-26 LAB — COPATH REPORT: NORMAL

## 2019-09-27 ENCOUNTER — TELEPHONE (OUTPATIENT)
Dept: OTOLARYNGOLOGY | Facility: CLINIC | Age: 84
End: 2019-09-27

## 2019-09-27 DIAGNOSIS — C82.91 FOLLICULAR LYMPHOMA OF LYMPH NODES OF NECK, UNSPECIFIED FOLLICULAR LYMPHOMA TYPE (H): Primary | ICD-10-CM

## 2019-09-27 NOTE — TELEPHONE ENCOUNTER
[facility] Call Center    Phone Message    May a detailed message be left on voicemail: yes    Reason for Call: Requesting Results   Name/type of test: Biopsy  Date of test: 9-  Was test done at a location other than [facility] (Please fill in the location if not [facility])?: No      Action Taken: Message routed to:  [facility] ([facility] ENT

## 2019-09-27 NOTE — TELEPHONE ENCOUNTER
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   Concurrent flow cytometry ([id number]) shows a CD10 positive lambda light   chain restricted B-cell population.     SPECIMEN(S):   Lymph node, left neck     INTERPRETATION:   Lymph node, left neck:        CD10-positive lambda monotypic B cells (38%)        See comment     COMMENT:   The immunophenotypic findings are compatible with recurrent B-cell   lymphoma. Morphologic correlation is   required for final interpretation.       Patient verbalized understanding of these results and he will proceed with his appointment scheduled with oncology on 10/10. He was encouraged to call with further questions or concerns.     Ammy Mcgrath, RN, BSN

## 2019-09-30 ENCOUNTER — OFFICE VISIT (OUTPATIENT)
Dept: OPHTHALMOLOGY | Facility: CLINIC | Age: 84
End: 2019-09-30
Attending: OPHTHALMOLOGY
Payer: COMMERCIAL

## 2019-09-30 ENCOUNTER — OFFICE VISIT (OUTPATIENT)
Dept: AUDIOLOGY | Facility: CLINIC | Age: 84
End: 2019-09-30

## 2019-09-30 DIAGNOSIS — H04.123 DRY EYES, BILATERAL: Primary | ICD-10-CM

## 2019-09-30 DIAGNOSIS — Z96.1 PSEUDOPHAKIA OF BOTH EYES: ICD-10-CM

## 2019-09-30 DIAGNOSIS — H43.813 POSTERIOR VITREOUS DETACHMENT, BILATERAL: ICD-10-CM

## 2019-09-30 DIAGNOSIS — H35.3132 INTERMEDIATE STAGE NONEXUDATIVE AGE-RELATED MACULAR DEGENERATION OF BOTH EYES: ICD-10-CM

## 2019-09-30 DIAGNOSIS — H11.823 CONJUNCTIVOCHALASIS OF BOTH EYES: ICD-10-CM

## 2019-09-30 DIAGNOSIS — H02.883 MEIBOMIAN GLAND DYSFUNCTION (MGD) OF BOTH EYES: ICD-10-CM

## 2019-09-30 DIAGNOSIS — H02.831 DERMATOCHALASIS OF BOTH UPPER EYELIDS: ICD-10-CM

## 2019-09-30 DIAGNOSIS — H02.834 DERMATOCHALASIS OF BOTH UPPER EYELIDS: ICD-10-CM

## 2019-09-30 DIAGNOSIS — H90.3 SENSORY HEARING LOSS, BILATERAL: Primary | ICD-10-CM

## 2019-09-30 DIAGNOSIS — H02.886 MEIBOMIAN GLAND DYSFUNCTION (MGD) OF BOTH EYES: ICD-10-CM

## 2019-09-30 PROCEDURE — G0463 HOSPITAL OUTPT CLINIC VISIT: HCPCS | Mod: ZF

## 2019-09-30 ASSESSMENT — REFRACTION_WEARINGRX
OS_AXIS: 008
OS_CYLINDER: +1.75
OD_SPHERE: -1.50
OD_AXIS: 010
OD_ADD: +2.75
OS_ADD: +2.75
OD_CYLINDER: +1.00
OS_SPHERE: -2.25
SPECS_TYPE: BIFOCAL

## 2019-09-30 ASSESSMENT — VISUAL ACUITY
CORRECTION_TYPE: GLASSES
OS_CC: 20/25-1
OD_CC+: +3
OS_CC+: +1
METHOD: SNELLEN - LINEAR
OD_CC: 20/30-

## 2019-09-30 ASSESSMENT — EXTERNAL EXAM - RIGHT EYE: OD_EXAM: MILD BROW PTOSIS

## 2019-09-30 ASSESSMENT — SLIT LAMP EXAM - LIDS
COMMENTS: MEIBOMIAN GLAND DYSFUNCTION
COMMENTS: MEIBOMIAN GLAND DYSFUNCTION

## 2019-09-30 ASSESSMENT — EXTERNAL EXAM - LEFT EYE: OS_EXAM: MILD BROW PTOSIS

## 2019-09-30 ASSESSMENT — TONOMETRY
IOP_METHOD: ICARE
OS_IOP_MMHG: 16
OD_IOP_MMHG: 13

## 2019-09-30 ASSESSMENT — CONF VISUAL FIELD
OS_NORMAL: 1
OD_NORMAL: 1

## 2019-09-30 NOTE — NURSING NOTE
Chief Complaints and History of Present Illnesses   Patient presents with     Dry Eye(s) Both Eyes     Chief Complaint(s) and History of Present Illness(es)     Dry Eye(s) Both Eyes     Laterality: both eyes    Associated symptoms: dryness    Frequency: constantly    Course: stable    Treatments tried: artificial tears    Response to treatment: moderate improvement    Pain scale: 0/10              Comments     3mo bilateral ROBINA recheck     Vision is okay since LV, updated gls after YASMINE with Dr Feng. Only concern today is in regards to his upcoming DMV renewal of driving license.     Mostly managing ROBINA with ATs + briana.     Ocular meds: PF ATs tid-qid, Briana bid  Tiffany Woods COT 1:38 PM September 30, 2019

## 2019-09-30 NOTE — PROGRESS NOTES
CC:  Dry eye eval    HPI:  89 y/o  male with POH dry AMD (followed by [facility]), s/p CE/IOL each eye, originally referred for cornea eval regarding dry eye disease.   Describes having a scratching and burning each eye, comes and goes. Lubricating drops seem to help. Irritation bothers the patient like he wants to scratch the eyes, but he tries to avoid that. Feels like his eyes become easily fatigued with reading, even with an additional light.     Interval History:  Had eyelid surgery with Dr. Melo. Reports vision is stable. No blurring of vision. Occasional diplopia, but does not notice this most of the time. Eyes still occasionally feel dry/scratchy/irritated. Usually does WCs daily, sometimes BID.     POH:   -s/p CE/IOL each eye ~2005  -dry AMD each eye on AREDS, Amsler - follows with [facility] q6 months  -BUL blepharoplasty    Gtts:  Preservative-free tears - Refresh - a few times during the day   Lubricating ointment before naps and at bedtime each eye   WC/LH - with baby shampoo (doing 1x/day)  Omega-3 fish oil    Occasional wipes with a washcloth - no scrubs or warm water.   Sleeps with sock over the eyes - to darken his surroundings  No sleep apnea or face mask   Sleeping mostly on left side, not on stomach     All:  NKDA    A/P:  1. Dry eye disease, each eye  -surfaces look reasonable, Continue topical lubricating drops  -INCREASE preservative-free lubricating tears to every 3-4 hours each eye  -continue lubricating ointment pre-nap and at bedtime OU  -Continue PO omega-3 fatty acid supplementation and/or flax seed oil  -INCREASE warm compresses and lid scrubs with baby shampoo to BID  -continue frequent breaks while reading  -continue night mask  -Consider punctal plugs if conservative measures fail    2. Conjunctivochalasis, each eye  -lubrication as above  -have discussed conjunctivochalasis excision in the past, but not very symptomatic  -may be contributing to tearing - monitor for  now  -If increased surface/eyelid conservative measures fail, check lacrimal drainage at follow-up - if open, consider conjunctivochalasis excision     3. Pseudophakia, each eye  -stable IOLs each eye  -monitor    4. Dry AMD, each eye  -follows with [facility] q6 months  -continue AREDs, Amsler grid  -reviewed return precautions    5. Dermatochalasis, each eye  -s/p BULB w/ Mokhtarzadeh on 5/17/19  -Doing well, stable    F/u 3 months, sooner PRN    Mahesh Willard MD - PGY 3  Ophthalmology resident    Attending Physician Attestation:  Complete documentation of historical and exam elements from today's encounter can be found in the full encounter summary report (not reduplicated in this progress note).  I personally obtained the chief complaint(s) and history of present illness.  I confirmed and edited as necessary the review of systems, past medical/surgical history, family history, social history, and examination findings as documented by others; and I examined the patient myself.  I personally reviewed the relevant tests, images, and reports as documented above.  I formulated and edited as necessary the assessment and plan and discussed the findings and management plan with the patient and family. - Jhoan Owens MD

## 2019-10-02 ENCOUNTER — HEALTH MAINTENANCE LETTER (OUTPATIENT)
Age: 84
End: 2019-10-02

## 2019-10-04 ENCOUNTER — ALLIED HEALTH/NURSE VISIT (OUTPATIENT)
Dept: INTERNAL MEDICINE | Facility: CLINIC | Age: 84
End: 2019-10-04
Payer: COMMERCIAL

## 2019-10-04 ENCOUNTER — OFFICE VISIT (OUTPATIENT)
Dept: DERMATOLOGY | Facility: CLINIC | Age: 84
End: 2019-10-04
Payer: COMMERCIAL

## 2019-10-04 DIAGNOSIS — R39.9 LOWER URINARY TRACT SYMPTOMS (LUTS): ICD-10-CM

## 2019-10-04 DIAGNOSIS — Z85.828 HISTORY OF BASAL CELL CANCER: ICD-10-CM

## 2019-10-04 DIAGNOSIS — L72.0 EIC (EPIDERMAL INCLUSION CYST): ICD-10-CM

## 2019-10-04 DIAGNOSIS — Z23 NEED FOR INFLUENZA VACCINATION: Primary | ICD-10-CM

## 2019-10-04 DIAGNOSIS — K21.9 GASTROESOPHAGEAL REFLUX DISEASE WITHOUT ESOPHAGITIS: ICD-10-CM

## 2019-10-04 DIAGNOSIS — L82.0 INFLAMED SEBORRHEIC KERATOSIS: ICD-10-CM

## 2019-10-04 DIAGNOSIS — R20.2 NOTALGIA PARESTHETICA: ICD-10-CM

## 2019-10-04 DIAGNOSIS — L57.0 ACTINIC KERATOSIS: Primary | ICD-10-CM

## 2019-10-04 RX ORDER — FAMOTIDINE 20 MG/1
20 TABLET, FILM COATED ORAL 2 TIMES DAILY
Qty: 180 TABLET | Refills: 3 | Status: SHIPPED | OUTPATIENT
Start: 2019-10-04 | End: 2020-01-01

## 2019-10-04 ASSESSMENT — PAIN SCALES - GENERAL: PAINLEVEL: NO PAIN (0)

## 2019-10-04 NOTE — PATIENT INSTRUCTIONS
Cryotherapy    What is it?    Use of a very cold liquid, such as liquid nitrogen, to freeze and destroy abnormal skin cells that need to be removed    What should I expect?    Tenderness and redness    A small blister that might grow and fill with dark purple blood. There may be crusting.    More than one treatment may be needed if the lesions do not go away.    How do I care for the treated area?    Gently wash the area with your hands when bathing.    Use a thin layer of Vaseline to help with healing. You may use a Band-Aid.     The area should heal within 7-10 days and may leave behind a pink or lighter color.     Do not use an antibiotic or Neosporin ointment.     You may take acetaminophen (Tylenol) for pain.     Call your Doctor if you have:    Severe pain    Signs of infection (warmth, redness, cloudy yellow drainage, and or a bad smell)    Questions or concerns    Who should I call with questions?       [facility]: [phone]       [facility]: [phone]       For urgent needs outside of business hours call the [facility] at [phone]        and ask for the dermatology resident on call      Dry Skin    What is dry skin?    Common skin problem    Can be worse during the winter     Affects all ages    Occurs in people with or without other skin problems    What does it look like?    Fine lines in the skin become more visible     Rough feeling skin     Flaky skin    Most common on the arms and legs    Skin can become cracked, especially on the hands and feet    What are some problems caused by dry skin?     Itching    Rubbing or scratching can cause thickened, rough skin patches    Cracks in skin can be painful    Red, itchy, scaly skin (called eczema) can occur    Yellow crusting or pus could be signs of an infection    What causes dry skin?    A lack of water in the top layer of the skin    Too much soapy water,  hot water, or harsh  chemicals    Aging and sun damage    How do I treat dry skin?    Shower or bathe daily for under ten minutes with lukewarm water and mild soap.    Pat yourself dry with a towel gently and leave your skin slightly damp.    Use moisturizing cream or ointment right away.  Avoid lotions.    What kind of mild soap should I be using?    Camay , Dove , Tone , Neutrogena , Purpose , or Oil of Olay     A non-detergent cleanser, like Cetaphil , can be used.    What should I stay away from?    Scented soaps     Bath oils    What moisturizers should I be using?    Cetaphil Cream,CeraVe Cream, Vanicream, Aquaphilic, Eucerin, Aquaphor, or Vaseline     Always apply after showering or bathing.    Reapply throughout the day, if possible.    If dry skin affects your hands, always reapply after handwashing.    What else should I know?    Using a humidifier during winter months may help.    If dry skin gets worse or if eczema develops, a steroid cream may be needed.

## 2019-10-04 NOTE — NURSING NOTE
Daniel Mcnair      1.  Has the patient received the information for the influenza vaccine? YES    2.  Does the patient have any of the following contraindications?     Allergy to eggs? No     Allergy to a component of the influenza vaccine? No     Serious reaction to previous influenza vaccines? No     Paralyzed by Guillain-[place] syndrome? No     Currently sick today? No         3.  Verified patient name and  prior to injection. Yes  4.  The patient was instructed to wait 15 minutes before leaving the building in the event of an allergic reaction: YES        Vaccination given by Austin Johnston, KAREN.      Recorded by Austin Johnston CMA      Daniel Mcnair comes into clinic today at the request of Tim, Ordering Provider for an immunization/s.    I gave the Flu immunization/s while the patient was in clinic. They received an informational sheet and I explained the common side effects of the injection. The patient tolerated the procedure well and had no complications while here in clinic.     This service provided today was under the supervising provider of the mara Dumont, who was available if needed.    Austin Johnston CMA, EMT at 11:26 AM on 10/4/2019.

## 2019-10-04 NOTE — NURSING NOTE
Dermatology Rooming Note    Daniel Mcnair's goals for this visit include:   Chief Complaint   Patient presents with     Derm Problem     Daniel is here for a skin check, states concerning areas on his face.        Tiffany Tapia LPN

## 2019-10-04 NOTE — LETTER
10/4/2019       RE: Daniel Mcnair  [address]  [place] [facility] [address]     Dear Colleague,    Thank you for referring your patient, Daniel Mcnair, to the [facility] DERMATOLOGY at [facility]. Please see a copy of my visit note below.    [facility] Dermatology Note      Dermatology Problem List:  1. Hx NMSC  - BCC, R lateral neck, s/p Mohs 3/11/2016  - BCC, L antihelix, s/p Mohs 11/13/2015  2. AKs  3. Non-Hodgkin Lymphoma, 2012, followed by oncology  4. ISK bx from L upper back 10/19/2015  5. EIC, L postauricular scalp  6. Verrucous keratosis, R elbow, s/p bx 10/4/19    Encounter Date: Oct 4, 2019    CC:   Chief Complaint   Patient presents with     Derm Problem     Daniel is here for a skin check, states concerning areas on his face.      History of Present Illness:  Mr. Daniel Mcnair is a 89 year old male with a history of NMSC who presents today for a skin check.  He was last seen in clinic on 8/10/18 at which time a bx was performed on his R elbow and consistent with a verrucous keratosis, and an AK on his R cheek was treated with cryotherapy.  Since his last visit, he states he is doing well. He has a tender and sore area behind his R ear that he covers with a band aid, and another sore lesion on his R 5th digit that he also covers with a band aid.  Aside from these two lesions, he denies any additional tender, non-healing, or bleeding lesions of concern that he would like to address today.     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)     Benign essential hypertension     Coronary artery disease involving native coronary artery of native heart without angina pectoris     Advance Care Planning     Osteoarthritis of multiple joints, unspecified osteoarthritis type     Hyperlipidemia, unspecified hyperlipidemia type     Other fatigue     B-cell lymphoma, unspecified B-cell lymphoma type, unspecified body region (H)     IPMN (intraductal papillary mucinous neoplasm)     Sensorineural hearing loss (SNHL) of both ears     Arthropathy of right sacroiliac joint     Past Medical History:   Diagnosis Date     Anal stricture 7/29/2011     Baker's cyst      Basal cell carcinoma      Chronic pain     left hip     Coronary artery disease 9/5/2012     Dermatochalasis      Esophageal reflux 3/20/2013    not been an issue     Hearing loss      Hypertrophy of prostate without urinary obstruction and other lower urinary tract symptoms (LUTS) 7/21/2011     Idiopathic gynecomastia 7/29/2011     Impotence of organic origin 7/21/2011      Lymphoma (H) 1/25/2012     Macular degeneration, dry      Other and unspecified hyperlipidemia 7/21/2011     Snoring      Spinal stenosis, lumbar region, without neurogenic claudication 7/21/2011     Stented coronary artery 2003    x5; two separate times     Unspecified essential hypertension 7/21/2011     Past Surgical History:   Procedure Laterality Date     ARTHROPLASTY HIP  10/2008    left     ARTHROPLASTY HIP  1999    right     ARTHROPLASTY REVISION HIP  1/21/2014    Procedure: ARTHROPLASTY REVISION HIP;  Revision Left Total Hip;  Surgeon: Edgar Grewal MD;  Location: [facility] OR     BACK SURGERY  2006     BLEPHAROPLASTY BILATERAL Bilateral 5/17/2019    Procedure: BLEPHAROPLASTY BILATERAL UPPER LIDS;  Surgeon: America Melo MD;  Location: [facility] OR     CARDIAC SURGERY  2001    stent x 5      CARDIAC SURGERY  2002     CATARACT IOL, RT/LT Bilateral      CYSTOSCOPY, TRANSURETHRAL RESECTION (TUR) PROSTATE, COMBINED  11/5/2013    Procedure: COMBINED CYSTOSCOPY, TRANSURETHRAL RESECTION (TUR) PROSTATE;  Transurethral Resection Of Prostate, Bipolar;  Surgeon: Lai Street MD;  Location: [facility] OR     ESOPHAGOSCOPY, GASTROSCOPY, DUODENOSCOPY (EGD), COMBINED  2/3/2014    Procedure: COMBINED ESOPHAGOSCOPY, GASTROSCOPY, DUODENOSCOPY (EGD);;  Surgeon: Ezra Fiore MD;  Location: [facility] GI     ESOPHAGOSCOPY, GASTROSCOPY, DUODENOSCOPY (EGD), COMBINED  4/15/2014    Procedure: COMBINED ESOPHAGOSCOPY, GASTROSCOPY, DUODENOSCOPY (EGD), BIOPSY SINGLE OR MULTIPLE;  Surgeon: Ezra Fiore MD;  Location: [facility]     EXTRACAPSULAR CATARACT EXTRATION WITH INTRAOCULAR LENS IMPLANT  2005     EXTRACAPSULAR CATARACT EXTRATION WITH INTRAOCULAR LENS IMPLANT  2010     HERNIA REPAIR       INJECT NERVE BLOCK LUMBAR PARAVERTEBRAL SYMPATHETIC Left 8/29/2018    Procedure: INJECT NERVE BLOCK LUMBAR PARAVERTEBRAL SYMPATHETIC;  Left L3, L4, sacral Ala medial branch block;  Surgeon: Ronaldo Hicks MD;  Location: [facility] OR     IR LYMPH NODE BIOPSY   9/20/2019     MASTOID SURGERY  1939     MOHS MICROGRAPHIC PROCEDURE       OPTICAL TRACKING SYSTEM FUSION SPINE POSTERIOR LUMBAR TWO LEVELS  7/11/2013    Procedure: OPTICAL TRACKING SYSTEM FUSION SPINE POSTERIOR LUMBAR TWO LEVELS;  Stealth Assisted Lumbar 3-4 Transforaminal Lumbar Interbody Fusion, Lumbar 2-3 Lumbar 3-4 Laminectomy Decompression;  Surgeon: Edgar Lew MD;  Location: [facility] OR     psoris Bilateral 2019       Social History:  Patient reports that he has never smoked. He has never used smokeless tobacco. He reports current alcohol use of about 1.7 standard drinks of alcohol per week. He reports that he does not use drugs.    Family History:  Family History   Problem Relation Age of Onset     Diabetes Maternal Grandfather      Alcohol/Drug Maternal Grandfather      Arthritis Maternal Grandfather      Obesity Maternal Grandfather      Cerebrovascular Disease Maternal Grandmother      Hypertension Mother         willa rand     Glaucoma No family hx of      Macular Degeneration No family hx of      Cancer No family hx of         No known family hx of skin cancer     Skin Cancer No family hx of        Medications:  Current Outpatient Medications   Medication Sig Dispense Refill     acetaminophen (TYLENOL) 325 MG tablet Take 2 tablets (650 mg) by mouth every 6 hours as needed for pain 100 tablet 0     amLODIPine (NORVASC) 10 MG tablet TAKE 1 TABLET DAILY 90 tablet 3     atorvastatin (LIPITOR) 20 MG tablet TAKE ONE TABLET BY MOUTH ONE TIME DAILY 30 tablet 0     benazepril (LOTENSIN) 40 MG tablet Take 1 tablet (40 mg) by mouth daily 90 tablet 3     bisacodyl (DULCOLAX) 10 MG suppository Place 1 suppository rectally daily as needed. 3 suppository 0     carboxymethylcellulose PF (REFRESH PLUS) 0.5 % SOLN ophthalmic solution 1 drop 3 times daily as needed       Carboxymethylcellulose Sodium 1 % GEL Apply to eye as needed       docusate sodium 100 MG tablet Take 100 mg by mouth 2 times daily 60  tablet 1     FISH OIL-KRILL OIL PO Take 2,000 mg by mouth daily       Guar Gum (BENEFIBER) packet Take 1 packet by mouth every morning        Magnesium Hydroxide (MILK OF MAGNESIA PO) Take by mouth At Bedtime       metoprolol succinate ER (TOPROL-XL) 25 MG 24 hr tablet TAKE ONE HALF TABLET BY MOUTH EVERY DAY 45 tablet 2     Multiple Vitamins-Minerals (VITEYES AREDS FORMULA/LUTEIN) CAPS Take by mouth daily       nitroglycerin (NITROSTAT) 0.4 MG sublingual tablet Place 1 tablet (0.4 mg) under the tongue every 5 minutes as needed 30 tablet 2     sildenafil (VIAGRA) 100 MG tablet Take 1 tablet (100 mg) by mouth daily as needed 10 tablet 2     tamsulosin (FLOMAX) 0.4 MG capsule Take 1 capsule (0.4 mg) by mouth daily 90 capsule 3     tolterodine (DETROL) 2 MG tablet Take 1 tablet (2 mg) by mouth 2 times daily 180 tablet 3     famotidine (PEPCID) 20 MG tablet Take 1 tablet (20 mg) by mouth 2 times daily 180 tablet 3        Allergies   Allergen Reactions     Nka [No Known Allergies]      Review of Systems:  -As per HPI  -Constitutional: Otherwise feeling well today, in usual state of health.  -HEENT: Patient denies nonhealing oral sores.  -Skin: As above in HPI. No additional skin concerns.    Physical exam:  Vitals: There were no vitals taken for this visit.  GEN: This is a well developed, well-nourished male in no acute distress, in a pleasant mood.    SKIN: Total skin excluding the undergarment areas was performed. The exam included the head/face, neck, both arms, chest, back, abdomen, both legs, digits and/or nails.   -Cheek skin type: II  - On the L malar cheek, L upper forehead and R temple there are pink, gritty papules   - On the R postauricular scalp, there is a medium brown papule with a rim of erythema and overlying crust   - Examination of the left posterior neck shows a 1 cm, firm, subcutaneous nodule without induration.   - Scattered, waxy, [id number], tan-to-brown papules and plaques on the  lateral temples, the chest, the abdomen, the upper and lower back.   - Right lateral distal 5th finger with a firm, hyperkeratotic papule with a punctate center.   - Scattered, light-brown macules on superior shoulders and lateral cheeks.   -No other lesions of concern on areas examined.     Impression/Plan:  1. Actinic keratoses, L malar cheek, L upper forehead and R temple    Cryotherapy procedure note: After verbal consent and discussion of risks and benefits including but no limited to dyspigmentation/scar, blister, and pain, 3 was(were) treated with 1-2mm freeze border for 2 cycles with liquid nitrogen. Post cryotherapy instructions were provided.    Sun precaution was advised including the use of sun screens of SPF 30 or higher, sun protective clothing, and avoidance of tanning beds.    2. Seborrheic keratosis, inflamed, R postauricular scalp     Cryotherapy procedure note: After verbal consent and discussion of risks and benefits including but no limited to dyspigmentation/scar, blister, and pain, 1 was(were) treated with 1-2mm freeze border for 2 cycles with liquid nitrogen. Post cryotherapy instructions were provided.    3. History of nonmelanoma skin cancer, no clincial evidence of recurrence    Sun precaution was advised including the use of sun screens of SPF 30 or higher, sun protective clothing, and avoidance of tanning beds.    4. Suspected digital mucocyst on the R 5th finger.     Pt state that cyst is not bothersome for him. Recommended excision with ortho if cyst becomes bothersome and he wishes to pursue further treatment    5. Notalgia paresthetica, central upper back    Recommended applying lidocaine patches or menthol cream to the affected area to help calm the nerves and prevent further pruritus  6. EIC, L neck    Non-bothersome for patient. Recommended excision if pt wishes to pursue further treatment to remove cyst capsule and prevent recurrence     CC Referred Self, MD  No address on file  on close of this encounter.    Follow-up in 1 year, earlier for new or changing lesions.     Dr. Camarena staffed the patient.    Staff Involved:  Resident(Dr. Feng)/Staff(Dr. Camarena)    I have personally examined this patient and agree with Dr. Feng's documentation and plan of care. I have reviewed and amended the resident's note above. The documentation accurately reflects my clinical observations, diagnoses, treatment and follow-up plans. I was present for key portions of the procedure.       Yovana Camarena MD  Dermatology Staff

## 2019-10-04 NOTE — NURSING NOTE
Daniel Mcnair      1.  Has the patient received the information for the influenza vaccine? YES    2.  Does the patient have any of the following contraindications?     Allergy to eggs? No     Allergy to a component of the influenza vaccine? No     Serious reaction to previous influenza vaccines? No     Paralyzed by Guillain-Belfield syndrome? No     Currently sick today? Yes         3.  Verified patient name and  prior to injection. Yes  4.  The patient was instructed to wait 15 minutes before leaving the building in the event of an allergic reaction: YES        Vaccination given by Austin Johnston, KAREN.      Recorded by Austin Johnston CMA      Daniel Mcnair comes into clinic today at the request of Tim, Ordering Provider for an immunization/s.    I gave the Flu immunization/s while the patient was in clinic. They received an informational sheet and I explained the common side effects of the injection. The patient tolerated the procedure well and had no complications while here in clinic.     This service provided today was under the supervising provider of the mara Dumont , who was available if needed.    Austin Johnston CMA, EMT at 10:50 AM on 10/4/2019.

## 2019-10-04 NOTE — PROGRESS NOTES
[facility] Dermatology Note      Dermatology Problem List:  1. Hx NMSC  - BCC, R lateral neck, s/p Mohs 3/11/2016  - BCC, L antihelix, s/p Mohs 11/13/2015  2. AKs  3. Non-Hodgkin Lymphoma, 2012, followed by oncology  4. ISK bx from L upper back 10/19/2015  5. EIC, L postauricular scalp  6. Verrucous keratosis, R elbow, s/p bx 10/4/19    Encounter Date: Oct 4, 2019    CC:   Chief Complaint   Patient presents with     Derm Problem     Daniel is here for a skin check, states concerning areas on his face.      History of Present Illness:  Mr. Daniel Mcnair is a 89 year old male with a history of NMSC who presents today for a skin check.  He was last seen in clinic on 8/10/18 at which time a bx was performed on his R elbow and consistent with a verrucous keratosis, and an AK on his R cheek was treated with cryotherapy.  Since his last visit, he states he is doing well. He has a tender and sore area behind his R ear that he covers with a band aid, and another sore lesion on his R 5th digit that he also covers with a band aid.  Aside from these two lesions, he denies any additional tender, non-healing, or bleeding lesions of concern that he would like to address today.     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)     Benign essential hypertension     Coronary artery disease involving native coronary artery of native heart without angina pectoris     Advance Care Planning     Osteoarthritis of multiple joints, unspecified osteoarthritis type     Hyperlipidemia, unspecified hyperlipidemia type     Other fatigue     B-cell lymphoma, unspecified B-cell lymphoma type, unspecified body region (H)     IPMN (intraductal papillary mucinous neoplasm)     Sensorineural hearing loss (SNHL) of both ears     Arthropathy of right sacroiliac joint     Past Medical History:   Diagnosis Date     Anal stricture 7/29/2011     Baker's cyst      Basal cell carcinoma      Chronic pain     left hip     Coronary artery disease 9/5/2012     Dermatochalasis      Esophageal reflux 3/20/2013    not been an issue     Hearing loss      Hypertrophy of prostate without urinary obstruction and other lower urinary tract symptoms (LUTS) 7/21/2011     Idiopathic gynecomastia 7/29/2011     Impotence of organic origin 7/21/2011     Lymphoma (H) 1/25/2012     Macular degeneration, dry      Other and unspecified hyperlipidemia 7/21/2011     Snoring      Spinal stenosis, lumbar region, without neurogenic claudication 7/21/2011     Stented coronary artery 2003    x5; two separate times     Unspecified essential hypertension 7/21/2011     Past Surgical  History:   Procedure Laterality Date     ARTHROPLASTY HIP  10/2008    left     ARTHROPLASTY HIP  1999    right     ARTHROPLASTY REVISION HIP  1/21/2014    Procedure: ARTHROPLASTY REVISION HIP;  Revision Left Total Hip;  Surgeon: Edgar Grewal MD;  Location: [facility]     BACK SURGERY  2006     BLEPHAROPLASTY BILATERAL Bilateral 5/17/2019    Procedure: BLEPHAROPLASTY BILATERAL UPPER LIDS;  Surgeon: America Melo MD;  Location: [facility]     CARDIAC SURGERY  2001    stent x 5      CARDIAC SURGERY  2002     CATARACT IOL, RT/LT Bilateral      CYSTOSCOPY, TRANSURETHRAL RESECTION (TUR) PROSTATE, COMBINED  11/5/2013    Procedure: COMBINED CYSTOSCOPY, TRANSURETHRAL RESECTION (TUR) PROSTATE;  Transurethral Resection Of Prostate, Bipolar;  Surgeon: Lai Street MD;  [facility]     ESOPHAGOSCOPY, GASTROSCOPY, DUODENOSCOPY (EGD), COMBINED  2/3/2014    Procedure: COMBINED ESOPHAGOSCOPY, GASTROSCOPY, DUODENOSCOPY (EGD);;  Surgeon: Ezra Fiore MD;  [facility]     ESOPHAGOSCOPY, GASTROSCOPY, DUODENOSCOPY (EGD), COMBINED  4/15/2014    Procedure: COMBINED ESOPHAGOSCOPY, GASTROSCOPY, DUODENOSCOPY (EGD), BIOPSY SINGLE OR MULTIPLE;  Surgeon: Ezra Fiore MD;  [facility]     EXTRACAPSULAR CATARACT EXTRATION WITH INTRAOCULAR LENS IMPLANT  2005     EXTRACAPSULAR CATARACT EXTRATION WITH INTRAOCULAR LENS IMPLANT  2010     HERNIA REPAIR       INJECT NERVE BLOCK LUMBAR PARAVERTEBRAL SYMPATHETIC Left 8/29/2018    Procedure: INJECT NERVE BLOCK LUMBAR PARAVERTEBRAL SYMPATHETIC;  Left L3, L4, sacral Ala medial branch block;  Surgeon: Ronaldo Hicks MD;  [facility]     IR LYMPH NODE BIOPSY  9/20/2019     MASTOID SURGERY  1939     MOHS MICROGRAPHIC PROCEDURE       OPTICAL TRACKING SYSTEM FUSION SPINE POSTERIOR LUMBAR TWO LEVELS  7/11/2013    Procedure: OPTICAL TRACKING SYSTEM FUSION SPINE POSTERIOR LUMBAR TWO LEVELS;  Stealth Assisted Lumbar 3-4 Transforaminal Lumbar Interbody Fusion, Lumbar 2-3 Lumbar 3-4  Laminectomy Decompression;  Surgeon: Edgar Lew MD;  [facility]: [facility]     psoris Bilateral 2019       Social History:  Patient reports that he has never smoked. He has never used smokeless tobacco. He reports current alcohol use of about 1.7 standard drinks of alcohol per week. He reports that he does not use drugs.    Family History:  Family History   Problem Relation Age of Onset     Diabetes Maternal Grandfather      Alcohol/Drug Maternal Grandfather      Arthritis Maternal Grandfather      Obesity Maternal Grandfather      Cerebrovascular Disease Maternal Grandmother      Hypertension Mother         willa rand     Glaucoma No family hx of      Macular Degeneration No family hx of      Cancer No family hx of         No known family hx of skin cancer     Skin Cancer No family hx of        Medications:  Current Outpatient Medications   Medication Sig Dispense Refill     acetaminophen (TYLENOL) 325 MG tablet Take 2 tablets (650 mg) by mouth every 6 hours as needed for pain 100 tablet 0     amLODIPine (NORVASC) 10 MG tablet TAKE 1 TABLET DAILY 90 tablet 3     atorvastatin (LIPITOR) 20 MG tablet TAKE ONE TABLET BY MOUTH ONE TIME DAILY 30 tablet 0     benazepril (LOTENSIN) 40 MG tablet Take 1 tablet (40 mg) by mouth daily 90 tablet 3     bisacodyl (DULCOLAX) 10 MG suppository Place 1 suppository rectally daily as needed. 3 suppository 0     carboxymethylcellulose PF (REFRESH PLUS) 0.5 % SOLN ophthalmic solution 1 drop 3 times daily as needed       Carboxymethylcellulose Sodium 1 % GEL Apply to eye as needed       docusate sodium 100 MG tablet Take 100 mg by mouth 2 times daily 60 tablet 1     FISH OIL-KRILL OIL PO Take 2,000 mg by mouth daily       Guar Gum (BENEFIBER) packet Take 1 packet by mouth every morning        Magnesium Hydroxide (MILK OF MAGNESIA PO) Take by mouth At Bedtime       metoprolol succinate ER (TOPROL-XL) 25 MG 24 hr tablet TAKE ONE HALF TABLET BY MOUTH EVERY DAY 45 tablet 2      Multiple Vitamins-Minerals (VITEYES AREDS FORMULA/LUTEIN) CAPS Take by mouth daily       nitroglycerin (NITROSTAT) 0.4 MG sublingual tablet Place 1 tablet (0.4 mg) under the tongue every 5 minutes as needed 30 tablet 2     sildenafil (VIAGRA) 100 MG tablet Take 1 tablet (100 mg) by mouth daily as needed 10 tablet 2     tamsulosin (FLOMAX) 0.4 MG capsule Take 1 capsule (0.4 mg) by mouth daily 90 capsule 3     tolterodine (DETROL) 2 MG tablet Take 1 tablet (2 mg) by mouth 2 times daily 180 tablet 3     famotidine (PEPCID) 20 MG tablet Take 1 tablet (20 mg) by mouth 2 times daily 180 tablet 3        Allergies   Allergen Reactions     Nka [No Known Allergies]      Review of Systems:  -As per HPI  -Constitutional: Otherwise feeling well today, in usual state of health.  -HEENT: Patient denies nonhealing oral sores.  -Skin: As above in HPI. No additional skin concerns.    Physical exam:  Vitals: There were no vitals taken for this visit.  GEN: This is a well developed, well-nourished male in no acute distress, in a pleasant mood.    SKIN: Total skin excluding the undergarment areas was performed. The exam included the head/face, neck, both arms, chest, back, abdomen, both legs, digits and/or nails.   -Cheek skin type: II  - On the L malar cheek, L upper forehead and R temple there are pink, gritty papules   - On the R postauricular scalp, there is a medium brown papule with a rim of erythema and overlying crust   - Examination of the left posterior neck shows a 1 cm, firm, subcutaneous nodule without induration.   - Scattered, waxy, [id number], tan-to-brown papules and plaques on the lateral temples, the chest, the abdomen, the upper and lower back.   - Right lateral distal 5th finger with a firm, hyperkeratotic papule with a punctate center.   - Scattered, light-brown macules on superior shoulders and lateral cheeks.   -No other lesions of concern on areas examined.     Impression/Plan:  1. Actinic  keratoses, L malar cheek, L upper forehead and R temple    Cryotherapy procedure note: After verbal consent and discussion of risks and benefits including but no limited to dyspigmentation/scar, blister, and pain, 3 was(were) treated with 1-2mm freeze border for 2 cycles with liquid nitrogen. Post cryotherapy instructions were provided.    Sun precaution was advised including the use of sun screens of SPF 30 or higher, sun protective clothing, and avoidance of tanning beds.    2. Seborrheic keratosis, inflamed, R postauricular scalp     Cryotherapy procedure note: After verbal consent and discussion of risks and benefits including but no limited to dyspigmentation/scar, blister, and pain, 1 was(were) treated with 1-2mm freeze border for 2 cycles with liquid nitrogen. Post cryotherapy instructions were provided.    3. History of nonmelanoma skin cancer, no clincial evidence of recurrence    Sun precaution was advised including the use of sun screens of SPF 30 or higher, sun protective clothing, and avoidance of tanning beds.    4. Suspected digital mucocyst on the R 5th finger.     Pt state that cyst is not bothersome for him. Recommended excision with ortho if cyst becomes bothersome and he wishes to pursue further treatment    5. Notalgia paresthetica, central upper back    Recommended applying lidocaine patches or menthol cream to the affected area to help calm the nerves and prevent further pruritus  6. EIC, L neck    Non-bothersome for patient. Recommended excision if pt wishes to pursue further treatment to remove cyst capsule and prevent recurrence     CC Referred Self, MD  No address on file on close of this encounter.    Follow-up in 1 year, earlier for new or changing lesions.     Dr. Camarena staffed the patient.    Staff Involved:  Resident(Dr. Feng)/Staff(Dr. Camarena)    I have personally examined this patient and agree with Dr. Feng's documentation and plan of care. I have reviewed and amended the  resident's note above. The documentation accurately reflects my clinical observations, diagnoses, treatment and follow-up plans. I was present for key portions of the procedure.       Yovana Camarena MD  Dermatology Staff

## 2019-10-08 ENCOUNTER — ANCILLARY PROCEDURE (OUTPATIENT)
Dept: RADIOLOGY | Facility: AMBULATORY SURGERY CENTER | Age: 84
End: 2019-10-08
Payer: COMMERCIAL

## 2019-10-08 ENCOUNTER — HOSPITAL ENCOUNTER (OUTPATIENT)
Facility: AMBULATORY SURGERY CENTER | Age: 84
End: 2019-10-08
Payer: COMMERCIAL

## 2019-10-08 VITALS
RESPIRATION RATE: 15 BRPM | DIASTOLIC BLOOD PRESSURE: 70 MMHG | BODY MASS INDEX: 26.05 KG/M2 | SYSTOLIC BLOOD PRESSURE: 143 MMHG | TEMPERATURE: 98.2 F | WEIGHT: 182 LBS | HEART RATE: 54 BPM | HEIGHT: 70 IN | OXYGEN SATURATION: 98 %

## 2019-10-08 DIAGNOSIS — M47.818 ARTHROPATHY OF RIGHT SACROILIAC JOINT: ICD-10-CM

## 2019-10-08 DIAGNOSIS — R52 PAIN: ICD-10-CM

## 2019-10-08 RX ORDER — BUPIVACAINE HYDROCHLORIDE 2.5 MG/ML
INJECTION, SOLUTION EPIDURAL; INFILTRATION; INTRACAUDAL PRN
Status: DISCONTINUED | OUTPATIENT
Start: 2019-10-08 | End: 2019-10-08 | Stop reason: HOSPADM

## 2019-10-08 RX ORDER — METHYLPREDNISOLONE ACETATE 40 MG/ML
INJECTION, SUSPENSION INTRA-ARTICULAR; INTRALESIONAL; INTRAMUSCULAR; SOFT TISSUE PRN
Status: DISCONTINUED | OUTPATIENT
Start: 2019-10-08 | End: 2019-10-08 | Stop reason: HOSPADM

## 2019-10-08 RX ORDER — LIDOCAINE HYDROCHLORIDE 10 MG/ML
INJECTION, SOLUTION EPIDURAL; INFILTRATION; INTRACAUDAL; PERINEURAL PRN
Status: DISCONTINUED | OUTPATIENT
Start: 2019-10-08 | End: 2019-10-08 | Stop reason: HOSPADM

## 2019-10-08 ASSESSMENT — MIFFLIN-ST. JEOR: SCORE: 1496.8

## 2019-10-08 NOTE — DISCHARGE INSTRUCTIONS
Home Care Instructions after an Epidural Steroid Pain Injection    A lumbar epidural steroid injection delivers steroid medication directly into the area that may be causing your lower back pain and/or leg pain. A cervical or thoracic epidural steroid injection delivers steroids into the epidural space surrounding spinal nerve roots to help relieve pain in the upper spine/neck.    Activity  -Rest today  -Do not work today  -Resume normal activity tomorrow  -DO NOT shower for 24 hours  -DO NOT remove bandaid for 24 hours    Pain  -You may experience soreness at the injection site for one or two days  -You may use an ice pack for 20 minutes every 2 hours for the first 24 hours  -You may use a heating pad after the first 24 hours  -You may use Tylenol (acetaminophen) every 4 hours or other pain medicines as     directed by your physician    You may experience numbness radiating into your legs or arms (depending on the procedure location). This numbness may last several hours. Until sensation returns to normal; please use caution in walking, climbing stairs, and stepping out of your vehicle, etc.    DID YOU RECEIVE SEDATION TODAY?  No    Safety  Sedation medicine, if given, may remain active for many hours. It is important for the next 24 hours that you do not:  -Drive a car  -Operate machines or power tools  -Consume alcohol, including beer  -Sign any important papers or legal documents      Common side effects of steroids:  Not everyone will experience corticosteroid side effects. If side effects are experienced, they will gradually subside in the 7-10 day period following an injection. Most common side effects include:  -Flushed face and/or chest  -Feeling of warmth, particularly in the face but could be an overall feeling of warmth  -Increased blood sugar in diabetic patients  -Menstrual irregularities my occur. If taking hormone-based birth control an alternate method of birth control is recommended  -Sleep  disturbances and/or mood swings are possible  -Leg cramps    Please contact us if you have:  -Severe pain  -Fever more than 101.5 degrees Fahrenheit  -Signs of infection at the injection site (redness, swelling, or drainage)    If you have questions, please contact our office at [phone] between the hours of 7:00 am and 3:00 pm Monday through Friday. After office hours you can contact the on call provider by dialing [phone]. If you need immediate attention, we recommend that you go to a hospital emergency room or dial [phone].

## 2019-10-09 DIAGNOSIS — Z11.59 ENCOUNTER FOR SCREENING FOR OTHER VIRAL DISEASES: ICD-10-CM

## 2019-10-09 DIAGNOSIS — C82.01 GRADE 1 FOLLICULAR LYMPHOMA OF LYMPH NODES OF NECK (H): Primary | ICD-10-CM

## 2019-10-09 NOTE — PROGRESS NOTES
Patient is an 89-year-old gentleman with a chief complaint of right lower back pain.  The patient states that the pain is approximately 2-3 out of 10 in severity and that it keeps him from doing most of the things he needs to do.  He has had physical therapy in the past this is seem to help with this pain.  He has been seen in this clinic previously, specifically in the procedure center for diagnostic medial branch blocks.  At the time of his proposed procedure, the patient did not have pain and his procedure was canceled.  Review of his chart reveals that he has received intra-articular facet injections in the past.  He notes that his pain is worse with prolonged sitting and prolonged standing.  He notes that his pain is alleviated by sitting position.  He presents today for reevaluation and follow-up.  Current Outpatient Medications   Medication     acetaminophen (TYLENOL) 325 MG tablet     amLODIPine (NORVASC) 10 MG tablet     atorvastatin (LIPITOR) 20 MG tablet     benazepril (LOTENSIN) 40 MG tablet     bisacodyl (DULCOLAX) 10 MG suppository     carboxymethylcellulose PF (REFRESH PLUS) 0.5 % SOLN ophthalmic solution     Carboxymethylcellulose Sodium 1 % GEL     docusate sodium 100 MG tablet     FISH OIL-KRILL OIL PO     Guar Gum (BENEFIBER) packet     Magnesium Hydroxide (MILK OF MAGNESIA PO)     metoprolol succinate ER (TOPROL-XL) 25 MG 24 hr tablet     Multiple Vitamins-Minerals (VITEYES AREDS FORMULA/LUTEIN) CAPS     nitroglycerin (NITROSTAT) 0.4 MG sublingual tablet     sildenafil (VIAGRA) 100 MG tablet     tamsulosin (FLOMAX) 0.4 MG capsule     tolterodine (DETROL) 2 MG tablet     famotidine (PEPCID) 20 MG tablet     No current facility-administered medications for this visit.      Allergies   Allergen Reactions     Nka [No Known Allergies]      Past Medical History:   Diagnosis Date     Anal stricture 7/29/2011     Baker's cyst      Basal cell carcinoma      Chronic pain     left hip     Coronary artery  disease 9/5/2012     Dermatochalasis      Esophageal reflux 3/20/2013    not been an issue     Hearing loss      Hypertrophy of prostate without urinary obstruction and other lower urinary tract symptoms (LUTS) 7/21/2011     Idiopathic gynecomastia 7/29/2011     Impotence of organic origin 7/21/2011     Lymphoma (H) 1/25/2012     Macular degeneration, dry      Other and unspecified hyperlipidemia 7/21/2011     Snoring      Spinal stenosis, lumbar region, without neurogenic claudication 7/21/2011     Stented coronary artery 2003    x5; two separate times     Unspecified essential hypertension 7/21/2011     Past Surgical History:   Procedure Laterality Date     ARTHROPLASTY HIP  10/2008    left     ARTHROPLASTY HIP  1999    right     ARTHROPLASTY REVISION HIP  1/21/2014    Procedure: ARTHROPLASTY REVISION HIP;  Revision Left Total Hip;  Surgeon: Edgar Grewal MD;  [facility]     BACK SURGERY  2006     BLEPHAROPLASTY BILATERAL Bilateral 5/17/2019    Procedure: BLEPHAROPLASTY BILATERAL UPPER LIDS;  Surgeon: America Melo MD;  [facility]: [facility]     CARDIAC SURGERY  2001    stent x 5      CARDIAC SURGERY  2002     CATARACT IOL, RT/LT Bilateral      CYSTOSCOPY, TRANSURETHRAL RESECTION (TUR) PROSTATE, COMBINED  11/5/2013    Procedure: COMBINED CYSTOSCOPY, TRANSURETHRAL RESECTION (TUR) PROSTATE;  Transurethral Resection Of Prostate, Bipolar;  Surgeon: Lai Street MD;  [facility]     ESOPHAGOSCOPY, GASTROSCOPY, DUODENOSCOPY (EGD), COMBINED  2/3/2014    Procedure: COMBINED ESOPHAGOSCOPY, GASTROSCOPY, DUODENOSCOPY (EGD);;  Surgeon: Ezra Fiore MD;  [facility]     ESOPHAGOSCOPY, GASTROSCOPY, DUODENOSCOPY (EGD), COMBINED  4/15/2014    Procedure: COMBINED ESOPHAGOSCOPY, GASTROSCOPY, DUODENOSCOPY (EGD), BIOPSY SINGLE OR MULTIPLE;  Surgeon: Ezra Fiore MD;  [facility]     EXTRACAPSULAR CATARACT EXTRATION WITH INTRAOCULAR LENS IMPLANT  2005     EXTRACAPSULAR CATARACT EXTRATION WITH  INTRAOCULAR LENS IMPLANT  2010     HERNIA REPAIR       INJECT NERVE BLOCK LUMBAR PARAVERTEBRAL SYMPATHETIC Left 8/29/2018    Procedure: INJECT NERVE BLOCK LUMBAR PARAVERTEBRAL SYMPATHETIC;  Left L3, L4, sacral Ala medial branch block;  Surgeon: Ronaldo Hicks MD;  [facility]     INJECT SACROILIAC JOINT Right 10/8/2019    Procedure: INJECTION, SACROILIAC JOINT;  Surgeon: Ronaldo Hicks MD;  [facility]     IR LYMPH NODE BIOPSY  9/20/2019     MASTOID SURGERY  1939     MOHS MICROGRAPHIC PROCEDURE       OPTICAL TRACKING SYSTEM FUSION SPINE POSTERIOR LUMBAR TWO LEVELS  7/11/2013    Procedure: OPTICAL TRACKING SYSTEM FUSION SPINE POSTERIOR LUMBAR TWO LEVELS;  Stealth Assisted Lumbar 3-4 Transforaminal Lumbar Interbody Fusion, Lumbar 2-3 Lumbar 3-4 Laminectomy Decompression;  Surgeon: Edgar Lew MD;  [facility]     psoris Bilateral 2019     Family History   Problem Relation Age of Onset     Diabetes Maternal Grandfather      Alcohol/Drug Maternal Grandfather      Arthritis Maternal Grandfather      Obesity Maternal Grandfather      Cerebrovascular Disease Maternal Grandmother      Hypertension Mother         willa rand     Glaucoma No family hx of      Macular Degeneration No family hx of      Cancer No family hx of         No known family hx of skin cancer     Skin Cancer No family hx of      Social History     Socioeconomic History     Marital status:      Spouse name: Not on file     Number of children: Not on file     Years of education: Not on file     Highest education level: Not on file   Occupational History     Not on file   Social Needs     Financial resource strain: Not on file     Food insecurity:     Worry: Not on file     Inability: Not on file     Transportation needs:     Medical: Not on file     Non-medical: Not on file   Tobacco Use     Smoking status: Never Smoker     Smokeless tobacco: Never Used   Substance and Sexual Activity     Alcohol use: Yes      Alcohol/week: 1.7 standard drinks     Comment: occ     Drug use: No     Sexual activity: Not on file   Lifestyle     Physical activity:     Days per week: Not on file     Minutes per session: Not on file     Stress: Not on file   Relationships     Social connections:     Talks on phone: Not on file     Gets together: Not on file     Attends Mandaen service: Not on file     Active member of club or organization: Not on file     Attends meetings of clubs or organizations: Not on file     Relationship status: Not on file     Intimate partner violence:     Fear of current or ex partner: Not on file     Emotionally abused: Not on file     Physically abused: Not on file     Forced sexual activity: Not on file   Other Topics Concern     Parent/sibling w/ CABG, MI or angioplasty before 65F 55M? Not Asked   Social History Narrative     Not on file      ROS: 10 point ROS neg other than the symptoms noted above in the HPI.  Physical Exam  Vitals signs and nursing note reviewed.   Constitutional:       Appearance: Normal appearance. He is normal weight.   HENT:      Head: Normocephalic and atraumatic.      Nose: Nose normal.      Mouth/Throat:      Mouth: Mucous membranes are moist.   Eyes:      Extraocular Movements: Extraocular movements intact.      Pupils: Pupils are equal, round, and reactive to light.   Neck:      Musculoskeletal: Normal range of motion and neck supple.   Cardiovascular:      Rate and Rhythm: Normal rate and regular rhythm.   Pulmonary:      Effort: Pulmonary effort is normal.      Breath sounds: Normal breath sounds.   Abdominal:      General: Abdomen is flat.   Musculoskeletal:         General: Tenderness present. No swelling, deformity or signs of injury.      Lumbar back: He exhibits decreased range of motion, tenderness, bony tenderness and pain. He exhibits no swelling, no edema, no deformity, no laceration, no spasm and normal pulse.      Right lower leg: No edema.      Left lower leg: No edema.       Comments: The patient has a positive Gaenslen's sign, the patient also has right sacroiliac tenderness.     Neurological:      Mental Status: He is alert.       A/P: The patient is an 89-year-old gentleman with a chief complaint of lower back pain on the right greater than the left.  The patient states that the pain is worse with prolonged sitting and standing.  Physical examination and history seem to corroborate or suggest that this patient has a degree of sacroiliac arthropathy.  The patient notes that physical therapy assist in the amelioration of his pain.  In addition to the physical therapy I recommend scheduling a right sacroiliac joint injection for diagnostic and therapeutic purposes.  No medication management will be indicated at this time.  The patient verbalizes an understanding of our plan.  The patient will return to clinic 4 weeks following his procedure.

## 2019-10-10 ENCOUNTER — OFFICE VISIT (OUTPATIENT)
Dept: TRANSPLANT | Facility: CLINIC | Age: 84
End: 2019-10-10
Attending: INTERNAL MEDICINE
Payer: COMMERCIAL

## 2019-10-10 VITALS
SYSTOLIC BLOOD PRESSURE: 155 MMHG | WEIGHT: 185.2 LBS | HEART RATE: 71 BPM | BODY MASS INDEX: 26.57 KG/M2 | OXYGEN SATURATION: 98 % | TEMPERATURE: 97.7 F | DIASTOLIC BLOOD PRESSURE: 67 MMHG

## 2019-10-10 DIAGNOSIS — C82.91 FOLLICULAR LYMPHOMA OF LYMPH NODES OF NECK, UNSPECIFIED FOLLICULAR LYMPHOMA TYPE (H): ICD-10-CM

## 2019-10-10 DIAGNOSIS — R91.8 PULMONARY NODULES: Primary | ICD-10-CM

## 2019-10-10 DIAGNOSIS — C82.01 GRADE 1 FOLLICULAR LYMPHOMA OF LYMPH NODES OF NECK (H): ICD-10-CM

## 2019-10-10 DIAGNOSIS — Z11.59 ENCOUNTER FOR SCREENING FOR OTHER VIRAL DISEASES: ICD-10-CM

## 2019-10-10 LAB
ALBUMIN SERPL-MCNC: 3.6 G/DL (ref 3.4–5)
ALP SERPL-CCNC: 68 U/L (ref 40–150)
ALT SERPL W P-5'-P-CCNC: 33 U/L (ref 0–70)
ANION GAP SERPL CALCULATED.3IONS-SCNC: 5 MMOL/L (ref 3–14)
AST SERPL W P-5'-P-CCNC: 19 U/L (ref 0–45)
B2 MICROGLOB SERPL-MCNC: 1.9 MG/L
BASOPHILS # BLD AUTO: 0 10E9/L (ref 0–0.2)
BASOPHILS NFR BLD AUTO: 0.5 %
BILIRUB SERPL-MCNC: 1 MG/DL (ref 0.2–1.3)
BUN SERPL-MCNC: 15 MG/DL (ref 7–30)
CALCIUM SERPL-MCNC: 9.2 MG/DL (ref 8.5–10.1)
CHLORIDE SERPL-SCNC: 107 MMOL/L (ref 94–109)
CO2 SERPL-SCNC: 24 MMOL/L (ref 20–32)
CREAT SERPL-MCNC: 0.72 MG/DL (ref 0.66–1.25)
DIFFERENTIAL METHOD BLD: ABNORMAL
EOSINOPHIL # BLD AUTO: 0.1 10E9/L (ref 0–0.7)
EOSINOPHIL NFR BLD AUTO: 1.2 %
ERYTHROCYTE [DISTWIDTH] IN BLOOD BY AUTOMATED COUNT: 13.3 % (ref 10–15)
GFR SERPL CREATININE-BSD FRML MDRD: 82 ML/MIN/{1.73_M2}
GLUCOSE SERPL-MCNC: 122 MG/DL (ref 70–99)
HBV SURFACE AB SERPL IA-ACNC: 1.04 M[IU]/ML
HBV SURFACE AG SERPL QL IA: NONREACTIVE
HCT VFR BLD AUTO: 37.7 % (ref 40–53)
HCV AB SERPL QL IA: NONREACTIVE
HGB BLD-MCNC: 13.5 G/DL (ref 13.3–17.7)
IGA SERPL-MCNC: 236 MG/DL (ref 70–380)
IGG SERPL-MCNC: 935 MG/DL (ref 695–1620)
IGM SERPL-MCNC: 50 MG/DL (ref 60–265)
IMM GRANULOCYTES # BLD: 0 10E9/L (ref 0–0.4)
IMM GRANULOCYTES NFR BLD: 0.4 %
LDH SERPL L TO P-CCNC: 164 U/L (ref 85–227)
LYMPHOCYTES # BLD AUTO: 1.4 10E9/L (ref 0.8–5.3)
LYMPHOCYTES NFR BLD AUTO: 16.2 %
MCH RBC QN AUTO: 34.9 PG (ref 26.5–33)
MCHC RBC AUTO-ENTMCNC: 35.8 G/DL (ref 31.5–36.5)
MCV RBC AUTO: 97 FL (ref 78–100)
MONOCYTES # BLD AUTO: 1 10E9/L (ref 0–1.3)
MONOCYTES NFR BLD AUTO: 11.9 %
NEUTROPHILS # BLD AUTO: 6 10E9/L (ref 1.6–8.3)
NEUTROPHILS NFR BLD AUTO: 69.8 %
NRBC # BLD AUTO: 0 10*3/UL
NRBC BLD AUTO-RTO: 0 /100
PLATELET # BLD AUTO: 141 10E9/L (ref 150–450)
POTASSIUM SERPL-SCNC: 4.3 MMOL/L (ref 3.4–5.3)
PROT SERPL-MCNC: 6.6 G/DL (ref 6.8–8.8)
RBC # BLD AUTO: 3.87 10E12/L (ref 4.4–5.9)
SODIUM SERPL-SCNC: 137 MMOL/L (ref 133–144)
URATE SERPL-MCNC: 4.7 MG/DL (ref 3.5–7.2)
WBC # BLD AUTO: 8.5 10E9/L (ref 4–11)

## 2019-10-10 PROCEDURE — 82784 ASSAY IGA/IGD/IGG/IGM EACH: CPT | Performed by: INTERNAL MEDICINE

## 2019-10-10 PROCEDURE — 36415 COLL VENOUS BLD VENIPUNCTURE: CPT

## 2019-10-10 PROCEDURE — 85025 COMPLETE CBC W/AUTO DIFF WBC: CPT | Performed by: INTERNAL MEDICINE

## 2019-10-10 PROCEDURE — 80053 COMPREHEN METABOLIC PANEL: CPT | Performed by: INTERNAL MEDICINE

## 2019-10-10 PROCEDURE — G0463 HOSPITAL OUTPT CLINIC VISIT: HCPCS | Mod: ZF

## 2019-10-10 PROCEDURE — 84550 ASSAY OF BLOOD/URIC ACID: CPT | Performed by: INTERNAL MEDICINE

## 2019-10-10 PROCEDURE — 86803 HEPATITIS C AB TEST: CPT | Performed by: INTERNAL MEDICINE

## 2019-10-10 PROCEDURE — 86706 HEP B SURFACE ANTIBODY: CPT | Performed by: INTERNAL MEDICINE

## 2019-10-10 PROCEDURE — 82232 ASSAY OF BETA-2 PROTEIN: CPT | Performed by: INTERNAL MEDICINE

## 2019-10-10 PROCEDURE — G0499 HEPB SCREEN HIGH RISK INDIV: HCPCS | Performed by: INTERNAL MEDICINE

## 2019-10-10 PROCEDURE — 83615 LACTATE (LD) (LDH) ENZYME: CPT | Performed by: INTERNAL MEDICINE

## 2019-10-10 ASSESSMENT — PAIN SCALES - GENERAL: PAINLEVEL: NO PAIN (0)

## 2019-10-10 NOTE — NURSING NOTE
"Oncology Rooming Note    October 10, 2019 8:21 AM   Daniel Mcnair is a 89 year old male who presents for:    Chief Complaint   Patient presents with     RECHECK     Return: B-cell lymphoma     Initial Vitals: BP (!) 155/67   Pulse 71   Temp 97.7  F (36.5  C) (Oral)   Wt 84 kg (185 lb 3.2 oz)   SpO2 98%   BMI 26.57 kg/m   Estimated body mass index is 26.57 kg/m  as calculated from the following:    Height as of 10/8/19: 1.778 m (5' 10\").    Weight as of this encounter: 84 kg (185 lb 3.2 oz). Body surface area is 2.04 meters squared.  No Pain (0) Comment: Data Unavailable   No LMP for male patient.  Allergies reviewed: Yes  Medications reviewed: Yes    Medications: Medication refills not needed today.  Pharmacy name entered into EPIC:    [facility] - NICOLE GOYAL  [id number] - [place], [place] - [address]  [id number] - [place], [place] - [address]  [facility] - [place], [place] - [address] [place] [address]    Clinical concerns: None       Hailey Osborn CMA              "

## 2019-10-10 NOTE — LETTER
RE: Daniel Mcnair  [address] [facility] [address]  [place] [phone]     Dear Colleague,    Thank you for referring your patient, Daniel Mcnair, to the [facility] at [facility]. Please see a copy of my visit note below.    Justo New Patient Consult  10/10/2019    Reason for Visit: New neck LN    Disease and Treatment History:  1.  Found to have incidental retroperitoneal lymphadenopathy on CT scan done for other reasons in 2012  -Needle biopsy completed and it was felt to be consistent with low-grade lymphoma not otherwise specified as the piece of tissue was to small to give a specific histologic subtype  -PET/CT on 1/27/2012 showed multiple enlarged hypermetabolic retroperitoneal lymph nodes with one left periaortic lymph node measuring 2.7 x 2.1, a conglomerate mass of 2 lymph nodes measuring 4.7 x 2.4, this had an SUV of 6.7.  A periaortic lymph node on the left measuring 2 x 1.7, and intra-aortic caval lymph node measuring 1.4 x 1.4 cm with an SUV of 5.9.  No bone uptake.  -Bone marrow biopsy on 1/26/2012 showed 20% cellularity with a single very small interstitial infiltrate of CD positive small B cells comprising less than 1% which could be low level involvement although not definitive.  2.  Observation 2012 forward  -Intermittent scans with Dr. Rojas showed all stable retroperitoneal lymphadenopathy actually somewhat smaller on last imaging in 11/2018 at 2.5 cm or less.  - Actually discharged from clinic in 11/2018 due to stability for over 6 years.    HPI: Mr. Mcnair was found to have small lymph nodes on his neck  by his primary doctor and subsequently underwent ultrasound on 8/23/2019 that showed a 1.7 x 1.1 x 1.7 cm lymph node in the left that was felt to be possible suspicious for lymphoma.  He underwent CT scan on 9/3/2019 that showed a prominent 1.3 cm lymph node left level 1B and a 1.3 cm pretracheal  Daniel Carmen is a 33 year old male presenting  for six-month follow-up for ongoing medical concerns:    VISIT PROBLEMS:  1. Sleep initiation dysfunction    2. Depression with anxiety    3. Essential hypertension, benign    4. Low serum calcium        HISTORY OF PRESENT ILLNESS:  Patient presents for followup of multiple medical concerns and medication monitoring.  Patient doing very well. Does not take blood pressure medicine and has been in normal range. Working at weight loss but has been going up slightly likely secondary to sertraline. Otherwise sertraline working very well with anxiety and depression. Second shift [employer].  Occasional lightheadedness early afternoon. Patient has tried to eat sugar. Blood sugars have been normal and hemoglobin A1c in the past very reassuring.  Drinks significant amount of coffee in the morning but not a lot of water    CO-MANAGING PROVIDERS:      Patient Active Problem List   Diagnosis   • Essential hypertension, benign   • Sleep initiation dysfunction   • Abnormal weight gain   • Depression with anxiety       I have reviewed the patient's medications and allergies, past medical, surgical, social and family history, updating these as appropriate with patient.  See Histories section of the EMR for a display of this information.    Current Outpatient Prescriptions   Medication Sig Dispense Refill   • LORazepam (ATIVAN) 0.5 MG tablet Take 1 tablet by mouth 3 times daily as needed for Anxiety. 60 tablet 5   • sertraline (ZOLOFT) 100 MG tablet TAKE ONE & ONE-HALF TABLETS BY MOUTH ONCE DAILY 45 tablet 5     No current facility-administered medications for this visit.        REVIEW OF SYSTEMS:  As above per the HPI.  All other systems reviewed and otherwise negative.   Constitutional:  Negative for fever and chills.   Skin:  Negative for rash.   HEENT:  Negative for eye drainage, rhinorrhea, ear pain, sore throat or neck pain.   Respiratory:  Negative cough, wheezing or shortness  "lymph node as well.  Fine-needle aspiration attempted in ENT clinic on 911 was nondiagnostic and subsequently underwent surgical biopsy on 9/20/2019.  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    In review of systems he notes no fevers or chills or night sweats.  He notes no unintentional weight loss.  He notes no new lumps or bumps. He notes a mild diminished appetite but he attributes that to age and not getting full easier.  He notes chronic issues with back pain and actually had a SI injection last week but no other new symptoms.  He is otherwise energetic and does the things that he likes to do.  He does describe to being a \"anxious person\" and has been doing a gratitude journal and doing a grief group at his senior living facility that has helped him greatly.    10 point review of systems otherwise negative    Past medical history:  1.  Low-grade lymphoma see HPI  2.  Anxiety  3.  Hypertension  4.  Hyperlipidemia  5.  Benign prostatic hypertrophy  6.  Diverticulosis  7.  Basal cell carcinoma status post Mohs  8.  Macular degeneration  9.  History of hernia surgery  10.  History of hip surgery  11.  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  He is  to his wife who accompanies him today.  They have been  67 years.  No tobacco use occasional alcohol use.  His son Brenton Mcnair is a retired 70 oncology nurse.    Family history: Noncontributory    Physical exam:  BP (!) 155/67   Pulse 71   Temp 97.7  F (36.5  C) (Oral)   Wt 84 kg (185 lb 3.2 oz)   SpO2 98%   BMI 26.57 kg/m     General: Very pleasant gentleman in no acute distress.  Looks much younger than his stated age of 89.  KPS 80-90  HEENT: No icterus or injection.  Oropharynx is clear.  I actually could not palpate any abnormal cervical or supraclavicular lymphadenopathy.  " of breath.    Cardiovascular:  Negative for chest pain, chest pressure, palpitations or diaphoresis.   Gastrointestinal:  Negative for nausea, vomiting, diarrhea or abdominal pain.   Genitourinary:  Negative for dysuria, urgency, frequency, hematuria or flank pain.   Extremities:  Negative for joint swelling or joint pain.   Neurologic:  Negative for change in sensory or motor function.  Negative for headache, migraine aura.  No change in gait or ataxia.  No history of vertigo.   Endocrine:  Negative for heat or cold intolerance, polydipsia, weight loss or gain.   Hematological:  Negative for bleeding, bruising or adenopathy.   Psychiatric:  Negative for change in affect, change in mentation or sleep disturbance.     PHYSICAL EXAM:  Visit Vitals  /82   Pulse 68   Ht 5' 9\" (1.753 m)   Wt 91.5 kg   BMI 29.80 kg/m²     General:  Well developed, well nourished.   Skin:  Warm and dry without rash.    Head:  Normocephalic-atraumatic.   Neck:  Trachea is midline.  No evidence of JVD.  Normal thyroid without mass or tenderness.   Eyes:  Normal conjunctivae.  PERRL.  EOMI.   Chest:  Symmetrical expansion.  No chest wall deformity or tenderness to palpation.   Cardiovascular:  Symmetrical pulses.  Regular rate and rhythm without murmur.   Respiratory:  Normal respiratory effort.  Clear to auscultation.  No wheezes, rales or rhonchi.   Gastrointestinal:  Non-distended, soft and nontender.  Normal bowel sounds.  No hepatomegaly or splenomegaly.   Musculoskeletal:  No deformity, edema or joint effusion.  No tenderness to palpation.   Back:  Normal alignment.  No costovertebral angle tenderness or midline bony tenderness.   Neurologic:  Oriented x 4.  Cranial nerves 2-12 intact.  No focal deficits.  No lateralizing signs.  Normal gait.  No ataxia.   Psychiatric:  Cooperative.  Appropriate mood and affect.     LABS/STUDIES DONE:  Outside lab reviewed  Calcium 8.2    ASSESSMENT AND PLAN:  1. Depression with anxiety and sleep  No palpable axillary lymphadenopathy  Lungs: Clear to all station bilaterally  Cardiovascular: Regular rate and rhythm  Abdomen: Soft nontender no palpable hepatosplenomegaly or other masses  Extremities: Trace to 1+ edema from the ankles down    Labs:  Results for SUMAN FRAZIER (MRN [id number]) as of 10/10/2019 10:26   Ref. Range 10/10/2019 09:14   Sodium Latest Ref Range: 133 - 144 mmol/L 137   Potassium Latest Ref Range: 3.4 - 5.3 mmol/L 4.3   Chloride Latest Ref Range: 94 - 109 mmol/L 107   Carbon Dioxide Latest Ref Range: 20 - 32 mmol/L 24   Urea Nitrogen Latest Ref Range: 7 - 30 mg/dL 15   Creatinine Latest Ref Range: 0.66 - 1.25 mg/dL 0.72   GFR Estimate Latest Ref Range: >60 mL/min/1.73_m2 82   GFR Estimate If Black Latest Ref Range: >60 mL/min/1.73_m2 >90   Calcium Latest Ref Range: 8.5 - 10.1 mg/dL 9.2   Anion Gap Latest Ref Range: 3 - 14 mmol/L 5   Albumin Latest Ref Range: 3.4 - 5.0 g/dL 3.6   Protein Total Latest Ref Range: 6.8 - 8.8 g/dL 6.6 (L)   Bilirubin Total Latest Ref Range: 0.2 - 1.3 mg/dL 1.0   Alkaline Phosphatase Latest Ref Range: 40 - 150 U/L 68   ALT Latest Ref Range: 0 - 70 U/L 33   AST Latest Ref Range: 0 - 45 U/L 19   Lactate Dehydrogenase Latest Ref Range: 85 - 227 U/L 164   Uric Acid Latest Ref Range: 3.5 - 7.2 mg/dL 4.7   Glucose Latest Ref Range: 70 - 99 mg/dL 122 (H)   WBC Latest Ref Range: 4.0 - 11.0 10e9/L 8.5   Hemoglobin Latest Ref Range: 13.3 - 17.7 g/dL 13.5   Hematocrit Latest Ref Range: 40.0 - 53.0 % 37.7 (L)   Platelet Count Latest Ref Range: 150 - 450 10e9/L 141 (L)   RBC Count Latest Ref Range: 4.4 - 5.9 10e12/L 3.87 (L)   MCV Latest Ref Range: 78 - 100 fl 97   MCH Latest Ref Range: 26.5 - 33.0 pg 34.9 (H)   MCHC Latest Ref Range: 31.5 - 36.5 g/dL 35.8   RDW Latest Ref Range: 10.0 - 15.0 % 13.3   Diff Method Unknown Automated Method   % Neutrophils Latest Units: % 69.8   % Lymphocytes Latest Units: % 16.2   % Monocytes Latest Units: % 11.9   % Eosinophils Latest  dysfunction. Sleeping better and moves doing very well on sertraline. Mild side effect to weight gain noted. Discussed strategies to keep weight under control. Lorazepam which uses once rarely twice daily and appropriately.  2. History elevated blood pressure now controlled with lifestyle. Having some lightheadedness and encouraged increase water intake.  3 low calcium. Likely secondary to sertraline. Reassurance given and we'll monitor.    Follow-up:  6 months  Labs prior:  BMP       Units: % 1.2   % Basophils Latest Units: % 0.5   % Immature Granulocytes Latest Units: % 0.4   Nucleated RBCs Latest Ref Range: 0 /100 0   Absolute Neutrophil Latest Ref Range: 1.6 - 8.3 10e9/L 6.0   Absolute Lymphocytes Latest Ref Range: 0.8 - 5.3 10e9/L 1.4   Absolute Monocytes Latest Ref Range: 0.0 - 1.3 10e9/L 1.0   Absolute Eosinophils Latest Ref Range: 0.0 - 0.7 10e9/L 0.1   Absolute Basophils Latest Ref Range: 0.0 - 0.2 10e9/L 0.0   Abs Immature Granulocytes Latest Ref Range: 0 - 0.4 10e9/L 0.0   Absolute Nucleated RBC Unknown 0.0         Hep B/C/ Immunoglobulins/Beta 2 microglobulin pending    Neck CT on 9/3 reviewed    Pathology from 9/20/2019    FINAL DIAGNOSIS:   Lymph node, neck, left:        - Follicular lymphoma, 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   Concurrent flow cytometry ([id number]) shows a CD10 positive lambda light   chain restricted B-cell population.     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    1.  Follicular lymphoma: I do long discussion with Mr. Mcnair, his wife, and his son regarding the natural history of follicular lymphoma.  We reviewed all subtypes of lymphomas including aggressive and indolent lymphomas.  We reviewed that follicular lymphoma is an indolent lymphoma that is not curable with chemotherapy but very treatable with chemotherapy.  We reviewed that the only cure for follicular lymphoma is a bone marrow transplant however again also reviewed that most people die with follicular lymphoma and not from it.    We reviewed that he has had a very long natural history to date of over 7+ years and that the current imaging of his neck shows a very  low level follicular lymphoma.  I discussed with him today that it would be good to get a reassessment of his full staging and do CT scans of the chest and the abdomen as well.  We will do additional blood work including a CBC, comprehensive, LDH, uric acid, beta-2 microglobulin, immunoglobulins, and hepatitis B and C serologies.    We reviewed the FLIP scoring system for follicular lymphoma.  Specifically this looks at age, LDH, hemoglobin, and stage and his number of jesse sites.  He would get a point for age, a point for stage of III given above and below diaphragm, but assuming 4 or fewer jesse sites, a normal hemoglobin, and a normal LDH this would give him a 50% chance of survival at 10 years without intervention.  Given his age of almost 89 this would be quite a good survival even in the absence of his lymphoma.    We discussed that if things are stable on the imaging of his abdomen and his labs all look good that I would recommend ongoing observation.  We discussed that indications for treatment really are if the follicular lymphoma is causing problems and if it is not causing problems then we observe.  If it starts causing pain or compression on vital organs or abnormal symptoms or abnormal blood counts then that would be an indication to treat.  At that point I would consider steroids and Rituxan based therapy.    He and his family were very happy to hear this information and this approach.    Next steps:  Labs today  CT scans on 1022 given that he is going to be here for a cardiology appointment that day  I will call him with results on the 24th of the 25th  If the scans look stable and we are going to continue to observe and I will see him again in 4 months time.    I spent 45 minutes with the patient with the majority in direct face to face consultation    Renata Hargrove MD

## 2019-10-10 NOTE — PATIENT INSTRUCTIONS
CT Scans on 10/22 after cardiology apt    Beena in 4 months with labs prior       Patient Instructions:  - Beena will call with CT scan results on 10/24 or 10/25

## 2019-10-10 NOTE — PROGRESS NOTES
Just[facility] New Patient Consult  10/10/2019    Reason for Visit: New neck LN    Disease and Treatment History:  1.  Found to have incidental retroperitoneal lymphadenopathy on CT scan done for other reasons in 2012  -Needle biopsy completed and it was felt to be consistent with low-grade lymphoma not otherwise specified as the piece of tissue was to small to give a specific histologic subtype  -PET/CT on 1/27/2012 showed multiple enlarged hypermetabolic retroperitoneal lymph nodes with one left periaortic lymph node measuring 2.7 x 2.1, a conglomerate mass of 2 lymph nodes measuring 4.7 x 2.4, this had an SUV of 6.7.  A periaortic lymph node on the left measuring 2 x 1.7, and intra-aortic caval lymph node measuring 1.4 x 1.4 cm with an SUV of 5.9.  No bone uptake.  -Bone marrow biopsy on 1/26/2012 showed 20% cellularity with a single very small interstitial infiltrate of CD positive small B cells comprising less than 1% which could be low level involvement although not definitive.  2.  Observation 2012 forward  -Intermittent scans with Dr. Rojas showed all stable retroperitoneal lymphadenopathy actually somewhat smaller on last imaging in 11/2018 at 2.5 cm or less.  - Actually discharged from clinic in 11/2018 due to stability for over 6 years.    HPI: Mr. Mcnair was found to have small lymph nodes on his neck  by his primary doctor and subsequently underwent ultrasound on 8/23/2019 that showed a 1.7 x 1.1 x 1.7 cm lymph node in the left that was felt to be possible suspicious for lymphoma.  He underwent CT scan on 9/3/2019 that showed a prominent 1.3 cm lymph node left level 1B and a 1.3 cm pretracheal lymph node as well.  Fine-needle aspiration attempted in ENT clinic on 911 was nondiagnostic and subsequently underwent surgical biopsy on 9/20/2019.  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    In review of systems he notes no fevers or chills or night sweats.  He notes no  "unintentional weight loss.  He notes no new lumps or bumps. He notes a mild diminished appetite but he attributes that to age and not getting full easier.  He notes chronic issues with back pain and actually had a SI injection last week but no other new symptoms.  He is otherwise energetic and does the things that he likes to do.  He does describe to being a \"anxious person\" and has been doing a gratitude journal and doing a grief group at his senior living facility that has helped him greatly.    10 point review of systems otherwise negative    Past medical history:  1.  Low-grade lymphoma see HPI  2.  Anxiety  3.  Hypertension  4.  Hyperlipidemia  5.  Benign prostatic hypertrophy  6.  Diverticulosis  7.  Basal cell carcinoma status post Mohs  8.  Macular degeneration  9.  History of hernia surgery  10.  History of hip surgery  11.  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  He is  to his wife who accompanies him today.  They have been  67 years.  No tobacco use occasional alcohol use.  His son Brenton Frazier is a retired 70 oncology nurse.    Family history: Noncontributory    Physical exam:  BP (!) 155/67   Pulse 71   Temp 97.7  F (36.5  C) (Oral)   Wt 84 kg (185 lb 3.2 oz)   SpO2 98%   BMI 26.57 kg/m    General: Very pleasant gentleman in no acute distress.  Looks much younger than his stated age of 89.  KPS 80-90  HEENT: No icterus or injection.  Oropharynx is clear.  I actually could not palpate any abnormal cervical or supraclavicular lymphadenopathy.  No palpable axillary lymphadenopathy  Lungs: Clear to all station bilaterally  Cardiovascular: Regular rate and rhythm  Abdomen: Soft nontender no palpable hepatosplenomegaly or other masses  Extremities: Trace to 1+ edema from the ankles down    Labs:  Results for SUMAN FRAZIER (MRN [id number]) as of 10/10/2019 " 10:26   Ref. Range 10/10/2019 09:14   Sodium Latest Ref Range: 133 - 144 mmol/L 137   Potassium Latest Ref Range: 3.4 - 5.3 mmol/L 4.3   Chloride Latest Ref Range: 94 - 109 mmol/L 107   Carbon Dioxide Latest Ref Range: 20 - 32 mmol/L 24   Urea Nitrogen Latest Ref Range: 7 - 30 mg/dL 15   Creatinine Latest Ref Range: 0.66 - 1.25 mg/dL 0.72   GFR Estimate Latest Ref Range: >60 mL/min/1.73_m2 82   GFR Estimate If Black Latest Ref Range: >60 mL/min/1.73_m2 >90   Calcium Latest Ref Range: 8.5 - 10.1 mg/dL 9.2   Anion Gap Latest Ref Range: 3 - 14 mmol/L 5   Albumin Latest Ref Range: 3.4 - 5.0 g/dL 3.6   Protein Total Latest Ref Range: 6.8 - 8.8 g/dL 6.6 (L)   Bilirubin Total Latest Ref Range: 0.2 - 1.3 mg/dL 1.0   Alkaline Phosphatase Latest Ref Range: 40 - 150 U/L 68   ALT Latest Ref Range: 0 - 70 U/L 33   AST Latest Ref Range: 0 - 45 U/L 19   Lactate Dehydrogenase Latest Ref Range: 85 - 227 U/L 164   Uric Acid Latest Ref Range: 3.5 - 7.2 mg/dL 4.7   Glucose Latest Ref Range: 70 - 99 mg/dL 122 (H)   WBC Latest Ref Range: 4.0 - 11.0 10e9/L 8.5   Hemoglobin Latest Ref Range: 13.3 - 17.7 g/dL 13.5   Hematocrit Latest Ref Range: 40.0 - 53.0 % 37.7 (L)   Platelet Count Latest Ref Range: 150 - 450 10e9/L 141 (L)   RBC Count Latest Ref Range: 4.4 - 5.9 10e12/L 3.87 (L)   MCV Latest Ref Range: 78 - 100 fl 97   MCH Latest Ref Range: 26.5 - 33.0 pg 34.9 (H)   MCHC Latest Ref Range: 31.5 - 36.5 g/dL 35.8   RDW Latest Ref Range: 10.0 - 15.0 % 13.3   Diff Method Unknown Automated Method   % Neutrophils Latest Units: % 69.8   % Lymphocytes Latest Units: % 16.2   % Monocytes Latest Units: % 11.9   % Eosinophils Latest Units: % 1.2   % Basophils Latest Units: % 0.5   % Immature Granulocytes Latest Units: % 0.4   Nucleated RBCs Latest Ref Range: 0 /100 0   Absolute Neutrophil Latest Ref Range: 1.6 - 8.3 10e9/L 6.0   Absolute Lymphocytes Latest Ref Range: 0.8 - 5.3 10e9/L 1.4   Absolute Monocytes Latest Ref Range: 0.0 - 1.3 10e9/L 1.0    Absolute Eosinophils Latest Ref Range: 0.0 - 0.7 10e9/L 0.1   Absolute Basophils Latest Ref Range: 0.0 - 0.2 10e9/L 0.0   Abs Immature Granulocytes Latest Ref Range: 0 - 0.4 10e9/L 0.0   Absolute Nucleated RBC Unknown 0.0         Hep B/C/ Immunoglobulins/Beta 2 microglobulin pending    Neck CT on 9/3 reviewed    Pathology from 9/20/2019    FINAL DIAGNOSIS:   Lymph node, neck, left:        - Follicular lymphoma, 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   Concurrent flow cytometry ([id number]) shows a CD10 positive lambda light   chain restricted B-cell population.     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    1.  Follicular lymphoma: I do long discussion with Mr. Mcnair, his wife, and his son regarding the natural history of follicular lymphoma.  We reviewed all subtypes of lymphomas including aggressive and indolent lymphomas.  We reviewed that follicular lymphoma is an indolent lymphoma that is not curable with chemotherapy but very treatable with chemotherapy.  We reviewed that the only cure for follicular lymphoma is a bone marrow transplant however again also reviewed that most people die with follicular lymphoma and not from it.    We reviewed that he has had a very long natural history to date of over 7+ years and that the current imaging of his neck shows a very low level follicular lymphoma.  I discussed with him today that it would be good to get a reassessment of his full staging and do CT scans of the chest and the abdomen as well.  We will do additional blood work including a CBC, comprehensive, LDH, uric acid, beta-2 microglobulin, immunoglobulins, and hepatitis B and C  serologies.    We reviewed the FLIP scoring system for follicular lymphoma.  Specifically this looks at age, LDH, hemoglobin, and stage and his number of jesse sites.  He would get a point for age, a point for stage of III given above and below diaphragm, but assuming 4 or fewer jesse sites, a normal hemoglobin, and a normal LDH this would give him a 50% chance of survival at 10 years without intervention.  Given his age of almost 89 this would be quite a good survival even in the absence of his lymphoma.    We discussed that if things are stable on the imaging of his abdomen and his labs all look good that I would recommend ongoing observation.  We discussed that indications for treatment really are if the follicular lymphoma is causing problems and if it is not causing problems then we observe.  If it starts causing pain or compression on vital organs or abnormal symptoms or abnormal blood counts then that would be an indication to treat.  At that point I would consider steroids and Rituxan based therapy.    He and his family were very happy to hear this information and this approach.    Next steps:  Labs today  CT scans on 1022 given that he is going to be here for a cardiology appointment that day  I will call him with results on the 24th of the 25th  If the scans look stable and we are going to continue to observe and I will see him again in 4 months time.    I spent 45 minutes with the patient with the majority in direct face to face consultation      Renata Hargrove MD    10/24/2019:  CT C/A/P:  Chest: Pleural-based right middle lobe pulmonary nodule measures 5 mm  (series 6, image 230). Right lower lobe pulmonary nodule measures 5 mm  (series 6, image 269). Left lower lobe pulmonary nodule measures 9 mm  (series 6, image 215). There are other scattered smaller pulmonary  nodules in both lungs.     No pleural or pericardial effusion. Enlarged pretracheal lymph node  measures 1.2 cm in short axis (series  2, image 26). Enlarged  subcarinal lymph node measures 1.3 cm in short axis. No hilar or  axillary adenopathy. Thyroid is unremarkable. There is severe coronary  atherosclerosis.     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 Subcentimeter hypodense liver lesion in segment 2  (series 2, image 55) is unchanged. No other liver lesions  demonstrated. Small cystic lesions in the pancreatic body are  unchanged. The gallbladder, spleen, adrenal glands, and right kidney  are unremarkable. Simple left renal cyst unchanged.     There are enlarged retroperitoneal lymph nodes. A left periaortic  lymph node measures 2.1 x 1.6 cm (series 2, image 72), previously 2.4  x 1.9 cm. Other enlarged retroperitoneal lymph nodes are similarly  slightly decreased in size compared to prior.     No ascites or free air. No evidence of bowel obstruction. There is  sigmoid diverticulosis. No pelvic adenopathy or free fluid.     Degenerative changes of the skeletal structures. Prior posterior L3-L4  lumbar fusion and bilateral hip replacements. Partial ankylosis of the  SI joints. No suspicious lytic or blastic bone lesions.                                                                      IMPRESSION:  1. Small right-sided pulmonary nodules were visualized on prior CT  scans of the abdomen and are unchanged. A larger nodule in the left  lower lobe measuring 9 mm is above the superior margin of prior  studies and chronicity is unknown. Consider PET/CT or three-month  follow-up conventional CT.  2. There are two mildly enlarged lymph nodes in the mediastinum. No  other adenopathy in the chest.  3. Enlarged retroperitoneal lymph nodes have decreased in size  compared to prior.  4. Small cystic lesions in the pancreatic body are unchanged    Called Mr. Mcnair with the results on 10/24/2019. Reviewed the good news that there were no new lymph  nodes and the ones there in the RP were smaller. Reviewed the lung nodules and the recommendation to repeat CT chest in 3-4 months.    Renata Hargrove MD

## 2019-10-21 NOTE — PATIENT INSTRUCTIONS
You were seen at the [facility] clinic today.  You saw Dr. Santana Martinez  Here are your Instructions:    1. Follow up with Dr. Martinez in 1 year.         If you have questions after this visit:  Send a [employer] message or contact Channing Ambriz LPN  Office:  [phone] option #1, then #3 & ask for Channing (nurse line)  Fax:  [phone]  After Hours:  [phone] option #4 ask to speak to he on-call Cardiologist  Appointments:  [phone] option #1, then option #1

## 2019-10-21 NOTE — PROGRESS NOTES
Chief complaint: bradycardia, h/o CAD    HPI:   Daniel Mcnair is a 89 year old male with history of HTN, HL, and CAD s/p PCI to LAD/D2 (5 stents total) 5/2002-6/2003 last seen by me 12/2017 who presents for follow up of his chronic cardiovascular conditions.     Cardiac history:  His prior cardiac history includes several PCIs (5 stents total) between 5/2002 and 3/2003; his last PCI was 3/2003 at which time he had PCI to mLAD (in-stent restenosis) and to D2; he recalls having new-onset exertional dyspnea while playing tennis at that time. He was last seen by Dr. Guajardo in 2013 for a preoperative visit and was asymptomatic at that time. He underwent regadenoson SPECT which showed a small area of apical reversible photopenia-- ischemia versus artifact. He was asymptomatic and no further workup was pursued.   In 2017, he had 48h Holter (see below) to investigate bradycardia which documented excellent chronotropic competence, no symptomatic bradycardia, and no prolonged pauses or high-grade AV block. ABIs (see below) showed no evidence of significant lower extremity PAD.)   He does have chronic bilateral leg edema, worse at the end of the day and after prolonged standing, which has been attributed to venous insufficiency and for which he uses compression hose.     Interval history:  He continues to have chronic bilateral lower extremity edema which is well-controlled with compression hose.     ROS is otherwise positive for lower back and bilateral thigh pain, worse with prolonged standing, which is chronic.    He denies any chest pain, dyspnea at rest or with exertion, PND, orthopnea, palpitations, lightheadedness or syncope.           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     Lymphoma (H) 1/25/2012     Macular degeneration, dry      Other and unspecified hyperlipidemia 7/21/2011     Snoring      Spinal stenosis, lumbar region, without neurogenic claudication 7/21/2011     Stented coronary artery 2003    x5; two separate times     Unspecified essential hypertension 7/21/2011       CURRENT MEDICATIONS:  Current Outpatient Medications   Medication Sig Dispense Refill     acetaminophen (TYLENOL) 325 MG tablet Take 2 tablets (650 mg) by mouth every 6 hours as needed for pain 100 tablet 0     amLODIPine (NORVASC) 10 MG tablet TAKE 1 TABLET DAILY 90 tablet 3     atorvastatin (LIPITOR) 20 MG tablet TAKE ONE TABLET BY MOUTH ONE TIME DAILY 30 tablet 0     benazepril (LOTENSIN) 40 MG tablet Take 1 tablet (40 mg) by mouth daily 90 tablet 3     bisacodyl (DULCOLAX) 10 MG suppository Place 1 suppository rectally daily as needed. 3 suppository 0     carboxymethylcellulose PF (REFRESH PLUS) 0.5 % SOLN ophthalmic solution 1 drop 3 times daily as needed       Carboxymethylcellulose Sodium 1 % GEL Apply to eye as needed       docusate sodium 100 MG tablet Take 100 mg by mouth 2 times daily 60 tablet 1     famotidine (PEPCID) 20 MG tablet Take 1 tablet (20 mg) by mouth 2 times daily 180 tablet 3     FISH OIL-KRILL OIL PO Take 2,000 mg by mouth daily       Guar Gum (BENEFIBER) packet Take 1 packet by mouth every morning        Magnesium Hydroxide (MILK OF MAGNESIA PO) Take by mouth At Bedtime       metoprolol succinate ER (TOPROL-XL) 25 MG 24 hr tablet TAKE ONE HALF TABLET BY MOUTH EVERY DAY 45 tablet 2     Multiple Vitamins-Minerals (VITEYES AREDS FORMULA/LUTEIN) CAPS Take by mouth daily       nitroglycerin (NITROSTAT) 0.4 MG sublingual tablet Place 1 tablet (0.4 mg) under the tongue every 5 minutes  as needed 30 tablet 2     sildenafil (VIAGRA) 100 MG tablet Take 1 tablet (100 mg) by mouth daily as needed 10 tablet 2     tamsulosin (FLOMAX) 0.4 MG capsule Take 1 capsule (0.4 mg) by mouth daily 90 capsule 3     tolterodine (DETROL) 2 MG tablet Take 1 tablet (2 mg) by mouth 2 times daily 180 tablet 3       PAST SURGICAL HISTORY:  Past Surgical History:   Procedure Laterality Date     ARTHROPLASTY HIP  10/2008    left     ARTHROPLASTY HIP  1999    right     ARTHROPLASTY REVISION HIP  1/21/2014    Procedure: ARTHROPLASTY REVISION HIP;  Revision Left Total Hip;  Surgeon: Edgar Grewal MD;  Location: [facility]     BACK SURGERY  2006     BLEPHAROPLASTY BILATERAL Bilateral 5/17/2019    Procedure: BLEPHAROPLASTY BILATERAL UPPER LIDS;  Surgeon: America Melo MD;  Location: [facility] OR     CARDIAC SURGERY  2001    stent x 5      CARDIAC SURGERY  2002     CATARACT IOL, RT/LT Bilateral      CYSTOSCOPY, TRANSURETHRAL RESECTION (TUR) PROSTATE, COMBINED  11/5/2013    Procedure: COMBINED CYSTOSCOPY, TRANSURETHRAL RESECTION (TUR) PROSTATE;  Transurethral Resection Of Prostate, Bipolar;  Surgeon: Lai Street MD;  Location:  OR     ESOPHAGOSCOPY, GASTROSCOPY, DUODENOSCOPY (EGD), COMBINED  2/3/2014    Procedure: COMBINED ESOPHAGOSCOPY, GASTROSCOPY, DUODENOSCOPY (EGD);;  Surgeon: Ezra Fiore MD;  [facility]     ESOPHAGOSCOPY, GASTROSCOPY, DUODENOSCOPY (EGD), COMBINED  4/15/2014    Procedure: COMBINED ESOPHAGOSCOPY, GASTROSCOPY, DUODENOSCOPY (EGD), BIOPSY SINGLE OR MULTIPLE;  Surgeon: Ezra Fiore MD;  Location:  GI     EXTRACAPSULAR CATARACT EXTRATION WITH INTRAOCULAR LENS IMPLANT  2005     EXTRACAPSULAR CATARACT EXTRATION WITH INTRAOCULAR LENS IMPLANT  2010     HERNIA REPAIR       INJECT NERVE BLOCK LUMBAR PARAVERTEBRAL SYMPATHETIC Left 8/29/2018    Procedure: INJECT NERVE BLOCK LUMBAR PARAVERTEBRAL SYMPATHETIC;  Left L3, L4, sacral Ala medial branch block;  Surgeon: Ronaldo Hicks MD;  Location:   OR     INJECT SACROILIAC JOINT Right 10/8/2019    Procedure: INJECTION, SACROILIAC JOINT;  Surgeon: Ronaldo Hicks MD;  Location: [facility] OR     IR LYMPH NODE BIOPSY  9/20/2019     MASTOID SURGERY  1939     MOHS MICROGRAPHIC PROCEDURE       OPTICAL TRACKING SYSTEM FUSION SPINE POSTERIOR LUMBAR TWO LEVELS  7/11/2013    Procedure: OPTICAL TRACKING SYSTEM FUSION SPINE POSTERIOR LUMBAR TWO LEVELS;  Stealth Assisted Lumbar 3-4 Transforaminal Lumbar Interbody Fusion, Lumbar 2-3 Lumbar 3-4 Laminectomy Decompression;  Surgeon: Edgar Lew MD;  Location: [facility] OR     psoris Bilateral 2019       ALLERGIES:     Allergies   Allergen Reactions     Nka [No Known Allergies]        FAMILY HISTORY:  Family History   Problem Relation Age of Onset     Diabetes Maternal Grandfather      Alcohol/Drug Maternal Grandfather      Arthritis Maternal Grandfather      Obesity Maternal Grandfather      Cerebrovascular Disease Maternal Grandmother      Hypertension Mother         willa rand     Glaucoma No family hx of      Macular Degeneration No family hx of      Cancer No family hx of         No known family hx of skin cancer     Skin Cancer No family hx of      SOCIAL HISTORY:  Social History     Tobacco Use     Smoking status: Never Smoker     Smokeless tobacco: Never Used   Substance Use Topics     Alcohol use: Yes     Alcohol/week: 1.7 standard drinks     Comment: occ     Drug use: No       ROS:   A comprehensive 14 point review of systems is negative other than as mentioned in HPI.    Exam:  There were no vitals taken for this visit.  GENERAL APPEARANCE: healthy, alert and no distress  EYES: no icterus, no xanthelasmas  ENT: normal palate, mucosa moist, no central cyanosis  NECK: no adenopathy, no asymmetry, masses, or scars, thyroid normal to palpation and no bruits, JVP~7 cm H2O  RESPIRATORY: lungs clear to auscultation - no rales, rhonchi or wheezes, no use of accessory muscles, no retractions, respirations are  unlabored, normal respiratory rate  CARDIOVASCULAR: regular bradycardic rhythm, normal S1 with physiologic split S2, no S3 or S4 and no murmur, click or rub, precordium quiet with normal PMI.  GI: soft, non tender, without hepatosplenomegaly, no masses palpable, bowel sounds normal, aorta not enlarged by palpation, no abdominal bruits  EXTREMITIES: 1+ pitting pretibial edema BLE. no bruits  NEURO: alert and oriented to person/place/time, normal speech, gait and affect  VASC: PTs 1+ bilaterally  SKIN: no ecchymoses, no rashes.  PSYCH: cooperative, affect appropriate.       DATA:  Labs reviewed.    Reviewed:  48h Holter (10/3/17-10/5/17): Sinus with occasional (2%) PVCs, average VR 57 (rage  bpm.) Rare non-sustained SVT (longest 5 beats), no sustained or non-sustained VT. Appropriate heart rate variability with activity. Rare reports of symptoms ( stress,   tired,   shaky ) did not correspond to arrhythmias.     ABIs 11/16/17: Normal, no evidence of PAD.      Assessment and Plan:   1. Sinus bradycardia, stable: With Holter monitor showing clear evidence of chronotropic competence.   -continue beta blocker at present dose   -in the absence of symptomatic bradycardia (presyncope/syncope) or ECG evidence of advanced SA or AV jesse disease (prolonged pauses, Mobitz II or 3rd degree AV block), no further workup is indicated at this time    2. Bilateral leg pain, stable:  ABIs without evidence of PAD, discussed with patient   -evaluate for nonvascular etiologies (likely his longstanding spinal stenosis status post multiple surgeries)    3. CAD s/p PCI to LAD/D2 6921-6928 w/o angina: Stable and free of anginal symptoms. Continue ASA, atorvastatin, beta blocker.    4. HTN: well-controlled on ACEI/amlodipine/beta blocker, continue at present doses.    5. Hyperlipidemia: stable and well-controlled, continue atorvastatin.     6. Bilateral leg edema: exam and history strongly suggest venous insufficiency. Continue use of  compression stockings, counseled regarding elevation of legs when sitting.    Follow up in 1 year.    The patient states understanding and is agreeable with plan.     Santana Martinez MD  Cardiology

## 2019-10-22 ENCOUNTER — ANCILLARY PROCEDURE (OUTPATIENT)
Dept: CT IMAGING | Facility: CLINIC | Age: 84
End: 2019-10-22
Attending: INTERNAL MEDICINE
Payer: COMMERCIAL

## 2019-10-22 ENCOUNTER — OFFICE VISIT (OUTPATIENT)
Dept: CARDIOLOGY | Facility: CLINIC | Age: 84
End: 2019-10-22
Attending: INTERNAL MEDICINE
Payer: COMMERCIAL

## 2019-10-22 VITALS
HEART RATE: 52 BPM | OXYGEN SATURATION: 98 % | DIASTOLIC BLOOD PRESSURE: 72 MMHG | WEIGHT: 185.7 LBS | SYSTOLIC BLOOD PRESSURE: 131 MMHG | HEIGHT: 70 IN | BODY MASS INDEX: 26.58 KG/M2

## 2019-10-22 DIAGNOSIS — E78.5 HYPERLIPIDEMIA LDL GOAL <70: ICD-10-CM

## 2019-10-22 DIAGNOSIS — I10 BENIGN ESSENTIAL HYPERTENSION: ICD-10-CM

## 2019-10-22 DIAGNOSIS — I25.10 CORONARY ARTERY DISEASE INVOLVING NATIVE CORONARY ARTERY OF NATIVE HEART WITHOUT ANGINA PECTORIS: Primary | ICD-10-CM

## 2019-10-22 DIAGNOSIS — C82.01 GRADE 1 FOLLICULAR LYMPHOMA OF LYMPH NODES OF NECK (H): ICD-10-CM

## 2019-10-22 DIAGNOSIS — R00.1 BRADYCARDIA: ICD-10-CM

## 2019-10-22 PROCEDURE — 99214 OFFICE O/P EST MOD 30 MIN: CPT | Mod: ZP | Performed by: INTERNAL MEDICINE

## 2019-10-22 PROCEDURE — G0463 HOSPITAL OUTPT CLINIC VISIT: HCPCS | Mod: ZF

## 2019-10-22 RX ORDER — IOPAMIDOL 755 MG/ML
114 INJECTION, SOLUTION INTRAVASCULAR ONCE
Status: COMPLETED | OUTPATIENT
Start: 2019-10-22 | End: 2019-10-22

## 2019-10-22 RX ADMIN — IOPAMIDOL 114 ML: 755 INJECTION, SOLUTION INTRAVASCULAR at 10:30

## 2019-10-22 ASSESSMENT — PAIN SCALES - GENERAL: PAINLEVEL: NO PAIN (0)

## 2019-10-22 ASSESSMENT — MIFFLIN-ST. JEOR: SCORE: 1508.58

## 2019-10-22 NOTE — LETTER
10/22/2019      RE: Daniel Mcnair  [address]  [facility] [address]       Dear Colleague,    Thank you for the opportunity to participate in the care of your patient, Daniel Mcnair, at the [facility] at [facility]. Please see a copy of my visit note below.    Chief complaint: bradycardia, h/o CAD    HPI:   Daniel Mcnair is a 89 year old male with history of HTN, HL, and CAD s/p PCI to LAD/D2 (5 stents total) 5/2002-6/2003 last seen by me 12/2017 who presents for follow up of his chronic cardiovascular conditions.     Cardiac history:  His prior cardiac history includes several PCIs (5 stents total) between 5/2002 and 3/2003; his last PCI was 3/2003 at which time he had PCI to mLAD (in-stent restenosis) and to D2; he recalls having new-onset exertional dyspnea while playing tennis at that time. He was last seen by Dr. Guajardo in 2013 for a preoperative visit and was asymptomatic at that time. He underwent regadenoson SPECT which showed a small area of apical reversible photopenia-- ischemia versus artifact. He was asymptomatic and no further workup was pursued.   In 2017, he had 48h Holter (see below) to investigate bradycardia which documented excellent chronotropic competence, no symptomatic bradycardia, and no prolonged pauses or high-grade AV block. ABIs (see below) showed no evidence of significant lower extremity PAD.)   He does have chronic bilateral leg edema, worse at the end of the day and after prolonged standing, which has been attributed to venous insufficiency and for which he uses compression hose.     Interval history:  He continues to have chronic bilateral lower extremity edema which is well-controlled with compression hose.     ROS is otherwise positive for lower back and bilateral thigh pain, worse with prolonged standing, which is chronic.    He denies any chest pain, dyspnea at rest or with  exertion, PND, orthopnea, palpitations, lightheadedness or syncope.           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     Macular degeneration, dry      Other and unspecified hyperlipidemia 7/21/2011     Snoring      Spinal stenosis, lumbar region, without neurogenic claudication 7/21/2011     Stented coronary artery 2003    x5; two separate times     Unspecified essential hypertension 7/21/2011       CURRENT MEDICATIONS:  Current Outpatient Medications   Medication Sig Dispense Refill     acetaminophen (TYLENOL) 325 MG tablet Take 2 tablets (650 mg) by mouth every 6 hours as needed for pain 100 tablet 0     amLODIPine (NORVASC) 10 MG tablet TAKE 1 TABLET DAILY 90 tablet 3     atorvastatin (LIPITOR) 20 MG tablet TAKE ONE TABLET BY MOUTH ONE TIME DAILY 30 tablet 0     benazepril (LOTENSIN) 40 MG tablet Take 1 tablet (40 mg) by mouth daily 90 tablet 3     bisacodyl (DULCOLAX) 10 MG suppository Place 1 suppository rectally daily as needed. 3 suppository 0     carboxymethylcellulose PF (REFRESH PLUS) 0.5 % SOLN ophthalmic solution 1 drop 3 times daily as needed       Carboxymethylcellulose Sodium 1 % GEL Apply to eye as needed       docusate sodium 100 MG tablet Take 100 mg by mouth 2 times daily 60 tablet 1     famotidine (PEPCID) 20 MG tablet Take 1 tablet (20 mg) by mouth 2 times daily 180 tablet 3     FISH OIL-KRILL OIL PO Take 2,000 mg by mouth daily       Guar Gum (BENEFIBER) packet Take 1 packet by mouth every morning        Magnesium Hydroxide  (MILK OF MAGNESIA PO) Take by mouth At Bedtime       metoprolol succinate ER (TOPROL-XL) 25 MG 24 hr tablet TAKE ONE HALF TABLET BY MOUTH EVERY DAY 45 tablet 2     Multiple Vitamins-Minerals (VITEYES AREDS FORMULA/LUTEIN) CAPS Take by mouth daily       nitroglycerin (NITROSTAT) 0.4 MG sublingual tablet Place 1 tablet (0.4 mg) under the tongue every 5 minutes as needed 30 tablet 2     sildenafil (VIAGRA) 100 MG tablet Take 1 tablet (100 mg) by mouth daily as needed 10 tablet 2     tamsulosin (FLOMAX) 0.4 MG capsule Take 1 capsule (0.4 mg) by mouth daily 90 capsule 3     tolterodine (DETROL) 2 MG tablet Take 1 tablet (2 mg) by mouth 2 times daily 180 tablet 3       PAST SURGICAL HISTORY:  Past Surgical History:   Procedure Laterality Date     ARTHROPLASTY HIP  10/2008    left     ARTHROPLASTY HIP  1999    right     ARTHROPLASTY REVISION HIP  1/21/2014    Procedure: ARTHROPLASTY REVISION HIP;  Revision Left Total Hip;  Surgeon: Edgar Grewal MD;  Location: [facility] OR     BACK SURGERY  2006     BLEPHAROPLASTY BILATERAL Bilateral 5/17/2019    Procedure: BLEPHAROPLASTY BILATERAL UPPER LIDS;  Surgeon: America Melo MD;  Location: [facility] OR     CARDIAC SURGERY  2001    stent x 5      CARDIAC SURGERY  2002     CATARACT IOL, RT/LT Bilateral      CYSTOSCOPY, TRANSURETHRAL RESECTION (TUR) PROSTATE, COMBINED  11/5/2013    Procedure: COMBINED CYSTOSCOPY, TRANSURETHRAL RESECTION (TUR) PROSTATE;  Transurethral Resection Of Prostate, Bipolar;  Surgeon: Lai Street MD;  [facility]     ESOPHAGOSCOPY, GASTROSCOPY, DUODENOSCOPY (EGD), COMBINED  2/3/2014    Procedure: COMBINED ESOPHAGOSCOPY, GASTROSCOPY, DUODENOSCOPY (EGD);;  Surgeon: Ezra Fiore MD;  Location: [facility] GI     ESOPHAGOSCOPY, GASTROSCOPY, DUODENOSCOPY (EGD), COMBINED  4/15/2014    Procedure: COMBINED ESOPHAGOSCOPY, GASTROSCOPY, DUODENOSCOPY (EGD), BIOPSY SINGLE OR MULTIPLE;  Surgeon: Ezra Fiore MD;  Location: [facility]     EXTRACAPSULAR CATARACT EXTRATION  WITH INTRAOCULAR LENS IMPLANT  2005     EXTRACAPSULAR CATARACT EXTRATION WITH INTRAOCULAR LENS IMPLANT  2010     HERNIA REPAIR       INJECT NERVE BLOCK LUMBAR PARAVERTEBRAL SYMPATHETIC Left 8/29/2018    Procedure: INJECT NERVE BLOCK LUMBAR PARAVERTEBRAL SYMPATHETIC;  Left L3, L4, sacral Ala medial branch block;  Surgeon: Ronaldo Hicks MD;  Location[facility]     INJECT SACROILIAC JOINT Right 10/8/2019    Procedure: INJECTION, SACROILIAC JOINT;  Surgeon: Ronaldo Hicks MD;  [facility]: [facility]     IR LYMPH NODE BIOPSY  9/20/2019     MASTOID SURGERY  1939     MOHS MICROGRAPHIC PROCEDURE       OPTICAL TRACKING SYSTEM FUSION SPINE POSTERIOR LUMBAR TWO LEVELS  7/11/2013    Procedure: OPTICAL TRACKING SYSTEM FUSION SPINE POSTERIOR LUMBAR TWO LEVELS;  Stealth Assisted Lumbar 3-4 Transforaminal Lumbar Interbody Fusion, Lumbar 2-3 Lumbar 3-4 Laminectomy Decompression;  Surgeon: Edgar Lew MD;  Location: [facility] OR     psoris Bilateral 2019       ALLERGIES:     Allergies   Allergen Reactions     Nka [No Known Allergies]        FAMILY HISTORY:  Family History   Problem Relation Age of Onset     Diabetes Maternal Grandfather      Alcohol/Drug Maternal Grandfather      Arthritis Maternal Grandfather      Obesity Maternal Grandfather      Cerebrovascular Disease Maternal Grandmother      Hypertension Mother         willa rand     Glaucoma No family hx of      Macular Degeneration No family hx of      Cancer No family hx of         No known family hx of skin cancer     Skin Cancer No family hx of      SOCIAL HISTORY:  Social History     Tobacco Use     Smoking status: Never Smoker     Smokeless tobacco: Never Used   Substance Use Topics     Alcohol use: Yes     Alcohol/week: 1.7 standard drinks     Comment: occ     Drug use: No       ROS:   A comprehensive 14 point review of systems is negative other than as mentioned in HPI.    Exam:  There were no vitals taken for this visit.  GENERAL APPEARANCE: healthy, alert  and no distress  EYES: no icterus, no xanthelasmas  ENT: normal palate, mucosa moist, no central cyanosis  NECK: no adenopathy, no asymmetry, masses, or scars, thyroid normal to palpation and no bruits, JVP~7 cm H2O  RESPIRATORY: lungs clear to auscultation - no rales, rhonchi or wheezes, no use of accessory muscles, no retractions, respirations are unlabored, normal respiratory rate  CARDIOVASCULAR: regular bradycardic rhythm, normal S1 with physiologic split S2, no S3 or S4 and no murmur, click or rub, precordium quiet with normal PMI.  GI: soft, non tender, without hepatosplenomegaly, no masses palpable, bowel sounds normal, aorta not enlarged by palpation, no abdominal bruits  EXTREMITIES: 1+ pitting pretibial edema BLE. no bruits  NEURO: alert and oriented to person/place/time, normal speech, gait and affect  VASC: PTs 1+ bilaterally  SKIN: no ecchymoses, no rashes.  PSYCH: cooperative, affect appropriate.       DATA:  Labs reviewed.    Reviewed:  48h Holter (10/3/17-10/5/17): Sinus with occasional (2%) PVCs, average VR 57 (rage  bpm.) Rare non-sustained SVT (longest 5 beats), no sustained or non-sustained VT. Appropriate heart rate variability with activity. Rare reports of symptoms ( stress,   tired,   shaky ) did not correspond to arrhythmias.     ABIs 11/16/17: Normal, no evidence of PAD.      Assessment and Plan:   1. Sinus bradycardia, stable: With Holter monitor showing clear evidence of chronotropic competence.   -continue beta blocker at present dose   -in the absence of symptomatic bradycardia (presyncope/syncope) or ECG evidence of advanced SA or AV jesse disease (prolonged pauses, Mobitz II or 3rd degree AV block), no further workup is indicated at this time    2. Bilateral leg pain, stable:  ABIs without evidence of PAD, discussed with patient   -evaluate for nonvascular etiologies (likely his longstanding spinal stenosis status post multiple surgeries)    3. CAD s/p PCI to LAD/D2 [id number]  w/o angina: Stable and free of anginal symptoms. Continue ASA, atorvastatin, beta blocker.    4. HTN: well-controlled on ACEI/amlodipine/beta blocker, continue at present doses.    5. Hyperlipidemia: stable and well-controlled, continue atorvastatin.     6. Bilateral leg edema: exam and history strongly suggest venous insufficiency. Continue use of compression stockings, counseled regarding elevation of legs when sitting.    Follow up in 1 year.    The patient states understanding and is agreeable with plan.     Santana Martinez MD  Cardiology

## 2019-10-22 NOTE — PROCEDURES
Sacro-iliac Joint Injection    The patient s identity, the procedure to be performed and the specific site of the procedure was verified in accordance with The [facility] Protocol.  Diagnosis:  Sacroiliac Arthropathy  Pre-Procedure Pain Score: 3/10  Procedure Note:   Informed consent was obtained and the patient was positioned comfortably in the prone position.  There was no evidence of infection at the sites of needle insertion.  The patient was prepped and draped in a sterile fashion.  Skeletal landmarks were identified with fluoroscopy guidance.  22 gauge spinal needle was then placed within the Sacro-iliac joint in the lower 1/3 of the joint.    The ligaments medial and superior to the joint were not infiltrated with local anesthetic and steroid.     SIDE:   right  Medication: 40 mg depomedrol  3 ml 0.25% Bupivacaine  Post-Procedure Pain Score: 2/10  The patient was given discharge instructions and verbalizes understanding, including understanding of those signs and symptoms that would require emergency care.     Counseling: Greater than 50% of this patient visit was spent in counseling the patient regarding the treatment of their pain, coordinating their overall treatment plan and assessing their progress.

## 2019-11-25 DIAGNOSIS — I10 ESSENTIAL HYPERTENSION, BENIGN: ICD-10-CM

## 2019-11-26 RX ORDER — BENAZEPRIL HYDROCHLORIDE 40 MG/1
40 TABLET ORAL DAILY
Qty: 90 TABLET | Refills: 2 | Status: SHIPPED | OUTPATIENT
Start: 2019-11-26 | End: 2020-01-01

## 2019-12-16 ENCOUNTER — HEALTH MAINTENANCE LETTER (OUTPATIENT)
Age: 84
End: 2019-12-16

## 2020-01-01 ENCOUNTER — OFFICE VISIT (OUTPATIENT)
Dept: AUDIOLOGY | Facility: CLINIC | Age: 85
End: 2020-01-01
Payer: COMMERCIAL

## 2020-01-01 ENCOUNTER — ALLIED HEALTH/NURSE VISIT (OUTPATIENT)
Dept: AUDIOLOGY | Facility: CLINIC | Age: 85
End: 2020-01-01
Payer: COMMERCIAL

## 2020-01-01 ENCOUNTER — OFFICE VISIT (OUTPATIENT)
Dept: CARDIOLOGY | Facility: CLINIC | Age: 85
End: 2020-01-01
Attending: INTERNAL MEDICINE
Payer: COMMERCIAL

## 2020-01-01 ENCOUNTER — OFFICE VISIT (OUTPATIENT)
Dept: TRANSPLANT | Facility: CLINIC | Age: 85
End: 2020-01-01
Attending: INTERNAL MEDICINE
Payer: COMMERCIAL

## 2020-01-01 ENCOUNTER — OFFICE VISIT (OUTPATIENT)
Dept: OPHTHALMOLOGY | Facility: CLINIC | Age: 85
End: 2020-01-01
Attending: OPHTHALMOLOGY
Payer: COMMERCIAL

## 2020-01-01 ENCOUNTER — TELEPHONE (OUTPATIENT)
Dept: OPHTHALMOLOGY | Facility: CLINIC | Age: 85
End: 2020-01-01

## 2020-01-01 VITALS
TEMPERATURE: 98.3 F | BODY MASS INDEX: 25.12 KG/M2 | WEIGHT: 175.1 LBS | RESPIRATION RATE: 18 BRPM | HEART RATE: 57 BPM | SYSTOLIC BLOOD PRESSURE: 136 MMHG | DIASTOLIC BLOOD PRESSURE: 54 MMHG | OXYGEN SATURATION: 97 %

## 2020-01-01 VITALS
HEIGHT: 70 IN | TEMPERATURE: 98.3 F | DIASTOLIC BLOOD PRESSURE: 73 MMHG | OXYGEN SATURATION: 98 % | BODY MASS INDEX: 24.91 KG/M2 | WEIGHT: 174 LBS | HEART RATE: 67 BPM | SYSTOLIC BLOOD PRESSURE: 166 MMHG

## 2020-01-01 DIAGNOSIS — R00.1 BRADYCARDIA: ICD-10-CM

## 2020-01-01 DIAGNOSIS — H02.886 MEIBOMIAN GLAND DYSFUNCTION (MGD) OF BOTH EYES: ICD-10-CM

## 2020-01-01 DIAGNOSIS — H43.813 POSTERIOR VITREOUS DETACHMENT, BILATERAL: ICD-10-CM

## 2020-01-01 DIAGNOSIS — I10 BENIGN ESSENTIAL HYPERTENSION: ICD-10-CM

## 2020-01-01 DIAGNOSIS — H02.831 DERMATOCHALASIS OF BOTH UPPER EYELIDS: ICD-10-CM

## 2020-01-01 DIAGNOSIS — C82.01 GRADE 1 FOLLICULAR LYMPHOMA OF LYMPH NODES OF NECK (H): ICD-10-CM

## 2020-01-01 DIAGNOSIS — H11.823 CONJUNCTIVOCHALASIS OF BOTH EYES: Primary | ICD-10-CM

## 2020-01-01 DIAGNOSIS — Z96.1 PSEUDOPHAKIA OF BOTH EYES: ICD-10-CM

## 2020-01-01 DIAGNOSIS — H90.3 SENSORINEURAL HEARING LOSS (SNHL) OF BOTH EARS: Primary | ICD-10-CM

## 2020-01-01 DIAGNOSIS — I48.91 NEW ONSET ATRIAL FIBRILLATION (H): Primary | ICD-10-CM

## 2020-01-01 DIAGNOSIS — I25.10 CORONARY ARTERY DISEASE INVOLVING NATIVE CORONARY ARTERY OF NATIVE HEART WITHOUT ANGINA PECTORIS: ICD-10-CM

## 2020-01-01 DIAGNOSIS — I10 ESSENTIAL HYPERTENSION, BENIGN: ICD-10-CM

## 2020-01-01 DIAGNOSIS — H35.3132 INTERMEDIATE STAGE NONEXUDATIVE AGE-RELATED MACULAR DEGENERATION OF BOTH EYES: ICD-10-CM

## 2020-01-01 DIAGNOSIS — C82.01 GRADE 1 FOLLICULAR LYMPHOMA OF LYMPH NODES OF NECK (H): Primary | ICD-10-CM

## 2020-01-01 DIAGNOSIS — H02.834 DERMATOCHALASIS OF BOTH UPPER EYELIDS: ICD-10-CM

## 2020-01-01 DIAGNOSIS — H02.883 MEIBOMIAN GLAND DYSFUNCTION (MGD) OF BOTH EYES: ICD-10-CM

## 2020-01-01 DIAGNOSIS — K21.9 GASTROESOPHAGEAL REFLUX DISEASE WITHOUT ESOPHAGITIS: Primary | ICD-10-CM

## 2020-01-01 DIAGNOSIS — H04.123 DRY EYES, BILATERAL: ICD-10-CM

## 2020-01-01 DIAGNOSIS — E78.5 HYPERLIPIDEMIA LDL GOAL <70: ICD-10-CM

## 2020-01-01 LAB
ALBUMIN SERPL-MCNC: 3.4 G/DL (ref 3.4–5)
ALP SERPL-CCNC: 54 U/L (ref 40–150)
ALT SERPL W P-5'-P-CCNC: 32 U/L (ref 0–70)
ANION GAP SERPL CALCULATED.3IONS-SCNC: 7 MMOL/L (ref 3–14)
AST SERPL W P-5'-P-CCNC: 12 U/L (ref 0–45)
BASOPHILS # BLD AUTO: 0 10E9/L (ref 0–0.2)
BASOPHILS NFR BLD AUTO: 0.5 %
BILIRUB SERPL-MCNC: 0.7 MG/DL (ref 0.2–1.3)
BUN SERPL-MCNC: 13 MG/DL (ref 7–30)
CALCIUM SERPL-MCNC: 8.4 MG/DL (ref 8.5–10.1)
CHLORIDE SERPL-SCNC: 107 MMOL/L (ref 94–109)
CO2 SERPL-SCNC: 23 MMOL/L (ref 20–32)
CREAT SERPL-MCNC: 0.83 MG/DL (ref 0.66–1.25)
DIFFERENTIAL METHOD BLD: ABNORMAL
EOSINOPHIL # BLD AUTO: 0.1 10E9/L (ref 0–0.7)
EOSINOPHIL NFR BLD AUTO: 1.9 %
ERYTHROCYTE [DISTWIDTH] IN BLOOD BY AUTOMATED COUNT: 13.4 % (ref 10–15)
GFR SERPL CREATININE-BSD FRML MDRD: 77 ML/MIN/{1.73_M2}
GLUCOSE SERPL-MCNC: 113 MG/DL (ref 70–99)
HCT VFR BLD AUTO: 36.2 % (ref 40–53)
HGB BLD-MCNC: 13.1 G/DL (ref 13.3–17.7)
IMM GRANULOCYTES # BLD: 0 10E9/L (ref 0–0.4)
IMM GRANULOCYTES NFR BLD: 0.5 %
LYMPHOCYTES # BLD AUTO: 1.2 10E9/L (ref 0.8–5.3)
LYMPHOCYTES NFR BLD AUTO: 15.8 %
MCH RBC QN AUTO: 35.9 PG (ref 26.5–33)
MCHC RBC AUTO-ENTMCNC: 36.2 G/DL (ref 31.5–36.5)
MCV RBC AUTO: 99 FL (ref 78–100)
MONOCYTES # BLD AUTO: 0.9 10E9/L (ref 0–1.3)
MONOCYTES NFR BLD AUTO: 11.9 %
NEUTROPHILS # BLD AUTO: 5.1 10E9/L (ref 1.6–8.3)
NEUTROPHILS NFR BLD AUTO: 69.4 %
NRBC # BLD AUTO: 0 10*3/UL
NRBC BLD AUTO-RTO: 0 /100
PLATELET # BLD AUTO: 125 10E9/L (ref 150–450)
POTASSIUM SERPL-SCNC: 4.3 MMOL/L (ref 3.4–5.3)
PROT SERPL-MCNC: 6.2 G/DL (ref 6.8–8.8)
RBC # BLD AUTO: 3.65 10E12/L (ref 4.4–5.9)
SODIUM SERPL-SCNC: 137 MMOL/L (ref 133–144)
WBC # BLD AUTO: 7.3 10E9/L (ref 4–11)

## 2020-01-01 PROCEDURE — 92134 CPTRZ OPH DX IMG PST SGM RTA: CPT | Mod: ZF | Performed by: OPHTHALMOLOGY

## 2020-01-01 PROCEDURE — G0463 HOSPITAL OUTPT CLINIC VISIT: HCPCS | Mod: ZF

## 2020-01-01 PROCEDURE — V5299 HEARING SERVICE: HCPCS | Performed by: AUDIOLOGIST

## 2020-01-01 PROCEDURE — 36415 COLL VENOUS BLD VENIPUNCTURE: CPT

## 2020-01-01 PROCEDURE — 80053 COMPREHEN METABOLIC PANEL: CPT | Performed by: INTERNAL MEDICINE

## 2020-01-01 PROCEDURE — 99214 OFFICE O/P EST MOD 30 MIN: CPT | Mod: 25 | Performed by: INTERNAL MEDICINE

## 2020-01-01 PROCEDURE — 85025 COMPLETE CBC W/AUTO DIFF WBC: CPT | Performed by: INTERNAL MEDICINE

## 2020-01-01 RX ORDER — AMLODIPINE BESYLATE 10 MG/1
10 TABLET ORAL DAILY
Qty: 90 TABLET | Refills: 3 | Status: SHIPPED | OUTPATIENT
Start: 2020-01-01

## 2020-01-01 RX ORDER — FAMOTIDINE 20 MG/1
20 TABLET, FILM COATED ORAL 2 TIMES DAILY
Qty: 180 TABLET | Refills: 1 | Status: SHIPPED | OUTPATIENT
Start: 2020-01-01

## 2020-01-01 RX ORDER — BENAZEPRIL HYDROCHLORIDE 40 MG/1
40 TABLET ORAL DAILY
Qty: 90 TABLET | Refills: 1 | Status: SHIPPED | OUTPATIENT
Start: 2020-01-01

## 2020-01-01 ASSESSMENT — REFRACTION_WEARINGRX
OD_AXIS: 010
OS_CYLINDER: +1.75
OS_SPHERE: -2.25
SPECS_TYPE: BIFOCAL
OD_ADD: +2.75
OD_ADD: +2.75
OD_CYLINDER: +1.00
OS_CYLINDER: +1.75
OD_SPHERE: -1.50
OD_AXIS: 010
SPECS_TYPE: BIFOCAL
OS_ADD: +2.75
OD_SPHERE: -1.50
OS_ADD: +2.75
OS_AXIS: 008
OS_SPHERE: -2.25
OD_CYLINDER: +1.00
OS_AXIS: 008

## 2020-01-01 ASSESSMENT — MIFFLIN-ST. JEOR: SCORE: 1450.51

## 2020-01-01 ASSESSMENT — ENCOUNTER SYMPTOMS
WEIGHT LOSS: 0
ALTERED TEMPERATURE REGULATION: 0
FLANK PAIN: 0
POLYDIPSIA: 0
PALPITATIONS: 0
HEMOPTYSIS: 0
EYE WATERING: 1
DYSURIA: 0
LEG PAIN: 1
INCREASED ENERGY: 1
WHEEZING: 0
EYE PAIN: 0
DYSPNEA ON EXERTION: 0
HALLUCINATIONS: 0
CHILLS: 0
SYNCOPE: 0
COUGH DISTURBING SLEEP: 1
LIGHT-HEADEDNESS: 0
HYPERTENSION: 1
POLYPHAGIA: 0
HYPOTENSION: 0
DOUBLE VISION: 1
EYE IRRITATION: 1
SPUTUM PRODUCTION: 1
EXERCISE INTOLERANCE: 0
FEVER: 0
FATIGUE: 1
ORTHOPNEA: 0
SHORTNESS OF BREATH: 0
DECREASED APPETITE: 0
HEMATURIA: 0
POSTURAL DYSPNEA: 0
SNORES LOUDLY: 1
WEIGHT GAIN: 0
NIGHT SWEATS: 0
EYE REDNESS: 0
COUGH: 1
SLEEP DISTURBANCES DUE TO BREATHING: 0
DIFFICULTY URINATING: 0

## 2020-01-01 ASSESSMENT — EXTERNAL EXAM - RIGHT EYE
OD_EXAM: MILD BROW PTOSIS
OD_EXAM: MILD BROW PTOSIS

## 2020-01-01 ASSESSMENT — VISUAL ACUITY
OD_CC+: -1+2
CORRECTION_TYPE: GLASSES
OS_CC: 20/50
OS_PH_CC: 20/30
OD_CC: 20/30
OS_PH_CC: 20/25-
OD_CC: 20/25-/+2
OS_PH_CC+: +2
METHOD: SNELLEN - LINEAR
CORRECTION_TYPE: GLASSES
OS_CC+: -1+2
OD_PH_CC+: -2
OD_PH_CC: 20/25
OS_CC: 20/50-/+2

## 2020-01-01 ASSESSMENT — TONOMETRY
IOP_METHOD: ICARE
IOP_METHOD: TONOPEN
OD_IOP_MMHG: 13
OS_IOP_MMHG: 15
OS_IOP_MMHG: 11
OD_IOP_MMHG: 10

## 2020-01-01 ASSESSMENT — CONF VISUAL FIELD
OS_NORMAL: 1
OD_NORMAL: 1
METHOD: COUNTING FINGERS
METHOD: COUNTING FINGERS
OD_NORMAL: 1
OS_NORMAL: 1

## 2020-01-01 ASSESSMENT — CUP TO DISC RATIO
OD_RATIO: 0.3
OS_RATIO: 0.3

## 2020-01-01 ASSESSMENT — EXTERNAL EXAM - LEFT EYE
OS_EXAM: MILD BROW PTOSIS
OS_EXAM: MILD BROW PTOSIS

## 2020-01-01 ASSESSMENT — PAIN SCALES - GENERAL: PAINLEVEL: NO PAIN (0)

## 2020-01-09 NOTE — PROGRESS NOTES
"   08/05/19 1000   Signing Clinician's Name / Credentials   Signing clinician's name / credentials Eboni Pimentel OTR/SHASHI   Session Number   Session Number 1   Reporting period 08/05-09/30   GOALS   Goals Near Vision;Environmental Modification;Resource Education   Goals Addressed this Session All goals addressed   Goal 1 - Near Vision   Goal Description: Near Vision Patient will verbalize awareness of visual field Loss and demonstrate improved use of visual skills/adaptive equipment for increased independence in reading-based activities of daily living, written communication and detail ADL tasks.   Near Vision Goal Comment goal met as below   Target Date 09/30/19   Date Met 09/30/19   Goal 3 - Environmental modification   Goal Description: Environment modification Patient will demonstrate understanding of the impact of lighting, contrast and glare on ADL activities, in conjunction with environmentally-based ADL modifications   Environmental Modification Goal Comment goal met as below.   Target Date 09/30/19   Date Met 09/30/19   Goal 4 - Resource education   Goal Description: Resource education Patient will verbalize awareness of community resources for the following:;Access to large print materials;Access to low vision devices   Resource Education Goal Comment goal met as below   Target Date 09/30/19   Date Met 09/30/19       Present No   Therapy Diagnosis   Therapy  Diagnosis: Impaired ADL/IADL with deficits in Reading based ADL  (management of light/glare)   Subjective Report   Subjective Report \"I have been right with you.  I don't know how much I will remember though\"   Visual Rehab Treatment [place]   Daily Treatment [place] Self Care/Home Management   Self Care/Home Management   Self-Care/Home Mgmt/ADL, Compensatory, Meal Prep Minutes (79262) 45 Minutes   Skilled Intervention Training in use of magnifiers for reading;Training in focal distance, tracking across a page of print, and mechanics " "of magnification use;Instruction in task lighting placement/directionality, assessment of lighting for optimal  clarity/minimal glare;Trial of tinted eye glasses to manage symptoms of glare  (ergonomics for magnifier use)   Patient Response demonstrated good understanding, and benefits from all instruction and recommendations   Treatment Detail Reading Endurance: pt with dry eye reports he only uses moisture drops 2X daily; 4X daily was prescribed per MD. Reinforced need to increase use to prescribed level for improved function in setting of dry eye. Magnfication: pt reports he has a 3X stand illuminated magnifier, but he seldom uses it. Pt demonstrated poor ergnomics using 3X stand illuminated magnifier in clinic. Trialed slanted lap desk for improved ergonomics, and pt  stated \"Yes, that would be better . I would use it more (positioned) like that\". Lighting: pt reports he reads under lamps with shades. Trialed [place] task light with full spectrum bulb. Pt improved his reading speed with task ligh tin place. Resources issued. Pt trialed large print book in clinic, and reported \"now that I would read!: Identified that his standard print books require too much effort. Issed large print resources. Glare managment: pt reports his sunglasses are broken. Trialed filters in clinic, and pt very pleased with [place] medium frame yellow for managing Glare. Identified that his fitover style would also control for irritating elements (wind, dust) in setting of dry eye. Resources issued.    Progress all goals addressed .Pt will implement strategies and identifiy in 4 weeks of follow up is needed for reinforcment or other.    Therapeutic Activity: Dynavision   Dynavision Cascade Dynavision Mode A (single stimulus attention, 1 minute   Dynavision Mode A (single stimulus attention, 1 minute)   Patient Score (Mode A, 1 min) 45   MN Read   Smallest print size read 0.5M   Critical print size 0.8M   Words per minute at critical " "print size 150 - pt reports limited reading endurance is barrier to independnece.  \"I have stopped reading books\"   Equipment/Resources   Written resources provided Magnifier;Tinted glasses;Large print materials;Low vision devices  (written summary of session)   Education   Learner Patient   Readiness Eager;Acceptance   Method Booklet/handout;Explanation;Demonstration   Response Verbalizes understanding;Demonstrates understanding;Needs reinforcement   Communication with other professionals   Communication with other professionals per EHR   Plan   Plan for next session reinforcement as needed.   Comments   Comments Discharge: all goals addressed as above. No further follow up requested   Total Session Time   Timed Code Treatment Minutes 45   Total Treatment Time (sum of timed and untimed services) 90     "

## 2020-01-09 NOTE — ADDENDUM NOTE
Encounter addended by: Eboni Pimentel, OT on: 1/9/2020 12:44 PM   Actions taken: Clinical Note Signed, Flowsheet accepted, Episode resolved

## 2020-01-13 DIAGNOSIS — H35.3132 INTERMEDIATE STAGE NONEXUDATIVE AGE-RELATED MACULAR DEGENERATION OF BOTH EYES: Primary | ICD-10-CM

## 2020-01-18 DIAGNOSIS — I10 ESSENTIAL HYPERTENSION, BENIGN: ICD-10-CM

## 2020-01-20 RX ORDER — METOPROLOL SUCCINATE 25 MG/1
12.5 TABLET, EXTENDED RELEASE ORAL DAILY
Qty: 45 TABLET | Refills: 0 | Status: SHIPPED | OUTPATIENT
Start: 2020-01-20 | End: 2020-02-17

## 2020-02-11 ENCOUNTER — ANCILLARY PROCEDURE (OUTPATIENT)
Dept: CT IMAGING | Facility: CLINIC | Age: 85
End: 2020-02-11
Attending: INTERNAL MEDICINE
Payer: COMMERCIAL

## 2020-02-11 ENCOUNTER — APPOINTMENT (OUTPATIENT)
Dept: LAB | Facility: CLINIC | Age: 85
End: 2020-02-11
Attending: INTERNAL MEDICINE
Payer: COMMERCIAL

## 2020-02-11 ENCOUNTER — OFFICE VISIT (OUTPATIENT)
Dept: TRANSPLANT | Facility: CLINIC | Age: 85
End: 2020-02-11
Attending: INTERNAL MEDICINE
Payer: COMMERCIAL

## 2020-02-11 VITALS
BODY MASS INDEX: 26.6 KG/M2 | HEART RATE: 48 BPM | RESPIRATION RATE: 18 BRPM | TEMPERATURE: 97.6 F | SYSTOLIC BLOOD PRESSURE: 133 MMHG | OXYGEN SATURATION: 97 % | WEIGHT: 185.4 LBS | DIASTOLIC BLOOD PRESSURE: 66 MMHG

## 2020-02-11 DIAGNOSIS — R91.8 PULMONARY NODULES: ICD-10-CM

## 2020-02-11 DIAGNOSIS — C82.01 GRADE 1 FOLLICULAR LYMPHOMA OF LYMPH NODES OF NECK (H): ICD-10-CM

## 2020-02-11 LAB
ALBUMIN SERPL-MCNC: 3.4 G/DL (ref 3.4–5)
ALP SERPL-CCNC: 60 U/L (ref 40–150)
ALT SERPL W P-5'-P-CCNC: 30 U/L (ref 0–70)
ANION GAP SERPL CALCULATED.3IONS-SCNC: 4 MMOL/L (ref 3–14)
AST SERPL W P-5'-P-CCNC: 16 U/L (ref 0–45)
BASOPHILS # BLD AUTO: 0 10E9/L (ref 0–0.2)
BASOPHILS NFR BLD AUTO: 0.4 %
BILIRUB SERPL-MCNC: 0.8 MG/DL (ref 0.2–1.3)
BUN SERPL-MCNC: 14 MG/DL (ref 7–30)
CALCIUM SERPL-MCNC: 9 MG/DL (ref 8.5–10.1)
CHLORIDE SERPL-SCNC: 108 MMOL/L (ref 94–109)
CO2 SERPL-SCNC: 25 MMOL/L (ref 20–32)
CREAT SERPL-MCNC: 0.81 MG/DL (ref 0.66–1.25)
DIFFERENTIAL METHOD BLD: ABNORMAL
EOSINOPHIL # BLD AUTO: 0.1 10E9/L (ref 0–0.7)
EOSINOPHIL NFR BLD AUTO: 1.2 %
ERYTHROCYTE [DISTWIDTH] IN BLOOD BY AUTOMATED COUNT: 13.4 % (ref 10–15)
GFR SERPL CREATININE-BSD FRML MDRD: 78 ML/MIN/{1.73_M2}
GLUCOSE SERPL-MCNC: 115 MG/DL (ref 70–99)
HCT VFR BLD AUTO: 38.8 % (ref 40–53)
HGB BLD-MCNC: 13.6 G/DL (ref 13.3–17.7)
IMM GRANULOCYTES # BLD: 0 10E9/L (ref 0–0.4)
IMM GRANULOCYTES NFR BLD: 0.3 %
LYMPHOCYTES # BLD AUTO: 1.6 10E9/L (ref 0.8–5.3)
LYMPHOCYTES NFR BLD AUTO: 21.4 %
MCH RBC QN AUTO: 34.3 PG (ref 26.5–33)
MCHC RBC AUTO-ENTMCNC: 35.1 G/DL (ref 31.5–36.5)
MCV RBC AUTO: 98 FL (ref 78–100)
MONOCYTES # BLD AUTO: 0.8 10E9/L (ref 0–1.3)
MONOCYTES NFR BLD AUTO: 10.6 %
NEUTROPHILS # BLD AUTO: 4.9 10E9/L (ref 1.6–8.3)
NEUTROPHILS NFR BLD AUTO: 66.1 %
NRBC # BLD AUTO: 0 10*3/UL
NRBC BLD AUTO-RTO: 0 /100
PLATELET # BLD AUTO: 126 10E9/L (ref 150–450)
POTASSIUM SERPL-SCNC: 4.5 MMOL/L (ref 3.4–5.3)
PROT SERPL-MCNC: 6 G/DL (ref 6.8–8.8)
RBC # BLD AUTO: 3.97 10E12/L (ref 4.4–5.9)
SODIUM SERPL-SCNC: 137 MMOL/L (ref 133–144)
WBC # BLD AUTO: 7.3 10E9/L (ref 4–11)

## 2020-02-11 PROCEDURE — G0463 HOSPITAL OUTPT CLINIC VISIT: HCPCS

## 2020-02-11 PROCEDURE — 36415 COLL VENOUS BLD VENIPUNCTURE: CPT

## 2020-02-11 PROCEDURE — 85025 COMPLETE CBC W/AUTO DIFF WBC: CPT | Performed by: INTERNAL MEDICINE

## 2020-02-11 PROCEDURE — 80053 COMPREHEN METABOLIC PANEL: CPT | Performed by: INTERNAL MEDICINE

## 2020-02-11 ASSESSMENT — PAIN SCALES - GENERAL: PAINLEVEL: NO PAIN (0)

## 2020-02-11 NOTE — PROGRESS NOTES
[facility] New Patient Consult  Feb 11, 2020      Reason for Visit: follow-up low grade lymphoma    Disease and Treatment History:  1.  Found to have incidental retroperitoneal lymphadenopathy on CT scan done for other reasons in 2012  -Needle biopsy completed and it was felt to be consistent with low-grade lymphoma not otherwise specified as the piece of tissue was to small to give a specific histologic subtype  -PET/CT on 1/27/2012 showed multiple enlarged hypermetabolic retroperitoneal lymph nodes with one left periaortic lymph node measuring 2.7 x 2.1, a conglomerate mass of 2 lymph nodes measuring 4.7 x 2.4, this had an SUV of 6.7.  A periaortic lymph node on the left measuring 2 x 1.7, and intra-aortic caval lymph node measuring 1.4 x 1.4 cm with an SUV of 5.9.  No bone uptake.  -Bone marrow biopsy on 1/26/2012 showed 20% cellularity with a single very small interstitial infiltrate of CD positive small B cells comprising less than 1% which could be low level involvement although not definitive.  2.  Observation 2012 forward  -Intermittent scans with Dr. Rojas showed all stable retroperitoneal lymphadenopathy actually somewhat smaller on last imaging in 11/2018 at 2.5 cm or less.  - Actually discharged from clinic in 11/2018 due to stability for over 6 years.    HPI: I met Mr. Mcnair in oncology clinic in 10/2019 for new neck SUNI noted with an ultrasound on 8/23/2019 that showed a 1.7 x 1.1 x 1.7 cm lymph node in the left that was felt to be possible suspicious for lymphoma.  He underwent CT scan on 9/3/2019 that showed a prominent 1.3 cm lymph node left level 1B and a 1.3 cm pretracheal lymph node as well.  Fine-needle aspiration attempted in ENT clinic on 911 was nondiagnostic and subsequently underwent surgical biopsy on 9/20/2019.  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    We imaged his C/A/P at that time and there were some mild pulmonary nodules. He comes in  today for follow-up. Since I last saw him in 10/2019 he notes he has not had any other doctor visits since then. No recent infections, no fevers or chills, no chest pain or SOB, no new GI symptoms. Constipation waxes and wanes but has a regimen that he follows. No bleeding or bruising. Notes chronic back and hip pain.    10 point review of systems otherwise negative      Physical exam:  /66   Pulse (!) 48   Temp 97.6  F (36.4  C) (Oral)   Resp 18   Wt 84.1 kg (185 lb 6.4 oz)   SpO2 97%   BMI 26.60 kg/m    General: Very pleasant gentleman in no acute distress.  Looks much younger than his stated age of 89.  KPS 80-90  HEENT: No icterus or injection.  Oropharynx is clear. No abnormal cervical or supraclavicular lymphadenopathy.  No palpable axillary lymphadenopathy  Lungs: Clear to all station bilaterally  Cardiovascular: Regular rate and rhythm  Abdomen: Soft nontender no palpable hepatosplenomegaly or other masses  Extremities: Trace to 1+ edema from the ankles down    Labs:  Results for SUMAN FRAZIER (MRN [id number]) as of 2/11/2020 12:03   Ref. Range 2/11/2020 11:21   Sodium Latest Ref Range: 133 - 144 mmol/L 137   Potassium Latest Ref Range: 3.4 - 5.3 mmol/L 4.5   Chloride Latest Ref Range: 94 - 109 mmol/L 108   Carbon Dioxide Latest Ref Range: 20 - 32 mmol/L 25   Urea Nitrogen Latest Ref Range: 7 - 30 mg/dL 14   Creatinine Latest Ref Range: 0.66 - 1.25 mg/dL 0.81   GFR Estimate Latest Ref Range: >60 mL/min/1.73_m2 78   GFR Estimate If Black Latest Ref Range: >60 mL/min/1.73_m2 >90   Calcium Latest Ref Range: 8.5 - 10.1 mg/dL 9.0   Anion Gap Latest Ref Range: 3 - 14 mmol/L 4   Albumin Latest Ref Range: 3.4 - 5.0 g/dL 3.4   Protein Total Latest Ref Range: 6.8 - 8.8 g/dL 6.0 (L)   Bilirubin Total Latest Ref Range: 0.2 - 1.3 mg/dL 0.8   Alkaline Phosphatase Latest Ref Range: 40 - 150 U/L 60   ALT Latest Ref Range: 0 - 70 U/L 30   AST Latest Ref Range: 0 - 45 U/L 16   Glucose Latest Ref Range: 70 -  99 mg/dL 115 (H)   WBC Latest Ref Range: 4.0 - 11.0 10e9/L 7.3   Hemoglobin Latest Ref Range: 13.3 - 17.7 g/dL 13.6   Hematocrit Latest Ref Range: 40.0 - 53.0 % 38.8 (L)   Platelet Count Latest Ref Range: 150 - 450 10e9/L 126 (L)   RBC Count Latest Ref Range: 4.4 - 5.9 10e12/L 3.97 (L)   MCV Latest Ref Range: 78 - 100 fl 98   MCH Latest Ref Range: 26.5 - 33.0 pg 34.3 (H)   MCHC Latest Ref Range: 31.5 - 36.5 g/dL 35.1   RDW Latest Ref Range: 10.0 - 15.0 % 13.4   Diff Method Unknown Automated Method   % Neutrophils Latest Units: % 66.1   % Lymphocytes Latest Units: % 21.4   % Monocytes Latest Units: % 10.6   % Eosinophils Latest Units: % 1.2   % Basophils Latest Units: % 0.4   % Immature Granulocytes Latest Units: % 0.3   Nucleated RBCs Latest Ref Range: 0 /100 0   Absolute Neutrophil Latest Ref Range: 1.6 - 8.3 10e9/L 4.9   Absolute Lymphocytes Latest Ref Range: 0.8 - 5.3 10e9/L 1.6   Absolute Monocytes Latest Ref Range: 0.0 - 1.3 10e9/L 0.8   Absolute Eosinophils Latest Ref Range: 0.0 - 0.7 10e9/L 0.1   Absolute Basophils Latest Ref Range: 0.0 - 0.2 10e9/L 0.0   Abs Immature Granulocytes Latest Ref Range: 0 - 0.4 10e9/L 0.0   Absolute Nucleated RBC Unknown 0.0       Ct Chest Pending: To my eye no new pulmonary nodules    Assessment and plan: 89-year-old gentleman with long-standing history of low-grade B-cell lymphoma going back to 2012 that has been observed over the last 7+ years now with new small neck lymph node 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    1.  Follicular lymphoma:  FLIP scoring system for follicular lymphoma.  Specifically this looks at age, LDH, hemoglobin, and stage and his number of jesse sites.  He would get a point for age, a point for stage of III given above and below diaphragm, but assuming 4 or fewer jesse sites, a normal hemoglobin, and a normal LDH this would give him a 50% chance of survival at 10 years without intervention.  Given his age of 89 this would be quite a good  survival even in the absence of his lymphoma.    Imaging from 10/2019 showed minimal disease with small 2 X 1 cm periaortic LN and no other concerning findings. Did have some pulmonary nodules that follow-up was recommended.    No findings on exam today that were concerning.    Will consider scans in 10/2020 for follow-up and then likely just clinical exams going forward if remains stable    2. Pulmonary nodules: await final CT review but to my eye nodules look stable    3. ID: no current issues  - annual flu shot    4. Heme: WBC and Hgb normal. Platelets mildly low but hover around 150 over the last few years. No intervention needed    5. FEN/Renal: WNL    6. Liver: LFTs WNL    Final Plan:  - Follow-up on Chest CT results  - Beena in 6 months with labs prior    Renata Hargrove MD    Addendum 2/12/2020:                                                                     IMPRESSION:   8 mm nodule in the medial left lower lobe and 6 mm nodule in the right middle lobe are unchanged since 5/18/2017. No dedicated follow-up is Recommended.    Called Mr. Mcnair with the results. He was happy to hear the news. Plan for clinical follow-up in 6 months as planned.    Renata Hargrove MD

## 2020-02-11 NOTE — NURSING NOTE
Chief Complaint   Patient presents with     Blood Draw     VPT blood draw and vitals     Venipuncture labs drawn from left arm

## 2020-02-11 NOTE — NURSING NOTE
"Oncology Rooming Note    February 11, 2020 12:01 PM   Daniel Mcnair is a 89 year old male who presents for:    Chief Complaint   Patient presents with     Blood Draw     VPT blood draw and vitals     Oncology Clinic Visit     Pt is here for  a rtn for Lymphoma     Initial Vitals: Blood Pressure 133/66   Pulse (Abnormal) 48   Temperature 97.6  F (36.4  C) (Oral)   Respiration 18   Weight 84.1 kg (185 lb 6.4 oz)   Oxygen Saturation 97%   Body Mass Index 26.60 kg/m   Estimated body mass index is 26.6 kg/m  as calculated from the following:    Height as of 10/22/19: 1.778 m (5' 10\").    Weight as of this encounter: 84.1 kg (185 lb 6.4 oz). Body surface area is 2.04 meters squared.  No Pain (0) Comment: Data Unavailable   No LMP for male patient.  Allergies reviewed: Yes  Medications reviewed: Yes    Medications: Medication refills not needed today.  Pharmacy name entered into EPIC:    [facility] - [facility]YAL  [id number] [place], [place] - [address]  [id number] [place], [place] - [address]  [facility] - [place], [place] - [address] [place] [address]    Clinical concerns: none       Makeda Higgins MA            "

## 2020-02-14 ENCOUNTER — OFFICE VISIT (OUTPATIENT)
Dept: OPHTHALMOLOGY | Facility: CLINIC | Age: 85
End: 2020-02-14
Attending: OPHTHALMOLOGY
Payer: COMMERCIAL

## 2020-02-14 DIAGNOSIS — H35.3132 INTERMEDIATE STAGE NONEXUDATIVE AGE-RELATED MACULAR DEGENERATION OF BOTH EYES: ICD-10-CM

## 2020-02-14 PROCEDURE — 92134 CPTRZ OPH DX IMG PST SGM RTA: CPT | Mod: ZF | Performed by: OPHTHALMOLOGY

## 2020-02-14 PROCEDURE — G0463 HOSPITAL OUTPT CLINIC VISIT: HCPCS | Mod: ZF

## 2020-02-14 ASSESSMENT — EXTERNAL EXAM - RIGHT EYE: OD_EXAM: MILD BROW PTOSIS

## 2020-02-14 ASSESSMENT — VISUAL ACUITY
CORRECTION_TYPE: GLASSES
OS_CC: 20/40
OD_CC: 20/40
OS_CC+: -2
OD_CC+: +2
METHOD: SNELLEN - LINEAR

## 2020-02-14 ASSESSMENT — CUP TO DISC RATIO
OS_RATIO: 0.3
OD_RATIO: 0.3

## 2020-02-14 ASSESSMENT — EXTERNAL EXAM - LEFT EYE: OS_EXAM: MILD BROW PTOSIS

## 2020-02-14 ASSESSMENT — CONF VISUAL FIELD
METHOD: COUNTING FINGERS
OS_NORMAL: 1
OD_NORMAL: 1

## 2020-02-14 ASSESSMENT — TONOMETRY
OS_IOP_MMHG: 12
IOP_METHOD: TONOPEN
OD_IOP_MMHG: 14

## 2020-02-14 NOTE — NURSING NOTE
Chief Complaints and History of Present Illnesses   Patient presents with     Macular Degeneration Follow Up     Chief Complaint(s) and History of Present Illness(es)     Macular Degeneration Follow Up     Laterality: both eyes    Quality: blurred vision    Frequency: constantly    Timing: throughout the day    Course: stable    Associated symptoms: Negative for eye pain, dryness, redness, tearing, itching, floaters and flashes    Treatments tried: warm compresses and artificial tears    Pain scale: 0/10              Comments     AMD recheck / states gets by without magnifying glasses.  PFAT about TID to BE   ARNOLD Lozano COT 2:28 PM 02/14/2020

## 2020-02-14 NOTE — PROGRESS NOTES
CC -   Dry AMD    INTERVAL HISTORY -    Feels VA is stable, fluctuates  ROBINA worse  . Using AREDS II BID, occasional Amsler.    HPI - 89 yo male with hx of dry ARMD, dbh right eye noted in 3/2015, CE/IOL OU.   Now taking AREDS II, never smoked in the past and using Amsler not noticing diplopia.      PAST OCULAR SURGERY  CE/IOL both eyes ~ 2005    RETINAL IMAGING  OCT  2/14/20  right eye- 2+ drusen , sl increase in size of drusenoid pigment epithelial detachments, no fluid, PVD  left eye - 2+ drusen, minimal increase in size of drusenoid pigment epithelial detachments, no fluid, PVD      ASSESSMENT & PLAN  1.  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  Diplopia   - monocular on testing in past, although reports binocular symptoms occasionally   - ortho at near and distance on strabismus exam in past   - has been experiencing this less often - describes occasional shadowing    3. Posterior vitreous detachment (PVD) both eyes    - advised S/Sx RD 7/2019 & 2/2020    4.  H/O dot blot hemorrhage right eye   - seen previously 3/2015 visit, resolved today, no new heme    - ?etiology   - h/o HTN, doubt Choroidal neovascular membrane, but in far temporal macula   - continue observation      6. Dry Eyes, bilateral   -sees Roman   - on artificial tears & compresses      return to clinic:6-12  monhts, OCT each eye      ATTESTATION     Attending Physician Attestation:      Complete documentation of historical and exam elements from today's encounter can be found in the full encounter summary report (not reduplicated in this progress note).  I personally obtained the chief complaint(s) and history of present illness.  I confirmed and edited as necessary the review of systems, past medical/surgical history, family history, social history, and examination  findings as documented by others; and I examined the patient myself.  I personally reviewed the relevant tests, images, and reports as documented above.  I formulated and edited as necessary the assessment and plan and discussed the findings and management plan with the patient and family    Luna Parker MD, PhD  , Vitreoretinal Surgery  Department of Ophthalmology  [facility]

## 2020-02-17 ENCOUNTER — OFFICE VISIT (OUTPATIENT)
Dept: INTERNAL MEDICINE | Facility: CLINIC | Age: 85
End: 2020-02-17
Payer: COMMERCIAL

## 2020-02-17 VITALS
DIASTOLIC BLOOD PRESSURE: 54 MMHG | SYSTOLIC BLOOD PRESSURE: 119 MMHG | BODY MASS INDEX: 26.48 KG/M2 | WEIGHT: 185 LBS | TEMPERATURE: 98.4 F | HEART RATE: 49 BPM | HEIGHT: 70 IN | RESPIRATION RATE: 18 BRPM | OXYGEN SATURATION: 98 %

## 2020-02-17 DIAGNOSIS — N40.0 HYPERTROPHY OF PROSTATE WITHOUT URINARY OBSTRUCTION: ICD-10-CM

## 2020-02-17 DIAGNOSIS — G31.84 MILD COGNITIVE IMPAIRMENT WITH MEMORY LOSS: ICD-10-CM

## 2020-02-17 DIAGNOSIS — E78.5 HYPERLIPIDEMIA LDL GOAL <70: ICD-10-CM

## 2020-02-17 DIAGNOSIS — I10 ESSENTIAL HYPERTENSION, BENIGN: ICD-10-CM

## 2020-02-17 DIAGNOSIS — R41.3 MEMORY LOSS: ICD-10-CM

## 2020-02-17 DIAGNOSIS — N31.8 HYPERTONICITY OF BLADDER: ICD-10-CM

## 2020-02-17 DIAGNOSIS — R41.3 MEMORY LOSS: Primary | ICD-10-CM

## 2020-02-17 LAB
GLUCOSE SERPL-MCNC: 100 MG/DL (ref 70–99)
TSH SERPL DL<=0.005 MIU/L-ACNC: 1.98 MU/L (ref 0.4–4)
VIT B12 SERPL-MCNC: 883 PG/ML (ref 193–986)

## 2020-02-17 RX ORDER — METOPROLOL SUCCINATE 25 MG/1
12.5 TABLET, EXTENDED RELEASE ORAL DAILY
Qty: 45 TABLET | Refills: 3 | Status: SHIPPED | OUTPATIENT
Start: 2020-02-17

## 2020-02-17 RX ORDER — TAMSULOSIN HYDROCHLORIDE 0.4 MG/1
0.8 CAPSULE ORAL AT BEDTIME
Qty: 180 CAPSULE | Refills: 3 | Status: SHIPPED | OUTPATIENT
Start: 2020-02-17

## 2020-02-17 RX ORDER — ATORVASTATIN CALCIUM 20 MG/1
20 TABLET, FILM COATED ORAL DAILY
Qty: 90 TABLET | Refills: 3 | Status: SHIPPED | OUTPATIENT
Start: 2020-02-17

## 2020-02-17 RX ORDER — TOLTERODINE TARTRATE 2 MG/1
TABLET, EXTENDED RELEASE ORAL
Qty: 270 TABLET | Refills: 3 | Status: SHIPPED | OUTPATIENT
Start: 2020-02-17

## 2020-02-17 ASSESSMENT — MIFFLIN-ST. JEOR: SCORE: 1505.4

## 2020-02-17 ASSESSMENT — PAIN SCALES - GENERAL: PAINLEVEL: NO PAIN (0)

## 2020-02-17 NOTE — NURSING NOTE
Chief Complaint   Patient presents with     Recheck Medication     pt. is here for a 3 month follow up      Musculoskeletal Problem     pt. states that he has been having aome pain in his legs     Eye Problem     pt. states that he has dry eyes bilaterally      Urinary Problem     more frequent urination throughout the day     Refill Request     pt. needs medication refills       Khadra Avila, EMT

## 2020-02-17 NOTE — PROGRESS NOTES
CC:  Routine F/U    Other health care team members:  Ophthalmology Dr. Parker, Dr. Owens  Oncology Dr. Hragrove  Cardiology Dr. Martinez  Dermatology Dr. Camarena  Pain Clinic Dr. Hicks  ENT Dr. 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  CAD (s/p multiple stents, bradycardia, last seen by cardiology 10/22/19), stable  Chronic leg swelling c/w venous insufficiency, stable  HTN, controlled  HLD, controlled  Chronic back pain due to spinal stenosis (hx SI  Joint injection 10/2019, PT/OT helpful, needs to resume home exercises, has seen PMN&R, Pain Clinic, takes acetaminophen)   Anemia, chronic, normal/stable as of Feb  Macular degenration   Nocturia, hx TURP, on tamsulosin and tolterodine, still having to get up every 2 hours to urinate.  Insufficient sleep affecting pain and energy during the day.  Pulmonary nodules, stable, no f/u needed as of 2/2020  Constipation, stable  Hearing loss, intermittent cerumen, will check today  Cognitive changes, sometimes forgets names, takes longer to recall at times, no other red flags such as getting lost, not remembering appointments, forgetting familiar people, safety concerns, naming familiar objects, change in personality, psychosis, depression, other neurologic problems such as tremor, trouble with coordination, gait alteration from baseline, mm stiffness, etc.  Does note memory issues are worse when anxious. He and his wife work together to help each other with memory.  We discussed behavioral strategies to address mild cognitive impairment, and discussed red flags that would prompt further evaluation.  Chronic anxiety is stable and we again talked about non-pharmaceutical strategies for management, including avoiding isolating self, and continued  participation in his group meetings which he finds very helpful.    Daniel and his wife will be transferring care to a gerontology group at [facility].  The clinic is closer to home and there is shuttle service as well.  We talked about this transition.    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)     Benign essential hypertension     Coronary artery disease involving native coronary artery of native heart without angina pectoris     Advance Care Planning     Osteoarthritis of multiple joints, unspecified osteoarthritis type     Hyperlipidemia, unspecified hyperlipidemia type     Other fatigue     B-cell lymphoma, unspecified B-cell lymphoma  type, unspecified body region (H)     IPMN (intraductal papillary mucinous neoplasm)     Sensorineural hearing loss (SNHL) of both ears     Arthropathy of right sacroiliac joint     Current Outpatient Medications   Medication Sig Dispense Refill     acetaminophen (TYLENOL) 325 MG tablet Take 2 tablets (650 mg) by mouth every 6 hours as needed for pain 100 tablet 0     amLODIPine (NORVASC) 10 MG tablet TAKE 1 TABLET DAILY 90 tablet 3     atorvastatin (LIPITOR) 20 MG tablet Take 1 tablet (20 mg) by mouth daily 90 tablet 3     benazepril (LOTENSIN) 40 MG tablet Take 1 tablet (40 mg) by mouth daily Please keep appointment 2/17/20 90 tablet 2     bisacodyl (DULCOLAX) 10 MG suppository Place 1 suppository rectally daily as needed. 3 suppository 0     carboxymethylcellulose PF (REFRESH PLUS) 0.5 % SOLN ophthalmic solution 1 drop 3 times daily as needed       Carboxymethylcellulose Sodium 1 % GEL Apply to eye as needed       docusate sodium 100 MG tablet Take 100 mg by mouth 2 times daily 60 tablet 1     famotidine (PEPCID) 20 MG tablet Take 1 tablet (20 mg) by mouth 2 times daily 180 tablet 3     FISH OIL-KRILL OIL PO Take 2,000 mg by mouth daily       Guar Gum (BENEFIBER) packet Take 1 packet by mouth every morning        Magnesium Hydroxide (MILK OF MAGNESIA PO) Take by mouth At Bedtime       metoprolol succinate ER (TOPROL-XL) 25 MG 24 hr tablet Take 0.5 tablets (12.5 mg) by mouth daily DO NOT CRUSH. Tablet may be split in half along score line. 45 tablet 3     Multiple Vitamins-Minerals (VITEYES AREDS FORMULA/LUTEIN) CAPS Take by mouth daily       nitroglycerin (NITROSTAT) 0.4 MG sublingual tablet Place 1 tablet (0.4 mg) under the tongue every 5 minutes as needed 30 tablet 2     sildenafil (VIAGRA) 100 MG tablet Take 1 tablet (100 mg) by mouth daily as needed 10 tablet 2     tamsulosin (FLOMAX) 0.4 MG capsule Take 2 capsules (0.8 mg) by mouth At Bedtime 180 capsule 3     tolterodine (DETROL) 2 MG tablet Take one tab by  "mouth in the morning and 2 tabs by mouth in the evening 270 tablet 3     Allergies   Allergen Reactions     Nka [No Known Allergies]      /54 (BP Location: Right arm, Patient Position: Chair, Cuff Size: Adult Regular)   Pulse (!) 49   Temp 98.4  F (36.9  C) (Oral)   Resp 18   Ht 1.778 m (5' 10\")   Wt 83.9 kg (185 lb)   SpO2 98%   BMI 26.54 kg/m    Minimal wax on ear exam today  Cognitive screen:  Recalls 3/3 objects and draws clock appropriately    Daniel was seen today for recheck medication, musculoskeletal problem, eye problem, urinary problem and refill request.    Diagnoses and all orders for this visit:    Memory loss  -     Vitamin B12; Future  -     Glucose; Future  -     TSH with free T4 reflex; Future    Hypertonicity of bladder  -     tolterodine (DETROL) 2 MG tablet; Take one tab by mouth in the morning and 2 tabs by mouth in the evening    Hypertrophy of prostate without urinary obstruction  -     tamsulosin (FLOMAX) 0.4 MG capsule; Take 2 capsules (0.8 mg) by mouth At Bedtime    Essential hypertension, benign  -     metoprolol succinate ER (TOPROL-XL) 25 MG 24 hr tablet; Take 0.5 tablets (12.5 mg) by mouth daily DO NOT CRUSH. Tablet may be split in half along score line.    Hyperlipidemia LDL goal <70  -     atorvastatin (LIPITOR) 20 MG tablet; Take 1 tablet (20 mg) by mouth daily    Mild cognitive impairment with memory loss   -     Glucose; Future      The following verbal and written instructions were given to the patient:  Plan:  You can schedule injections for your back pain every 3 months if you like.  Take 2 acetaminophen (total 650 mg) every 6 hours for back pain.  Continue to take tolterodine to one tablet (2 mg) in the morning, 2 tablets (total 4 mg) at bedtime  Increase your tamsulosin to 2 tabs (total 0.8 mg) at bedtime  Make an appointment with urology if your medication changes are not sufficiently controlling your night time urination frequency.  Be sure to change position " slowly.  If you are too dizzy with these medication changes, return to your previous doses.  Go to the lab today  I sent prescription refills to your pharmacy  See me any time!  It has been my privilege to have you and your wife as patients.    Total time spent 40 minutes.  More than 50% of the time spent with Mr. Mcnair on counseling / coordinating his care    Follow up as needed.    Lia Dumont M.D.  Internal Medicine  [facility]   pager [phone]

## 2020-02-17 NOTE — PATIENT INSTRUCTIONS
"    Plan:  You can schedule injections for your back pain every 3 months if you like.  Take 2 acetaminophen (total 650 mg) every 6 hours for back pain.  Continue to take tolterodine to one tablet (2 mg) in the morning, 2 tablets (total 4 mg) at bedtime  Increase your tamsulosin to 2 tabs (total 0.8 mg) at bedtime  Make an appointment with urology if your medication changes are not sufficiently controlling your night time urination frequency.  Be sure to change position slowly.  If you are too dizzy with these medication changes, return to your previous doses.  Go to the lab today  I sent prescription refills to your pharmacy  See me any time!  It has been my privilege to have you and your wife as patients.  Dr. Dumont    Patient Education     Coping with Memory Loss  What happens when you have memory loss?  Memory loss causes problems with thinking, learning and memory. You may feel forgetful or have trouble focusing on tasks.  Memory loss may be a side effect of medicine, chemotherapy or radiation. In these cases, problems with memory may improve after you finish your treatment. But you could have long-term problems.  Other factors that affect memory and your ability to focus include:    Aging    Illness    Depression    Hormonal changes    Anemia (low red blood cells)    Stress.  What can I do to treat or prevent memory loss?  Problems with thinking and memory can be very frustrating. There is no standard treatment at this time. But there are things you can do to reduce the effects of memory loss:    Really pay attention. Use your eyes, ears and sense of touch to remember things. Focus when someone tells you something.    Limit distractions when you need to complete tasks that require you to focus.    Tell yourself what you are doing as you do it. For example, if you're going out for a few hours, as you close the door tell yourself, \"I'm locking the door now and putting the key in my pocket so I don't have to " "worry about whether I locked the door.\"    Give yourself clear reminders. If you need to buy dog food, put the empty bag at the door so you'll see it as you leave the house. Or write yourself a note and put it where it's needed.    Keep things in the same place. This way you don't have to wonder where you put your keys, remote control or medicine.    Keep a journal of daily events and activities. Carry a notebook and write down important information that you want to remember.    Use helpful tools, such as:  ? A daily planner  ? A wall calendar  ? Checklists  ? Sticky notes  ? A  near the door  ? A pre-programmed phone  ? A wristwatch with an alarm  ? A pill box to keep track of your medicines.    Get plenty of sleep and exercise each day.    Stay positive, and try to manage stress.    If you think you may be depressed, talk to your doctor. Depression should be treated.  When should I call my care team?  Call your care team if:    You feel depressed.    You cannot complete basic daily activities.  Comments:  For informational purposes only. Not to replace the advice of your health care provider.  Copyright   2006 [employer]. All rights reserved. [employer] 585026 - REV 03/16.  For informational purposes only. Not to replace the advice of your health care provider.  Copyright   2018 [employer]. All rights reserved.             Your health care Provider has recommended that you receive the new Shingle vaccine called Shingrix to prevent shingles for ages 50 and above. Many private insurance and Medicare Part D plans cover Shingrix. However, not all insurance carriers cover the entire cost of the Shingrix vaccine if the vaccine is administered at your primary care clinic. The clinic cannot determine your insurance benefits.  Please call your insurance carrier prior. The vaccine comes in two doses. Your second dose should be 2-6 months from your first dose.       Prior to receiving " the vaccine, we recommend that you call your insurance carrier and ask them the following questions:            1. Is there a cost difference if I receive the vaccine at my doctor's office or a pharmacy?          2. Does my insurance cover the Shingrix Vaccine and administration of the vaccine?          3. What is my co-pay or deductible for the vaccine?        Please call to schedule a Nurse-Visit only at [phone].  Nurse Visit hours are available Monday, Wednesday, and Friday from 9:00 AM-11:00 AM and 1:00 PM-3:00 PM.       [facility] Medication Refill Request Information:  * Please contact your pharmacy regarding ANY request for medication refills.  ** [facility] Prescription Fax = [phone]  * Please allow 3 business days for routine medication refills.  * Please allow 5 business days for controlled substance medication refills.     [facility] Test Result notification information:  *You will be notified with in 7-10 days of your appointment day regarding the results of your test.  If you are on MyChart you will be notified as soon as the provider has reviewed the results and signed off on them.

## 2020-02-18 PROBLEM — G31.84 MILD COGNITIVE IMPAIRMENT WITH MEMORY LOSS: Status: ACTIVE | Noted: 2020-02-18

## 2020-05-27 NOTE — TELEPHONE ENCOUNTER
Spoke with pt and RSC'd appt with Dr. Owens from 6/1 to 9/21 due to Covid-19.    Cheyenne Maza COA 10:34 AM May 27, 2020

## 2020-07-14 NOTE — PROGRESS NOTES
AUDIOLOGY REPORT    BACKGROUND INFORMATION: Daniel Mcnair was seen in the Audiology Clinic  at [facility] on 7/14/2020 for follow-up.  The patient has been seen previously in this clinic and results revealed a bilateral mild to moderately-severe sensorineural hearing loss.  The patient was fit with [employer]ia Pure 13 3Nx hearing aids on 6/04/18.  The patient reports that he lost the retention hook on his right aid but is doing well with them otherwise.    TEST RESULTS AND PROCEDURES: An electroacoustic hearing aid check was performed.  Adjustments were made including replacing the right retention hook and the patient reported good sound and comfort. The hearing aid conformity evaluation was completed. This includes initially evaluating the devices electroacoustically and later matching NAL-NL2 target with soft sounds audible, moderate sounds comfortable and clear, and loud sounds below discomfort.     SUMMARY AND RECOMMENDATIONS: A hearing aid check was performed today and the patient reports that he is doing well with his hearing aids.  Adjustments were made as noted above and the patient will return as needed or at least every 4-6 months for cleaning and assessment of hearing aid. It is recommended that the patient follow-up for a hearing evaluation as well. The patient expressed understanding with these recommendations. Call this clinic with questions regarding today s visit.    Tashia Chisholm.  Doctor of Audiology  [place] License # [id number]

## 2020-08-04 NOTE — TELEPHONE ENCOUNTER
AMLODIPINE BESYLATE TABS 10MG    Last Written Prescription Date:  6/24/2019  Last Fill Quantity: 90,   # refills: 3  Last Office Visit : 2/17/2020  Future Office visit:  None  90 Tabs, 3 Refills sent to pharm 8/4/2020    Lindsay Quinn RN  Central Triage Red Flags/Med Refills

## 2020-08-14 NOTE — PROGRESS NOTES
CC -   Dry AMD    INTERVAL HISTORY -    Feels VA is stable, uses artificial tears 4/day  ROBINA worse  . Using AREDS II BID, occasional Amsler.    HPI - 89 yo male with hx of dry ARMD, dbh right eye noted in 3/2015, CE/IOL OU.   Now taking AREDS II, never smoked in the past and using Amsler not noticing diplopia.      PAST OCULAR SURGERY  CE/IOL both eyes ~ 2005    RETINAL IMAGING  OCT  8/14/20  OD -  2+ drusen , sl increase in size of drusenoid pigment epithelial detachments, no fluid, PVD  OS  - 2+ drusen, minimal increase in size of drusenoid pigment epithelial detachments, no fluid, PVD      ASSESSMENT & PLAN  1.  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  Diplopia   - monocular on testing in past, although reports binocular symptoms occasionally   - ortho at near and distance on strabismus exam in past   - has been experiencing this less often - describes occasional shadowing    3. Posterior vitreous detachment (PVD) both eyes    - advised S/Sx RD 8/2020    4.  H/O dot blot hemorrhage right eye   - seen previously 3/2015 visit, resolved today, no new heme    - ?etiology   - h/o HTN, doubt Choroidal neovascular membrane, but in far temporal macula   - continue observation      6. Dry Eyes, bilateral   -sees Roman   - on artificial tears & compresses      return to clinic:6-12  monhts, OCT each eye      ATTESTATION     Attending Physician Attestation:      Complete documentation of historical and exam elements from today's encounter can be found in the full encounter summary report (not reduplicated in this progress note).  I personally obtained the chief complaint(s) and history of present illness.  I confirmed and edited as necessary the review of systems, past medical/surgical history, family history, social history, and examination  findings as documented by others; and I examined the patient myself.  I personally reviewed the relevant tests, images, and reports as documented above.  I formulated and edited as necessary the assessment and plan and discussed the findings and management plan with the patient and family    Luna Parker MD, PhD  , Vitreoretinal Surgery  Department of Ophthalmology  [facility]

## 2020-08-14 NOTE — NURSING NOTE
Chief Complaints and History of Present Illnesses   Patient presents with     Macular Degeneration Follow Up     6 month follow up AMD, both eyes     Chief Complaint(s) and History of Present Illness(es)     Macular Degeneration Follow Up     Laterality: both eyes    Quality: blurred vision    Severity: moderate    Associated symptoms: dryness.  Negative for eye pain, tearing, flashes, floaters and discharge    Pain scale: 0/10    Comments: 6 month follow up AMD, both eyes              Comments     Pt feels his vision BE has become more blurry over the last 6 months.  Pt complains of seeing spots float around in vision BE after straining to try and read or when he is tired.  Denies any flashes, pain, pressure, discharge, and tearing.  Ocular Meds: AT's PRN BE    Elly Corea OT 9:58 AM August 14, 2020

## 2020-08-25 NOTE — PROGRESS NOTES
Chief Complaint   Patient presents with     Oncology Clinic Visit     Follicular Lymphoma     Blood Draw     Vitals and blood drawn via VPT by LPN. Pt checked into appt.      DOLLY Suarez LPN

## 2020-08-25 NOTE — PROGRESS NOTES
Justo New Patient Consult  Aug 25, 2020      Reason for Visit: follow-up low grade lymphoma    Disease and Treatment History:  1.  Found to have incidental retroperitoneal lymphadenopathy on CT scan done for other reasons in 2012  -Needle biopsy completed and it was felt to be consistent with low-grade lymphoma not otherwise specified as the piece of tissue was to small to give a specific histologic subtype  -PET/CT on 1/27/2012 showed multiple enlarged hypermetabolic retroperitoneal lymph nodes with one left periaortic lymph node measuring 2.7 x 2.1, a conglomerate mass of 2 lymph nodes measuring 4.7 x 2.4, this had an SUV of 6.7.  A periaortic lymph node on the left measuring 2 x 1.7, and intra-aortic caval lymph node measuring 1.4 x 1.4 cm with an SUV of 5.9.  No bone uptake.  -Bone marrow biopsy on 1/26/2012 showed 20% cellularity with a single very small interstitial infiltrate of CD positive small B cells comprising less than 1% which could be low level involvement although not definitive.  2.  Observation 2012 forward  -Intermittent scans with Dr. Rojas showed all stable retroperitoneal lymphadenopathy actually somewhat smaller on last imaging in 11/2018 at 2.5 cm or less.  - Actually discharged from clinic in 11/2018 due to stability for over 6 years.    HPI: I met Mr. Mcnair in oncology clinic in 10/2019 for new neck SUNI noted with an ultrasound on 8/23/2019 that showed a 1.7 x 1.1 x 1.7 cm lymph node in the left that was felt to be possible suspicious for lymphoma.  He underwent CT scan on 9/3/2019 that showed a prominent 1.3 cm lymph node left level 1B and a 1.3 cm pretracheal lymph node as well.  Fine-needle aspiration attempted in ENT clinic on 911 was nondiagnostic and subsequently underwent surgical biopsy on 9/20/2019.  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    We imaged his C/A/P at that time and there were some mild pulmonary nodules. I saw him  last in February at which point we were following some pulmonary nodules which were stable. Today he comes in for clinical follow-up. He notes that he has had some anxiety over the last few months with COVID but meditation and nain has helped with this. No new lumps or bumps. No GI issues. No bleeding. Has had some issues with eyes and hearing but otherwise doing ok. Had avoided infections. Wondered exactly what he was seeing me for today for follow-up     10 point review of systems otherwise negative      Physical exam:  /54   Pulse 57   Temp 98.3  F (36.8  C) (Oral)   Resp 18   Wt 79.4 kg (175 lb 1.6 oz)   SpO2 97%   BMI 25.12 kg/m    General: Very pleasant gentleman in no acute distress.  Looks much younger than his stated age of 89.  KPS 80-90  HEENT: No icterus or injection.  Oropharynx is clear. No abnormal cervical or supraclavicular lymphadenopathy.  No palpable axillary lymphadenopathy  Lungs: Clear to all station bilaterally  Cardiovascular: Regular rate and rhythm  Abdomen: Soft nontender no palpable hepatosplenomegaly or other masses  Extremities: No edema from the ankles down    Labs:  Results for SUMAN FRAZIER (MRN [id number]) as of 8/25/2020 11:08   Ref. Range 8/25/2020 10:33   Sodium Latest Ref Range: 133 - 144 mmol/L 137   Potassium Latest Ref Range: 3.4 - 5.3 mmol/L 4.3   Chloride Latest Ref Range: 94 - 109 mmol/L 107   Carbon Dioxide Latest Ref Range: 20 - 32 mmol/L 23   Urea Nitrogen Latest Ref Range: 7 - 30 mg/dL 13   Creatinine Latest Ref Range: 0.66 - 1.25 mg/dL 0.83   GFR Estimate Latest Ref Range: >60 mL/min/1.73_m2 77   GFR Estimate If Black Latest Ref Range: >60 mL/min/1.73_m2 89   Calcium Latest Ref Range: 8.5 - 10.1 mg/dL 8.4 (L)   Anion Gap Latest Ref Range: 3 - 14 mmol/L 7   Albumin Latest Ref Range: 3.4 - 5.0 g/dL 3.4   Protein Total Latest Ref Range: 6.8 - 8.8 g/dL 6.2 (L)   Bilirubin Total Latest Ref Range: 0.2 - 1.3 mg/dL 0.7   Alkaline Phosphatase Latest Ref  Range: 40 - 150 U/L 54   ALT Latest Ref Range: 0 - 70 U/L 32   AST Latest Ref Range: 0 - 45 U/L 12   Glucose Latest Ref Range: 70 - 99 mg/dL 113 (H)   WBC Latest Ref Range: 4.0 - 11.0 10e9/L 7.3   Hemoglobin Latest Ref Range: 13.3 - 17.7 g/dL 13.1 (L)   Hematocrit Latest Ref Range: 40.0 - 53.0 % 36.2 (L)   Platelet Count Latest Ref Range: 150 - 450 10e9/L 125 (L)   RBC Count Latest Ref Range: 4.4 - 5.9 10e12/L 3.65 (L)   MCV Latest Ref Range: 78 - 100 fl 99   MCH Latest Ref Range: 26.5 - 33.0 pg 35.9 (H)   MCHC Latest Ref Range: 31.5 - 36.5 g/dL 36.2   RDW Latest Ref Range: 10.0 - 15.0 % 13.4   Diff Method Unknown Automated Method   % Neutrophils Latest Units: % 69.4   % Lymphocytes Latest Units: % 15.8   % Monocytes Latest Units: % 11.9   % Eosinophils Latest Units: % 1.9   % Basophils Latest Units: % 0.5   % Immature Granulocytes Latest Units: % 0.5   Nucleated RBCs Latest Ref Range: 0 /100 0   Absolute Neutrophil Latest Ref Range: 1.6 - 8.3 10e9/L 5.1   Absolute Lymphocytes Latest Ref Range: 0.8 - 5.3 10e9/L 1.2   Absolute Monocytes Latest Ref Range: 0.0 - 1.3 10e9/L 0.9   Absolute Eosinophils Latest Ref Range: 0.0 - 0.7 10e9/L 0.1   Absolute Basophils Latest Ref Range: 0.0 - 0.2 10e9/L 0.0   Abs Immature Granulocytes Latest Ref Range: 0 - 0.4 10e9/L 0.0   Absolute Nucleated RBC Unknown 0.0       Assessment and plan: 89-year-old gentleman with long-standing history of low-grade B-cell lymphoma going back to 2012 that has been observed over the last 7+ years now with new small neck lymph node 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    1.  Follicular lymphoma:  FLIP scoring system for follicular lymphoma.  Specifically this looks at age, LDH, hemoglobin, and stage and his number of jesse sites.  He would get a point for age, a point for stage of III given above and below diaphragm, but assuming 4 or fewer jesse sites, a normal hemoglobin, and a normal LDH this would give him a 50% chance of survival at 10  years without intervention.  Given his age of 89 this would be quite a good survival even in the absence of his lymphoma.    Imaging from 10/2019 showed minimal disease with small 2 X 1 cm periaortic LN and no other concerning findings. Did have some pulmonary nodules that follow-up was recommended and repeat imaging in 2/2020 showed stable nodules and no concern for ongoing follow-up    At this point we discussed a plan for clinical follow-up only and scans only if he gets no symptoms or exam findings. He is fine with this. I wrote down this plan and printed out all of his recent scans for his records        2. Pulmonary nodules: stable on 2/2020 imaging. No dedicated follow-up needed    3. ID: no current issues  - annual flu shot    4. Heme: WBC and Hgb normal. Platelets mildly low but hover around 150 over the last few years. No intervention needed    5. FEN/Renal: WNL    6. Liver: LFTs WNL    Final Plan:  - Beena in 6 months with labs prior    Renata Hargrove MD       Thalidomide Pregnancy And Lactation Text: This medication is Pregnancy Category X and is absolutely contraindicated during pregnancy. It is unknown if it is excreted in breast milk.

## 2020-08-25 NOTE — NURSING NOTE
"Oncology Rooming Note    August 25, 2020 10:39 AM   Daniel Mcnair is a 89 year old male who presents for:    Chief Complaint   Patient presents with     Oncology Clinic Visit     Follicular Lymphoma     Blood Draw     Vitals and blood drawn via VPT by LPN. Pt checked into appt.      Initial Vitals: /54   Pulse 57   Temp 98.3  F (36.8  C) (Oral)   Resp 18   Wt 79.4 kg (175 lb 1.6 oz)   SpO2 97%   BMI 25.12 kg/m   Estimated body mass index is 25.12 kg/m  as calculated from the following:    Height as of 2/17/20: 1.778 m (5' 10\").    Weight as of this encounter: 79.4 kg (175 lb 1.6 oz). Body surface area is 1.98 meters squared.  No Pain (0) Comment: Data Unavailable   No LMP for male patient.  Allergies reviewed: Yes  Medications reviewed: Yes    Medications: Medication refills not needed today.  Pharmacy name entered into EPIC:    [facility]  [id number] [place], [place] - [address]  [id number] - [place], [place] - [address]  [facility] - [place], [place] - [address] [place] [address]    Clinical concerns: No new concerns today. Dr. Hargrove was notified.      Maliha Mijares MA            "

## 2020-08-25 NOTE — LETTER
8/25/2020         RE: Daniel Mcnair  [id number]  [place] [address]        Dear Colleague,    Thank you for referring your patient, Daniel Mcnair, to the [facility]. Please see a copy of my visit note below.    Chief Complaint   Patient presents with     Oncology Clinic Visit     Follicular Lymphoma     Blood Draw     Vitals and blood drawn via VPT by LPN. Pt checked into appt.      DOLLY Kepm New Patient Consult  Aug 25, 2020      Reason for Visit: follow-up low grade lymphoma    Disease and Treatment History:  1.  Found to have incidental retroperitoneal lymphadenopathy on CT scan done for other reasons in 2012  -Needle biopsy completed and it was felt to be consistent with low-grade lymphoma not otherwise specified as the piece of tissue was to small to give a specific histologic subtype  -PET/CT on 1/27/2012 showed multiple enlarged hypermetabolic retroperitoneal lymph nodes with one left periaortic lymph node measuring 2.7 x 2.1, a conglomerate mass of 2 lymph nodes measuring 4.7 x 2.4, this had an SUV of 6.7.  A periaortic lymph node on the left measuring 2 x 1.7, and intra-aortic caval lymph node measuring 1.4 x 1.4 cm with an SUV of 5.9.  No bone uptake.  -Bone marrow biopsy on 1/26/2012 showed 20% cellularity with a single very small interstitial infiltrate of CD positive small B cells comprising less than 1% which could be low level involvement although not definitive.  2.  Observation 2012 forward  -Intermittent scans with Dr. Rojas showed all stable retroperitoneal lymphadenopathy actually somewhat smaller on last imaging in 11/2018 at 2.5 cm or less.  - Actually discharged from clinic in 11/2018 due to stability for over 6 years.    HPI: I met Mr. Mcnair in oncology clinic in 10/2019 for new neck SUNI noted with an ultrasound on 8/23/2019 that showed a 1.7 x 1.1 x 1.7 cm lymph node in the left that was felt to  be possible suspicious for lymphoma.  He underwent CT scan on 9/3/2019 that showed a prominent 1.3 cm lymph node left level 1B and a 1.3 cm pretracheal lymph node as well.  Fine-needle aspiration attempted in ENT clinic on 911 was nondiagnostic and subsequently underwent surgical biopsy on 9/20/2019.  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    We imaged his C/A/P at that time and there were some mild pulmonary nodules. I saw him last in February at which point we were following some pulmonary nodules which were stable. Today he comes in for clinical follow-up. He notes that he has had some anxiety over the last few months with COVID but meditation and nain has helped with this. No new lumps or bumps. No GI issues. No bleeding. Has had some issues with eyes and hearing but otherwise doing ok. Had avoided infections. Wondered exactly what he was seeing me for today for follow-up     10 point review of systems otherwise negative      Physical exam:  /54   Pulse 57   Temp 98.3  F (36.8  C) (Oral)   Resp 18   Wt 79.4 kg (175 lb 1.6 oz)   SpO2 97%   BMI 25.12 kg/m    General: Very pleasant gentleman in no acute distress.  Looks much younger than his stated age of 89.  KPS 80-90  HEENT: No icterus or injection.  Oropharynx is clear. No abnormal cervical or supraclavicular lymphadenopathy.  No palpable axillary lymphadenopathy  Lungs: Clear to all station bilaterally  Cardiovascular: Regular rate and rhythm  Abdomen: Soft nontender no palpable hepatosplenomegaly or other masses  Extremities: No edema from the ankles down    Labs:  Results for SUMAN FRAZIER (MRN [id number]) as of 8/25/2020 11:08   Ref. Range 8/25/2020 10:33   Sodium Latest Ref Range: 133 - 144 mmol/L 137   Potassium Latest Ref Range: 3.4 - 5.3 mmol/L 4.3   Chloride Latest Ref Range: 94 - 109 mmol/L 107   Carbon Dioxide Latest Ref Range: 20 - 32 mmol/L 23   Urea Nitrogen Latest Ref Range: 7 -  30 mg/dL 13   Creatinine Latest Ref Range: 0.66 - 1.25 mg/dL 0.83   GFR Estimate Latest Ref Range: >60 mL/min/1.73_m2 77   GFR Estimate If Black Latest Ref Range: >60 mL/min/1.73_m2 89   Calcium Latest Ref Range: 8.5 - 10.1 mg/dL 8.4 (L)   Anion Gap Latest Ref Range: 3 - 14 mmol/L 7   Albumin Latest Ref Range: 3.4 - 5.0 g/dL 3.4   Protein Total Latest Ref Range: 6.8 - 8.8 g/dL 6.2 (L)   Bilirubin Total Latest Ref Range: 0.2 - 1.3 mg/dL 0.7   Alkaline Phosphatase Latest Ref Range: 40 - 150 U/L 54   ALT Latest Ref Range: 0 - 70 U/L 32   AST Latest Ref Range: 0 - 45 U/L 12   Glucose Latest Ref Range: 70 - 99 mg/dL 113 (H)   WBC Latest Ref Range: 4.0 - 11.0 10e9/L 7.3   Hemoglobin Latest Ref Range: 13.3 - 17.7 g/dL 13.1 (L)   Hematocrit Latest Ref Range: 40.0 - 53.0 % 36.2 (L)   Platelet Count Latest Ref Range: 150 - 450 10e9/L 125 (L)   RBC Count Latest Ref Range: 4.4 - 5.9 10e12/L 3.65 (L)   MCV Latest Ref Range: 78 - 100 fl 99   MCH Latest Ref Range: 26.5 - 33.0 pg 35.9 (H)   MCHC Latest Ref Range: 31.5 - 36.5 g/dL 36.2   RDW Latest Ref Range: 10.0 - 15.0 % 13.4   Diff Method Unknown Automated Method   % Neutrophils Latest Units: % 69.4   % Lymphocytes Latest Units: % 15.8   % Monocytes Latest Units: % 11.9   % Eosinophils Latest Units: % 1.9   % Basophils Latest Units: % 0.5   % Immature Granulocytes Latest Units: % 0.5   Nucleated RBCs Latest Ref Range: 0 /100 0   Absolute Neutrophil Latest Ref Range: 1.6 - 8.3 10e9/L 5.1   Absolute Lymphocytes Latest Ref Range: 0.8 - 5.3 10e9/L 1.2   Absolute Monocytes Latest Ref Range: 0.0 - 1.3 10e9/L 0.9   Absolute Eosinophils Latest Ref Range: 0.0 - 0.7 10e9/L 0.1   Absolute Basophils Latest Ref Range: 0.0 - 0.2 10e9/L 0.0   Abs Immature Granulocytes Latest Ref Range: 0 - 0.4 10e9/L 0.0   Absolute Nucleated RBC Unknown 0.0       Assessment and plan: 89-year-old gentleman with long-standing history of low-grade B-cell lymphoma going back to 2012 that has been observed over the  last 7+ years now with new small neck lymph node 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    1.  Follicular lymphoma:  FLIP scoring system for follicular lymphoma.  Specifically this looks at age, LDH, hemoglobin, and stage and his number of jesse sites.  He would get a point for age, a point for stage of III given above and below diaphragm, but assuming 4 or fewer jesse sites, a normal hemoglobin, and a normal LDH this would give him a 50% chance of survival at 10 years without intervention.  Given his age of 89 this would be quite a good survival even in the absence of his lymphoma.    Imaging from 10/2019 showed minimal disease with small 2 X 1 cm periaortic LN and no other concerning findings. Did have some pulmonary nodules that follow-up was recommended and repeat imaging in 2/2020 showed stable nodules and no concern for ongoing follow-up    At this point we discussed a plan for clinical follow-up only and scans only if he gets no symptoms or exam findings. He is fine with this. I wrote down this plan and printed out all of his recent scans for his records        2. Pulmonary nodules: stable on 2/2020 imaging. No dedicated follow-up needed    3. ID: no current issues  - annual flu shot    4. Heme: WBC and Hgb normal. Platelets mildly low but hover around 150 over the last few years. No intervention needed    5. FEN/Renal: WNL    6. Liver: LFTs WNL    Final Plan:  - Beena in 6 months with labs prior    Renata Hargrove MD

## 2020-09-21 NOTE — PROGRESS NOTES
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   Describes having a scratching and burning each eye, comes and goes. Lubricating drops seem to help. Irritation bothers the patient like he wants to scratch the eyes, but he tries to avoid that. Feels like his eyes become easily fatigued with reading, even with an additional light.     Interval History:  Last visit with me on 9/2019. Warm compresses in the AM. Using PFATs which help with scratching/burning sensation in both eyes. He has not yet tried the baby shampoo lid scrubs. At night, he develops mattering around the eyes and needs to wipe them away. Last night, he had a scintillating scotoma in both eyes that lasted less than an hour - history of similar and there was nothing unusual about this episode. He feels like his vision is a little more blurry compared to last year. No significant epiphora.     POH:   -s/p CE/IOL each eye ~2005  -dry AMD each eye on AREDSAshley - follows with [facility] q6 months  -BUL blepharoplasty    Gtts:  Preservative-free tears - Refresh - a few times during the day   Lubricating ointment before naps and at bedtime each eye   WC/LH - with baby shampoo (doing 1x/day)  Omega-3 fish oil    Occasional wipes with a washcloth - no scrubs or warm water.   Sleeps with sock over the eyes - to darken his surroundings  No sleep apnea or face mask   Sleeping mostly on left side, not on stomach     All:  NKDA    A/P:  1. Dry eye disease, each eye  -surfaces look reasonable, Continue topical lubricating drops  -Continue preservative-free lubricating tears to every 3-4 hours each eye  -continue lubricating ointment pre-nap and at bedtime OU  -Continue PO omega-3 fatty acid supplementation and/or flax seed oil  -Continue warm compresses and lid scrubs with baby shampoo to BID  -Reinforced lid hygiene   -continue frequent breaks  while reading  -continue night mask  -Consider punctal plugs if conservative measures fail    2. Conjunctivochalasis, each eye  -lubrication as above  -have discussed conjunctivochalasis excision in the past, but not very symptomatic  -may be contributing to tearing - monitor for now  -If increased surface/eyelid conservative measures fail, check lacrimal drainage at follow-up - if open, consider conjunctivochalasis excision     3. Pseudophakia, each eye  -stable IOLs each eye  -monitor    4. Dry AMD, each eye  -follows with [facility] q6 months  -continue AREDs, Amsler grid  -reviewed return precautions    5. Dermatochalasis, each eye  -s/p BULB w/ Mokhtarzadeh on 5/17/19  -Doing well, stable    F/u 6 months    Ezra Yanes MD  Ophthalmology PGY-3  [facility]      Attending Physician Attestation:  Complete documentation of historical and exam elements from today's encounter can be found in the full encounter summary report (not reduplicated in this progress note).  I personally obtained the chief complaint(s) and history of present illness.  I confirmed and edited as necessary the review of systems, past medical/surgical history, family history, social history, and examination findings as documented by others; and I examined the patient myself.  I personally reviewed the relevant tests, images, and reports as documented above.  I formulated and edited as necessary the assessment and plan and discussed the findings and management plan with the patient and family. - Jhoan Owens MD    I personally spent great than 40min with the patient, of which >50% of the time was spent face to face with the patient, counseling and coordinating care with the patient. We discussed the complexity of his diagnosis, the need for further information prior to proceeding with yet another surgery, and the unknown prognosis for the patient at this time. Discussed conjunctivochalasis excision excessively; patient wishes to  defer.    Jhoan Owens MD

## 2020-11-25 NOTE — PROGRESS NOTES
AUDIOLOGY REPORT    BACKGROUND INFORMATION: Daniel Mcnair [facility] for follow[facility]up.  The patient has been seen previously on 2/02/18 and results revealed a mild to moderately-severe sensorineural hearing loss bilaterally.  The patient was fit with Signia Pure 13 Nx hearing aids on 6/04/2018.  The patient reports that his right hearing aid is not working.    TEST RESULTS AND PROCEDURES: An electroacoustic hearing aid check was performed.  Adjustments were made including replacing the wax traps and domes and the patient reported good sound. The hearing aid conformity evaluation was completed. This includes initially evaluating the devices electroacoustically and later matching NAL-NL2 target with soft sounds audible, moderate sounds comfortable and clear, and loud sounds below discomfort. The patient was counseled on care and maintenance of his devices.    SUMMARY AND RECOMMENDATIONS: A hearing aid check was performed today and the patient reports good sound following cleaning. It is recommended that the patient follow-up for a hearing evaluation. Adjustments were made as noted above and the patient will return as needed or at least every 4-6 months for cleaning and assessment of hearing aid.  Call this clinic with questions regarding today s visit.    Tashia Chisholm.  Doctor of Audiology  [place] License # [id number]

## 2020-12-21 NOTE — PROGRESS NOTES
Chief complaint: bradycardia, h/o CAD     HPI:   Daniel Mcnair is a 89 year old male with history of HTN, HL, and CAD s/p PCI to LAD/D2 (5 stents total) 5/2002-6/2003 last seen by me 12/2017 who presents for follow up of his chronic cardiovascular conditions.      Cardiac history:  His prior cardiac history includes several PCIs (5 stents total) between 5/2002 and 3/2003; his last PCI was 3/2003 at which time he had PCI to mLAD (in-stent restenosis) and to D2; he recalls having new-onset exertional dyspnea while playing tennis at that time. He was last seen by Dr. Guajardo in 2013 for a preoperative visit and was asymptomatic at that time. He underwent regadenoson SPECT which showed a small area of apical reversible photopenia-- ischemia versus artifact. He was asymptomatic and no further workup was pursued.   In 2017, he had 48h Holter (see below) to investigate bradycardia which documented excellent chronotropic competence, no symptomatic bradycardia, and no prolonged pauses or high-grade AV block. ABIs (see below) showed no evidence of significant lower extremity PAD.)   He does have chronic bilateral leg edema, worse at the end of the day and after prolonged standing, which has been attributed to venous insufficiency and for which he uses compression hose.      Interval history:  He continues to have chronic bilateral lower extremity edema which is well-controlled with compression hose.      ROS is otherwise positive for lower back and bilateral thigh pain, worse with prolonged standing, which is chronic.     He denies any chest pain, dyspnea at rest or with exertion, PND, orthopnea, palpitations, lightheadedness or syncope.     Interval history (12/22/20):  Since his last visit he was admitted to [facility] for a prosthetic hip dislocation managed with a closed fixation. During this hospitalization, he was noted to be in atrial fibrillation. Cardiology was consulted and he was started on  apixaban.    Today, patient reports that he has no new cardiac symptoms. No new LEZAMA, SOB, CP. He has occasional lightheadedness, dizziness with position change. No syncope. No bleeding issues.    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     Macular degeneration, dry      Other and unspecified hyperlipidemia 7/21/2011     Snoring      Spinal stenosis, lumbar region, without neurogenic claudication 7/21/2011     Stented coronary artery 2003    x5; two separate times     Unspecified essential hypertension 7/21/2011       CURRENT MEDICATIONS:  Current Outpatient Medications   Medication Sig Dispense Refill     acetaminophen (TYLENOL) 325 MG tablet Take 2 tablets (650 mg) by mouth every 6 hours as needed for pain 100 tablet 0     amLODIPine (NORVASC) 10 MG tablet Take 1 tablet (10 mg) by mouth daily 90 tablet 3     Aspirin Buf,CaCarb-MgCarb-MgO, 81 MG TABS Take 81 mg by mouth       atorvastatin (LIPITOR) 20 MG tablet Take 1 tablet (20 mg) by mouth daily 90 tablet 3     benazepril (LOTENSIN) 40 MG tablet Take 1 tablet (40 mg) by mouth daily 90 tablet 1     FISH OIL-KRILL OIL PO Take 2,000 mg by mouth daily       Magnesium Hydroxide (MILK OF MAGNESIA PO) Take by mouth At Bedtime       metoprolol succinate ER (TOPROL-XL) 25 MG 24 hr tablet Take 0.5 tablets (12.5 mg) by mouth daily DO NOT CRUSH. Tablet may be split in half along score line. 45 tablet 3     Multiple Vitamins-Minerals (VITEYES AREDS FORMULA/LUTEIN) CAPS Take by mouth daily       tamsulosin  (FLOMAX) 0.4 MG capsule Take 2 capsules (0.8 mg) by mouth At Bedtime 180 capsule 3     tolterodine (DETROL) 2 MG tablet Take one tab by mouth in the morning and 2 tabs by mouth in the evening 270 tablet 3     bisacodyl (DULCOLAX) 10 MG suppository Place 1 suppository rectally daily as needed. (Patient not taking: Reported on 12/22/2020) 3 suppository 0     carboxymethylcellulose PF (REFRESH PLUS) 0.5 % SOLN ophthalmic solution 1 drop 3 times daily as needed       Carboxymethylcellulose Sodium 1 % GEL Apply to eye as needed       docusate sodium 100 MG tablet Take 100 mg by mouth 2 times daily 60 tablet 1     famotidine (PEPCID) 20 MG tablet Take 1 tablet (20 mg) by mouth 2 times daily 180 tablet 1     Guar Gum (BENEFIBER) packet Take 1 packet by mouth every morning        nitroglycerin (NITROSTAT) 0.4 MG sublingual tablet Place 1 tablet (0.4 mg) under the tongue every 5 minutes as needed (Patient not taking: Reported on 12/22/2020) 30 tablet 2     sildenafil (VIAGRA) 100 MG tablet Take 1 tablet (100 mg) by mouth daily as needed (Patient not taking: Reported on 12/22/2020) 10 tablet 2       PAST SURGICAL HISTORY:  Past Surgical History:   Procedure Laterality Date     ARTHROPLASTY HIP  10/2008    left     ARTHROPLASTY HIP  1999    right     ARTHROPLASTY REVISION HIP  1/21/2014    Procedure: ARTHROPLASTY REVISION HIP;  Revision Left Total Hip;  Surgeon: Edgar Grewal MD;  Location:  OR     BACK SURGERY  2006     BLEPHAROPLASTY BILATERAL Bilateral 5/17/2019    Procedure: BLEPHAROPLASTY BILATERAL UPPER LIDS;  Surgeon: America Melo MD;  Location:  OR     CARDIAC SURGERY  2001    stent x 5      CARDIAC SURGERY  2002     CATARACT IOL, RT/LT Bilateral      CYSTOSCOPY, TRANSURETHRAL RESECTION (TUR) PROSTATE, COMBINED  11/5/2013    Procedure: COMBINED CYSTOSCOPY, TRANSURETHRAL RESECTION (TUR) PROSTATE;  Transurethral Resection Of Prostate, Bipolar;  Surgeon: Lai Strete MD;  Location: [facility]      ESOPHAGOSCOPY, GASTROSCOPY, DUODENOSCOPY (EGD), COMBINED  2/3/2014    Procedure: COMBINED ESOPHAGOSCOPY, GASTROSCOPY, DUODENOSCOPY (EGD);;  Surgeon: Ezra Fiore MD;  [facility]: [facility]     ESOPHAGOSCOPY, GASTROSCOPY, DUODENOSCOPY (EGD), COMBINED  4/15/2014    Procedure: COMBINED ESOPHAGOSCOPY, GASTROSCOPY, DUODENOSCOPY (EGD), BIOPSY SINGLE OR MULTIPLE;  Surgeon: Ezra Fiore MD;  Location: [facility]     EXTRACAPSULAR CATARACT EXTRATION WITH INTRAOCULAR LENS IMPLANT  2005     EXTRACAPSULAR CATARACT EXTRATION WITH INTRAOCULAR LENS IMPLANT  2010     HERNIA REPAIR       INJECT NERVE BLOCK LUMBAR PARAVERTEBRAL SYMPATHETIC Left 8/29/2018    Procedure: INJECT NERVE BLOCK LUMBAR PARAVERTEBRAL SYMPATHETIC;  Left L3, L4, sacral Ala medial branch block;  Surgeon: Ronaldo Hicks MD;  [facility]     INJECT SACROILIAC JOINT Right 10/8/2019    Procedure: INJECTION, SACROILIAC JOINT;  Surgeon: Ronaldo Hicks MD;  [facility]     IR LYMPH NODE BIOPSY  9/20/2019     MASTOID SURGERY  1939     MOHS MICROGRAPHIC PROCEDURE       OPTICAL TRACKING SYSTEM FUSION SPINE POSTERIOR LUMBAR TWO LEVELS  7/11/2013    Procedure: OPTICAL TRACKING SYSTEM FUSION SPINE POSTERIOR LUMBAR TWO LEVELS;  Stealth Assisted Lumbar 3-4 Transforaminal Lumbar Interbody Fusion, Lumbar 2-3 Lumbar 3-4 Laminectomy Decompression;  Surgeon: Edgar Lew MD;  Location: [facility]     psoris Bilateral 2019       ALLERGIES:     Allergies   Allergen Reactions     Nka [No Known Allergies]        FAMILY HISTORY:  Family History   Problem Relation Age of Onset     Diabetes Maternal Grandfather      Alcohol/Drug Maternal Grandfather      Arthritis Maternal Grandfather      Obesity Maternal Grandfather      Cerebrovascular Disease Maternal Grandmother      Hypertension Mother         willa rand     Glaucoma No family hx of      Macular Degeneration No family hx of      Cancer No family hx of         No known family hx of skin cancer     Skin Cancer  "No family hx of      No family history of premature CAD or sudden death.    SOCIAL HISTORY:  Social History     Tobacco Use     Smoking status: Never Smoker     Smokeless tobacco: Never Used   Substance Use Topics     Alcohol use: Yes     Alcohol/week: 1.7 standard drinks     Comment: occ     Drug use: No       ROS:   A comprehensive 14 point review of systems is negative other than as mentioned in HPI.    Exam:  BP (!) 166/73 (BP Location: Right arm, Patient Position: Sitting, Cuff Size: Adult Regular)   Pulse 67   Temp 98.3  F (36.8  C)   Ht 1.778 m (5' 10\")   Wt 78.9 kg (174 lb)   SpO2 98%   BMI 24.97 kg/m    GENERAL APPEARANCE: healthy, alert and no distress  EYES: no icterus, no xanthelasmas  ENT: normal palate, mucosa moist, no central cyanosis  NECK: JVP not elevated  RESPIRATORY: lungs clear to auscultation - no rales, rhonchi or wheezes, no use of accessory muscles, no retractions, respirations are unlabored, normal respiratory rate  CARDIOVASCULAR: regular rhythm, normal S1 with physiologic split S2, no S3 or S4 and no murmur, click or rub.  GI: soft, non tender, bowel sounds normal,no abdominal bruits  EXTREMITIES: no edema, no bruits  NEURO: alert and oriented to person/place/time, normal speech, gait and affect  VASC: Radial, dorsalis pedis and posterior tibialis pulses 2+ bilaterally.  SKIN: no ecchymoses, no rashes.  PSYCH: cooperative, affect appropriate.     DATA:  Labs reviewed.     I personally reviewed and interpreted the following data:    ECG 12/22/20:  -Sinus rhythm with PACs and PVC     Assessment and Plan:     1. Paroxysmal atrial fibrillation, new:  First diagnosed during 12/2020 admission at [facility] for hip dislocation. Was started on Apixaban. Impression is that would recommend 14-day zio patch event monitor in order to assess for afib burden not during an acute illness in order to better assess CVA risk.  For the time being, continue Apixban.    Patient has plans to transfer his " care to Dr. Deutsch at [facility]. Given this, we will hold off on ordering the event monitor at this time.     2. Stable CAD s/p PCI to LAD/D2 9547-4555 without angina:   Stable and free of anginal symptoms. Continue ASA, atorvastatin, beta blocker.     3. HTN: well-controlled on ACEI/amlodipine/beta blocker, continue at present doses.     4. Hyperlipidemia: stable and well-controlled, continue atorvastatin.      5. Bilateral leg edema: exam and history strongly suggest venous insufficiency. Continue use of compression stockings, counseled regarding elevation of legs when sitting.     Plan has been discussed with Dr. Martinez, attending cardiologist.    Heron Rivera MD  Cardiology Fellow    ATTENDING ATTESTATION     Patient was seen and evaluated with Dr. Rivera. History was confirmed personally by me. Labs, imaging studies, EKGs, and telemetry were reviewed. Agree with assessment and plan as outlined above. The note has been edited by me as needed to produce a single, cohesive document.     Santana Martinez MD  Staff Cardiologist    CC  SELF, REFERRED

## 2020-12-22 NOTE — NURSING NOTE
No chief complaint on file.    Vitals were taken and medications were reconciled. EKG was performed    Joann CARRILLO  4:21 PM

## 2020-12-22 NOTE — LETTER
12/22/2020      RE: Daniel Mcnair  [address]  [place] [address]       Dear Colleague,    Thank you for the opportunity to participate in the care of your patient, Daniel Mcnair, at the [facility]. Please see a copy of my visit note below.    Chief complaint: bradycardia, h/o CAD     HPI:   Daniel Mcnair is a 89 year old male with history of HTN, HL, and CAD s/p PCI to LAD/D2 (5 stents total) 5/2002-6/2003 last seen by me 12/2017 who presents for follow up of his chronic cardiovascular conditions.      Cardiac history:  His prior cardiac history includes several PCIs (5 stents total) between 5/2002 and 3/2003; his last PCI was 3/2003 at which time he had PCI to mLAD (in-stent restenosis) and to D2; he recalls having new-onset exertional dyspnea while playing tennis at that time. He was last seen by Dr. Guajardo in 2013 for a preoperative visit and was asymptomatic at that time. He underwent regadenoson SPECT which showed a small area of apical reversible photopenia-- ischemia versus artifact. He was asymptomatic and no further workup was pursued.   In 2017, he had 48h Holter (see below) to investigate bradycardia which documented excellent chronotropic competence, no symptomatic bradycardia, and no prolonged pauses or high-grade AV block. ABIs (see below) showed no evidence of significant lower extremity PAD.)   He does have chronic bilateral leg edema, worse at the end of the day and after prolonged standing, which has been attributed to venous insufficiency and for which he uses compression hose.      Interval history:  He continues to have chronic bilateral lower extremity edema which is well-controlled with compression hose.      ROS is otherwise positive for lower back and bilateral thigh pain, worse with prolonged standing, which is chronic.     He denies any chest pain, dyspnea  at rest or with exertion, PND, orthopnea, palpitations, lightheadedness or syncope.     Interval history (12/22/20):  Since his last visit he was admitted to [facility] for a prosthetic hip dislocation managed with a closed fixation. During this hospitalization, he was noted to be in atrial fibrillation. Cardiology was consulted and he was started on apixaban.    Today, patient reports that he has no new cardiac symptoms. No new LEZAMA, SOB, CP. He has occasional lightheadedness, dizziness with position change. No syncope. No bleeding issues.    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     Macular degeneration, dry      Other and unspecified hyperlipidemia 7/21/2011     Snoring      Spinal stenosis, lumbar region, without neurogenic claudication 7/21/2011     Stented coronary artery 2003    x5; two separate times     Unspecified essential hypertension 7/21/2011       CURRENT MEDICATIONS:  Current Outpatient Medications   Medication Sig Dispense Refill     acetaminophen (TYLENOL) 325 MG tablet Take 2 tablets (650 mg) by mouth every 6 hours as needed for pain 100 tablet 0     amLODIPine (NORVASC) 10 MG tablet Take 1 tablet (10 mg) by mouth daily 90 tablet 3     Aspirin Buf,CaCarb-MgCarb-MgO, 81 MG TABS Take 81 mg by mouth       atorvastatin (LIPITOR) 20 MG tablet Take 1 tablet (20 mg) by mouth daily 90 tablet 3     benazepril (LOTENSIN) 40 MG tablet Take 1 tablet (40 mg) by mouth daily 90 tablet 1     FISH OIL-KRILL OIL PO Take 2,000 mg by mouth daily        Magnesium Hydroxide (MILK OF MAGNESIA PO) Take by mouth At Bedtime       metoprolol succinate ER (TOPROL-XL) 25 MG 24 hr tablet Take 0.5 tablets (12.5 mg) by mouth daily DO NOT CRUSH. Tablet may be split in half along score line. 45 tablet 3     Multiple Vitamins-Minerals (VITEYES AREDS FORMULA/LUTEIN) CAPS Take by mouth daily       tamsulosin (FLOMAX) 0.4 MG capsule Take 2 capsules (0.8 mg) by mouth At Bedtime 180 capsule 3     tolterodine (DETROL) 2 MG tablet Take one tab by mouth in the morning and 2 tabs by mouth in the evening 270 tablet 3     bisacodyl (DULCOLAX) 10 MG suppository Place 1 suppository rectally daily as needed. (Patient not taking: Reported on 12/22/2020) 3 suppository 0     carboxymethylcellulose PF (REFRESH PLUS) 0.5 % SOLN ophthalmic solution 1 drop 3 times daily as needed       Carboxymethylcellulose Sodium 1 % GEL Apply to eye as needed       docusate sodium 100 MG tablet Take 100 mg by mouth 2 times daily 60 tablet 1     famotidine (PEPCID) 20 MG tablet Take 1 tablet (20 mg) by mouth 2 times daily 180 tablet 1     Guar Gum (BENEFIBER) packet Take 1 packet by mouth every morning        nitroglycerin (NITROSTAT) 0.4 MG sublingual tablet Place 1 tablet (0.4 mg) under the tongue every 5 minutes as needed (Patient not taking: Reported on 12/22/2020) 30 tablet 2     sildenafil (VIAGRA) 100 MG tablet Take 1 tablet (100 mg) by mouth daily as needed (Patient not taking: Reported on 12/22/2020) 10 tablet 2       PAST SURGICAL HISTORY:  Past Surgical History:   Procedure Laterality Date     ARTHROPLASTY HIP  10/2008    left     ARTHROPLASTY HIP  1999    right     ARTHROPLASTY REVISION HIP  1/21/2014    Procedure: ARTHROPLASTY REVISION HIP;  Revision Left Total Hip;  Surgeon: Edgar Grewal MD;  [facility]     BACK SURGERY  2006     BLEPHAROPLASTY BILATERAL Bilateral 5/17/2019    Procedure: BLEPHAROPLASTY BILATERAL UPPER LIDS;  Surgeon: America Melo MD;  Location: [facility] OR     CARDIAC  SURGERY  2001    stent x 5      CARDIAC SURGERY  2002     CATARACT IOL, RT/LT Bilateral      CYSTOSCOPY, TRANSURETHRAL RESECTION (TUR) PROSTATE, COMBINED  11/5/2013    Procedure: COMBINED CYSTOSCOPY, TRANSURETHRAL RESECTION (TUR) PROSTATE;  Transurethral Resection Of Prostate, Bipolar;  Surgeon: Lai Street MD;  [facility]     ESOPHAGOSCOPY, GASTROSCOPY, DUODENOSCOPY (EGD), COMBINED  2/3/2014    Procedure: COMBINED ESOPHAGOSCOPY, GASTROSCOPY, DUODENOSCOPY (EGD);;  Surgeon: Ezra Fiore MD;  [facility]     ESOPHAGOSCOPY, GASTROSCOPY, DUODENOSCOPY (EGD), COMBINED  4/15/2014    Procedure: COMBINED ESOPHAGOSCOPY, GASTROSCOPY, DUODENOSCOPY (EGD), BIOPSY SINGLE OR MULTIPLE;  Surgeon: Ezra Fiore MD;  [facility]     EXTRACAPSULAR CATARACT EXTRATION WITH INTRAOCULAR LENS IMPLANT  2005     EXTRACAPSULAR CATARACT EXTRATION WITH INTRAOCULAR LENS IMPLANT  2010     HERNIA REPAIR       INJECT NERVE BLOCK LUMBAR PARAVERTEBRAL SYMPATHETIC Left 8/29/2018    Procedure: INJECT NERVE BLOCK LUMBAR PARAVERTEBRAL SYMPATHETIC;  Left L3, L4, sacral Ala medial branch block;  Surgeon: Ronaldo Hicks MD;  [facility]     INJECT SACROILIAC JOINT Right 10/8/2019    Procedure: INJECTION, SACROILIAC JOINT;  Surgeon: Ronaldo Hicks MD;  [facility]     IR LYMPH NODE BIOPSY  9/20/2019     MASTOID SURGERY  1939     MOHS MICROGRAPHIC PROCEDURE       OPTICAL TRACKING SYSTEM FUSION SPINE POSTERIOR LUMBAR TWO LEVELS  7/11/2013    Procedure: OPTICAL TRACKING SYSTEM FUSION SPINE POSTERIOR LUMBAR TWO LEVELS;  Stealth Assisted Lumbar 3-4 Transforaminal Lumbar Interbody Fusion, Lumbar 2-3 Lumbar 3-4 Laminectomy Decompression;  Surgeon: Edgar Lew MD;  [facility]: [facility]     psoris Bilateral 2019       ALLERGIES:     Allergies   Allergen Reactions     Nka [No Known Allergies]        FAMILY HISTORY:  Family History   Problem Relation Age of Onset     Diabetes Maternal Grandfather       "Alcohol/Drug Maternal Grandfather      Arthritis Maternal Grandfather      Obesity Maternal Grandfather      Cerebrovascular Disease Maternal Grandmother      Hypertension Mother         willa zelaya     Glaucoma No family hx of      Macular Degeneration No family hx of      Cancer No family hx of         No known family hx of skin cancer     Skin Cancer No family hx of      No family history of premature CAD or sudden death.    SOCIAL HISTORY:  Social History     Tobacco Use     Smoking status: Never Smoker     Smokeless tobacco: Never Used   Substance Use Topics     Alcohol use: Yes     Alcohol/week: 1.7 standard drinks     Comment: occ     Drug use: No       ROS:   A comprehensive 14 point review of systems is negative other than as mentioned in HPI.    Exam:  BP (!) 166/73 (BP Location: Right arm, Patient Position: Sitting, Cuff Size: Adult Regular)   Pulse 67   Temp 98.3  F (36.8  C)   Ht 1.778 m (5' 10\")   Wt 78.9 kg (174 lb)   SpO2 98%   BMI 24.97 kg/m    GENERAL APPEARANCE: healthy, alert and no distress  EYES: no icterus, no xanthelasmas  ENT: normal palate, mucosa moist, no central cyanosis  NECK: JVP not elevated  RESPIRATORY: lungs clear to auscultation - no rales, rhonchi or wheezes, no use of accessory muscles, no retractions, respirations are unlabored, normal respiratory rate  CARDIOVASCULAR: regular rhythm, normal S1 with physiologic split S2, no S3 or S4 and no murmur, click or rub.  GI: soft, non tender, bowel sounds normal,no abdominal bruits  EXTREMITIES: no edema, no bruits  NEURO: alert and oriented to person/place/time, normal speech, gait and affect  VASC: Radial, dorsalis pedis and posterior tibialis pulses 2+ bilaterally.  SKIN: no ecchymoses, no rashes.  PSYCH: cooperative, affect appropriate.     DATA:  Labs reviewed.     I personally reviewed and interpreted the following data:    ECG 12/22/20:  -Sinus rhythm with PACs and PVC     Assessment and Plan:     1. Paroxysmal atrial " fibrillation, new:  First diagnosed during 12/2020 admission at [facility] for hip dislocation. Was started on Apixaban. Impression is that would recommend 14-day zio patch event monitor in order to assess for afib burden not during an acute illness in order to better assess CVA risk.  For the time being, continue Apixban.    Patient has plans to transfer his care to Dr. Deutsch at [facility]. Given this, we will hold off on ordering the event monitor at this time.     2. Stable CAD s/p PCI to LAD/D2 9511-9806 without angina:   Stable and free of anginal symptoms. Continue ASA, atorvastatin, beta blocker.     3. HTN: well-controlled on ACEI/amlodipine/beta blocker, continue at present doses.     4. Hyperlipidemia: stable and well-controlled, continue atorvastatin.      5. Bilateral leg edema: exam and history strongly suggest venous insufficiency. Continue use of compression stockings, counseled regarding elevation of legs when sitting.     Plan has been discussed with Dr. Martinez, attending cardiologist.    Heron Rivera MD  Cardiology Fellow    ATTENDING ATTESTATION     Patient was seen and evaluated with Dr. Rivera. History was confirmed personally by me. Labs, imaging studies, EKGs, and telemetry were reviewed. Agree with assessment and plan as outlined above. The note has been edited by me as needed to produce a single, cohesive document.       Please do not hesitate to contact me if you have any questions/concerns.     Sincerely,     Santana Martinez MD

## 2020-12-22 NOTE — PATIENT INSTRUCTIONS
You were seen at the [facility] clinic today.  You saw Dr. Santana Martinez  Here are your Instructions:    1. Continue apixaban (Eliquis) at current dose. If you need to have a spinal injection (or other procedure/surgery), it is OK to hold apixaban for 2 days before and restart it as soon as the doctor doing the procedure says it is safe to restart.     2. We would recommend a 14-day Zio Patch heart rhythm monitor, but this would be better for your new cardiologist, Dr. Paresh Deutsch, to order and follow-up on those results.    3. We will call Dr. Deutsch and convey to him details of your care for a seamless transfer of your heart care.      If you have questions after this visit:  Send a [employer] message or contact Channing Ambriz LPN  Office:  [phone] option #1, then #4 & ask for Channing (nurse line)  Fax:  [phone]  After Hours:  [phone] option #4 ask to speak to he on-call Cardiologist  Appointments:  [phone] option #1, then option #1

## 2021-01-01 ENCOUNTER — TELEPHONE (OUTPATIENT)
Dept: OPHTHALMOLOGY | Facility: CLINIC | Age: 86
End: 2021-01-01

## 2021-01-01 ENCOUNTER — TELEPHONE (OUTPATIENT)
Dept: CARDIOLOGY | Facility: CLINIC | Age: 86
End: 2021-01-01

## 2021-01-01 ENCOUNTER — HEALTH MAINTENANCE LETTER (OUTPATIENT)
Age: 86
End: 2021-01-01

## 2021-01-01 DIAGNOSIS — H35.3132 INTERMEDIATE STAGE NONEXUDATIVE AGE-RELATED MACULAR DEGENERATION OF BOTH EYES: Primary | ICD-10-CM

## 2021-02-10 NOTE — TELEPHONE ENCOUNTER
[facility] Call Center    Phone Message    May a detailed message be left on voicemail: yes     Reason for Call: Other: Pt calling because he explains that his cardiovascular care has been transferred to  at [facility]. Dr. Martinez is aware.    Action Taken: Message routed to:  [facility] ([facility] cardio    Travel Screening: Not Applicable

## 2021-02-11 NOTE — TELEPHONE ENCOUNTER
Spoke to pt at [phone]    Reviewed medical records department would assist in records release per patient request  Provided [phone] number to contact   Pt verbally demonstrated understanding    Zheng Moon RN 3:08 PM 02/11/21          [facility] Call Center    Phone Message    May a detailed message be left on voicemail: yes     Reason for Call: Form or Letter   Type or form/letter needing completion: Records   Provider: Dr Roman Mixon form needed: ASAP  Once completed: Fax form to: [phone]      Action Taken: Message routed to:  [facility]     Travel Screening: Not Applicable

## 2021-06-30 ENCOUNTER — PATIENT OUTREACH (OUTPATIENT)
Dept: ONCOLOGY | Facility: CLINIC | Age: 86
End: 2021-06-30

## 2022-09-02 NOTE — NURSING NOTE
Chief Complaint   Patient presents with     RECHECK     FOLLOW UP MRI     Malathi Daily    
4 = No assist / stand by assistance

## 2024-01-25 NOTE — NURSING NOTE
Chief Complaint   Patient presents with     Pain     pt states he has pain in left hip, right lower back, and into thighs, aching as he walks     Ear Problem     pt thinks he has wax build up in both ears     Forms     pt has forms to be filled out       Milady Javier CMA at 1:04 PM on 8/20/2019.   Patient may eat per . Meal tray ordered.

## 2024-04-01 NOTE — NURSING NOTE
Chief Complaints and History of Present Illnesses   Patient presents with     Follow Up     Chief Complaint(s) and History of Present Illness(es)     Follow Up     Laterality: both eyes    Associated symptoms: dryness.  Negative for eye pain, redness and tearing    Pain scale: 0/10              Comments     Patient returns to clinic for a follow up of Dry eye disease, each eye s/p Bilateral upper blepharoplasty on 5/17/2019.  He states that his vision seems a bit better since his lid surgeries one month ago.  Both eyes seem intermittently dry.  Using artificial tears does resolve the discomfort.    Albertina Alvarado COT 1:03 PM June 17, 2019                   
[FreeTextEntry2] : NCS B/L UE/LE

## 2024-06-17 PROBLEM — Z71.89 ADVANCED DIRECTIVES, COUNSELING/DISCUSSION: Status: RESOLVED | Noted: 2017-03-28 | Resolved: 2024-06-17

## 2024-12-02 NOTE — NURSING NOTE
Chief Complaint(s) and History of Present Illness(es)     Macular Degeneration Follow Up     Laterality: both eyes    Onset: gradual    Onset: 3 months ago    Quality: blurred vision    Frequency: intermittently    Timing: throughout the day    Associated symptoms: dryness.  Negative for eye pain, redness and tearing      Treatments tried: artificial tears    Response to treatment: moderate improvement              Comments     Pt is here for a 6 month f/u for Dry AMD, BE. Pt states in the last 3-6   months there has been a gradual decrease in near vision. Pt notes needing   more light to see to read x 3 months. Also, pt states that people on TV   are blurred with glasses on x 3 months. Pt states he is uncomfortable with   driving at night x 1-2 months.      Jory Pitt, COMT 7:57 AM January 14, 2019                  Depression   F32.A   Acute encephalopathy   G93.40

## (undated) DEVICE — NDL SPINAL 22GA 7" QUINCKE 405149

## (undated) DEVICE — GLOVE PROTEXIS MICRO 6.0  2D73PM60

## (undated) DEVICE — TRAY PAIN INJECTION 97A 640

## (undated) DEVICE — PREP CHLORAPREP W/ORANGE TINT 10.5ML 260715

## (undated) DEVICE — GLOVE PROTEXIS POWDER FREE SMT 8.0  2D72PT80X

## (undated) DEVICE — LINEN TOWEL PACK X5 5464

## (undated) DEVICE — SU PLAIN 6-0 G-1DA 18" 770G

## (undated) DEVICE — NDL SPINAL 22GA 3.5" QUINCKE 405181

## (undated) DEVICE — NDL SPINAL 25GA 3.5" QUINCKE 405180

## (undated) DEVICE — PACK OCULOPLATIC SEN15OCFSD

## (undated) DEVICE — ESU EYE HIGH TEMP 65410-183

## (undated) DEVICE — SOL WATER IRRIG 1000ML BOTTLE 2F7114

## (undated) DEVICE — NDL 27GA 1.25" 305136

## (undated) DEVICE — EYE PREP BETADINE 5% SOLUTION 30ML 0065-0411-30

## (undated) DEVICE — SOL NACL 0.9% IRRIG 1000ML BOTTLE 2F7124

## (undated) DEVICE — GLOVE PROTEXIS W/NEU-THERA 7.5  2D73TE75

## (undated) RX ORDER — ONDANSETRON 2 MG/ML
INJECTION INTRAMUSCULAR; INTRAVENOUS
Status: DISPENSED
Start: 2019-05-17

## (undated) RX ORDER — LIDOCAINE HYDROCHLORIDE 20 MG/ML
INJECTION, SOLUTION EPIDURAL; INFILTRATION; INTRACAUDAL; PERINEURAL
Status: DISPENSED
Start: 2019-05-17

## (undated) RX ORDER — TRIAMCINOLONE ACETONIDE 40 MG/ML
INJECTION, SUSPENSION INTRA-ARTICULAR; INTRAMUSCULAR
Status: DISPENSED
Start: 2017-02-01

## (undated) RX ORDER — SODIUM CHLORIDE 9 MG/ML
INJECTION, SOLUTION INTRAVENOUS
Status: DISPENSED
Start: 2019-09-20

## (undated) RX ORDER — FENTANYL CITRATE 50 UG/ML
INJECTION, SOLUTION INTRAMUSCULAR; INTRAVENOUS
Status: DISPENSED
Start: 2019-05-17

## (undated) RX ORDER — FENTANYL CITRATE 50 UG/ML
INJECTION, SOLUTION INTRAMUSCULAR; INTRAVENOUS
Status: DISPENSED
Start: 2019-09-20

## (undated) RX ORDER — LIDOCAINE HYDROCHLORIDE 10 MG/ML
INJECTION, SOLUTION EPIDURAL; INFILTRATION; INTRACAUDAL; PERINEURAL
Status: DISPENSED
Start: 2019-09-20

## (undated) RX ORDER — BUPIVACAINE HYDROCHLORIDE 2.5 MG/ML
INJECTION, SOLUTION EPIDURAL; INFILTRATION; INTRACAUDAL
Status: DISPENSED
Start: 2017-02-01